# Patient Record
Sex: MALE | Race: BLACK OR AFRICAN AMERICAN | NOT HISPANIC OR LATINO | Employment: FULL TIME | URBAN - METROPOLITAN AREA
[De-identification: names, ages, dates, MRNs, and addresses within clinical notes are randomized per-mention and may not be internally consistent; named-entity substitution may affect disease eponyms.]

---

## 2017-03-16 DIAGNOSIS — I10 ESSENTIAL HYPERTENSION: ICD-10-CM

## 2017-03-16 DIAGNOSIS — F32.A ANXIETY AND DEPRESSION: ICD-10-CM

## 2017-03-16 DIAGNOSIS — F41.9 ANXIETY AND DEPRESSION: ICD-10-CM

## 2017-03-16 RX ORDER — ESCITALOPRAM OXALATE 10 MG/1
TABLET ORAL
Qty: 90 TABLET | Refills: 0 | Status: SHIPPED | OUTPATIENT
Start: 2017-03-16

## 2017-03-16 RX ORDER — LISINOPRIL 10 MG/1
TABLET ORAL
Qty: 90 TABLET | Refills: 0 | Status: SHIPPED | OUTPATIENT
Start: 2017-03-16

## 2017-03-16 NOTE — TELEPHONE ENCOUNTER
Pt is overdue for annual. Please contact him to schedule this. Will send refill of meds in interim.

## 2018-01-01 ENCOUNTER — TELEPHONE (OUTPATIENT)
Dept: SMOKING CESSATION | Facility: CLINIC | Age: 63
End: 2018-01-01

## 2018-01-01 ENCOUNTER — CLINICAL SUPPORT (OUTPATIENT)
Dept: SMOKING CESSATION | Facility: CLINIC | Age: 63
End: 2018-01-01
Payer: COMMERCIAL

## 2018-01-01 ENCOUNTER — ANESTHESIA EVENT (OUTPATIENT)
Dept: SURGERY | Facility: HOSPITAL | Age: 63
DRG: 003 | End: 2018-01-01
Payer: MEDICAID

## 2018-01-01 ENCOUNTER — ANESTHESIA EVENT (OUTPATIENT)
Dept: INTENSIVE CARE | Facility: HOSPITAL | Age: 63
DRG: 003 | End: 2018-01-01
Payer: MEDICAID

## 2018-01-01 ENCOUNTER — ANESTHESIA (OUTPATIENT)
Dept: SURGERY | Facility: HOSPITAL | Age: 63
DRG: 003 | End: 2018-01-01
Payer: MEDICAID

## 2018-01-01 ENCOUNTER — ANESTHESIA EVENT (OUTPATIENT)
Dept: SURGERY | Facility: HOSPITAL | Age: 63
End: 2018-01-01

## 2018-01-01 ENCOUNTER — HOSPITAL ENCOUNTER (INPATIENT)
Facility: HOSPITAL | Age: 63
LOS: 18 days | DRG: 003 | End: 2018-09-30
Attending: EMERGENCY MEDICINE | Admitting: SURGERY
Payer: MEDICAID

## 2018-01-01 ENCOUNTER — ANESTHESIA (OUTPATIENT)
Dept: INTENSIVE CARE | Facility: HOSPITAL | Age: 63
DRG: 003 | End: 2018-01-01
Payer: MEDICAID

## 2018-01-01 VITALS
HEIGHT: 66 IN | RESPIRATION RATE: 36 BRPM | BODY MASS INDEX: 25.61 KG/M2 | SYSTOLIC BLOOD PRESSURE: 75 MMHG | DIASTOLIC BLOOD PRESSURE: 52 MMHG | OXYGEN SATURATION: 79 % | WEIGHT: 159.38 LBS | TEMPERATURE: 98 F

## 2018-01-01 DIAGNOSIS — I49.9 ARRHYTHMIA: ICD-10-CM

## 2018-01-01 DIAGNOSIS — E16.2 HYPOGLYCEMIA: ICD-10-CM

## 2018-01-01 DIAGNOSIS — K25.4 GASTRIC ULCER WITH HEMORRHAGE, UNSPECIFIED CHRONICITY: ICD-10-CM

## 2018-01-01 DIAGNOSIS — R60.0 PERIPHERAL EDEMA: ICD-10-CM

## 2018-01-01 DIAGNOSIS — F17.200 NICOTINE DEPENDENCE: Primary | ICD-10-CM

## 2018-01-01 DIAGNOSIS — R06.03 RESPIRATORY DISTRESS: ICD-10-CM

## 2018-01-01 DIAGNOSIS — J96.01 ACUTE HYPOXEMIC RESPIRATORY FAILURE: ICD-10-CM

## 2018-01-01 DIAGNOSIS — R06.03 ACUTE RESPIRATORY DISTRESS: ICD-10-CM

## 2018-01-01 DIAGNOSIS — K66.8 PNEUMOPERITONEUM: ICD-10-CM

## 2018-01-01 DIAGNOSIS — R00.0 SINUS TACHYCARDIA: ICD-10-CM

## 2018-01-01 DIAGNOSIS — I51.9 RIGHT VENTRICULAR DYSFUNCTION: ICD-10-CM

## 2018-01-01 DIAGNOSIS — R06.02 SHORTNESS OF BREATH: ICD-10-CM

## 2018-01-01 DIAGNOSIS — D62 ACUTE BLOOD LOSS ANEMIA: ICD-10-CM

## 2018-01-01 DIAGNOSIS — R06.09 EXERTIONAL DYSPNEA: ICD-10-CM

## 2018-01-01 DIAGNOSIS — T50.905A MEDICATION ADVERSE EFFECT: ICD-10-CM

## 2018-01-01 DIAGNOSIS — K57.20 PERFORATED DIVERTICULUM OF LARGE INTESTINE: Primary | ICD-10-CM

## 2018-01-01 DIAGNOSIS — J69.0 ASPIRATION PNEUMONIA OF RIGHT LOWER LOBE DUE TO GASTRIC SECRETIONS: ICD-10-CM

## 2018-01-01 DIAGNOSIS — R00.0 TACHYCARDIA: ICD-10-CM

## 2018-01-01 LAB
ABO + RH BLD: NORMAL
ALBUMIN SERPL BCP-MCNC: 0.8 G/DL
ALBUMIN SERPL BCP-MCNC: 0.9 G/DL
ALBUMIN SERPL BCP-MCNC: 1 G/DL
ALBUMIN SERPL BCP-MCNC: 1.1 G/DL
ALBUMIN SERPL BCP-MCNC: 1.2 G/DL
ALBUMIN SERPL BCP-MCNC: 1.3 G/DL
ALBUMIN SERPL BCP-MCNC: 1.3 G/DL
ALBUMIN SERPL BCP-MCNC: 1.4 G/DL
ALBUMIN SERPL BCP-MCNC: 1.5 G/DL
ALBUMIN SERPL BCP-MCNC: 1.6 G/DL
ALBUMIN SERPL BCP-MCNC: 1.6 G/DL
ALBUMIN SERPL BCP-MCNC: 1.7 G/DL
ALBUMIN SERPL BCP-MCNC: 1.7 G/DL
ALBUMIN SERPL BCP-MCNC: 2 G/DL
ALBUMIN SERPL BCP-MCNC: 2.1 G/DL
ALLENS TEST: ABNORMAL
ALP SERPL-CCNC: 103 U/L
ALP SERPL-CCNC: 107 U/L
ALP SERPL-CCNC: 126 U/L
ALP SERPL-CCNC: 129 U/L
ALP SERPL-CCNC: 131 U/L
ALP SERPL-CCNC: 134 U/L
ALP SERPL-CCNC: 140 U/L
ALP SERPL-CCNC: 144 U/L
ALP SERPL-CCNC: 157 U/L
ALP SERPL-CCNC: 158 U/L
ALP SERPL-CCNC: 167 U/L
ALP SERPL-CCNC: 173 U/L
ALP SERPL-CCNC: 181 U/L
ALP SERPL-CCNC: 183 U/L
ALP SERPL-CCNC: 184 U/L
ALP SERPL-CCNC: 189 U/L
ALP SERPL-CCNC: 204 U/L
ALP SERPL-CCNC: 225 U/L
ALP SERPL-CCNC: 78 U/L
ALP SERPL-CCNC: 81 U/L
ALP SERPL-CCNC: 82 U/L
ALP SERPL-CCNC: 89 U/L
ALT SERPL W/O P-5'-P-CCNC: 14 U/L
ALT SERPL W/O P-5'-P-CCNC: 17 U/L
ALT SERPL W/O P-5'-P-CCNC: 19 U/L
ALT SERPL W/O P-5'-P-CCNC: 19 U/L
ALT SERPL W/O P-5'-P-CCNC: 21 U/L
ALT SERPL W/O P-5'-P-CCNC: 21 U/L
ALT SERPL W/O P-5'-P-CCNC: 22 U/L
ALT SERPL W/O P-5'-P-CCNC: 23 U/L
ALT SERPL W/O P-5'-P-CCNC: 23 U/L
ALT SERPL W/O P-5'-P-CCNC: 24 U/L
ALT SERPL W/O P-5'-P-CCNC: 26 U/L
ALT SERPL W/O P-5'-P-CCNC: 27 U/L
ALT SERPL W/O P-5'-P-CCNC: 28 U/L
ALT SERPL W/O P-5'-P-CCNC: 28 U/L
ALT SERPL W/O P-5'-P-CCNC: 30 U/L
ALT SERPL W/O P-5'-P-CCNC: 36 U/L
ALT SERPL W/O P-5'-P-CCNC: 40 U/L
ANION GAP SERPL CALC-SCNC: 10 MMOL/L
ANION GAP SERPL CALC-SCNC: 4 MMOL/L
ANION GAP SERPL CALC-SCNC: 5 MMOL/L
ANION GAP SERPL CALC-SCNC: 6 MMOL/L
ANION GAP SERPL CALC-SCNC: 7 MMOL/L
ANION GAP SERPL CALC-SCNC: 8 MMOL/L
ANION GAP SERPL CALC-SCNC: 9 MMOL/L
ANION GAP SERPL CALC-SCNC: 9 MMOL/L
ANISOCYTOSIS BLD QL SMEAR: ABNORMAL
ANISOCYTOSIS BLD QL SMEAR: ABNORMAL
ANISOCYTOSIS BLD QL SMEAR: SLIGHT
APTT BLDCRRT: 31.5 SEC
APTT BLDCRRT: 31.6 SEC
APTT BLDCRRT: 32.2 SEC
APTT BLDCRRT: 32.9 SEC
APTT BLDCRRT: 35 SEC
APTT BLDCRRT: 35.7 SEC
APTT BLDCRRT: 43.1 SEC
APTT BLDCRRT: 45.1 SEC
APTT BLDCRRT: 45.2 SEC
APTT BLDCRRT: 45.8 SEC
APTT BLDCRRT: 48.5 SEC
APTT BLDCRRT: 51.7 SEC
APTT BLDCRRT: >150 SEC
APTT BLDCRRT: >150 SEC
AST SERPL-CCNC: 15 U/L
AST SERPL-CCNC: 18 U/L
AST SERPL-CCNC: 22 U/L
AST SERPL-CCNC: 23 U/L
AST SERPL-CCNC: 24 U/L
AST SERPL-CCNC: 26 U/L
AST SERPL-CCNC: 26 U/L
AST SERPL-CCNC: 28 U/L
AST SERPL-CCNC: 28 U/L
AST SERPL-CCNC: 30 U/L
AST SERPL-CCNC: 31 U/L
AST SERPL-CCNC: 31 U/L
AST SERPL-CCNC: 32 U/L
AST SERPL-CCNC: 35 U/L
AST SERPL-CCNC: 36 U/L
AST SERPL-CCNC: 37 U/L
AST SERPL-CCNC: 37 U/L
AST SERPL-CCNC: 38 U/L
AST SERPL-CCNC: 38 U/L
AST SERPL-CCNC: 39 U/L
AST SERPL-CCNC: 42 U/L
AST SERPL-CCNC: 43 U/L
AST SERPL-CCNC: 49 U/L
AST SERPL-CCNC: 54 U/L
AST SERPL-CCNC: 80 U/L
BACTERIA #/AREA URNS AUTO: ABNORMAL /HPF
BACTERIA BAL AEROBE CULT: NORMAL
BACTERIA BLD CULT: NORMAL
BACTERIA SPEC AEROBE CULT: NORMAL
BACTERIA SPEC ANAEROBE CULT: NORMAL
BACTERIA UR CULT: NO GROWTH
BACTERIA UR CULT: NORMAL
BASO STIPL BLD QL SMEAR: ABNORMAL
BASOPHILS # BLD AUTO: 0 K/UL
BASOPHILS # BLD AUTO: 0 K/UL
BASOPHILS # BLD AUTO: 0.01 K/UL
BASOPHILS # BLD AUTO: 0.02 K/UL
BASOPHILS # BLD AUTO: 0.03 K/UL
BASOPHILS # BLD AUTO: 0.04 K/UL
BASOPHILS # BLD AUTO: 0.05 K/UL
BASOPHILS # BLD AUTO: 0.09 K/UL
BASOPHILS # BLD AUTO: 0.14 K/UL
BASOPHILS # BLD AUTO: 0.15 K/UL
BASOPHILS # BLD AUTO: 0.17 K/UL
BASOPHILS # BLD AUTO: ABNORMAL K/UL
BASOPHILS NFR BLD: 0 %
BASOPHILS NFR BLD: 0.1 %
BASOPHILS NFR BLD: 0.2 %
BASOPHILS NFR BLD: 0.3 %
BASOPHILS NFR BLD: 0.5 %
BASOPHILS NFR BLD: 0.6 %
BASOPHILS NFR BLD: 0.6 %
BILIRUB SERPL-MCNC: 0.3 MG/DL
BILIRUB SERPL-MCNC: 0.5 MG/DL
BILIRUB SERPL-MCNC: 0.6 MG/DL
BILIRUB SERPL-MCNC: 0.7 MG/DL
BILIRUB SERPL-MCNC: 0.8 MG/DL
BILIRUB SERPL-MCNC: 1 MG/DL
BILIRUB SERPL-MCNC: 1 MG/DL
BILIRUB SERPL-MCNC: 1.1 MG/DL
BILIRUB SERPL-MCNC: 1.2 MG/DL
BILIRUB SERPL-MCNC: 1.2 MG/DL
BILIRUB SERPL-MCNC: 1.3 MG/DL
BILIRUB SERPL-MCNC: 1.4 MG/DL
BILIRUB SERPL-MCNC: 1.8 MG/DL
BILIRUB SERPL-MCNC: 3 MG/DL
BILIRUB SERPL-MCNC: 3.1 MG/DL
BILIRUB UR QL STRIP: NEGATIVE
BILIRUB UR QL STRIP: NEGATIVE
BLD GP AB SCN CELLS X3 SERPL QL: NORMAL
BLD PROD TYP BPU: NORMAL
BLOOD UNIT EXPIRATION DATE: NORMAL
BLOOD UNIT TYPE CODE: 5100
BLOOD UNIT TYPE CODE: 9500
BLOOD UNIT TYPE: NORMAL
BNP SERPL-MCNC: 110 PG/ML
BODY FLUID SOURCE, CREATININE: NORMAL
BUN SERPL-MCNC: 13 MG/DL
BUN SERPL-MCNC: 13 MG/DL
BUN SERPL-MCNC: 14 MG/DL
BUN SERPL-MCNC: 15 MG/DL
BUN SERPL-MCNC: 15 MG/DL
BUN SERPL-MCNC: 18 MG/DL
BUN SERPL-MCNC: 18 MG/DL
BUN SERPL-MCNC: 20 MG/DL
BUN SERPL-MCNC: 22 MG/DL
BUN SERPL-MCNC: 22 MG/DL
BUN SERPL-MCNC: 23 MG/DL
BUN SERPL-MCNC: 24 MG/DL
BUN SERPL-MCNC: 25 MG/DL
BUN SERPL-MCNC: 27 MG/DL
BUN SERPL-MCNC: 27 MG/DL
BUN SERPL-MCNC: 28 MG/DL
BUN SERPL-MCNC: 30 MG/DL
BUN SERPL-MCNC: 31 MG/DL
BUN SERPL-MCNC: 31 MG/DL
BUN SERPL-MCNC: 32 MG/DL
BUN SERPL-MCNC: 35 MG/DL
BUN SERPL-MCNC: 37 MG/DL
BUN SERPL-MCNC: 37 MG/DL
BUN SERPL-MCNC: 41 MG/DL
BURR CELLS BLD QL SMEAR: ABNORMAL
C DIFF GDH STL QL: NEGATIVE
C DIFF TOX A+B STL QL IA: NEGATIVE
CA-I BLDV-SCNC: 0.92 MMOL/L
CA-I BLDV-SCNC: 1.02 MMOL/L
CA-I BLDV-SCNC: 1.02 MMOL/L
CA-I BLDV-SCNC: 1.03 MMOL/L
CA-I BLDV-SCNC: 1.04 MMOL/L
CA-I BLDV-SCNC: 1.05 MMOL/L
CA-I BLDV-SCNC: 1.07 MMOL/L
CA-I BLDV-SCNC: 1.08 MMOL/L
CA-I BLDV-SCNC: 1.08 MMOL/L
CA-I BLDV-SCNC: 1.09 MMOL/L
CA-I BLDV-SCNC: 1.1 MMOL/L
CA-I BLDV-SCNC: 1.1 MMOL/L
CA-I BLDV-SCNC: 1.13 MMOL/L
CA-I BLDV-SCNC: 1.14 MMOL/L
CA-I BLDV-SCNC: 1.17 MMOL/L
CA-I BLDV-SCNC: 1.19 MMOL/L
CA-I BLDV-SCNC: 1.19 MMOL/L
CA-I BLDV-SCNC: 1.21 MMOL/L
CA-I BLDV-SCNC: 1.37 MMOL/L
CALCIUM SERPL-MCNC: 6 MG/DL
CALCIUM SERPL-MCNC: 6.5 MG/DL
CALCIUM SERPL-MCNC: 6.6 MG/DL
CALCIUM SERPL-MCNC: 6.8 MG/DL
CALCIUM SERPL-MCNC: 7.2 MG/DL
CALCIUM SERPL-MCNC: 7.3 MG/DL
CALCIUM SERPL-MCNC: 7.4 MG/DL
CALCIUM SERPL-MCNC: 7.5 MG/DL
CALCIUM SERPL-MCNC: 7.6 MG/DL
CALCIUM SERPL-MCNC: 7.7 MG/DL
CALCIUM SERPL-MCNC: 7.7 MG/DL
CALCIUM SERPL-MCNC: 7.8 MG/DL
CALCIUM SERPL-MCNC: 7.9 MG/DL
CALCIUM SERPL-MCNC: 8.4 MG/DL
CALCIUM SERPL-MCNC: 8.5 MG/DL
CALCIUM SERPL-MCNC: 8.6 MG/DL
CALCIUM SERPL-MCNC: 8.7 MG/DL
CHLORIDE SERPL-SCNC: 104 MMOL/L
CHLORIDE SERPL-SCNC: 109 MMOL/L
CHLORIDE SERPL-SCNC: 111 MMOL/L
CHLORIDE SERPL-SCNC: 112 MMOL/L
CHLORIDE SERPL-SCNC: 113 MMOL/L
CHLORIDE SERPL-SCNC: 114 MMOL/L
CHLORIDE SERPL-SCNC: 115 MMOL/L
CK MB SERPL-MCNC: 4.1 NG/ML
CK MB SERPL-RTO: 3.5 %
CK SERPL-CCNC: 116 U/L
CLARITY UR REFRACT.AUTO: ABNORMAL
CLARITY UR REFRACT.AUTO: ABNORMAL
CO2 SERPL-SCNC: 18 MMOL/L
CO2 SERPL-SCNC: 19 MMOL/L
CO2 SERPL-SCNC: 20 MMOL/L
CO2 SERPL-SCNC: 21 MMOL/L
CO2 SERPL-SCNC: 22 MMOL/L
CO2 SERPL-SCNC: 22 MMOL/L
CO2 SERPL-SCNC: 23 MMOL/L
CO2 SERPL-SCNC: 24 MMOL/L
CO2 SERPL-SCNC: 24 MMOL/L
CO2 SERPL-SCNC: 25 MMOL/L
CO2 SERPL-SCNC: 26 MMOL/L
CO2 SERPL-SCNC: 27 MMOL/L
CO2 SERPL-SCNC: 27 MMOL/L
CODING SYSTEM: NORMAL
COLOR UR AUTO: ABNORMAL
COLOR UR AUTO: YELLOW
CREAT FLD-MCNC: 0.7 MG/DL
CREAT SERPL-MCNC: 0.6 MG/DL
CREAT SERPL-MCNC: 0.7 MG/DL
CREAT SERPL-MCNC: 0.8 MG/DL
CREAT SERPL-MCNC: 0.9 MG/DL
CREAT SERPL-MCNC: 1 MG/DL
DELSYS: ABNORMAL
DIFFERENTIAL METHOD: ABNORMAL
DISPENSE STATUS: NORMAL
DOHLE BOD BLD QL SMEAR: PRESENT
EOSINOPHIL # BLD AUTO: 0 K/UL
EOSINOPHIL # BLD AUTO: 0.1 K/UL
EOSINOPHIL # BLD AUTO: ABNORMAL K/UL
EOSINOPHIL NFR BLD: 0 %
EOSINOPHIL NFR BLD: 0.1 %
EOSINOPHIL NFR BLD: 0.2 %
EOSINOPHIL NFR BLD: 0.3 %
EOSINOPHIL NFR BLD: 0.4 %
EOSINOPHIL NFR BLD: 0.5 %
EOSINOPHIL NFR BLD: 0.5 %
EOSINOPHIL NFR BLD: 1 %
ERYTHROCYTE [DISTWIDTH] IN BLOOD BY AUTOMATED COUNT: 15.6 %
ERYTHROCYTE [DISTWIDTH] IN BLOOD BY AUTOMATED COUNT: 15.6 %
ERYTHROCYTE [DISTWIDTH] IN BLOOD BY AUTOMATED COUNT: 15.8 %
ERYTHROCYTE [DISTWIDTH] IN BLOOD BY AUTOMATED COUNT: 16.2 %
ERYTHROCYTE [DISTWIDTH] IN BLOOD BY AUTOMATED COUNT: 16.5 %
ERYTHROCYTE [DISTWIDTH] IN BLOOD BY AUTOMATED COUNT: 16.7 %
ERYTHROCYTE [DISTWIDTH] IN BLOOD BY AUTOMATED COUNT: 16.8 %
ERYTHROCYTE [DISTWIDTH] IN BLOOD BY AUTOMATED COUNT: 16.9 %
ERYTHROCYTE [DISTWIDTH] IN BLOOD BY AUTOMATED COUNT: 17 %
ERYTHROCYTE [DISTWIDTH] IN BLOOD BY AUTOMATED COUNT: 17.1 %
ERYTHROCYTE [DISTWIDTH] IN BLOOD BY AUTOMATED COUNT: 17.2 %
ERYTHROCYTE [DISTWIDTH] IN BLOOD BY AUTOMATED COUNT: 17.2 %
ERYTHROCYTE [DISTWIDTH] IN BLOOD BY AUTOMATED COUNT: 17.3 %
ERYTHROCYTE [DISTWIDTH] IN BLOOD BY AUTOMATED COUNT: 17.4 %
ERYTHROCYTE [DISTWIDTH] IN BLOOD BY AUTOMATED COUNT: 17.5 %
ERYTHROCYTE [DISTWIDTH] IN BLOOD BY AUTOMATED COUNT: 17.5 %
ERYTHROCYTE [DISTWIDTH] IN BLOOD BY AUTOMATED COUNT: 17.6 %
ERYTHROCYTE [DISTWIDTH] IN BLOOD BY AUTOMATED COUNT: 17.7 %
ERYTHROCYTE [DISTWIDTH] IN BLOOD BY AUTOMATED COUNT: 17.8 %
ERYTHROCYTE [DISTWIDTH] IN BLOOD BY AUTOMATED COUNT: 17.9 %
ERYTHROCYTE [DISTWIDTH] IN BLOOD BY AUTOMATED COUNT: 17.9 %
ERYTHROCYTE [DISTWIDTH] IN BLOOD BY AUTOMATED COUNT: 18 %
ERYTHROCYTE [DISTWIDTH] IN BLOOD BY AUTOMATED COUNT: 18.1 %
ERYTHROCYTE [DISTWIDTH] IN BLOOD BY AUTOMATED COUNT: 18.2 %
ERYTHROCYTE [DISTWIDTH] IN BLOOD BY AUTOMATED COUNT: 18.3 %
ERYTHROCYTE [DISTWIDTH] IN BLOOD BY AUTOMATED COUNT: 18.4 %
ERYTHROCYTE [DISTWIDTH] IN BLOOD BY AUTOMATED COUNT: 18.6 %
ERYTHROCYTE [DISTWIDTH] IN BLOOD BY AUTOMATED COUNT: 18.6 %
ERYTHROCYTE [DISTWIDTH] IN BLOOD BY AUTOMATED COUNT: 18.7 %
ERYTHROCYTE [DISTWIDTH] IN BLOOD BY AUTOMATED COUNT: 18.7 %
ERYTHROCYTE [SEDIMENTATION RATE] IN BLOOD BY WESTERGREN METHOD: 14 MM/H
ERYTHROCYTE [SEDIMENTATION RATE] IN BLOOD BY WESTERGREN METHOD: 20 MM/H
ERYTHROCYTE [SEDIMENTATION RATE] IN BLOOD BY WESTERGREN METHOD: 24 MM/H
ERYTHROCYTE [SEDIMENTATION RATE] IN BLOOD BY WESTERGREN METHOD: 26 MM/H
ERYTHROCYTE [SEDIMENTATION RATE] IN BLOOD BY WESTERGREN METHOD: 28 MM/H
ERYTHROCYTE [SEDIMENTATION RATE] IN BLOOD BY WESTERGREN METHOD: 30 MM/H
EST. GFR  (AFRICAN AMERICAN): >60 ML/MIN/1.73 M^2
EST. GFR  (NON AFRICAN AMERICAN): >60 ML/MIN/1.73 M^2
ESTIMATED PA SYSTOLIC PRESSURE: 50.77
ESTIMATED PA SYSTOLIC PRESSURE: 51.84
FIBRINOGEN PPP-MCNC: 101 MG/DL
FIBRINOGEN PPP-MCNC: 201 MG/DL
FIBRINOGEN PPP-MCNC: 206 MG/DL
FIBRINOGEN PPP-MCNC: 225 MG/DL
FIBRINOGEN PPP-MCNC: 544 MG/DL
FIBRINOGEN PPP-MCNC: 88 MG/DL
FIO2: 100
FIO2: 40
FIO2: 50
FIO2: 60
FIO2: 70
FIO2: 80
FLOW: 15
GIANT PLATELETS BLD QL SMEAR: PRESENT
GLOBAL PERICARDIAL EFFUSION: ABNORMAL
GLOBAL PERICARDIAL EFFUSION: ABNORMAL
GLUCOSE SERPL-MCNC: 101 MG/DL
GLUCOSE SERPL-MCNC: 104 MG/DL
GLUCOSE SERPL-MCNC: 108 MG/DL
GLUCOSE SERPL-MCNC: 114 MG/DL
GLUCOSE SERPL-MCNC: 121 MG/DL
GLUCOSE SERPL-MCNC: 132 MG/DL
GLUCOSE SERPL-MCNC: 133 MG/DL
GLUCOSE SERPL-MCNC: 133 MG/DL
GLUCOSE SERPL-MCNC: 138 MG/DL
GLUCOSE SERPL-MCNC: 171 MG/DL
GLUCOSE SERPL-MCNC: 182 MG/DL
GLUCOSE SERPL-MCNC: 56 MG/DL
GLUCOSE SERPL-MCNC: 68 MG/DL
GLUCOSE SERPL-MCNC: 78 MG/DL
GLUCOSE SERPL-MCNC: 83 MG/DL
GLUCOSE SERPL-MCNC: 84 MG/DL
GLUCOSE SERPL-MCNC: 85 MG/DL
GLUCOSE SERPL-MCNC: 85 MG/DL
GLUCOSE SERPL-MCNC: 85 MG/DL (ref 70–110)
GLUCOSE SERPL-MCNC: 86 MG/DL
GLUCOSE SERPL-MCNC: 88 MG/DL
GLUCOSE SERPL-MCNC: 89 MG/DL
GLUCOSE SERPL-MCNC: 91 MG/DL
GLUCOSE SERPL-MCNC: 93 MG/DL
GLUCOSE SERPL-MCNC: 94 MG/DL
GLUCOSE SERPL-MCNC: 94 MG/DL
GLUCOSE SERPL-MCNC: 97 MG/DL
GLUCOSE UR QL STRIP: NEGATIVE
GLUCOSE UR QL STRIP: NEGATIVE
GRAM STN SPEC: NORMAL
HCO3 UR-SCNC: 16.8 MMOL/L (ref 24–28)
HCO3 UR-SCNC: 17.8 MMOL/L (ref 24–28)
HCO3 UR-SCNC: 18.2 MMOL/L (ref 24–28)
HCO3 UR-SCNC: 18.6 MMOL/L (ref 24–28)
HCO3 UR-SCNC: 18.8 MMOL/L (ref 24–28)
HCO3 UR-SCNC: 19.4 MMOL/L (ref 24–28)
HCO3 UR-SCNC: 19.8 MMOL/L (ref 24–28)
HCO3 UR-SCNC: 20.1 MMOL/L (ref 24–28)
HCO3 UR-SCNC: 21 MMOL/L (ref 24–28)
HCO3 UR-SCNC: 21.2 MMOL/L (ref 24–28)
HCO3 UR-SCNC: 21.2 MMOL/L (ref 24–28)
HCO3 UR-SCNC: 21.3 MMOL/L (ref 24–28)
HCO3 UR-SCNC: 21.5 MMOL/L (ref 24–28)
HCO3 UR-SCNC: 21.5 MMOL/L (ref 24–28)
HCO3 UR-SCNC: 21.8 MMOL/L (ref 24–28)
HCO3 UR-SCNC: 21.9 MMOL/L (ref 24–28)
HCO3 UR-SCNC: 22 MMOL/L (ref 24–28)
HCO3 UR-SCNC: 22 MMOL/L (ref 24–28)
HCO3 UR-SCNC: 22.2 MMOL/L (ref 24–28)
HCO3 UR-SCNC: 22.4 MMOL/L (ref 24–28)
HCO3 UR-SCNC: 22.5 MMOL/L (ref 24–28)
HCO3 UR-SCNC: 22.6 MMOL/L (ref 24–28)
HCO3 UR-SCNC: 22.6 MMOL/L (ref 24–28)
HCO3 UR-SCNC: 22.8 MMOL/L (ref 24–28)
HCO3 UR-SCNC: 22.8 MMOL/L (ref 24–28)
HCO3 UR-SCNC: 23 MMOL/L (ref 24–28)
HCO3 UR-SCNC: 23.1 MMOL/L (ref 24–28)
HCO3 UR-SCNC: 23.1 MMOL/L (ref 24–28)
HCO3 UR-SCNC: 23.4 MMOL/L (ref 24–28)
HCO3 UR-SCNC: 23.6 MMOL/L (ref 24–28)
HCO3 UR-SCNC: 23.8 MMOL/L (ref 24–28)
HCO3 UR-SCNC: 24 MMOL/L (ref 24–28)
HCO3 UR-SCNC: 24.3 MMOL/L (ref 24–28)
HCO3 UR-SCNC: 24.4 MMOL/L (ref 24–28)
HCO3 UR-SCNC: 24.5 MMOL/L (ref 24–28)
HCO3 UR-SCNC: 24.6 MMOL/L (ref 24–28)
HCO3 UR-SCNC: 24.7 MMOL/L (ref 24–28)
HCO3 UR-SCNC: 24.7 MMOL/L (ref 24–28)
HCO3 UR-SCNC: 24.8 MMOL/L (ref 24–28)
HCO3 UR-SCNC: 24.9 MMOL/L (ref 24–28)
HCO3 UR-SCNC: 25.2 MMOL/L (ref 24–28)
HCO3 UR-SCNC: 25.3 MMOL/L (ref 24–28)
HCO3 UR-SCNC: 25.4 MMOL/L (ref 24–28)
HCO3 UR-SCNC: 25.5 MMOL/L (ref 24–28)
HCO3 UR-SCNC: 25.5 MMOL/L (ref 24–28)
HCO3 UR-SCNC: 25.9 MMOL/L (ref 24–28)
HCO3 UR-SCNC: 26.7 MMOL/L (ref 24–28)
HCO3 UR-SCNC: 27 MMOL/L (ref 24–28)
HCO3 UR-SCNC: 27.8 MMOL/L (ref 24–28)
HCO3 UR-SCNC: 28.2 MMOL/L (ref 24–28)
HCO3 UR-SCNC: 29.3 MMOL/L (ref 24–28)
HCO3 UR-SCNC: 30 MMOL/L (ref 24–28)
HCO3 UR-SCNC: 42.3 MMOL/L (ref 24–28)
HCT VFR BLD AUTO: 11.5 %
HCT VFR BLD AUTO: 11.8 %
HCT VFR BLD AUTO: 20.7 %
HCT VFR BLD AUTO: 21 %
HCT VFR BLD AUTO: 21.7 %
HCT VFR BLD AUTO: 23 %
HCT VFR BLD AUTO: 23.7 %
HCT VFR BLD AUTO: 23.7 %
HCT VFR BLD AUTO: 24.1 %
HCT VFR BLD AUTO: 24.1 %
HCT VFR BLD AUTO: 24.4 %
HCT VFR BLD AUTO: 24.5 %
HCT VFR BLD AUTO: 24.7 %
HCT VFR BLD AUTO: 25 %
HCT VFR BLD AUTO: 25 %
HCT VFR BLD AUTO: 25.1 %
HCT VFR BLD AUTO: 25.4 %
HCT VFR BLD AUTO: 25.5 %
HCT VFR BLD AUTO: 25.6 %
HCT VFR BLD AUTO: 25.7 %
HCT VFR BLD AUTO: 25.8 %
HCT VFR BLD AUTO: 26 %
HCT VFR BLD AUTO: 26.2 %
HCT VFR BLD AUTO: 26.7 %
HCT VFR BLD AUTO: 26.7 %
HCT VFR BLD AUTO: 26.8 %
HCT VFR BLD AUTO: 27.1 %
HCT VFR BLD AUTO: 27.1 %
HCT VFR BLD AUTO: 27.2 %
HCT VFR BLD AUTO: 27.5 %
HCT VFR BLD AUTO: 27.6 %
HCT VFR BLD AUTO: 27.7 %
HCT VFR BLD AUTO: 27.7 %
HCT VFR BLD AUTO: 28.3 %
HCT VFR BLD AUTO: 28.5 %
HCT VFR BLD AUTO: 28.6 %
HCT VFR BLD AUTO: 29.2 %
HCT VFR BLD AUTO: 31.8 %
HCT VFR BLD AUTO: 32.4 %
HCT VFR BLD AUTO: 32.7 %
HCT VFR BLD AUTO: 34 %
HCT VFR BLD AUTO: 34.9 %
HCT VFR BLD AUTO: 35.1 %
HCT VFR BLD AUTO: 35.4 %
HCT VFR BLD AUTO: 36.1 %
HCT VFR BLD AUTO: 36.3 %
HCT VFR BLD AUTO: 36.9 %
HCT VFR BLD AUTO: 38 %
HCT VFR BLD AUTO: 38.5 %
HCT VFR BLD CALC: 22 %PCV (ref 36–54)
HCT VFR BLD CALC: 24 %PCV (ref 36–54)
HCT VFR BLD CALC: 27 %PCV (ref 36–54)
HCT VFR BLD CALC: 33 %PCV (ref 36–54)
HCT VFR BLD CALC: <15 %PCV (ref 36–54)
HGB BLD-MCNC: 10.5 G/DL
HGB BLD-MCNC: 10.8 G/DL
HGB BLD-MCNC: 11.1 G/DL
HGB BLD-MCNC: 11.4 G/DL
HGB BLD-MCNC: 11.8 G/DL
HGB BLD-MCNC: 11.9 G/DL
HGB BLD-MCNC: 12 G/DL
HGB BLD-MCNC: 12.1 G/DL
HGB BLD-MCNC: 12.2 G/DL
HGB BLD-MCNC: 12.2 G/DL
HGB BLD-MCNC: 12.6 G/DL
HGB BLD-MCNC: 12.7 G/DL
HGB BLD-MCNC: 3.9 G/DL
HGB BLD-MCNC: 4.3 G/DL
HGB BLD-MCNC: 6.8 G/DL
HGB BLD-MCNC: 6.9 G/DL
HGB BLD-MCNC: 7.6 G/DL
HGB BLD-MCNC: 7.8 G/DL
HGB BLD-MCNC: 7.9 G/DL
HGB BLD-MCNC: 8.2 G/DL
HGB BLD-MCNC: 8.2 G/DL
HGB BLD-MCNC: 8.3 G/DL
HGB BLD-MCNC: 8.3 G/DL
HGB BLD-MCNC: 8.5 G/DL
HGB BLD-MCNC: 8.6 G/DL
HGB BLD-MCNC: 8.8 G/DL
HGB BLD-MCNC: 8.9 G/DL
HGB BLD-MCNC: 9.1 G/DL
HGB BLD-MCNC: 9.2 G/DL
HGB BLD-MCNC: 9.2 G/DL
HGB BLD-MCNC: 9.3 G/DL
HGB BLD-MCNC: 9.3 G/DL
HGB BLD-MCNC: 9.5 G/DL
HGB BLD-MCNC: 9.6 G/DL
HGB BLD-MCNC: 9.6 G/DL
HGB UR QL STRIP: ABNORMAL
HGB UR QL STRIP: ABNORMAL
HYALINE CASTS UR QL AUTO: 1 /LPF
HYALINE CASTS UR QL AUTO: 12 /LPF
HYPOCHROMIA BLD QL SMEAR: ABNORMAL
IMM GRANULOCYTES # BLD AUTO: 0.06 K/UL
IMM GRANULOCYTES # BLD AUTO: 0.07 K/UL
IMM GRANULOCYTES # BLD AUTO: 0.07 K/UL
IMM GRANULOCYTES # BLD AUTO: 0.08 K/UL
IMM GRANULOCYTES # BLD AUTO: 0.09 K/UL
IMM GRANULOCYTES # BLD AUTO: 0.09 K/UL
IMM GRANULOCYTES # BLD AUTO: 0.11 K/UL
IMM GRANULOCYTES # BLD AUTO: 0.12 K/UL
IMM GRANULOCYTES # BLD AUTO: 0.13 K/UL
IMM GRANULOCYTES # BLD AUTO: 0.14 K/UL
IMM GRANULOCYTES # BLD AUTO: 0.15 K/UL
IMM GRANULOCYTES # BLD AUTO: 0.15 K/UL
IMM GRANULOCYTES # BLD AUTO: 0.17 K/UL
IMM GRANULOCYTES # BLD AUTO: 0.2 K/UL
IMM GRANULOCYTES # BLD AUTO: 0.2 K/UL
IMM GRANULOCYTES # BLD AUTO: 0.24 K/UL
IMM GRANULOCYTES # BLD AUTO: 0.24 K/UL
IMM GRANULOCYTES # BLD AUTO: 0.3 K/UL
IMM GRANULOCYTES # BLD AUTO: 0.41 K/UL
IMM GRANULOCYTES # BLD AUTO: 0.42 K/UL
IMM GRANULOCYTES # BLD AUTO: 0.45 K/UL
IMM GRANULOCYTES # BLD AUTO: 0.51 K/UL
IMM GRANULOCYTES # BLD AUTO: 0.53 K/UL
IMM GRANULOCYTES # BLD AUTO: 0.58 K/UL
IMM GRANULOCYTES # BLD AUTO: 0.6 K/UL
IMM GRANULOCYTES # BLD AUTO: 0.68 K/UL
IMM GRANULOCYTES # BLD AUTO: 0.83 K/UL
IMM GRANULOCYTES # BLD AUTO: 0.96 K/UL
IMM GRANULOCYTES # BLD AUTO: 1.05 K/UL
IMM GRANULOCYTES # BLD AUTO: 1.08 K/UL
IMM GRANULOCYTES # BLD AUTO: 1.15 K/UL
IMM GRANULOCYTES # BLD AUTO: ABNORMAL K/UL
IMM GRANULOCYTES NFR BLD AUTO: 0.6 %
IMM GRANULOCYTES NFR BLD AUTO: 0.7 %
IMM GRANULOCYTES NFR BLD AUTO: 0.8 %
IMM GRANULOCYTES NFR BLD AUTO: 0.8 %
IMM GRANULOCYTES NFR BLD AUTO: 0.9 %
IMM GRANULOCYTES NFR BLD AUTO: 1 %
IMM GRANULOCYTES NFR BLD AUTO: 1.1 %
IMM GRANULOCYTES NFR BLD AUTO: 1.2 %
IMM GRANULOCYTES NFR BLD AUTO: 1.2 %
IMM GRANULOCYTES NFR BLD AUTO: 1.3 %
IMM GRANULOCYTES NFR BLD AUTO: 1.6 %
IMM GRANULOCYTES NFR BLD AUTO: 1.7 %
IMM GRANULOCYTES NFR BLD AUTO: 1.7 %
IMM GRANULOCYTES NFR BLD AUTO: 1.8 %
IMM GRANULOCYTES NFR BLD AUTO: 1.9 %
IMM GRANULOCYTES NFR BLD AUTO: 2 %
IMM GRANULOCYTES NFR BLD AUTO: 2.1 %
IMM GRANULOCYTES NFR BLD AUTO: 2.7 %
IMM GRANULOCYTES NFR BLD AUTO: 2.9 %
IMM GRANULOCYTES NFR BLD AUTO: 3.4 %
IMM GRANULOCYTES NFR BLD AUTO: 3.9 %
IMM GRANULOCYTES NFR BLD AUTO: 4 %
IMM GRANULOCYTES NFR BLD AUTO: 4.5 %
IMM GRANULOCYTES NFR BLD AUTO: ABNORMAL %
INR PPP: 1.2
INR PPP: 1.3
INR PPP: 1.3
INR PPP: 1.4
INR PPP: 1.5
INR PPP: 1.6
INR PPP: 1.7
INR PPP: 1.7
INR PPP: 1.9
KETONES UR QL STRIP: NEGATIVE
KETONES UR QL STRIP: NEGATIVE
LACTATE SERPL-SCNC: 1 MMOL/L
LACTATE SERPL-SCNC: 1.3 MMOL/L
LACTATE SERPL-SCNC: 1.4 MMOL/L
LACTATE SERPL-SCNC: 1.5 MMOL/L
LACTATE SERPL-SCNC: 1.6 MMOL/L
LACTATE SERPL-SCNC: 1.7 MMOL/L
LACTATE SERPL-SCNC: 1.8 MMOL/L
LACTATE SERPL-SCNC: 1.9 MMOL/L
LACTATE SERPL-SCNC: 2.1 MMOL/L
LACTATE SERPL-SCNC: 2.1 MMOL/L
LACTATE SERPL-SCNC: 2.3 MMOL/L
LACTATE SERPL-SCNC: 2.5 MMOL/L
LACTATE SERPL-SCNC: 2.6 MMOL/L
LACTATE SERPL-SCNC: 2.9 MMOL/L
LACTATE SERPL-SCNC: 3.5 MMOL/L
LACTATE SERPL-SCNC: 5.9 MMOL/L
LDH SERPL L TO P-CCNC: 1.88 MMOL/L (ref 0.36–1.25)
LDH SERPL L TO P-CCNC: 2.64 MMOL/L (ref 0.36–1.25)
LDH SERPL L TO P-CCNC: 2.92 MMOL/L (ref 0.36–1.25)
LDH SERPL L TO P-CCNC: 3.06 MMOL/L (ref 0.36–1.25)
LDH SERPL L TO P-CCNC: 3.23 MMOL/L (ref 0.36–1.25)
LDH SERPL L TO P-CCNC: 3.91 MMOL/L (ref 0.36–1.25)
LEUKOCYTE ESTERASE UR QL STRIP: ABNORMAL
LEUKOCYTE ESTERASE UR QL STRIP: NEGATIVE
LYMPHOCYTES # BLD AUTO: 0.5 K/UL
LYMPHOCYTES # BLD AUTO: 0.6 K/UL
LYMPHOCYTES # BLD AUTO: 0.7 K/UL
LYMPHOCYTES # BLD AUTO: 0.8 K/UL
LYMPHOCYTES # BLD AUTO: 0.9 K/UL
LYMPHOCYTES # BLD AUTO: 0.9 K/UL
LYMPHOCYTES # BLD AUTO: 1 K/UL
LYMPHOCYTES # BLD AUTO: 1.1 K/UL
LYMPHOCYTES # BLD AUTO: 1.4 K/UL
LYMPHOCYTES # BLD AUTO: ABNORMAL K/UL
LYMPHOCYTES NFR BLD: 1 %
LYMPHOCYTES NFR BLD: 1.5 %
LYMPHOCYTES NFR BLD: 1.5 %
LYMPHOCYTES NFR BLD: 1.7 %
LYMPHOCYTES NFR BLD: 1.8 %
LYMPHOCYTES NFR BLD: 1.8 %
LYMPHOCYTES NFR BLD: 2 %
LYMPHOCYTES NFR BLD: 2.1 %
LYMPHOCYTES NFR BLD: 2.2 %
LYMPHOCYTES NFR BLD: 2.5 %
LYMPHOCYTES NFR BLD: 2.7 %
LYMPHOCYTES NFR BLD: 2.8 %
LYMPHOCYTES NFR BLD: 3 %
LYMPHOCYTES NFR BLD: 3.3 %
LYMPHOCYTES NFR BLD: 3.3 %
LYMPHOCYTES NFR BLD: 3.4 %
LYMPHOCYTES NFR BLD: 3.7 %
LYMPHOCYTES NFR BLD: 3.9 %
LYMPHOCYTES NFR BLD: 4 %
LYMPHOCYTES NFR BLD: 4 %
LYMPHOCYTES NFR BLD: 4.1 %
LYMPHOCYTES NFR BLD: 4.2 %
LYMPHOCYTES NFR BLD: 4.3 %
LYMPHOCYTES NFR BLD: 4.3 %
LYMPHOCYTES NFR BLD: 4.5 %
LYMPHOCYTES NFR BLD: 4.6 %
LYMPHOCYTES NFR BLD: 4.8 %
LYMPHOCYTES NFR BLD: 4.9 %
LYMPHOCYTES NFR BLD: 5 %
LYMPHOCYTES NFR BLD: 5 %
LYMPHOCYTES NFR BLD: 5.2 %
LYMPHOCYTES NFR BLD: 5.6 %
LYMPHOCYTES NFR BLD: 7 %
LYMPHOCYTES NFR BLD: 7.1 %
LYMPHOCYTES NFR BLD: 7.3 %
LYMPHOCYTES NFR BLD: 7.6 %
LYMPHOCYTES NFR BLD: 7.8 %
LYMPHOCYTES NFR BLD: 7.8 %
MAGNESIUM SERPL-MCNC: 1.1 MG/DL
MAGNESIUM SERPL-MCNC: 1.5 MG/DL
MAGNESIUM SERPL-MCNC: 1.6 MG/DL
MAGNESIUM SERPL-MCNC: 1.7 MG/DL
MAGNESIUM SERPL-MCNC: 1.7 MG/DL
MAGNESIUM SERPL-MCNC: 1.8 MG/DL
MAGNESIUM SERPL-MCNC: 1.9 MG/DL
MAGNESIUM SERPL-MCNC: 1.9 MG/DL
MAGNESIUM SERPL-MCNC: 2.1 MG/DL
MAGNESIUM SERPL-MCNC: 2.1 MG/DL
MAGNESIUM SERPL-MCNC: 2.2 MG/DL
MAGNESIUM SERPL-MCNC: 2.3 MG/DL
MAGNESIUM SERPL-MCNC: 2.5 MG/DL
MAGNESIUM SERPL-MCNC: 2.9 MG/DL
MCH RBC QN AUTO: 28.5 PG
MCH RBC QN AUTO: 28.6 PG
MCH RBC QN AUTO: 28.6 PG
MCH RBC QN AUTO: 28.7 PG
MCH RBC QN AUTO: 29.1 PG
MCH RBC QN AUTO: 29.1 PG
MCH RBC QN AUTO: 29.2 PG
MCH RBC QN AUTO: 29.2 PG
MCH RBC QN AUTO: 29.3 PG
MCH RBC QN AUTO: 29.4 PG
MCH RBC QN AUTO: 29.5 PG
MCH RBC QN AUTO: 29.6 PG
MCH RBC QN AUTO: 29.6 PG
MCH RBC QN AUTO: 29.7 PG
MCH RBC QN AUTO: 29.8 PG
MCH RBC QN AUTO: 29.9 PG
MCH RBC QN AUTO: 30 PG
MCH RBC QN AUTO: 30.1 PG
MCH RBC QN AUTO: 30.2 PG
MCH RBC QN AUTO: 30.3 PG
MCH RBC QN AUTO: 30.4 PG
MCH RBC QN AUTO: 30.5 PG
MCH RBC QN AUTO: 30.6 PG
MCH RBC QN AUTO: 30.7 PG
MCH RBC QN AUTO: 30.7 PG
MCH RBC QN AUTO: 30.8 PG
MCH RBC QN AUTO: 30.9 PG
MCH RBC QN AUTO: 31 PG
MCH RBC QN AUTO: 31.1 PG
MCH RBC QN AUTO: 31.8 PG
MCHC RBC AUTO-ENTMCNC: 31 G/DL
MCHC RBC AUTO-ENTMCNC: 31.6 G/DL
MCHC RBC AUTO-ENTMCNC: 32 G/DL
MCHC RBC AUTO-ENTMCNC: 32.3 G/DL
MCHC RBC AUTO-ENTMCNC: 32.5 G/DL
MCHC RBC AUTO-ENTMCNC: 32.8 G/DL
MCHC RBC AUTO-ENTMCNC: 32.8 G/DL
MCHC RBC AUTO-ENTMCNC: 32.9 G/DL
MCHC RBC AUTO-ENTMCNC: 33 G/DL
MCHC RBC AUTO-ENTMCNC: 33.1 G/DL
MCHC RBC AUTO-ENTMCNC: 33.1 G/DL
MCHC RBC AUTO-ENTMCNC: 33.2 G/DL
MCHC RBC AUTO-ENTMCNC: 33.2 G/DL
MCHC RBC AUTO-ENTMCNC: 33.3 G/DL
MCHC RBC AUTO-ENTMCNC: 33.5 G/DL
MCHC RBC AUTO-ENTMCNC: 33.6 G/DL
MCHC RBC AUTO-ENTMCNC: 33.9 G/DL
MCHC RBC AUTO-ENTMCNC: 34.2 G/DL
MCHC RBC AUTO-ENTMCNC: 34.4 G/DL
MCHC RBC AUTO-ENTMCNC: 34.5 G/DL
MCHC RBC AUTO-ENTMCNC: 34.5 G/DL
MCHC RBC AUTO-ENTMCNC: 34.6 G/DL
MCHC RBC AUTO-ENTMCNC: 34.8 G/DL
MCHC RBC AUTO-ENTMCNC: 34.8 G/DL
MCHC RBC AUTO-ENTMCNC: 35 G/DL
MCHC RBC AUTO-ENTMCNC: 35.2 G/DL
MCHC RBC AUTO-ENTMCNC: 35.3 G/DL
MCHC RBC AUTO-ENTMCNC: 35.5 G/DL
MCHC RBC AUTO-ENTMCNC: 35.7 G/DL
MCHC RBC AUTO-ENTMCNC: 35.9 G/DL
MCHC RBC AUTO-ENTMCNC: 36.4 G/DL
MCV RBC AUTO: 82 FL
MCV RBC AUTO: 83 FL
MCV RBC AUTO: 83 FL
MCV RBC AUTO: 84 FL
MCV RBC AUTO: 85 FL
MCV RBC AUTO: 85 FL
MCV RBC AUTO: 86 FL
MCV RBC AUTO: 86 FL
MCV RBC AUTO: 87 FL
MCV RBC AUTO: 89 FL
MCV RBC AUTO: 89 FL
MCV RBC AUTO: 90 FL
MCV RBC AUTO: 91 FL
MCV RBC AUTO: 92 FL
MCV RBC AUTO: 93 FL
MCV RBC AUTO: 94 FL
MCV RBC AUTO: 95 FL
MCV RBC AUTO: 95 FL
MCV RBC AUTO: 96 FL
MCV RBC AUTO: 97 FL
METAMYELOCYTES NFR BLD MANUAL: 0.5 %
METAMYELOCYTES NFR BLD MANUAL: 1 %
METAMYELOCYTES NFR BLD MANUAL: 1 %
METAMYELOCYTES NFR BLD MANUAL: 2 %
METAMYELOCYTES NFR BLD MANUAL: 3 %
METAMYELOCYTES NFR BLD MANUAL: 3 %
METHEMOGLOBIN: 0.7 % (ref 0–3)
METHEMOGLOBIN: 0.9 % (ref 0–3)
METHEMOGLOBIN: 1 % (ref 0–3)
METHEMOGLOBIN: 1 % (ref 0–3)
MICROSCOPIC COMMENT: ABNORMAL
MICROSCOPIC COMMENT: ABNORMAL
MIN VOL: 10
MIN VOL: 10.2
MIN VOL: 10.3
MIN VOL: 11.3
MIN VOL: 11.4
MIN VOL: 11.8
MIN VOL: 12.2
MIN VOL: 12.6
MIN VOL: 13.4
MIN VOL: 17.8
MIN VOL: 18.4
MIN VOL: 7.7
MIN VOL: 8.3
MIN VOL: 8.57
MIN VOL: 9
MIN VOL: 9.8
MITRAL VALVE MOBILITY: NORMAL
MITRAL VALVE MOBILITY: NORMAL
MODE: ABNORMAL
MONOCYTES # BLD AUTO: 0.2 K/UL
MONOCYTES # BLD AUTO: 0.3 K/UL
MONOCYTES # BLD AUTO: 0.4 K/UL
MONOCYTES # BLD AUTO: 0.5 K/UL
MONOCYTES # BLD AUTO: 0.6 K/UL
MONOCYTES # BLD AUTO: 0.8 K/UL
MONOCYTES # BLD AUTO: ABNORMAL K/UL
MONOCYTES NFR BLD: 0 %
MONOCYTES NFR BLD: 0.5 %
MONOCYTES NFR BLD: 0.5 %
MONOCYTES NFR BLD: 0.8 %
MONOCYTES NFR BLD: 0.9 %
MONOCYTES NFR BLD: 0.9 %
MONOCYTES NFR BLD: 1 %
MONOCYTES NFR BLD: 1.1 %
MONOCYTES NFR BLD: 1.2 %
MONOCYTES NFR BLD: 1.2 %
MONOCYTES NFR BLD: 1.4 %
MONOCYTES NFR BLD: 1.4 %
MONOCYTES NFR BLD: 1.5 %
MONOCYTES NFR BLD: 1.6 %
MONOCYTES NFR BLD: 1.8 %
MONOCYTES NFR BLD: 1.9 %
MONOCYTES NFR BLD: 2 %
MONOCYTES NFR BLD: 2.1 %
MONOCYTES NFR BLD: 2.2 %
MONOCYTES NFR BLD: 2.3 %
MONOCYTES NFR BLD: 2.4 %
MONOCYTES NFR BLD: 2.5 %
MONOCYTES NFR BLD: 2.9 %
MONOCYTES NFR BLD: 4 %
MYELOCYTES NFR BLD MANUAL: 0.5 %
MYELOCYTES NFR BLD MANUAL: 1 %
MYELOCYTES NFR BLD MANUAL: 6 %
MYELOCYTES NFR BLD MANUAL: 7 %
NEUTROPHILS # BLD AUTO: 10.1 K/UL
NEUTROPHILS # BLD AUTO: 11.4 K/UL
NEUTROPHILS # BLD AUTO: 12.1 K/UL
NEUTROPHILS # BLD AUTO: 12.4 K/UL
NEUTROPHILS # BLD AUTO: 12.5 K/UL
NEUTROPHILS # BLD AUTO: 12.6 K/UL
NEUTROPHILS # BLD AUTO: 12.9 K/UL
NEUTROPHILS # BLD AUTO: 13.1 K/UL
NEUTROPHILS # BLD AUTO: 13.8 K/UL
NEUTROPHILS # BLD AUTO: 13.9 K/UL
NEUTROPHILS # BLD AUTO: 15.9 K/UL
NEUTROPHILS # BLD AUTO: 15.9 K/UL
NEUTROPHILS # BLD AUTO: 16.7 K/UL
NEUTROPHILS # BLD AUTO: 16.9 K/UL
NEUTROPHILS # BLD AUTO: 18.7 K/UL
NEUTROPHILS # BLD AUTO: 20 K/UL
NEUTROPHILS # BLD AUTO: 20.6 K/UL
NEUTROPHILS # BLD AUTO: 21.7 K/UL
NEUTROPHILS # BLD AUTO: 21.9 K/UL
NEUTROPHILS # BLD AUTO: 23.4 K/UL
NEUTROPHILS # BLD AUTO: 24 K/UL
NEUTROPHILS # BLD AUTO: 24.3 K/UL
NEUTROPHILS # BLD AUTO: 25.7 K/UL
NEUTROPHILS # BLD AUTO: 26.7 K/UL
NEUTROPHILS # BLD AUTO: 27 K/UL
NEUTROPHILS # BLD AUTO: 27.2 K/UL
NEUTROPHILS # BLD AUTO: 28.6 K/UL
NEUTROPHILS # BLD AUTO: 28.8 K/UL
NEUTROPHILS # BLD AUTO: 31.4 K/UL
NEUTROPHILS # BLD AUTO: 7.4 K/UL
NEUTROPHILS # BLD AUTO: 8.2 K/UL
NEUTROPHILS # BLD AUTO: 8.4 K/UL
NEUTROPHILS NFR BLD: 68 %
NEUTROPHILS NFR BLD: 70 %
NEUTROPHILS NFR BLD: 75 %
NEUTROPHILS NFR BLD: 76 %
NEUTROPHILS NFR BLD: 80 %
NEUTROPHILS NFR BLD: 87 %
NEUTROPHILS NFR BLD: 88 %
NEUTROPHILS NFR BLD: 88 %
NEUTROPHILS NFR BLD: 88.8 %
NEUTROPHILS NFR BLD: 89 %
NEUTROPHILS NFR BLD: 89.1 %
NEUTROPHILS NFR BLD: 89.3 %
NEUTROPHILS NFR BLD: 89.3 %
NEUTROPHILS NFR BLD: 89.6 %
NEUTROPHILS NFR BLD: 90.1 %
NEUTROPHILS NFR BLD: 90.5 %
NEUTROPHILS NFR BLD: 90.9 %
NEUTROPHILS NFR BLD: 91 %
NEUTROPHILS NFR BLD: 91.6 %
NEUTROPHILS NFR BLD: 91.7 %
NEUTROPHILS NFR BLD: 91.9 %
NEUTROPHILS NFR BLD: 92 %
NEUTROPHILS NFR BLD: 92.3 %
NEUTROPHILS NFR BLD: 92.4 %
NEUTROPHILS NFR BLD: 92.4 %
NEUTROPHILS NFR BLD: 92.5 %
NEUTROPHILS NFR BLD: 92.5 %
NEUTROPHILS NFR BLD: 92.6 %
NEUTROPHILS NFR BLD: 93 %
NEUTROPHILS NFR BLD: 93 %
NEUTROPHILS NFR BLD: 93.1 %
NEUTROPHILS NFR BLD: 93.2 %
NEUTROPHILS NFR BLD: 93.7 %
NEUTROPHILS NFR BLD: 93.8 %
NEUTROPHILS NFR BLD: 93.9 %
NEUTROPHILS NFR BLD: 94 %
NEUTROPHILS NFR BLD: 94.3 %
NEUTROPHILS NFR BLD: 94.5 %
NEUTROPHILS NFR BLD: 94.7 %
NEUTROPHILS NFR BLD: 95 %
NEUTROPHILS NFR BLD: 96 %
NEUTS BAND NFR BLD MANUAL: 1 %
NEUTS BAND NFR BLD MANUAL: 1.5 %
NEUTS BAND NFR BLD MANUAL: 11 %
NEUTS BAND NFR BLD MANUAL: 13 %
NEUTS BAND NFR BLD MANUAL: 20 %
NEUTS BAND NFR BLD MANUAL: 20 %
NEUTS BAND NFR BLD MANUAL: 24 %
NEUTS BAND NFR BLD MANUAL: 3 %
NEUTS BAND NFR BLD MANUAL: 3 %
NEUTS BAND NFR BLD MANUAL: 3.5 %
NEUTS BAND NFR BLD MANUAL: 4 %
NEUTS BAND NFR BLD MANUAL: 4 %
NEUTS BAND NFR BLD MANUAL: 5 %
NEUTS BAND NFR BLD MANUAL: 5 %
NEUTS BAND NFR BLD MANUAL: 7 %
NEUTS BAND NFR BLD MANUAL: 8 %
NITRITE UR QL STRIP: NEGATIVE
NITRITE UR QL STRIP: NEGATIVE
NRBC BLD-RTO: 0 /100 WBC
NRBC BLD-RTO: 1 /100 WBC
NRBC BLD-RTO: 3 /100 WBC
NUM UNITS TRANS FFP: NORMAL
OVALOCYTES BLD QL SMEAR: ABNORMAL
PAPPENHEIMER BOD BLD QL SMEAR: PRESENT
PCO2 BLDA: 25.2 MMHG (ref 35–45)
PCO2 BLDA: 26.6 MMHG (ref 35–45)
PCO2 BLDA: 28.2 MMHG (ref 35–45)
PCO2 BLDA: 30.1 MMHG (ref 35–45)
PCO2 BLDA: 30.4 MMHG (ref 35–45)
PCO2 BLDA: 30.5 MMHG (ref 35–45)
PCO2 BLDA: 30.6 MMHG (ref 35–45)
PCO2 BLDA: 30.7 MMHG (ref 35–45)
PCO2 BLDA: 30.7 MMHG (ref 35–45)
PCO2 BLDA: 30.9 MMHG (ref 35–45)
PCO2 BLDA: 31.5 MMHG (ref 35–45)
PCO2 BLDA: 32.3 MMHG (ref 35–45)
PCO2 BLDA: 32.3 MMHG (ref 35–45)
PCO2 BLDA: 32.4 MMHG (ref 35–45)
PCO2 BLDA: 32.4 MMHG (ref 35–45)
PCO2 BLDA: 32.7 MMHG (ref 35–45)
PCO2 BLDA: 32.9 MMHG (ref 35–45)
PCO2 BLDA: 33 MMHG (ref 35–45)
PCO2 BLDA: 33.3 MMHG (ref 35–45)
PCO2 BLDA: 33.9 MMHG (ref 35–45)
PCO2 BLDA: 34 MMHG (ref 35–45)
PCO2 BLDA: 34.9 MMHG (ref 35–45)
PCO2 BLDA: 34.9 MMHG (ref 35–45)
PCO2 BLDA: 35.1 MMHG (ref 35–45)
PCO2 BLDA: 35.9 MMHG (ref 35–45)
PCO2 BLDA: 36.7 MMHG (ref 35–45)
PCO2 BLDA: 36.9 MMHG (ref 35–45)
PCO2 BLDA: 38.3 MMHG (ref 35–45)
PCO2 BLDA: 38.6 MMHG (ref 35–45)
PCO2 BLDA: 38.6 MMHG (ref 35–45)
PCO2 BLDA: 39.8 MMHG (ref 35–45)
PCO2 BLDA: 39.9 MMHG (ref 35–45)
PCO2 BLDA: 40 MMHG (ref 35–45)
PCO2 BLDA: 40.1 MMHG (ref 35–45)
PCO2 BLDA: 40.2 MMHG (ref 35–45)
PCO2 BLDA: 40.3 MMHG (ref 35–45)
PCO2 BLDA: 40.4 MMHG (ref 35–45)
PCO2 BLDA: 40.8 MMHG (ref 35–45)
PCO2 BLDA: 41 MMHG (ref 35–45)
PCO2 BLDA: 41.6 MMHG (ref 35–45)
PCO2 BLDA: 42.4 MMHG (ref 35–45)
PCO2 BLDA: 42.5 MMHG (ref 35–45)
PCO2 BLDA: 42.6 MMHG (ref 35–45)
PCO2 BLDA: 48 MMHG (ref 35–45)
PCO2 BLDA: 48.6 MMHG (ref 35–45)
PCO2 BLDA: 54.8 MMHG (ref 35–45)
PCO2 BLDA: 55.5 MMHG (ref 35–45)
PCO2 BLDA: 56.1 MMHG (ref 35–45)
PCO2 BLDA: 56.1 MMHG (ref 35–45)
PCO2 BLDA: 56.9 MMHG (ref 35–45)
PCO2 BLDA: 57.2 MMHG (ref 35–45)
PCO2 BLDA: 57.9 MMHG (ref 35–45)
PCO2 BLDA: 60 MMHG (ref 35–45)
PCO2 BLDA: 60.4 MMHG (ref 35–45)
PCO2 BLDA: 61.2 MMHG (ref 35–45)
PCO2 BLDA: 61.7 MMHG (ref 35–45)
PCO2 BLDA: 62.5 MMHG (ref 35–45)
PCO2 BLDA: 64.5 MMHG (ref 35–45)
PCO2 BLDA: 72.5 MMHG (ref 35–45)
PCO2 BLDA: 74.1 MMHG (ref 35–45)
PCO2 BLDA: 77.1 MMHG (ref 35–45)
PEEP: 10
PEEP: 12
PEEP: 15
PEEP: 16
PEEP: 5
PEEP: 8
PH SMN: 7.07 [PH] (ref 7.35–7.45)
PH SMN: 7.09 [PH] (ref 7.35–7.45)
PH SMN: 7.11 [PH] (ref 7.35–7.45)
PH SMN: 7.17 [PH] (ref 7.35–7.45)
PH SMN: 7.18 [PH] (ref 7.35–7.45)
PH SMN: 7.2 [PH] (ref 7.35–7.45)
PH SMN: 7.21 [PH] (ref 7.35–7.45)
PH SMN: 7.23 [PH] (ref 7.35–7.45)
PH SMN: 7.24 [PH] (ref 7.35–7.45)
PH SMN: 7.24 [PH] (ref 7.35–7.45)
PH SMN: 7.25 [PH] (ref 7.35–7.45)
PH SMN: 7.26 [PH] (ref 7.35–7.45)
PH SMN: 7.26 [PH] (ref 7.35–7.45)
PH SMN: 7.28 [PH] (ref 7.35–7.45)
PH SMN: 7.31 [PH] (ref 7.35–7.45)
PH SMN: 7.32 [PH] (ref 7.35–7.45)
PH SMN: 7.32 [PH] (ref 7.35–7.45)
PH SMN: 7.33 [PH] (ref 7.35–7.45)
PH SMN: 7.34 [PH] (ref 7.35–7.45)
PH SMN: 7.35 [PH] (ref 7.35–7.45)
PH SMN: 7.35 [PH] (ref 7.35–7.45)
PH SMN: 7.36 [PH] (ref 7.35–7.45)
PH SMN: 7.37 [PH] (ref 7.35–7.45)
PH SMN: 7.37 [PH] (ref 7.35–7.45)
PH SMN: 7.38 [PH] (ref 7.35–7.45)
PH SMN: 7.39 [PH] (ref 7.35–7.45)
PH SMN: 7.4 [PH] (ref 7.35–7.45)
PH SMN: 7.41 [PH] (ref 7.35–7.45)
PH SMN: 7.42 [PH] (ref 7.35–7.45)
PH SMN: 7.43 [PH] (ref 7.35–7.45)
PH SMN: 7.44 [PH] (ref 7.35–7.45)
PH SMN: 7.45 [PH] (ref 7.35–7.45)
PH SMN: 7.46 [PH] (ref 7.35–7.45)
PH SMN: 7.47 [PH] (ref 7.35–7.45)
PH SMN: 7.48 [PH] (ref 7.35–7.45)
PH SMN: 7.49 [PH] (ref 7.35–7.45)
PH SMN: 7.5 [PH] (ref 7.35–7.45)
PH UR STRIP: 5 [PH] (ref 5–8)
PH UR STRIP: 5 [PH] (ref 5–8)
PHOSPHATE SERPL-MCNC: 2.3 MG/DL
PHOSPHATE SERPL-MCNC: 2.4 MG/DL
PHOSPHATE SERPL-MCNC: 2.4 MG/DL
PHOSPHATE SERPL-MCNC: 2.5 MG/DL
PHOSPHATE SERPL-MCNC: 2.6 MG/DL
PHOSPHATE SERPL-MCNC: 2.8 MG/DL
PHOSPHATE SERPL-MCNC: 2.9 MG/DL
PHOSPHATE SERPL-MCNC: 3 MG/DL
PHOSPHATE SERPL-MCNC: 3.1 MG/DL
PHOSPHATE SERPL-MCNC: 3.2 MG/DL
PHOSPHATE SERPL-MCNC: 3.3 MG/DL
PHOSPHATE SERPL-MCNC: 3.3 MG/DL
PHOSPHATE SERPL-MCNC: 3.6 MG/DL
PHOSPHATE SERPL-MCNC: 3.6 MG/DL
PHOSPHATE SERPL-MCNC: 3.8 MG/DL
PHOSPHATE SERPL-MCNC: 3.8 MG/DL
PHOSPHATE SERPL-MCNC: 4.3 MG/DL
PHOSPHATE SERPL-MCNC: 5 MG/DL
PHOSPHATE SERPL-MCNC: 5.5 MG/DL
PHOSPHATE SERPL-MCNC: 5.9 MG/DL
PIP: 14
PIP: 14
PIP: 17
PIP: 17
PIP: 21
PIP: 22
PIP: 22
PIP: 24
PIP: 26
PIP: 27
PIP: 28
PIP: 29
PLATELET # BLD AUTO: 100 K/UL
PLATELET # BLD AUTO: 102 K/UL
PLATELET # BLD AUTO: 103 K/UL
PLATELET # BLD AUTO: 105 K/UL
PLATELET # BLD AUTO: 106 K/UL
PLATELET # BLD AUTO: 109 K/UL
PLATELET # BLD AUTO: 116 K/UL
PLATELET # BLD AUTO: 117 K/UL
PLATELET # BLD AUTO: 118 K/UL
PLATELET # BLD AUTO: 124 K/UL
PLATELET # BLD AUTO: 126 K/UL
PLATELET # BLD AUTO: 128 K/UL
PLATELET # BLD AUTO: 130 K/UL
PLATELET # BLD AUTO: 138 K/UL
PLATELET # BLD AUTO: 141 K/UL
PLATELET # BLD AUTO: 141 K/UL
PLATELET # BLD AUTO: 142 K/UL
PLATELET # BLD AUTO: 143 K/UL
PLATELET # BLD AUTO: 144 K/UL
PLATELET # BLD AUTO: 144 K/UL
PLATELET # BLD AUTO: 145 K/UL
PLATELET # BLD AUTO: 148 K/UL
PLATELET # BLD AUTO: 148 K/UL
PLATELET # BLD AUTO: 151 K/UL
PLATELET # BLD AUTO: 158 K/UL
PLATELET # BLD AUTO: 167 K/UL
PLATELET # BLD AUTO: 167 K/UL
PLATELET # BLD AUTO: 170 K/UL
PLATELET # BLD AUTO: 170 K/UL
PLATELET # BLD AUTO: 180 K/UL
PLATELET # BLD AUTO: 187 K/UL
PLATELET # BLD AUTO: 190 K/UL
PLATELET # BLD AUTO: 192 K/UL
PLATELET # BLD AUTO: 258 K/UL
PLATELET # BLD AUTO: 28 K/UL
PLATELET # BLD AUTO: 33 K/UL
PLATELET # BLD AUTO: 38 K/UL
PLATELET # BLD AUTO: 48 K/UL
PLATELET # BLD AUTO: 68 K/UL
PLATELET # BLD AUTO: 69 K/UL
PLATELET # BLD AUTO: 71 K/UL
PLATELET # BLD AUTO: 76 K/UL
PLATELET # BLD AUTO: 90 K/UL
PLATELET # BLD AUTO: 90 K/UL
PLATELET # BLD AUTO: 92 K/UL
PLATELET # BLD AUTO: 97 K/UL
PLATELET # BLD AUTO: 97 K/UL
PLATELET # BLD AUTO: 99 K/UL
PLATELET BLD QL SMEAR: ABNORMAL
PMV BLD AUTO: 10.3 FL
PMV BLD AUTO: 10.4 FL
PMV BLD AUTO: 10.5 FL
PMV BLD AUTO: 10.6 FL
PMV BLD AUTO: 10.6 FL
PMV BLD AUTO: 10.8 FL
PMV BLD AUTO: 10.9 FL
PMV BLD AUTO: 11 FL
PMV BLD AUTO: 11 FL
PMV BLD AUTO: 11.1 FL
PMV BLD AUTO: 11.3 FL
PMV BLD AUTO: 11.3 FL
PMV BLD AUTO: 11.4 FL
PMV BLD AUTO: 11.4 FL
PMV BLD AUTO: 11.5 FL
PMV BLD AUTO: 11.5 FL
PMV BLD AUTO: 11.6 FL
PMV BLD AUTO: 11.6 FL
PMV BLD AUTO: 11.7 FL
PMV BLD AUTO: 11.8 FL
PMV BLD AUTO: 11.9 FL
PMV BLD AUTO: 12 FL
PMV BLD AUTO: 12.2 FL
PMV BLD AUTO: 12.2 FL
PMV BLD AUTO: 12.4 FL
PMV BLD AUTO: 12.4 FL
PMV BLD AUTO: 12.5 FL
PMV BLD AUTO: 12.5 FL
PMV BLD AUTO: 12.7 FL
PMV BLD AUTO: 12.8 FL
PMV BLD AUTO: 12.8 FL
PMV BLD AUTO: 12.9 FL
PMV BLD AUTO: 9.1 FL
PMV BLD AUTO: 9.5 FL
PMV BLD AUTO: 9.8 FL
PMV BLD AUTO: 9.9 FL
PMV BLD AUTO: 9.9 FL
PMV BLD AUTO: ABNORMAL FL
PO2 BLDA: 102 MMHG (ref 80–100)
PO2 BLDA: 105 MMHG (ref 80–100)
PO2 BLDA: 106 MMHG (ref 80–100)
PO2 BLDA: 107 MMHG (ref 80–100)
PO2 BLDA: 111 MMHG (ref 80–100)
PO2 BLDA: 121 MMHG (ref 80–100)
PO2 BLDA: 126 MMHG (ref 80–100)
PO2 BLDA: 134 MMHG (ref 80–100)
PO2 BLDA: 138 MMHG (ref 80–100)
PO2 BLDA: 149 MMHG (ref 80–100)
PO2 BLDA: 161 MMHG (ref 80–100)
PO2 BLDA: 163 MMHG (ref 80–100)
PO2 BLDA: 174 MMHG (ref 80–100)
PO2 BLDA: 174 MMHG (ref 80–100)
PO2 BLDA: 176 MMHG (ref 80–100)
PO2 BLDA: 180 MMHG (ref 80–100)
PO2 BLDA: 191 MMHG (ref 80–100)
PO2 BLDA: 205 MMHG (ref 80–100)
PO2 BLDA: 214 MMHG (ref 80–100)
PO2 BLDA: 228 MMHG (ref 80–100)
PO2 BLDA: 228 MMHG (ref 80–100)
PO2 BLDA: 328 MMHG (ref 80–100)
PO2 BLDA: 38 MMHG (ref 80–100)
PO2 BLDA: 41 MMHG (ref 80–100)
PO2 BLDA: 45 MMHG (ref 80–100)
PO2 BLDA: 49 MMHG (ref 80–100)
PO2 BLDA: 51 MMHG (ref 80–100)
PO2 BLDA: 51 MMHG (ref 80–100)
PO2 BLDA: 52 MMHG (ref 80–100)
PO2 BLDA: 53 MMHG (ref 80–100)
PO2 BLDA: 53 MMHG (ref 80–100)
PO2 BLDA: 57 MMHG (ref 80–100)
PO2 BLDA: 58 MMHG (ref 80–100)
PO2 BLDA: 58 MMHG (ref 80–100)
PO2 BLDA: 59 MMHG (ref 80–100)
PO2 BLDA: 60 MMHG (ref 80–100)
PO2 BLDA: 61 MMHG (ref 80–100)
PO2 BLDA: 61 MMHG (ref 80–100)
PO2 BLDA: 63 MMHG (ref 80–100)
PO2 BLDA: 63 MMHG (ref 80–100)
PO2 BLDA: 64 MMHG (ref 80–100)
PO2 BLDA: 64 MMHG (ref 80–100)
PO2 BLDA: 65 MMHG (ref 80–100)
PO2 BLDA: 65 MMHG (ref 80–100)
PO2 BLDA: 66 MMHG (ref 80–100)
PO2 BLDA: 67 MMHG (ref 80–100)
PO2 BLDA: 69 MMHG (ref 80–100)
PO2 BLDA: 69 MMHG (ref 80–100)
PO2 BLDA: 70 MMHG (ref 80–100)
PO2 BLDA: 74 MMHG (ref 80–100)
PO2 BLDA: 74 MMHG (ref 80–100)
PO2 BLDA: 75 MMHG (ref 80–100)
PO2 BLDA: 77 MMHG (ref 80–100)
PO2 BLDA: 77 MMHG (ref 80–100)
PO2 BLDA: 81 MMHG (ref 80–100)
PO2 BLDA: 82 MMHG (ref 80–100)
PO2 BLDA: 83 MMHG (ref 80–100)
PO2 BLDA: 86 MMHG (ref 80–100)
PO2 BLDA: 86 MMHG (ref 80–100)
PO2 BLDA: 91 MMHG (ref 80–100)
PO2 BLDA: 98 MMHG (ref 80–100)
POC BE: -1 MMOL/L
POC BE: -2 MMOL/L
POC BE: -3 MMOL/L
POC BE: -4 MMOL/L
POC BE: -5 MMOL/L
POC BE: -6 MMOL/L
POC BE: -7 MMOL/L
POC BE: -9 MMOL/L
POC BE: -9 MMOL/L
POC BE: 0 MMOL/L
POC BE: 1 MMOL/L
POC BE: 19 MMOL/L
POC BE: 2 MMOL/L
POC BE: 4 MMOL/L
POC BE: 6 MMOL/L
POC BE: 6 MMOL/L
POC IONIZED CALCIUM: 1 MMOL/L (ref 1.06–1.42)
POC IONIZED CALCIUM: 1.07 MMOL/L (ref 1.06–1.42)
POC IONIZED CALCIUM: 1.09 MMOL/L (ref 1.06–1.42)
POC IONIZED CALCIUM: 1.1 MMOL/L (ref 1.06–1.42)
POC IONIZED CALCIUM: 1.16 MMOL/L (ref 1.06–1.42)
POC PCO2 TEMP: 56.9 MMHG
POC PH TEMP: 7.48
POC PO2 TEMP: 98 MMHG
POC SATURATED O2: 100 % (ref 95–100)
POC SATURATED O2: 72 % (ref 95–100)
POC SATURATED O2: 74 % (ref 95–100)
POC SATURATED O2: 76 % (ref 95–100)
POC SATURATED O2: 77 % (ref 95–100)
POC SATURATED O2: 80 % (ref 95–100)
POC SATURATED O2: 82 % (ref 95–100)
POC SATURATED O2: 83 % (ref 95–100)
POC SATURATED O2: 83 % (ref 95–100)
POC SATURATED O2: 84 % (ref 95–100)
POC SATURATED O2: 84 % (ref 95–100)
POC SATURATED O2: 85 % (ref 95–100)
POC SATURATED O2: 85 % (ref 95–100)
POC SATURATED O2: 86 % (ref 95–100)
POC SATURATED O2: 87 % (ref 95–100)
POC SATURATED O2: 88 % (ref 95–100)
POC SATURATED O2: 89 % (ref 95–100)
POC SATURATED O2: 91 % (ref 95–100)
POC SATURATED O2: 91 % (ref 95–100)
POC SATURATED O2: 92 % (ref 95–100)
POC SATURATED O2: 93 % (ref 95–100)
POC SATURATED O2: 93 % (ref 95–100)
POC SATURATED O2: 94 % (ref 95–100)
POC SATURATED O2: 95 % (ref 95–100)
POC SATURATED O2: 96 % (ref 95–100)
POC SATURATED O2: 96 % (ref 95–100)
POC SATURATED O2: 97 % (ref 95–100)
POC SATURATED O2: 98 % (ref 95–100)
POC SATURATED O2: 99 % (ref 95–100)
POC TCO2: 18 MMOL/L (ref 23–27)
POC TCO2: 19 MMOL/L (ref 23–27)
POC TCO2: 19 MMOL/L (ref 23–27)
POC TCO2: 20 MMOL/L (ref 23–27)
POC TCO2: 21 MMOL/L (ref 23–27)
POC TCO2: 21 MMOL/L (ref 23–27)
POC TCO2: 22 MMOL/L (ref 23–27)
POC TCO2: 23 MMOL/L (ref 23–27)
POC TCO2: 24 MMOL/L (ref 23–27)
POC TCO2: 25 MMOL/L (ref 23–27)
POC TCO2: 26 MMOL/L (ref 23–27)
POC TCO2: 27 MMOL/L (ref 23–27)
POC TCO2: 28 MMOL/L (ref 23–27)
POC TCO2: 28 MMOL/L (ref 23–27)
POC TCO2: 29 MMOL/L (ref 23–27)
POC TCO2: 29 MMOL/L (ref 23–27)
POC TCO2: 30 MMOL/L (ref 23–27)
POC TCO2: 31 MMOL/L (ref 23–27)
POC TCO2: 31 MMOL/L (ref 23–27)
POC TCO2: 44 MMOL/L (ref 23–27)
POC TEMPERATURE: ABNORMAL
POCT GLUCOSE: 100 MG/DL (ref 70–110)
POCT GLUCOSE: 101 MG/DL (ref 70–110)
POCT GLUCOSE: 105 MG/DL (ref 70–110)
POCT GLUCOSE: 105 MG/DL (ref 70–110)
POCT GLUCOSE: 106 MG/DL (ref 70–110)
POCT GLUCOSE: 106 MG/DL (ref 70–110)
POCT GLUCOSE: 107 MG/DL (ref 70–110)
POCT GLUCOSE: 107 MG/DL (ref 70–110)
POCT GLUCOSE: 108 MG/DL (ref 70–110)
POCT GLUCOSE: 108 MG/DL (ref 70–110)
POCT GLUCOSE: 111 MG/DL (ref 70–110)
POCT GLUCOSE: 111 MG/DL (ref 70–110)
POCT GLUCOSE: 112 MG/DL (ref 70–110)
POCT GLUCOSE: 112 MG/DL (ref 70–110)
POCT GLUCOSE: 113 MG/DL (ref 70–110)
POCT GLUCOSE: 114 MG/DL (ref 70–110)
POCT GLUCOSE: 116 MG/DL (ref 70–110)
POCT GLUCOSE: 117 MG/DL (ref 70–110)
POCT GLUCOSE: 118 MG/DL (ref 70–110)
POCT GLUCOSE: 119 MG/DL (ref 70–110)
POCT GLUCOSE: 119 MG/DL (ref 70–110)
POCT GLUCOSE: 120 MG/DL (ref 70–110)
POCT GLUCOSE: 120 MG/DL (ref 70–110)
POCT GLUCOSE: 121 MG/DL (ref 70–110)
POCT GLUCOSE: 123 MG/DL (ref 70–110)
POCT GLUCOSE: 124 MG/DL (ref 70–110)
POCT GLUCOSE: 125 MG/DL (ref 70–110)
POCT GLUCOSE: 126 MG/DL (ref 70–110)
POCT GLUCOSE: 128 MG/DL (ref 70–110)
POCT GLUCOSE: 130 MG/DL (ref 70–110)
POCT GLUCOSE: 131 MG/DL (ref 70–110)
POCT GLUCOSE: 131 MG/DL (ref 70–110)
POCT GLUCOSE: 134 MG/DL (ref 70–110)
POCT GLUCOSE: 135 MG/DL (ref 70–110)
POCT GLUCOSE: 136 MG/DL (ref 70–110)
POCT GLUCOSE: 138 MG/DL (ref 70–110)
POCT GLUCOSE: 138 MG/DL (ref 70–110)
POCT GLUCOSE: 139 MG/DL (ref 70–110)
POCT GLUCOSE: 142 MG/DL (ref 70–110)
POCT GLUCOSE: 142 MG/DL (ref 70–110)
POCT GLUCOSE: 143 MG/DL (ref 70–110)
POCT GLUCOSE: 143 MG/DL (ref 70–110)
POCT GLUCOSE: 144 MG/DL (ref 70–110)
POCT GLUCOSE: 147 MG/DL (ref 70–110)
POCT GLUCOSE: 154 MG/DL (ref 70–110)
POCT GLUCOSE: 155 MG/DL (ref 70–110)
POCT GLUCOSE: 157 MG/DL (ref 70–110)
POCT GLUCOSE: 160 MG/DL (ref 70–110)
POCT GLUCOSE: 161 MG/DL (ref 70–110)
POCT GLUCOSE: 164 MG/DL (ref 70–110)
POCT GLUCOSE: 166 MG/DL (ref 70–110)
POCT GLUCOSE: 168 MG/DL (ref 70–110)
POCT GLUCOSE: 168 MG/DL (ref 70–110)
POCT GLUCOSE: 173 MG/DL (ref 70–110)
POCT GLUCOSE: 174 MG/DL (ref 70–110)
POCT GLUCOSE: 175 MG/DL (ref 70–110)
POCT GLUCOSE: 177 MG/DL (ref 70–110)
POCT GLUCOSE: 177 MG/DL (ref 70–110)
POCT GLUCOSE: 20 MG/DL (ref 70–110)
POCT GLUCOSE: 200 MG/DL (ref 70–110)
POCT GLUCOSE: 232 MG/DL (ref 70–110)
POCT GLUCOSE: 339 MG/DL (ref 70–110)
POCT GLUCOSE: 44 MG/DL (ref 70–110)
POCT GLUCOSE: 47 MG/DL (ref 70–110)
POCT GLUCOSE: 47 MG/DL (ref 70–110)
POCT GLUCOSE: 50 MG/DL (ref 70–110)
POCT GLUCOSE: 51 MG/DL (ref 70–110)
POCT GLUCOSE: 54 MG/DL (ref 70–110)
POCT GLUCOSE: 55 MG/DL (ref 70–110)
POCT GLUCOSE: 56 MG/DL (ref 70–110)
POCT GLUCOSE: 57 MG/DL (ref 70–110)
POCT GLUCOSE: 58 MG/DL (ref 70–110)
POCT GLUCOSE: 60 MG/DL (ref 70–110)
POCT GLUCOSE: 61 MG/DL (ref 70–110)
POCT GLUCOSE: 61 MG/DL (ref 70–110)
POCT GLUCOSE: 63 MG/DL (ref 70–110)
POCT GLUCOSE: 64 MG/DL (ref 70–110)
POCT GLUCOSE: 67 MG/DL (ref 70–110)
POCT GLUCOSE: 68 MG/DL (ref 70–110)
POCT GLUCOSE: 68 MG/DL (ref 70–110)
POCT GLUCOSE: 69 MG/DL (ref 70–110)
POCT GLUCOSE: 70 MG/DL (ref 70–110)
POCT GLUCOSE: 71 MG/DL (ref 70–110)
POCT GLUCOSE: 72 MG/DL (ref 70–110)
POCT GLUCOSE: 74 MG/DL (ref 70–110)
POCT GLUCOSE: 75 MG/DL (ref 70–110)
POCT GLUCOSE: 75 MG/DL (ref 70–110)
POCT GLUCOSE: 76 MG/DL (ref 70–110)
POCT GLUCOSE: 76 MG/DL (ref 70–110)
POCT GLUCOSE: 77 MG/DL (ref 70–110)
POCT GLUCOSE: 77 MG/DL (ref 70–110)
POCT GLUCOSE: 79 MG/DL (ref 70–110)
POCT GLUCOSE: 80 MG/DL (ref 70–110)
POCT GLUCOSE: 81 MG/DL (ref 70–110)
POCT GLUCOSE: 82 MG/DL (ref 70–110)
POCT GLUCOSE: 83 MG/DL (ref 70–110)
POCT GLUCOSE: 84 MG/DL (ref 70–110)
POCT GLUCOSE: 84 MG/DL (ref 70–110)
POCT GLUCOSE: 85 MG/DL (ref 70–110)
POCT GLUCOSE: 85 MG/DL (ref 70–110)
POCT GLUCOSE: 86 MG/DL (ref 70–110)
POCT GLUCOSE: 88 MG/DL (ref 70–110)
POCT GLUCOSE: 88 MG/DL (ref 70–110)
POCT GLUCOSE: 89 MG/DL (ref 70–110)
POCT GLUCOSE: 90 MG/DL (ref 70–110)
POCT GLUCOSE: 90 MG/DL (ref 70–110)
POCT GLUCOSE: 91 MG/DL (ref 70–110)
POCT GLUCOSE: 92 MG/DL (ref 70–110)
POCT GLUCOSE: 93 MG/DL (ref 70–110)
POCT GLUCOSE: 94 MG/DL (ref 70–110)
POCT GLUCOSE: 94 MG/DL (ref 70–110)
POCT GLUCOSE: 95 MG/DL (ref 70–110)
POCT GLUCOSE: 95 MG/DL (ref 70–110)
POCT GLUCOSE: 97 MG/DL (ref 70–110)
POCT GLUCOSE: 97 MG/DL (ref 70–110)
POCT GLUCOSE: 98 MG/DL (ref 70–110)
POCT GLUCOSE: 99 MG/DL (ref 70–110)
POCT GLUCOSE: 99 MG/DL (ref 70–110)
POCT GLUCOSE: <20 MG/DL (ref 70–110)
POIKILOCYTOSIS BLD QL SMEAR: ABNORMAL
POIKILOCYTOSIS BLD QL SMEAR: SLIGHT
POLYCHROMASIA BLD QL SMEAR: ABNORMAL
POTASSIUM BLD-SCNC: 3.5 MMOL/L (ref 3.5–5.1)
POTASSIUM BLD-SCNC: 3.5 MMOL/L (ref 3.5–5.1)
POTASSIUM BLD-SCNC: 3.8 MMOL/L (ref 3.5–5.1)
POTASSIUM BLD-SCNC: 4 MMOL/L (ref 3.5–5.1)
POTASSIUM BLD-SCNC: 4.3 MMOL/L (ref 3.5–5.1)
POTASSIUM SERPL-SCNC: 3.4 MMOL/L
POTASSIUM SERPL-SCNC: 3.5 MMOL/L
POTASSIUM SERPL-SCNC: 3.6 MMOL/L
POTASSIUM SERPL-SCNC: 3.7 MMOL/L
POTASSIUM SERPL-SCNC: 3.9 MMOL/L
POTASSIUM SERPL-SCNC: 4 MMOL/L
POTASSIUM SERPL-SCNC: 4 MMOL/L
POTASSIUM SERPL-SCNC: 4.1 MMOL/L
POTASSIUM SERPL-SCNC: 4.2 MMOL/L
POTASSIUM SERPL-SCNC: 4.3 MMOL/L
POTASSIUM SERPL-SCNC: 4.5 MMOL/L
POTASSIUM SERPL-SCNC: 4.6 MMOL/L
POTASSIUM SERPL-SCNC: 4.8 MMOL/L
POTASSIUM SERPL-SCNC: 4.8 MMOL/L
PROMYELOCYTES NFR BLD MANUAL: 1 %
PROMYELOCYTES NFR BLD MANUAL: 1 %
PROT SERPL-MCNC: 2.8 G/DL
PROT SERPL-MCNC: 3.2 G/DL
PROT SERPL-MCNC: 3.3 G/DL
PROT SERPL-MCNC: 3.4 G/DL
PROT SERPL-MCNC: 3.4 G/DL
PROT SERPL-MCNC: 3.6 G/DL
PROT SERPL-MCNC: 3.6 G/DL
PROT SERPL-MCNC: 3.7 G/DL
PROT SERPL-MCNC: 3.7 G/DL
PROT SERPL-MCNC: 3.8 G/DL
PROT SERPL-MCNC: 3.8 G/DL
PROT SERPL-MCNC: 3.9 G/DL
PROT SERPL-MCNC: 3.9 G/DL
PROT SERPL-MCNC: 4 G/DL
PROT SERPL-MCNC: 4 G/DL
PROT SERPL-MCNC: 4.1 G/DL
PROT SERPL-MCNC: 4.1 G/DL
PROT SERPL-MCNC: 4.2 G/DL
PROT SERPL-MCNC: 4.4 G/DL
PROT SERPL-MCNC: 4.4 G/DL
PROT SERPL-MCNC: 5 G/DL
PROT UR QL STRIP: ABNORMAL
PROT UR QL STRIP: NEGATIVE
PROTHROMBIN TIME: 12.1 SEC
PROTHROMBIN TIME: 12.3 SEC
PROTHROMBIN TIME: 12.4 SEC
PROTHROMBIN TIME: 12.7 SEC
PROTHROMBIN TIME: 13 SEC
PROTHROMBIN TIME: 13.8 SEC
PROTHROMBIN TIME: 14.3 SEC
PROTHROMBIN TIME: 14.5 SEC
PROTHROMBIN TIME: 14.6 SEC
PROTHROMBIN TIME: 14.9 SEC
PROTHROMBIN TIME: 15 SEC
PROTHROMBIN TIME: 15.1 SEC
PROTHROMBIN TIME: 15.1 SEC
PROTHROMBIN TIME: 15.2 SEC
PROTHROMBIN TIME: 15.4 SEC
PROTHROMBIN TIME: 15.9 SEC
PROTHROMBIN TIME: 16.4 SEC
PROTHROMBIN TIME: 16.5 SEC
PROTHROMBIN TIME: 19 SEC
PROVIDER CREDENTIALS: ABNORMAL
PROVIDER NOTIFIED: ABNORMAL
PS: 10
RBC # BLD AUTO: 1.37 M/UL
RBC # BLD AUTO: 1.44 M/UL
RBC # BLD AUTO: 2.23 M/UL
RBC # BLD AUTO: 2.3 M/UL
RBC # BLD AUTO: 2.53 M/UL
RBC # BLD AUTO: 2.59 M/UL
RBC # BLD AUTO: 2.66 M/UL
RBC # BLD AUTO: 2.68 M/UL
RBC # BLD AUTO: 2.78 M/UL
RBC # BLD AUTO: 2.8 M/UL
RBC # BLD AUTO: 2.82 M/UL
RBC # BLD AUTO: 2.83 M/UL
RBC # BLD AUTO: 2.85 M/UL
RBC # BLD AUTO: 2.86 M/UL
RBC # BLD AUTO: 2.88 M/UL
RBC # BLD AUTO: 2.88 M/UL
RBC # BLD AUTO: 2.89 M/UL
RBC # BLD AUTO: 2.89 M/UL
RBC # BLD AUTO: 2.91 M/UL
RBC # BLD AUTO: 2.96 M/UL
RBC # BLD AUTO: 2.96 M/UL
RBC # BLD AUTO: 2.97 M/UL
RBC # BLD AUTO: 2.97 M/UL
RBC # BLD AUTO: 2.98 M/UL
RBC # BLD AUTO: 3 M/UL
RBC # BLD AUTO: 3.01 M/UL
RBC # BLD AUTO: 3.01 M/UL
RBC # BLD AUTO: 3.02 M/UL
RBC # BLD AUTO: 3.05 M/UL
RBC # BLD AUTO: 3.07 M/UL
RBC # BLD AUTO: 3.07 M/UL
RBC # BLD AUTO: 3.14 M/UL
RBC # BLD AUTO: 3.17 M/UL
RBC # BLD AUTO: 3.19 M/UL
RBC # BLD AUTO: 3.23 M/UL
RBC # BLD AUTO: 3.25 M/UL
RBC # BLD AUTO: 3.48 M/UL
RBC # BLD AUTO: 3.55 M/UL
RBC # BLD AUTO: 3.61 M/UL
RBC # BLD AUTO: 3.74 M/UL
RBC # BLD AUTO: 3.87 M/UL
RBC # BLD AUTO: 3.9 M/UL
RBC # BLD AUTO: 3.95 M/UL
RBC # BLD AUTO: 3.97 M/UL
RBC # BLD AUTO: 3.98 M/UL
RBC # BLD AUTO: 3.99 M/UL
RBC # BLD AUTO: 4.01 M/UL
RBC # BLD AUTO: 4.15 M/UL
RBC # BLD AUTO: 4.16 M/UL
RBC # BLD AUTO: 4.17 M/UL
RBC #/AREA URNS AUTO: 1 /HPF (ref 0–4)
RBC #/AREA URNS AUTO: 15 /HPF (ref 0–4)
RETIRED EF AND QEF - SEE NOTES: 55 (ref 55–65)
RETIRED EF AND QEF - SEE NOTES: 70 (ref 55–65)
SAMPLE: ABNORMAL
SCHISTOCYTES BLD QL SMEAR: ABNORMAL
SCHISTOCYTES BLD QL SMEAR: PRESENT
SITE: ABNORMAL
SODIUM BLD-SCNC: 137 MMOL/L (ref 136–145)
SODIUM BLD-SCNC: 143 MMOL/L (ref 136–145)
SODIUM BLD-SCNC: 143 MMOL/L (ref 136–145)
SODIUM BLD-SCNC: 144 MMOL/L (ref 136–145)
SODIUM BLD-SCNC: 145 MMOL/L (ref 136–145)
SODIUM SERPL-SCNC: 136 MMOL/L
SODIUM SERPL-SCNC: 137 MMOL/L
SODIUM SERPL-SCNC: 138 MMOL/L
SODIUM SERPL-SCNC: 139 MMOL/L
SODIUM SERPL-SCNC: 140 MMOL/L
SODIUM SERPL-SCNC: 141 MMOL/L
SODIUM SERPL-SCNC: 142 MMOL/L
SODIUM SERPL-SCNC: 143 MMOL/L
SODIUM SERPL-SCNC: 143 MMOL/L
SODIUM SERPL-SCNC: 144 MMOL/L
SODIUM SERPL-SCNC: 144 MMOL/L
SODIUM SERPL-SCNC: 145 MMOL/L
SP GR UR STRIP: 1 (ref 1–1.03)
SP GR UR STRIP: 1.01 (ref 1–1.03)
SP02: 100
SP02: 78
SP02: 84
SP02: 85
SP02: 85
SP02: 88
SP02: 91
SP02: 92
SP02: 93
SP02: 95
SP02: 96
SP02: 97
SP02: 98
SP02: 99
SPHEROCYTES BLD QL SMEAR: ABNORMAL
SPHEROCYTES BLD QL SMEAR: ABNORMAL
SPONT RATE: 25
SPONT RATE: 29
SQUAMOUS #/AREA URNS AUTO: 0 /HPF
SQUAMOUS #/AREA URNS AUTO: 3 /HPF
TARGETS BLD QL SMEAR: ABNORMAL
TIME NOTIFIED: 2010
TIME NOTIFIED: 2015
TOXIC GRANULES BLD QL SMEAR: PRESENT
TRANS ERYTHROCYTES VOL PATIENT: NORMAL ML
TRANS PLATPHERESIS VOL PATIENT: NORMAL ML
TRICUSPID VALVE REGURGITATION: ABNORMAL
TRICUSPID VALVE REGURGITATION: ABNORMAL
TRIGL SERPL-MCNC: 156 MG/DL
TROPONIN I SERPL DL<=0.01 NG/ML-MCNC: 0.05 NG/ML
TROPONIN I SERPL DL<=0.01 NG/ML-MCNC: 0.05 NG/ML
TROPONIN I SERPL DL<=0.01 NG/ML-MCNC: 0.08 NG/ML
TROPONIN I SERPL DL<=0.01 NG/ML-MCNC: <0.006 NG/ML
TROPONIN I SERPL DL<=0.01 NG/ML-MCNC: <0.006 NG/ML
UNIT NUMBER: NORMAL
URN SPEC COLLECT METH UR: ABNORMAL
URN SPEC COLLECT METH UR: ABNORMAL
UROBILINOGEN UR STRIP-ACNC: NEGATIVE EU/DL
UROBILINOGEN UR STRIP-ACNC: NEGATIVE EU/DL
VANCOMYCIN TROUGH SERPL-MCNC: 24.2 UG/ML
VERBAL RESULT READBACK PERFORMED: YES
VERBAL RESULT READBACK PERFORMED: YES
VT: 350
VT: 357
VT: 360
VT: 380
VT: 425
VT: 427
VT: 439
VT: 446
VT: 450
VT: 489
VT: 494
VT: 543
WBC # BLD AUTO: 10.87 K/UL
WBC # BLD AUTO: 12.4 K/UL
WBC # BLD AUTO: 13.34 K/UL
WBC # BLD AUTO: 13.4 K/UL
WBC # BLD AUTO: 13.47 K/UL
WBC # BLD AUTO: 13.61 K/UL
WBC # BLD AUTO: 13.93 K/UL
WBC # BLD AUTO: 14.64 K/UL
WBC # BLD AUTO: 15.09 K/UL
WBC # BLD AUTO: 15.11 K/UL
WBC # BLD AUTO: 15.39 K/UL
WBC # BLD AUTO: 16.98 K/UL
WBC # BLD AUTO: 17.9 K/UL
WBC # BLD AUTO: 18.07 K/UL
WBC # BLD AUTO: 18.28 K/UL
WBC # BLD AUTO: 18.36 K/UL
WBC # BLD AUTO: 18.91 K/UL
WBC # BLD AUTO: 19.24 K/UL
WBC # BLD AUTO: 19.51 K/UL
WBC # BLD AUTO: 20.59 K/UL
WBC # BLD AUTO: 20.79 K/UL
WBC # BLD AUTO: 21.39 K/UL
WBC # BLD AUTO: 21.77 K/UL
WBC # BLD AUTO: 22.41 K/UL
WBC # BLD AUTO: 22.63 K/UL
WBC # BLD AUTO: 23.3 K/UL
WBC # BLD AUTO: 23.62 K/UL
WBC # BLD AUTO: 24.36 K/UL
WBC # BLD AUTO: 25.39 K/UL
WBC # BLD AUTO: 25.48 K/UL
WBC # BLD AUTO: 25.83 K/UL
WBC # BLD AUTO: 26.95 K/UL
WBC # BLD AUTO: 27.27 K/UL
WBC # BLD AUTO: 27.87 K/UL
WBC # BLD AUTO: 28.12 K/UL
WBC # BLD AUTO: 28.37 K/UL
WBC # BLD AUTO: 28.83 K/UL
WBC # BLD AUTO: 28.98 K/UL
WBC # BLD AUTO: 30.46 K/UL
WBC # BLD AUTO: 31.05 K/UL
WBC # BLD AUTO: 31.06 K/UL
WBC # BLD AUTO: 31.97 K/UL
WBC # BLD AUTO: 32.15 K/UL
WBC # BLD AUTO: 33.15 K/UL
WBC # BLD AUTO: 33.99 K/UL
WBC # BLD AUTO: 35.93 K/UL
WBC # BLD AUTO: 41.9 K/UL
WBC # BLD AUTO: 8.26 K/UL
WBC # BLD AUTO: 9.21 K/UL
WBC # BLD AUTO: 9.38 K/UL
WBC #/AREA URNS AUTO: 1 /HPF (ref 0–5)
WBC #/AREA URNS AUTO: >100 /HPF (ref 0–5)
WBC CLUMPS UR QL AUTO: ABNORMAL
WBC TOXIC VACUOLES BLD QL SMEAR: PRESENT
YEAST UR QL AUTO: ABNORMAL

## 2018-01-01 PROCEDURE — 93010 ELECTROCARDIOGRAM REPORT: CPT | Mod: ,,, | Performed by: INTERNAL MEDICINE

## 2018-01-01 PROCEDURE — 31600 PLANNED TRACHEOSTOMY: CPT | Mod: 79,,, | Performed by: SURGERY

## 2018-01-01 PROCEDURE — 25000003 PHARM REV CODE 250: Performed by: SURGERY

## 2018-01-01 PROCEDURE — 20000000 HC ICU ROOM

## 2018-01-01 PROCEDURE — 37799 UNLISTED PX VASCULAR SURGERY: CPT

## 2018-01-01 PROCEDURE — 27200966 HC CLOSED SUCTION SYSTEM

## 2018-01-01 PROCEDURE — 80053 COMPREHEN METABOLIC PANEL: CPT

## 2018-01-01 PROCEDURE — 63600175 PHARM REV CODE 636 W HCPCS: Performed by: STUDENT IN AN ORGANIZED HEALTH CARE EDUCATION/TRAINING PROGRAM

## 2018-01-01 PROCEDURE — A4216 STERILE WATER/SALINE, 10 ML: HCPCS | Performed by: SURGERY

## 2018-01-01 PROCEDURE — 0DJ00ZZ INSPECTION OF UPPER INTESTINAL TRACT, OPEN APPROACH: ICD-10-PCS | Performed by: SURGERY

## 2018-01-01 PROCEDURE — 83605 ASSAY OF LACTIC ACID: CPT

## 2018-01-01 PROCEDURE — 99407 BEHAV CHNG SMOKING > 10 MIN: CPT | Mod: S$GLB,,, | Performed by: INTERNAL MEDICINE

## 2018-01-01 PROCEDURE — 25000003 PHARM REV CODE 250: Performed by: STUDENT IN AN ORGANIZED HEALTH CARE EDUCATION/TRAINING PROGRAM

## 2018-01-01 PROCEDURE — P9045 ALBUMIN (HUMAN), 5%, 250 ML: HCPCS | Mod: JG

## 2018-01-01 PROCEDURE — 94003 VENT MGMT INPAT SUBQ DAY: CPT

## 2018-01-01 PROCEDURE — C1751 CATH, INF, PER/CENT/MIDLINE: HCPCS

## 2018-01-01 PROCEDURE — 86022 PLATELET ANTIBODIES: CPT

## 2018-01-01 PROCEDURE — 97116 GAIT TRAINING THERAPY: CPT

## 2018-01-01 PROCEDURE — 85610 PROTHROMBIN TIME: CPT | Mod: 91

## 2018-01-01 PROCEDURE — 94761 N-INVAS EAR/PLS OXIMETRY MLT: CPT

## 2018-01-01 PROCEDURE — 25000242 PHARM REV CODE 250 ALT 637 W/ HCPCS: Performed by: STUDENT IN AN ORGANIZED HEALTH CARE EDUCATION/TRAINING PROGRAM

## 2018-01-01 PROCEDURE — 82330 ASSAY OF CALCIUM: CPT

## 2018-01-01 PROCEDURE — 85730 THROMBOPLASTIN TIME PARTIAL: CPT

## 2018-01-01 PROCEDURE — 0W9930Z DRAINAGE OF RIGHT PLEURAL CAVITY WITH DRAINAGE DEVICE, PERCUTANEOUS APPROACH: ICD-10-PCS | Performed by: SURGERY

## 2018-01-01 PROCEDURE — C9113 INJ PANTOPRAZOLE SODIUM, VIA: HCPCS | Performed by: STUDENT IN AN ORGANIZED HEALTH CARE EDUCATION/TRAINING PROGRAM

## 2018-01-01 PROCEDURE — 85007 BL SMEAR W/DIFF WBC COUNT: CPT | Mod: 91

## 2018-01-01 PROCEDURE — 87071 CULTURE AEROBIC QUANT OTHER: CPT

## 2018-01-01 PROCEDURE — 85384 FIBRINOGEN ACTIVITY: CPT | Mod: 91

## 2018-01-01 PROCEDURE — 37000009 HC ANESTHESIA EA ADD 15 MINS: Performed by: SURGERY

## 2018-01-01 PROCEDURE — 63600175 PHARM REV CODE 636 W HCPCS: Performed by: SURGERY

## 2018-01-01 PROCEDURE — 93005 ELECTROCARDIOGRAM TRACING: CPT

## 2018-01-01 PROCEDURE — 87075 CULTR BACTERIA EXCEPT BLOOD: CPT

## 2018-01-01 PROCEDURE — 94640 AIRWAY INHALATION TREATMENT: CPT

## 2018-01-01 PROCEDURE — 83735 ASSAY OF MAGNESIUM: CPT

## 2018-01-01 PROCEDURE — 87106 FUNGI IDENTIFICATION YEAST: CPT

## 2018-01-01 PROCEDURE — 94668 MNPJ CHEST WALL SBSQ: CPT

## 2018-01-01 PROCEDURE — 85027 COMPLETE CBC AUTOMATED: CPT

## 2018-01-01 PROCEDURE — 87070 CULTURE OTHR SPECIMN AEROBIC: CPT

## 2018-01-01 PROCEDURE — 84484 ASSAY OF TROPONIN QUANT: CPT

## 2018-01-01 PROCEDURE — 87086 URINE CULTURE/COLONY COUNT: CPT

## 2018-01-01 PROCEDURE — 31500 INSERT EMERGENCY AIRWAY: CPT

## 2018-01-01 PROCEDURE — 27000221 HC OXYGEN, UP TO 24 HOURS

## 2018-01-01 PROCEDURE — 93306 TTE W/DOPPLER COMPLETE: CPT | Mod: 26,,, | Performed by: INTERNAL MEDICINE

## 2018-01-01 PROCEDURE — 85007 BL SMEAR W/DIFF WBC COUNT: CPT

## 2018-01-01 PROCEDURE — 87077 CULTURE AEROBIC IDENTIFY: CPT | Mod: 59

## 2018-01-01 PROCEDURE — G8987 SELF CARE CURRENT STATUS: HCPCS | Mod: CK

## 2018-01-01 PROCEDURE — C9113 INJ PANTOPRAZOLE SODIUM, VIA: HCPCS | Performed by: SURGERY

## 2018-01-01 PROCEDURE — 85384 FIBRINOGEN ACTIVITY: CPT

## 2018-01-01 PROCEDURE — 99233 SBSQ HOSP IP/OBS HIGH 50: CPT | Mod: 24,,, | Performed by: SURGERY

## 2018-01-01 PROCEDURE — 36415 COLL VENOUS BLD VENIPUNCTURE: CPT

## 2018-01-01 PROCEDURE — 44143 PARTIAL REMOVAL OF COLON: CPT | Mod: ,,, | Performed by: SURGERY

## 2018-01-01 PROCEDURE — 99900035 HC TECH TIME PER 15 MIN (STAT)

## 2018-01-01 PROCEDURE — 99900026 HC AIRWAY MAINTENANCE (STAT)

## 2018-01-01 PROCEDURE — 86920 COMPATIBILITY TEST SPIN: CPT

## 2018-01-01 PROCEDURE — 83735 ASSAY OF MAGNESIUM: CPT | Mod: 91

## 2018-01-01 PROCEDURE — 63600175 PHARM REV CODE 636 W HCPCS: Mod: JG | Performed by: NURSE ANESTHETIST, CERTIFIED REGISTERED

## 2018-01-01 PROCEDURE — 85027 COMPLETE CBC AUTOMATED: CPT | Mod: 91

## 2018-01-01 PROCEDURE — 92950 HEART/LUNG RESUSCITATION CPR: CPT

## 2018-01-01 PROCEDURE — 99233 SBSQ HOSP IP/OBS HIGH 50: CPT | Mod: ,,, | Performed by: ANESTHESIOLOGY

## 2018-01-01 PROCEDURE — 84478 ASSAY OF TRIGLYCERIDES: CPT

## 2018-01-01 PROCEDURE — G8988 SELF CARE GOAL STATUS: HCPCS | Mod: CJ

## 2018-01-01 PROCEDURE — 80048 BASIC METABOLIC PNL TOTAL CA: CPT | Mod: 91

## 2018-01-01 PROCEDURE — 63600175 PHARM REV CODE 636 W HCPCS

## 2018-01-01 PROCEDURE — 99282 EMERGENCY DEPT VISIT SF MDM: CPT | Mod: ,,, | Performed by: EMERGENCY MEDICINE

## 2018-01-01 PROCEDURE — 84132 ASSAY OF SERUM POTASSIUM: CPT

## 2018-01-01 PROCEDURE — 82800 BLOOD PH: CPT

## 2018-01-01 PROCEDURE — 84100 ASSAY OF PHOSPHORUS: CPT

## 2018-01-01 PROCEDURE — 85610 PROTHROMBIN TIME: CPT

## 2018-01-01 PROCEDURE — 31624 DX BRONCHOSCOPE/LAVAGE: CPT | Mod: 79,,, | Performed by: SURGERY

## 2018-01-01 PROCEDURE — 87102 FUNGUS ISOLATION CULTURE: CPT

## 2018-01-01 PROCEDURE — 25500020 PHARM REV CODE 255: Performed by: STUDENT IN AN ORGANIZED HEALTH CARE EDUCATION/TRAINING PROGRAM

## 2018-01-01 PROCEDURE — 85025 COMPLETE CBC W/AUTO DIFF WBC: CPT

## 2018-01-01 PROCEDURE — 99900025 HC BRONCHOSCOPY-ASST (STAT)

## 2018-01-01 PROCEDURE — 36430 TRANSFUSION BLD/BLD COMPNT: CPT

## 2018-01-01 PROCEDURE — 94002 VENT MGMT INPAT INIT DAY: CPT

## 2018-01-01 PROCEDURE — 83605 ASSAY OF LACTIC ACID: CPT | Mod: 91

## 2018-01-01 PROCEDURE — 36000709 HC OR TIME LEV III EA ADD 15 MIN: Performed by: SURGERY

## 2018-01-01 PROCEDURE — 80053 COMPREHEN METABOLIC PANEL: CPT | Mod: 91

## 2018-01-01 PROCEDURE — 0B9F8ZX DRAINAGE OF RIGHT LOWER LUNG LOBE, VIA NATURAL OR ARTIFICIAL OPENING ENDOSCOPIC, DIAGNOSTIC: ICD-10-PCS | Performed by: SURGERY

## 2018-01-01 PROCEDURE — 63600367 HC NITRIC OXIDE PER HOUR

## 2018-01-01 PROCEDURE — 84100 ASSAY OF PHOSPHORUS: CPT | Mod: 91

## 2018-01-01 PROCEDURE — 25000003 PHARM REV CODE 250: Performed by: NURSE ANESTHETIST, CERTIFIED REGISTERED

## 2018-01-01 PROCEDURE — 80048 BASIC METABOLIC PNL TOTAL CA: CPT

## 2018-01-01 PROCEDURE — 0D1N0Z4 BYPASS SIGMOID COLON TO CUTANEOUS, OPEN APPROACH: ICD-10-PCS | Performed by: SURGERY

## 2018-01-01 PROCEDURE — 27100092 HC HIGH FLOW DELIVERY CANNULA

## 2018-01-01 PROCEDURE — 82803 BLOOD GASES ANY COMBINATION: CPT

## 2018-01-01 PROCEDURE — 27100171 HC OXYGEN HIGH FLOW UP TO 24 HOURS

## 2018-01-01 PROCEDURE — 97535 SELF CARE MNGMENT TRAINING: CPT

## 2018-01-01 PROCEDURE — 81001 URINALYSIS AUTO W/SCOPE: CPT

## 2018-01-01 PROCEDURE — 99232 SBSQ HOSP IP/OBS MODERATE 35: CPT | Mod: ,,, | Performed by: ANESTHESIOLOGY

## 2018-01-01 PROCEDURE — 87186 SC STD MICRODIL/AGAR DIL: CPT | Mod: 59

## 2018-01-01 PROCEDURE — A4217 STERILE WATER/SALINE, 500 ML: HCPCS | Performed by: STUDENT IN AN ORGANIZED HEALTH CARE EDUCATION/TRAINING PROGRAM

## 2018-01-01 PROCEDURE — 87040 BLOOD CULTURE FOR BACTERIA: CPT | Mod: 59

## 2018-01-01 PROCEDURE — 94770 HC EXHALED C02 TEST: CPT

## 2018-01-01 PROCEDURE — 96365 THER/PROPH/DIAG IV INF INIT: CPT

## 2018-01-01 PROCEDURE — P9021 RED BLOOD CELLS UNIT: HCPCS

## 2018-01-01 PROCEDURE — 85002 BLEEDING TIME TEST: CPT

## 2018-01-01 PROCEDURE — 36569 INSJ PICC 5 YR+ W/O IMAGING: CPT

## 2018-01-01 PROCEDURE — 31624 DX BRONCHOSCOPE/LAVAGE: CPT

## 2018-01-01 PROCEDURE — D9220A PRA ANESTHESIA: Mod: CRNA,,, | Performed by: NURSE ANESTHETIST, CERTIFIED REGISTERED

## 2018-01-01 PROCEDURE — 0W3P0ZZ CONTROL BLEEDING IN GASTROINTESTINAL TRACT, OPEN APPROACH: ICD-10-PCS | Performed by: SURGERY

## 2018-01-01 PROCEDURE — 0DBN0ZZ EXCISION OF SIGMOID COLON, OPEN APPROACH: ICD-10-PCS | Performed by: SURGERY

## 2018-01-01 PROCEDURE — 27800903 OPTIME MED/SURG SUP & DEVICES OTHER IMPLANTS: Performed by: SURGERY

## 2018-01-01 PROCEDURE — A4217 STERILE WATER/SALINE, 500 ML: HCPCS | Performed by: SURGERY

## 2018-01-01 PROCEDURE — 99900022

## 2018-01-01 PROCEDURE — 63600175 PHARM REV CODE 636 W HCPCS: Performed by: NURSE ANESTHETIST, CERTIFIED REGISTERED

## 2018-01-01 PROCEDURE — D9220A PRA ANESTHESIA: Mod: ANES,,, | Performed by: ANESTHESIOLOGY

## 2018-01-01 PROCEDURE — 27100025 HC TUBING, SET FLUID WARMER: Performed by: NURSE ANESTHETIST, CERTIFIED REGISTERED

## 2018-01-01 PROCEDURE — 25000242 PHARM REV CODE 250 ALT 637 W/ HCPCS: Performed by: SURGERY

## 2018-01-01 PROCEDURE — 87205 SMEAR GRAM STAIN: CPT

## 2018-01-01 PROCEDURE — 99232 SBSQ HOSP IP/OBS MODERATE 35: CPT | Mod: ,,, | Performed by: SURGERY

## 2018-01-01 PROCEDURE — 87077 CULTURE AEROBIC IDENTIFY: CPT

## 2018-01-01 PROCEDURE — 25000003 PHARM REV CODE 250

## 2018-01-01 PROCEDURE — P9012 CRYOPRECIPITATE EACH UNIT: HCPCS

## 2018-01-01 PROCEDURE — 37000008 HC ANESTHESIA 1ST 15 MINUTES: Performed by: SURGERY

## 2018-01-01 PROCEDURE — 84295 ASSAY OF SERUM SODIUM: CPT

## 2018-01-01 PROCEDURE — 86901 BLOOD TYPING SEROLOGIC RH(D): CPT

## 2018-01-01 PROCEDURE — B4185 PARENTERAL SOL 10 GM LIPIDS: HCPCS | Performed by: STUDENT IN AN ORGANIZED HEALTH CARE EDUCATION/TRAINING PROGRAM

## 2018-01-01 PROCEDURE — 85025 COMPLETE CBC W/AUTO DIFF WBC: CPT | Mod: 91

## 2018-01-01 PROCEDURE — P9017 PLASMA 1 DONOR FRZ W/IN 8 HR: HCPCS

## 2018-01-01 PROCEDURE — 99233 SBSQ HOSP IP/OBS HIGH 50: CPT | Mod: ,,, | Performed by: INTERNAL MEDICINE

## 2018-01-01 PROCEDURE — 99291 CRITICAL CARE FIRST HOUR: CPT | Mod: 24,25,, | Performed by: SURGERY

## 2018-01-01 PROCEDURE — P9045 ALBUMIN (HUMAN), 5%, 250 ML: HCPCS | Mod: JG | Performed by: STUDENT IN AN ORGANIZED HEALTH CARE EDUCATION/TRAINING PROGRAM

## 2018-01-01 PROCEDURE — 36600 WITHDRAWAL OF ARTERIAL BLOOD: CPT

## 2018-01-01 PROCEDURE — 0BH18EZ INSERTION OF ENDOTRACHEAL AIRWAY INTO TRACHEA, VIA NATURAL OR ARTIFICIAL OPENING ENDOSCOPIC: ICD-10-PCS | Performed by: SURGERY

## 2018-01-01 PROCEDURE — 82550 ASSAY OF CK (CPK): CPT

## 2018-01-01 PROCEDURE — 27202055 HC BRONCHOSCOPE, DISP

## 2018-01-01 PROCEDURE — 85347 COAGULATION TIME ACTIVATED: CPT

## 2018-01-01 PROCEDURE — C1729 CATH, DRAINAGE: HCPCS | Performed by: SURGERY

## 2018-01-01 PROCEDURE — 87186 SC STD MICRODIL/AGAR DIL: CPT

## 2018-01-01 PROCEDURE — 83050 HGB METHEMOGLOBIN QUAN: CPT

## 2018-01-01 PROCEDURE — 82553 CREATINE MB FRACTION: CPT

## 2018-01-01 PROCEDURE — 85014 HEMATOCRIT: CPT

## 2018-01-01 PROCEDURE — 85018 HEMOGLOBIN: CPT

## 2018-01-01 PROCEDURE — 99285 EMERGENCY DEPT VISIT HI MDM: CPT | Mod: 25

## 2018-01-01 PROCEDURE — 25800024 PHARM REV CODE 258: Performed by: SURGERY

## 2018-01-01 PROCEDURE — 0DH60UZ INSERTION OF FEEDING DEVICE INTO STOMACH, OPEN APPROACH: ICD-10-PCS | Performed by: SURGERY

## 2018-01-01 PROCEDURE — 63600175 PHARM REV CODE 636 W HCPCS: Mod: JG | Performed by: STUDENT IN AN ORGANIZED HEALTH CARE EDUCATION/TRAINING PROGRAM

## 2018-01-01 PROCEDURE — 88307 TISSUE EXAM BY PATHOLOGIST: CPT | Mod: 26,,, | Performed by: PATHOLOGY

## 2018-01-01 PROCEDURE — 97530 THERAPEUTIC ACTIVITIES: CPT

## 2018-01-01 PROCEDURE — 43235 EGD DIAGNOSTIC BRUSH WASH: CPT | Mod: 22,,, | Performed by: INTERNAL MEDICINE

## 2018-01-01 PROCEDURE — 85730 THROMBOPLASTIN TIME PARTIAL: CPT | Mod: 91

## 2018-01-01 PROCEDURE — 5A1955Z RESPIRATORY VENTILATION, GREATER THAN 96 CONSECUTIVE HOURS: ICD-10-PCS | Performed by: SURGERY

## 2018-01-01 PROCEDURE — A4216 STERILE WATER/SALINE, 10 ML: HCPCS | Performed by: STUDENT IN AN ORGANIZED HEALTH CARE EDUCATION/TRAINING PROGRAM

## 2018-01-01 PROCEDURE — 36000706: Performed by: SURGERY

## 2018-01-01 PROCEDURE — 99233 SBSQ HOSP IP/OBS HIGH 50: CPT | Mod: 24,25,, | Performed by: SURGERY

## 2018-01-01 PROCEDURE — 71000039 HC RECOVERY, EACH ADD'L HOUR: Performed by: SURGERY

## 2018-01-01 PROCEDURE — 12000002 HC ACUTE/MED SURGE SEMI-PRIVATE ROOM

## 2018-01-01 PROCEDURE — 99223 1ST HOSP IP/OBS HIGH 75: CPT | Mod: 57,,, | Performed by: SURGERY

## 2018-01-01 PROCEDURE — 0B113F4 BYPASS TRACHEA TO CUTANEOUS WITH TRACHEOSTOMY DEVICE, PERCUTANEOUS APPROACH: ICD-10-PCS | Performed by: SURGERY

## 2018-01-01 PROCEDURE — P9035 PLATELET PHERES LEUKOREDUCED: HCPCS

## 2018-01-01 PROCEDURE — 0DJ08ZZ INSPECTION OF UPPER INTESTINAL TRACT, VIA NATURAL OR ARTIFICIAL OPENING ENDOSCOPIC: ICD-10-PCS | Performed by: SURGERY

## 2018-01-01 PROCEDURE — 31500 INSERT EMERGENCY AIRWAY: CPT | Performed by: STUDENT IN AN ORGANIZED HEALTH CARE EDUCATION/TRAINING PROGRAM

## 2018-01-01 PROCEDURE — 88307 TISSUE EXAM BY PATHOLOGIST: CPT | Performed by: PATHOLOGY

## 2018-01-01 PROCEDURE — 93306 TTE W/DOPPLER COMPLETE: CPT

## 2018-01-01 PROCEDURE — 5A12012 PERFORMANCE OF CARDIAC OUTPUT, SINGLE, MANUAL: ICD-10-PCS | Performed by: SURGERY

## 2018-01-01 PROCEDURE — 20600001 HC STEP DOWN PRIVATE ROOM

## 2018-01-01 PROCEDURE — 99233 SBSQ HOSP IP/OBS HIGH 50: CPT | Mod: ,,, | Performed by: SURGERY

## 2018-01-01 PROCEDURE — 99900017 HC EXTUBATION W/PARAMETERS (STAT)

## 2018-01-01 PROCEDURE — 87324 CLOSTRIDIUM AG IA: CPT

## 2018-01-01 PROCEDURE — 63600175 PHARM REV CODE 636 W HCPCS: Mod: JG

## 2018-01-01 PROCEDURE — 5A1945Z RESPIRATORY VENTILATION, 24-96 CONSECUTIVE HOURS: ICD-10-PCS | Performed by: SURGERY

## 2018-01-01 PROCEDURE — 0DHA0UZ INSERTION OF FEEDING DEVICE INTO JEJUNUM, OPEN APPROACH: ICD-10-PCS | Performed by: SURGERY

## 2018-01-01 PROCEDURE — 83880 ASSAY OF NATRIURETIC PEPTIDE: CPT

## 2018-01-01 PROCEDURE — 36000708 HC OR TIME LEV III 1ST 15 MIN: Performed by: SURGERY

## 2018-01-01 PROCEDURE — 99232 SBSQ HOSP IP/OBS MODERATE 35: CPT | Mod: ,,, | Performed by: INTERNAL MEDICINE

## 2018-01-01 PROCEDURE — 76937 US GUIDE VASCULAR ACCESS: CPT

## 2018-01-01 PROCEDURE — 86850 RBC ANTIBODY SCREEN: CPT | Mod: 91

## 2018-01-01 PROCEDURE — 27201423 OPTIME MED/SURG SUP & DEVICES STERILE SUPPLY: Performed by: SURGERY

## 2018-01-01 PROCEDURE — 87076 CULTURE ANAEROBE IDENT EACH: CPT

## 2018-01-01 PROCEDURE — 25500020 PHARM REV CODE 255: Performed by: SURGERY

## 2018-01-01 PROCEDURE — 36000707: Performed by: SURGERY

## 2018-01-01 PROCEDURE — 94150 VITAL CAPACITY TEST: CPT

## 2018-01-01 PROCEDURE — 97161 PT EVAL LOW COMPLEX 20 MIN: CPT

## 2018-01-01 PROCEDURE — 94010 BREATHING CAPACITY TEST: CPT

## 2018-01-01 PROCEDURE — G8989 SELF CARE D/C STATUS: HCPCS | Mod: CK

## 2018-01-01 PROCEDURE — P9045 ALBUMIN (HUMAN), 5%, 250 ML: HCPCS | Mod: JG | Performed by: NURSE ANESTHETIST, CERTIFIED REGISTERED

## 2018-01-01 PROCEDURE — 80202 ASSAY OF VANCOMYCIN: CPT

## 2018-01-01 PROCEDURE — 97165 OT EVAL LOW COMPLEX 30 MIN: CPT

## 2018-01-01 PROCEDURE — 99233 SBSQ HOSP IP/OBS HIGH 50: CPT | Mod: 24,57,, | Performed by: SURGERY

## 2018-01-01 PROCEDURE — 87088 URINE BACTERIA CULTURE: CPT

## 2018-01-01 PROCEDURE — 71000033 HC RECOVERY, INTIAL HOUR: Performed by: SURGERY

## 2018-01-01 PROCEDURE — 43999 UNLISTED PROCEDURE STOMACH: CPT | Mod: ,,, | Performed by: INTERNAL MEDICINE

## 2018-01-01 PROCEDURE — 94799 UNLISTED PULMONARY SVC/PX: CPT

## 2018-01-01 PROCEDURE — 43830 GSTRST OPEN WO CONSTJ TUBE: CPT | Mod: 79,,, | Performed by: SURGERY

## 2018-01-01 PROCEDURE — 82330 ASSAY OF CALCIUM: CPT | Mod: 91

## 2018-01-01 PROCEDURE — G8987 SELF CARE CURRENT STATUS: HCPCS | Mod: CL

## 2018-01-01 PROCEDURE — 82570 ASSAY OF URINE CREATININE: CPT

## 2018-01-01 PROCEDURE — 0W9930Z DRAINAGE OF RIGHT PLEURAL CAVITY WITH DRAINAGE DEVICE, PERCUTANEOUS APPROACH: ICD-10-PCS | Performed by: THORACIC SURGERY (CARDIOTHORACIC VASCULAR SURGERY)

## 2018-01-01 RX ORDER — SODIUM CHLORIDE 0.9 % (FLUSH) 0.9 %
3 SYRINGE (ML) INJECTION EVERY 8 HOURS
Status: DISCONTINUED | OUTPATIENT
Start: 2018-01-01 | End: 2018-01-01 | Stop reason: HOSPADM

## 2018-01-01 RX ORDER — SODIUM BICARBONATE 1 MEQ/ML
SYRINGE (ML) INTRAVENOUS CODE/TRAUMA/SEDATION MEDICATION
Status: COMPLETED | OUTPATIENT
Start: 2018-01-01 | End: 2018-01-01

## 2018-01-01 RX ORDER — ALBUMIN HUMAN 50 G/1000ML
SOLUTION INTRAVENOUS
Status: COMPLETED
Start: 2018-01-01 | End: 2018-01-01

## 2018-01-01 RX ORDER — FENTANYL CITRATE 50 UG/ML
INJECTION, SOLUTION INTRAMUSCULAR; INTRAVENOUS
Status: COMPLETED
Start: 2018-01-01 | End: 2018-01-01

## 2018-01-01 RX ORDER — METOPROLOL TARTRATE 1 MG/ML
5 INJECTION, SOLUTION INTRAVENOUS ONCE
Status: COMPLETED | OUTPATIENT
Start: 2018-01-01 | End: 2018-01-01

## 2018-01-01 RX ORDER — DEXTROSE MONOHYDRATE 100 MG/ML
INJECTION, SOLUTION INTRAVENOUS CONTINUOUS
Status: DISCONTINUED | OUTPATIENT
Start: 2018-01-01 | End: 2018-01-01

## 2018-01-01 RX ORDER — DEXTROSE MONOHYDRATE, SODIUM CHLORIDE, AND POTASSIUM CHLORIDE 50; 1.49; 9 G/1000ML; G/1000ML; G/1000ML
INJECTION, SOLUTION INTRAVENOUS
Status: DISPENSED
Start: 2018-01-01 | End: 2018-01-01

## 2018-01-01 RX ORDER — HYDROCODONE BITARTRATE AND ACETAMINOPHEN 500; 5 MG/1; MG/1
TABLET ORAL
Status: DISCONTINUED | OUTPATIENT
Start: 2018-01-01 | End: 2018-01-01

## 2018-01-01 RX ORDER — POTASSIUM CHLORIDE 29.8 MG/ML
40 INJECTION INTRAVENOUS
Status: DISCONTINUED | OUTPATIENT
Start: 2018-01-01 | End: 2018-01-01 | Stop reason: HOSPADM

## 2018-01-01 RX ORDER — CALCIUM CHLORIDE INJECTION 100 MG/ML
INJECTION, SOLUTION INTRAVENOUS CODE/TRAUMA/SEDATION MEDICATION
Status: COMPLETED | OUTPATIENT
Start: 2018-01-01 | End: 2018-01-01

## 2018-01-01 RX ORDER — FENTANYL CITRATE 50 UG/ML
INJECTION, SOLUTION INTRAMUSCULAR; INTRAVENOUS
Status: DISCONTINUED | OUTPATIENT
Start: 2018-01-01 | End: 2018-01-01

## 2018-01-01 RX ORDER — SUCCINYLCHOLINE CHLORIDE 20 MG/ML
2 INJECTION INTRAMUSCULAR; INTRAVENOUS ONCE
Status: COMPLETED | OUTPATIENT
Start: 2018-01-01 | End: 2018-01-01

## 2018-01-01 RX ORDER — DEXTROSE MONOHYDRATE, SODIUM CHLORIDE, AND POTASSIUM CHLORIDE 50; 1.49; 9 G/1000ML; G/1000ML; G/1000ML
INJECTION, SOLUTION INTRAVENOUS CONTINUOUS
Status: DISCONTINUED | OUTPATIENT
Start: 2018-01-01 | End: 2018-01-01

## 2018-01-01 RX ORDER — ONDANSETRON 2 MG/ML
4 INJECTION INTRAMUSCULAR; INTRAVENOUS EVERY 8 HOURS PRN
Status: DISCONTINUED | OUTPATIENT
Start: 2018-01-01 | End: 2018-01-01 | Stop reason: HOSPADM

## 2018-01-01 RX ORDER — HALOPERIDOL 5 MG/ML
5 INJECTION INTRAMUSCULAR ONCE
Status: COMPLETED | OUTPATIENT
Start: 2018-01-01 | End: 2018-01-01

## 2018-01-01 RX ORDER — ENOXAPARIN SODIUM 100 MG/ML
1 INJECTION SUBCUTANEOUS
Status: DISCONTINUED | OUTPATIENT
Start: 2018-01-01 | End: 2018-01-01

## 2018-01-01 RX ORDER — PHENYLEPHRINE HCL IN 0.9% NACL 1 MG/10 ML
SYRINGE (ML) INTRAVENOUS
Status: COMPLETED
Start: 2018-01-01 | End: 2018-01-01

## 2018-01-01 RX ORDER — EPINEPHRINE 0.1 MG/ML
INJECTION INTRAVENOUS CODE/TRAUMA/SEDATION MEDICATION
Status: COMPLETED | OUTPATIENT
Start: 2018-01-01 | End: 2018-01-01

## 2018-01-01 RX ORDER — FLUCONAZOLE 40 MG/ML
400 POWDER, FOR SUSPENSION ORAL DAILY
Status: DISCONTINUED | OUTPATIENT
Start: 2018-01-01 | End: 2018-01-01

## 2018-01-01 RX ORDER — PANTOPRAZOLE SODIUM 40 MG/10ML
40 INJECTION, POWDER, LYOPHILIZED, FOR SOLUTION INTRAVENOUS EVERY 12 HOURS
Status: DISCONTINUED | OUTPATIENT
Start: 2018-01-01 | End: 2018-01-01 | Stop reason: HOSPADM

## 2018-01-01 RX ORDER — HYDROMORPHONE HCL IN 0.9% NACL 6 MG/30 ML
PATIENT CONTROLLED ANALGESIA SYRINGE INTRAVENOUS CONTINUOUS
Status: DISCONTINUED | OUTPATIENT
Start: 2018-01-01 | End: 2018-01-01

## 2018-01-01 RX ORDER — DEXMEDETOMIDINE HYDROCHLORIDE 4 UG/ML
0.2 INJECTION, SOLUTION INTRAVENOUS CONTINUOUS
Status: DISCONTINUED | OUTPATIENT
Start: 2018-01-01 | End: 2018-01-01 | Stop reason: HOSPADM

## 2018-01-01 RX ORDER — ROCURONIUM BROMIDE 10 MG/ML
25 INJECTION, SOLUTION INTRAVENOUS ONCE
Status: COMPLETED | OUTPATIENT
Start: 2018-01-01 | End: 2018-01-01

## 2018-01-01 RX ORDER — ALBUMIN HUMAN 50 G/1000ML
500 SOLUTION INTRAVENOUS ONCE
Status: DISCONTINUED | OUTPATIENT
Start: 2018-01-01 | End: 2018-01-01

## 2018-01-01 RX ORDER — ESCITALOPRAM OXALATE 10 MG/1
10 TABLET ORAL DAILY
Status: DISCONTINUED | OUTPATIENT
Start: 2018-01-01 | End: 2018-01-01

## 2018-01-01 RX ORDER — FLUCONAZOLE 2 MG/ML
400 INJECTION, SOLUTION INTRAVENOUS
Status: DISCONTINUED | OUTPATIENT
Start: 2018-01-01 | End: 2018-01-01

## 2018-01-01 RX ORDER — PROPOFOL 10 MG/ML
VIAL (ML) INTRAVENOUS
Status: DISCONTINUED | OUTPATIENT
Start: 2018-01-01 | End: 2018-01-01

## 2018-01-01 RX ORDER — MIDAZOLAM HYDROCHLORIDE 1 MG/ML
INJECTION, SOLUTION INTRAMUSCULAR; INTRAVENOUS
Status: DISCONTINUED | OUTPATIENT
Start: 2018-01-01 | End: 2018-01-01

## 2018-01-01 RX ORDER — MAGNESIUM SULFATE HEPTAHYDRATE 40 MG/ML
4 INJECTION, SOLUTION INTRAVENOUS
Status: DISCONTINUED | OUTPATIENT
Start: 2018-01-01 | End: 2018-01-01

## 2018-01-01 RX ORDER — ALBUMIN HUMAN 50 G/1000ML
25 SOLUTION INTRAVENOUS ONCE
Status: COMPLETED | OUTPATIENT
Start: 2018-01-01 | End: 2018-01-01

## 2018-01-01 RX ORDER — NALOXONE HCL 0.4 MG/ML
0.02 VIAL (ML) INJECTION
Status: DISCONTINUED | OUTPATIENT
Start: 2018-01-01 | End: 2018-01-01

## 2018-01-01 RX ORDER — FUROSEMIDE 10 MG/ML
20 INJECTION INTRAMUSCULAR; INTRAVENOUS 2 TIMES DAILY
Status: DISCONTINUED | OUTPATIENT
Start: 2018-01-01 | End: 2018-01-01

## 2018-01-01 RX ORDER — MAGNESIUM SULFATE HEPTAHYDRATE 40 MG/ML
2 INJECTION, SOLUTION INTRAVENOUS
Status: DISCONTINUED | OUTPATIENT
Start: 2018-01-01 | End: 2018-01-01

## 2018-01-01 RX ORDER — ETOMIDATE 2 MG/ML
INJECTION INTRAVENOUS
Status: DISCONTINUED | OUTPATIENT
Start: 2018-01-01 | End: 2018-01-01

## 2018-01-01 RX ORDER — PANTOPRAZOLE SODIUM 40 MG/10ML
40 INJECTION, POWDER, LYOPHILIZED, FOR SOLUTION INTRAVENOUS DAILY
Status: DISCONTINUED | OUTPATIENT
Start: 2018-01-01 | End: 2018-01-01

## 2018-01-01 RX ORDER — PROPOFOL 10 MG/ML
INJECTION, EMULSION INTRAVENOUS
Status: COMPLETED
Start: 2018-01-01 | End: 2018-01-01

## 2018-01-01 RX ORDER — OXYCODONE AND ACETAMINOPHEN 5; 325 MG/1; MG/1
1 TABLET ORAL EVERY 4 HOURS PRN
Status: DISCONTINUED | OUTPATIENT
Start: 2018-01-01 | End: 2018-01-01

## 2018-01-01 RX ORDER — VANCOMYCIN HCL IN 5 % DEXTROSE 1G/250ML
1000 PLASTIC BAG, INJECTION (ML) INTRAVENOUS
Status: DISCONTINUED | OUTPATIENT
Start: 2018-01-01 | End: 2018-01-01 | Stop reason: HOSPADM

## 2018-01-01 RX ORDER — FENTANYL CITRATE-0.9 % NACL/PF 10 MCG/ML
PLASTIC BAG, INJECTION (ML) INTRAVENOUS CONTINUOUS
Status: DISCONTINUED | OUTPATIENT
Start: 2018-01-01 | End: 2018-01-01

## 2018-01-01 RX ORDER — OXYCODONE AND ACETAMINOPHEN 5; 325 MG/1; MG/1
1-2 TABLET ORAL EVERY 4 HOURS PRN
Qty: 45 TABLET | Refills: 0
Start: 2018-01-01

## 2018-01-01 RX ORDER — GLUCAGON 1 MG
1 KIT INJECTION
Status: DISCONTINUED | OUTPATIENT
Start: 2018-01-01 | End: 2018-01-01 | Stop reason: HOSPADM

## 2018-01-01 RX ORDER — FENTANYL CITRATE 50 UG/ML
25 INJECTION, SOLUTION INTRAMUSCULAR; INTRAVENOUS ONCE
Status: COMPLETED | OUTPATIENT
Start: 2018-01-01 | End: 2018-01-01

## 2018-01-01 RX ORDER — HYDROCODONE BITARTRATE AND ACETAMINOPHEN 500; 5 MG/1; MG/1
TABLET ORAL
Status: DISCONTINUED | OUTPATIENT
Start: 2018-01-01 | End: 2018-01-01 | Stop reason: SDUPTHER

## 2018-01-01 RX ORDER — ETOMIDATE 2 MG/ML
INJECTION INTRAVENOUS
Status: DISPENSED
Start: 2018-01-01 | End: 2018-01-01

## 2018-01-01 RX ORDER — SUCCINYLCHOLINE CHLORIDE 20 MG/ML
INJECTION INTRAMUSCULAR; INTRAVENOUS CODE/TRAUMA/SEDATION MEDICATION
Status: COMPLETED | OUTPATIENT
Start: 2018-01-01 | End: 2018-01-01

## 2018-01-01 RX ORDER — NOREPINEPHRINE BITARTRATE/D5W 4MG/250ML
PLASTIC BAG, INJECTION (ML) INTRAVENOUS
Status: COMPLETED
Start: 2018-01-01 | End: 2018-01-01

## 2018-01-01 RX ORDER — MAGNESIUM SULFATE HEPTAHYDRATE 40 MG/ML
2 INJECTION, SOLUTION INTRAVENOUS ONCE
Status: COMPLETED | OUTPATIENT
Start: 2018-01-01 | End: 2018-01-01

## 2018-01-01 RX ORDER — LABETALOL HYDROCHLORIDE 5 MG/ML
10 INJECTION, SOLUTION INTRAVENOUS ONCE
Status: COMPLETED | OUTPATIENT
Start: 2018-01-01 | End: 2018-01-01

## 2018-01-01 RX ORDER — ALBUMIN HUMAN 50 G/1000ML
12.5 SOLUTION INTRAVENOUS ONCE
Status: COMPLETED | OUTPATIENT
Start: 2018-01-01 | End: 2018-01-01

## 2018-01-01 RX ORDER — FUROSEMIDE 10 MG/ML
40 INJECTION INTRAMUSCULAR; INTRAVENOUS 2 TIMES DAILY
Status: DISCONTINUED | OUTPATIENT
Start: 2018-01-01 | End: 2018-01-01

## 2018-01-01 RX ORDER — HYDROMORPHONE HYDROCHLORIDE 2 MG/ML
0.5 INJECTION, SOLUTION INTRAMUSCULAR; INTRAVENOUS; SUBCUTANEOUS EVERY 4 HOURS PRN
Status: DISCONTINUED | OUTPATIENT
Start: 2018-01-01 | End: 2018-01-01

## 2018-01-01 RX ORDER — PHENYLEPHRINE HCL IN 0.9% NACL 1 MG/10 ML
100 SYRINGE (ML) INTRAVENOUS ONCE
Status: COMPLETED | OUTPATIENT
Start: 2018-01-01 | End: 2018-01-01

## 2018-01-01 RX ORDER — LIDOCAINE HYDROCHLORIDE 10 MG/ML
1 INJECTION, SOLUTION EPIDURAL; INFILTRATION; INTRACAUDAL; PERINEURAL ONCE
Status: DISCONTINUED | OUTPATIENT
Start: 2018-01-01 | End: 2018-01-01

## 2018-01-01 RX ORDER — NOREPINEPHRINE BITARTRATE/D5W 4MG/250ML
0.02 PLASTIC BAG, INJECTION (ML) INTRAVENOUS CONTINUOUS
Status: DISCONTINUED | OUTPATIENT
Start: 2018-01-01 | End: 2018-01-01

## 2018-01-01 RX ORDER — DEXTROSE 50 % IN WATER (D50W) INTRAVENOUS SYRINGE
Status: COMPLETED
Start: 2018-01-01 | End: 2018-01-01

## 2018-01-01 RX ORDER — INDOMETHACIN 25 MG/1
50 CAPSULE ORAL ONCE
Status: COMPLETED | OUTPATIENT
Start: 2018-01-01 | End: 2018-01-01

## 2018-01-01 RX ORDER — FUROSEMIDE 10 MG/ML
40 INJECTION INTRAMUSCULAR; INTRAVENOUS ONCE
Status: COMPLETED | OUTPATIENT
Start: 2018-01-01 | End: 2018-01-01

## 2018-01-01 RX ORDER — ENOXAPARIN SODIUM 100 MG/ML
40 INJECTION SUBCUTANEOUS EVERY 24 HOURS
Status: DISCONTINUED | OUTPATIENT
Start: 2018-01-01 | End: 2018-01-01

## 2018-01-01 RX ORDER — MIDAZOLAM HYDROCHLORIDE 1 MG/ML
6 INJECTION INTRAMUSCULAR; INTRAVENOUS ONCE
Status: DISCONTINUED | OUTPATIENT
Start: 2018-01-01 | End: 2018-01-01

## 2018-01-01 RX ORDER — NOREPINEPHRINE BITARTRATE/D5W 4MG/250ML
0.02 PLASTIC BAG, INJECTION (ML) INTRAVENOUS CONTINUOUS
Status: DISCONTINUED | OUTPATIENT
Start: 2018-01-01 | End: 2018-01-01 | Stop reason: HOSPADM

## 2018-01-01 RX ORDER — SODIUM CHLORIDE 0.9 % (FLUSH) 0.9 %
3 SYRINGE (ML) INJECTION
Status: DISCONTINUED | OUTPATIENT
Start: 2018-01-01 | End: 2018-01-01 | Stop reason: HOSPADM

## 2018-01-01 RX ORDER — SODIUM BICARBONATE 1 MEQ/ML
SYRINGE (ML) INTRAVENOUS
Status: DISPENSED
Start: 2018-01-01 | End: 2018-01-01

## 2018-01-01 RX ORDER — IBUPROFEN 200 MG
16 TABLET ORAL
Status: DISCONTINUED | OUTPATIENT
Start: 2018-01-01 | End: 2018-01-01 | Stop reason: HOSPADM

## 2018-01-01 RX ORDER — KETAMINE HCL IN 0.9 % NACL 50 MG/5 ML
SYRINGE (ML) INTRAVENOUS
Status: DISCONTINUED | OUTPATIENT
Start: 2018-01-01 | End: 2018-01-01

## 2018-01-01 RX ORDER — VANCOMYCIN HCL IN 5 % DEXTROSE 1G/250ML
1000 PLASTIC BAG, INJECTION (ML) INTRAVENOUS
Status: DISCONTINUED | OUTPATIENT
Start: 2018-01-01 | End: 2018-01-01

## 2018-01-01 RX ORDER — ETOMIDATE 2 MG/ML
INJECTION INTRAVENOUS CODE/TRAUMA/SEDATION MEDICATION
Status: COMPLETED | OUTPATIENT
Start: 2018-01-01 | End: 2018-01-01

## 2018-01-01 RX ORDER — TAMSULOSIN HYDROCHLORIDE 0.4 MG/1
0.4 CAPSULE ORAL DAILY
Status: DISCONTINUED | OUTPATIENT
Start: 2018-01-01 | End: 2018-01-01

## 2018-01-01 RX ORDER — HEPARIN SODIUM,PORCINE/D5W 25000/250
18 INTRAVENOUS SOLUTION INTRAVENOUS CONTINUOUS
Status: DISCONTINUED | OUTPATIENT
Start: 2018-01-01 | End: 2018-01-01

## 2018-01-01 RX ORDER — QUETIAPINE FUMARATE 25 MG/1
25 TABLET, FILM COATED ORAL 2 TIMES DAILY
Status: DISCONTINUED | OUTPATIENT
Start: 2018-01-01 | End: 2018-01-01 | Stop reason: HOSPADM

## 2018-01-01 RX ORDER — CALCIUM CARBONATE 500(1250)
2000 TABLET ORAL ONCE
Status: DISCONTINUED | OUTPATIENT
Start: 2018-01-01 | End: 2018-01-01

## 2018-01-01 RX ORDER — PROPOFOL 10 MG/ML
5 INJECTION, EMULSION INTRAVENOUS CONTINUOUS
Status: DISCONTINUED | OUTPATIENT
Start: 2018-01-01 | End: 2018-01-01

## 2018-01-01 RX ORDER — LORAZEPAM 2 MG/ML
1 INJECTION INTRAMUSCULAR ONCE AS NEEDED
Status: DISCONTINUED | OUTPATIENT
Start: 2018-01-01 | End: 2018-01-01

## 2018-01-01 RX ORDER — ALPRAZOLAM 0.5 MG/1
0.5 TABLET ORAL DAILY PRN
Status: DISCONTINUED | OUTPATIENT
Start: 2018-01-01 | End: 2018-01-01

## 2018-01-01 RX ORDER — ROCURONIUM BROMIDE 10 MG/ML
20 INJECTION, SOLUTION INTRAVENOUS ONCE
Status: DISCONTINUED | OUTPATIENT
Start: 2018-01-01 | End: 2018-01-01

## 2018-01-01 RX ORDER — DEXMEDETOMIDINE HYDROCHLORIDE 4 UG/ML
0.2 INJECTION, SOLUTION INTRAVENOUS CONTINUOUS
Status: DISCONTINUED | OUTPATIENT
Start: 2018-01-01 | End: 2018-01-01

## 2018-01-01 RX ORDER — INSULIN ASPART 100 [IU]/ML
0-5 INJECTION, SOLUTION INTRAVENOUS; SUBCUTANEOUS
Status: DISCONTINUED | OUTPATIENT
Start: 2018-01-01 | End: 2018-01-01 | Stop reason: HOSPADM

## 2018-01-01 RX ORDER — POTASSIUM CHLORIDE 14.9 MG/ML
20 INJECTION INTRAVENOUS
Status: DISCONTINUED | OUTPATIENT
Start: 2018-01-01 | End: 2018-01-01 | Stop reason: HOSPADM

## 2018-01-01 RX ORDER — ETOMIDATE 2 MG/ML
0.15 INJECTION INTRAVENOUS ONCE
Status: COMPLETED | OUTPATIENT
Start: 2018-01-01 | End: 2018-01-01

## 2018-01-01 RX ORDER — CHLORHEXIDINE GLUCONATE ORAL RINSE 1.2 MG/ML
15 SOLUTION DENTAL 2 TIMES DAILY
Status: DISCONTINUED | OUTPATIENT
Start: 2018-01-01 | End: 2018-01-01 | Stop reason: HOSPADM

## 2018-01-01 RX ORDER — ACETAMINOPHEN 325 MG/1
650 TABLET ORAL
Status: COMPLETED | OUTPATIENT
Start: 2018-01-01 | End: 2018-01-01

## 2018-01-01 RX ORDER — FENTANYL CITRATE 50 UG/ML
25 INJECTION, SOLUTION INTRAMUSCULAR; INTRAVENOUS
Status: DISCONTINUED | OUTPATIENT
Start: 2018-01-01 | End: 2018-01-01

## 2018-01-01 RX ORDER — FUROSEMIDE 10 MG/ML
20 INJECTION INTRAMUSCULAR; INTRAVENOUS ONCE
Status: COMPLETED | OUTPATIENT
Start: 2018-01-01 | End: 2018-01-01

## 2018-01-01 RX ORDER — AMIODARONE HYDROCHLORIDE 150 MG/3ML
INJECTION, SOLUTION INTRAVENOUS CODE/TRAUMA/SEDATION MEDICATION
Status: COMPLETED | OUTPATIENT
Start: 2018-01-01 | End: 2018-01-01

## 2018-01-01 RX ORDER — VASOPRESSIN 20 [USP'U]/ML
INJECTION, SOLUTION INTRAMUSCULAR; SUBCUTANEOUS
Status: DISCONTINUED | OUTPATIENT
Start: 2018-01-01 | End: 2018-01-01

## 2018-01-01 RX ORDER — SUCCINYLCHOLINE CHLORIDE 20 MG/ML
INJECTION INTRAMUSCULAR; INTRAVENOUS
Status: DISCONTINUED | OUTPATIENT
Start: 2018-01-01 | End: 2018-01-01

## 2018-01-01 RX ORDER — FENTANYL CITRATE 50 UG/ML
50 INJECTION, SOLUTION INTRAMUSCULAR; INTRAVENOUS ONCE
Status: COMPLETED | OUTPATIENT
Start: 2018-01-01 | End: 2018-01-01

## 2018-01-01 RX ORDER — IPRATROPIUM BROMIDE AND ALBUTEROL SULFATE 2.5; .5 MG/3ML; MG/3ML
3 SOLUTION RESPIRATORY (INHALATION) EVERY 4 HOURS
Status: DISCONTINUED | OUTPATIENT
Start: 2018-01-01 | End: 2018-01-01

## 2018-01-01 RX ORDER — LIDOCAINE HCL/PF 100 MG/5ML
SYRINGE (ML) INTRAVENOUS
Status: DISCONTINUED | OUTPATIENT
Start: 2018-01-01 | End: 2018-01-01

## 2018-01-01 RX ORDER — MIDAZOLAM HYDROCHLORIDE 1 MG/ML
3 INJECTION INTRAMUSCULAR; INTRAVENOUS ONCE
Status: COMPLETED | OUTPATIENT
Start: 2018-01-01 | End: 2018-01-01

## 2018-01-01 RX ORDER — SODIUM CHLORIDE, SODIUM LACTATE, POTASSIUM CHLORIDE, CALCIUM CHLORIDE 600; 310; 30; 20 MG/100ML; MG/100ML; MG/100ML; MG/100ML
INJECTION, SOLUTION INTRAVENOUS CONTINUOUS
Status: DISCONTINUED | OUTPATIENT
Start: 2018-01-01 | End: 2018-01-01

## 2018-01-01 RX ORDER — CEFEPIME HYDROCHLORIDE 2 G/1
2 INJECTION, POWDER, FOR SOLUTION INTRAVENOUS
Status: DISCONTINUED | OUTPATIENT
Start: 2018-01-01 | End: 2018-01-01

## 2018-01-01 RX ORDER — ALBUMIN HUMAN 50 G/1000ML
SOLUTION INTRAVENOUS CONTINUOUS PRN
Status: DISCONTINUED | OUTPATIENT
Start: 2018-01-01 | End: 2018-01-01

## 2018-01-01 RX ORDER — IPRATROPIUM BROMIDE AND ALBUTEROL SULFATE 2.5; .5 MG/3ML; MG/3ML
3 SOLUTION RESPIRATORY (INHALATION)
Status: DISCONTINUED | OUTPATIENT
Start: 2018-01-01 | End: 2018-01-01

## 2018-01-01 RX ORDER — CALCIUM CARBONATE 500(1250)
1000 TABLET ORAL ONCE
Status: COMPLETED | OUTPATIENT
Start: 2018-01-01 | End: 2018-01-01

## 2018-01-01 RX ORDER — RAMELTEON 8 MG/1
8 TABLET ORAL NIGHTLY PRN
Status: DISCONTINUED | OUTPATIENT
Start: 2018-01-01 | End: 2018-01-01

## 2018-01-01 RX ORDER — IPRATROPIUM BROMIDE AND ALBUTEROL SULFATE 2.5; .5 MG/3ML; MG/3ML
SOLUTION RESPIRATORY (INHALATION)
Status: DISPENSED
Start: 2018-01-01 | End: 2018-01-01

## 2018-01-01 RX ORDER — DEXTROSE MONOHYDRATE 100 MG/ML
INJECTION, SOLUTION INTRAVENOUS CONTINUOUS
Status: ACTIVE | OUTPATIENT
Start: 2018-01-01 | End: 2018-01-01

## 2018-01-01 RX ORDER — OXYCODONE HYDROCHLORIDE 5 MG/1
5 TABLET ORAL EVERY 4 HOURS PRN
Status: DISCONTINUED | OUTPATIENT
Start: 2018-01-01 | End: 2018-01-01 | Stop reason: HOSPADM

## 2018-01-01 RX ORDER — ROCURONIUM BROMIDE 10 MG/ML
INJECTION, SOLUTION INTRAVENOUS
Status: DISCONTINUED | OUTPATIENT
Start: 2018-01-01 | End: 2018-01-01

## 2018-01-01 RX ORDER — DEXTROSE MONOHYDRATE, SODIUM CHLORIDE, AND POTASSIUM CHLORIDE 50; 1.49; 4.5 G/1000ML; G/1000ML; G/1000ML
INJECTION, SOLUTION INTRAVENOUS CONTINUOUS
Status: DISCONTINUED | OUTPATIENT
Start: 2018-01-01 | End: 2018-01-01

## 2018-01-01 RX ORDER — ACETAMINOPHEN 10 MG/ML
INJECTION, SOLUTION INTRAVENOUS
Status: DISCONTINUED | OUTPATIENT
Start: 2018-01-01 | End: 2018-01-01

## 2018-01-01 RX ORDER — PROPOFOL 10 MG/ML
INJECTION, EMULSION INTRAVENOUS
Status: DISPENSED
Start: 2018-01-01 | End: 2018-01-01

## 2018-01-01 RX ORDER — IBUPROFEN 200 MG
24 TABLET ORAL
Status: DISCONTINUED | OUTPATIENT
Start: 2018-01-01 | End: 2018-01-01 | Stop reason: HOSPADM

## 2018-01-01 RX ORDER — SODIUM CHLORIDE 9 MG/ML
INJECTION, SOLUTION INTRAVENOUS CONTINUOUS PRN
Status: DISCONTINUED | OUTPATIENT
Start: 2018-01-01 | End: 2018-01-01

## 2018-01-01 RX ORDER — IPRATROPIUM BROMIDE AND ALBUTEROL SULFATE 2.5; .5 MG/3ML; MG/3ML
9 SOLUTION RESPIRATORY (INHALATION)
Status: COMPLETED | OUTPATIENT
Start: 2018-01-01 | End: 2018-01-01

## 2018-01-01 RX ORDER — ETOMIDATE 2 MG/ML
INJECTION INTRAVENOUS
Status: COMPLETED
Start: 2018-01-01 | End: 2018-01-01

## 2018-01-01 RX ORDER — OXYCODONE AND ACETAMINOPHEN 5; 325 MG/1; MG/1
2 TABLET ORAL EVERY 4 HOURS PRN
Status: DISCONTINUED | OUTPATIENT
Start: 2018-01-01 | End: 2018-01-01

## 2018-01-01 RX ORDER — PHENYLEPHRINE HYDROCHLORIDE 10 MG/ML
INJECTION INTRAVENOUS
Status: DISCONTINUED | OUTPATIENT
Start: 2018-01-01 | End: 2018-01-01

## 2018-01-01 RX ORDER — SUCCINYLCHOLINE CHLORIDE 20 MG/ML
INJECTION INTRAMUSCULAR; INTRAVENOUS
Status: COMPLETED
Start: 2018-01-01 | End: 2018-01-01

## 2018-01-01 RX ORDER — DEXTROSE 20 G/100ML
INJECTION, SOLUTION INTRAVENOUS CONTINUOUS
Status: DISPENSED | OUTPATIENT
Start: 2018-01-01 | End: 2018-01-01

## 2018-01-01 RX ORDER — ROCURONIUM BROMIDE 10 MG/ML
INJECTION, SOLUTION INTRAVENOUS
Status: DISPENSED
Start: 2018-01-01 | End: 2018-01-01

## 2018-01-01 RX ORDER — ENOXAPARIN SODIUM 100 MG/ML
1 INJECTION SUBCUTANEOUS 2 TIMES DAILY
Status: DISCONTINUED | OUTPATIENT
Start: 2018-01-01 | End: 2018-01-01

## 2018-01-01 RX ORDER — ONDANSETRON 2 MG/ML
INJECTION INTRAMUSCULAR; INTRAVENOUS
Status: DISCONTINUED | OUTPATIENT
Start: 2018-01-01 | End: 2018-01-01

## 2018-01-01 RX ORDER — HALOPERIDOL 5 MG/ML
5 INJECTION INTRAMUSCULAR EVERY 4 HOURS PRN
Status: DISCONTINUED | OUTPATIENT
Start: 2018-01-01 | End: 2018-01-01

## 2018-01-01 RX ORDER — CEFTRIAXONE 1 G/1
1 INJECTION, POWDER, FOR SOLUTION INTRAMUSCULAR; INTRAVENOUS
Status: DISCONTINUED | OUTPATIENT
Start: 2018-01-01 | End: 2018-01-01

## 2018-01-01 RX ORDER — HYDROCODONE BITARTRATE AND ACETAMINOPHEN 500; 5 MG/1; MG/1
TABLET ORAL
Status: DISCONTINUED | OUTPATIENT
Start: 2018-01-01 | End: 2018-01-01 | Stop reason: HOSPADM

## 2018-01-01 RX ORDER — FUROSEMIDE 10 MG/ML
INJECTION INTRAMUSCULAR; INTRAVENOUS
Status: DISPENSED
Start: 2018-01-01 | End: 2018-01-01

## 2018-01-01 RX ORDER — DEXAMETHASONE SODIUM PHOSPHATE 4 MG/ML
INJECTION, SOLUTION INTRA-ARTICULAR; INTRALESIONAL; INTRAMUSCULAR; INTRAVENOUS; SOFT TISSUE
Status: DISCONTINUED | OUTPATIENT
Start: 2018-01-01 | End: 2018-01-01

## 2018-01-01 RX ORDER — FLUCONAZOLE 2 MG/ML
400 INJECTION, SOLUTION INTRAVENOUS
Status: DISCONTINUED | OUTPATIENT
Start: 2018-01-01 | End: 2018-01-01 | Stop reason: HOSPADM

## 2018-01-01 RX ADMIN — MAGNESIUM SULFATE HEPTAHYDRATE 2 G: 40 INJECTION, SOLUTION INTRAVENOUS at 06:09

## 2018-01-01 RX ADMIN — PANTOPRAZOLE SODIUM 40 MG: 40 INJECTION, POWDER, FOR SOLUTION INTRAVENOUS at 08:09

## 2018-01-01 RX ADMIN — IPRATROPIUM BROMIDE AND ALBUTEROL SULFATE 3 ML: .5; 3 SOLUTION RESPIRATORY (INHALATION) at 07:09

## 2018-01-01 RX ADMIN — SODIUM CHLORIDE, SODIUM LACTATE, POTASSIUM CHLORIDE, AND CALCIUM CHLORIDE: .6; .31; .03; .02 INJECTION, SOLUTION INTRAVENOUS at 07:09

## 2018-01-01 RX ADMIN — POTASSIUM CHLORIDE 40 MEQ: 400 INJECTION, SOLUTION INTRAVENOUS at 07:09

## 2018-01-01 RX ADMIN — DEXAMETHASONE SODIUM PHOSPHATE 4 MG: 4 INJECTION, SOLUTION INTRAMUSCULAR; INTRAVENOUS at 05:09

## 2018-01-01 RX ADMIN — Medication 0.02 MCG/KG/MIN: at 11:09

## 2018-01-01 RX ADMIN — IPRATROPIUM BROMIDE AND ALBUTEROL SULFATE 3 ML: .5; 3 SOLUTION RESPIRATORY (INHALATION) at 01:09

## 2018-01-01 RX ADMIN — PIPERACILLIN AND TAZOBACTAM 4.5 G: 4; .5 INJECTION, POWDER, LYOPHILIZED, FOR SOLUTION INTRAVENOUS; PARENTERAL at 07:09

## 2018-01-01 RX ADMIN — FENTANYL CITRATE 25 MCG: 50 INJECTION INTRAMUSCULAR; INTRAVENOUS at 04:09

## 2018-01-01 RX ADMIN — CHLORHEXIDINE GLUCONATE 0.12% ORAL RINSE 15 ML: 1.2 LIQUID ORAL at 08:09

## 2018-01-01 RX ADMIN — ENOXAPARIN SODIUM 70 MG: 100 INJECTION SUBCUTANEOUS at 08:09

## 2018-01-01 RX ADMIN — SODIUM CHLORIDE 1000 ML: 0.9 INJECTION, SOLUTION INTRAVENOUS at 04:09

## 2018-01-01 RX ADMIN — POTASSIUM CHLORIDE 40 MEQ: 400 INJECTION, SOLUTION INTRAVENOUS at 05:09

## 2018-01-01 RX ADMIN — Medication 3 ML: at 01:09

## 2018-01-01 RX ADMIN — PIPERACILLIN AND TAZOBACTAM 4.5 G: 4; .5 INJECTION, POWDER, LYOPHILIZED, FOR SOLUTION INTRAVENOUS; PARENTERAL at 06:09

## 2018-01-01 RX ADMIN — SODIUM CHLORIDE, SODIUM LACTATE, POTASSIUM CHLORIDE, AND CALCIUM CHLORIDE 250 ML: .6; .31; .03; .02 INJECTION, SOLUTION INTRAVENOUS at 11:09

## 2018-01-01 RX ADMIN — LABETALOL HYDROCHLORIDE 10 MG: 5 INJECTION, SOLUTION INTRAVENOUS at 04:09

## 2018-01-01 RX ADMIN — AMIODARONE HYDROCHLORIDE 300 MG: 50 INJECTION, SOLUTION INTRAVENOUS at 02:09

## 2018-01-01 RX ADMIN — ROCURONIUM BROMIDE 30 MG: 10 INJECTION, SOLUTION INTRAVENOUS at 02:09

## 2018-01-01 RX ADMIN — IPRATROPIUM BROMIDE AND ALBUTEROL SULFATE 9 ML: .5; 3 SOLUTION RESPIRATORY (INHALATION) at 02:09

## 2018-01-01 RX ADMIN — SODIUM CHLORIDE, SODIUM LACTATE, POTASSIUM CHLORIDE, AND CALCIUM CHLORIDE 1000 ML: .6; .31; .03; .02 INJECTION, SOLUTION INTRAVENOUS at 11:09

## 2018-01-01 RX ADMIN — ENOXAPARIN SODIUM 60 MG: 100 INJECTION SUBCUTANEOUS at 06:09

## 2018-01-01 RX ADMIN — IPRATROPIUM BROMIDE AND ALBUTEROL SULFATE 3 ML: .5; 3 SOLUTION RESPIRATORY (INHALATION) at 03:09

## 2018-01-01 RX ADMIN — SODIUM CHLORIDE 1000 ML: 0.9 INJECTION, SOLUTION INTRAVENOUS at 09:09

## 2018-01-01 RX ADMIN — VASOPRESSIN 0.04 UNITS/MIN: 20 INJECTION INTRAVENOUS at 07:09

## 2018-01-01 RX ADMIN — PROPOFOL 50 MCG/KG/MIN: 10 INJECTION, EMULSION INTRAVENOUS at 05:09

## 2018-01-01 RX ADMIN — CALCIUM CHLORIDE 1 G: 100 INJECTION, SOLUTION INTRAVENOUS at 05:09

## 2018-01-01 RX ADMIN — CALCIUM CHLORIDE 1 G: 100 INJECTION, SOLUTION INTRAVENOUS at 08:09

## 2018-01-01 RX ADMIN — DEXMEDETOMIDINE HYDROCHLORIDE 1 MCG/KG/HR: 100 INJECTION, SOLUTION, CONCENTRATE INTRAVENOUS at 01:09

## 2018-01-01 RX ADMIN — QUETIAPINE FUMARATE 25 MG: 25 TABLET ORAL at 10:09

## 2018-01-01 RX ADMIN — Medication 0.1 MCG/KG/MIN: at 09:09

## 2018-01-01 RX ADMIN — DEXMEDETOMIDINE HYDROCHLORIDE 1 MCG/KG/HR: 100 INJECTION, SOLUTION, CONCENTRATE INTRAVENOUS at 11:09

## 2018-01-01 RX ADMIN — HEPARIN SODIUM AND DEXTROSE 18 UNITS/KG/HR: 10000; 5 INJECTION INTRAVENOUS at 05:09

## 2018-01-01 RX ADMIN — Medication 4000 MCG: at 07:09

## 2018-01-01 RX ADMIN — FUROSEMIDE 40 MG: 10 INJECTION, SOLUTION INTRAMUSCULAR; INTRAVENOUS at 05:09

## 2018-01-01 RX ADMIN — ENOXAPARIN SODIUM 40 MG: 100 INJECTION SUBCUTANEOUS at 04:09

## 2018-01-01 RX ADMIN — PROPOFOL 20 MCG/KG/MIN: 10 INJECTION, EMULSION INTRAVENOUS at 08:09

## 2018-01-01 RX ADMIN — SOYBEAN OIL 250 ML: 20 INJECTION, SOLUTION INTRAVENOUS at 09:09

## 2018-01-01 RX ADMIN — CEFTRIAXONE SODIUM 1 G: 1 INJECTION, POWDER, FOR SOLUTION INTRAMUSCULAR; INTRAVENOUS at 09:09

## 2018-01-01 RX ADMIN — FENTANYL CITRATE 25 MCG: 50 INJECTION INTRAMUSCULAR; INTRAVENOUS at 01:09

## 2018-01-01 RX ADMIN — NITROGLYCERIN 2 INCH: 20 OINTMENT TOPICAL at 11:09

## 2018-01-01 RX ADMIN — IPRATROPIUM BROMIDE AND ALBUTEROL SULFATE 3 ML: .5; 3 SOLUTION RESPIRATORY (INHALATION) at 11:09

## 2018-01-01 RX ADMIN — DEXTROSE MONOHYDRATE 12.5 G: 500 INJECTION PARENTERAL at 02:09

## 2018-01-01 RX ADMIN — DEXTROSE MONOHYDRATE 12.5 G: 500 INJECTION PARENTERAL at 06:09

## 2018-01-01 RX ADMIN — NITROGLYCERIN 2 INCH: 20 OINTMENT TOPICAL at 01:09

## 2018-01-01 RX ADMIN — CHLORHEXIDINE GLUCONATE 0.12% ORAL RINSE 15 ML: 1.2 LIQUID ORAL at 10:09

## 2018-01-01 RX ADMIN — VASOPRESSIN 0.04 UNITS/MIN: 20 INJECTION INTRAVENOUS at 08:09

## 2018-01-01 RX ADMIN — PROPOFOL 50 MCG/KG/MIN: 10 INJECTION, EMULSION INTRAVENOUS at 12:09

## 2018-01-01 RX ADMIN — VANCOMYCIN HYDROCHLORIDE 1000 MG: 1 INJECTION, POWDER, LYOPHILIZED, FOR SOLUTION INTRAVENOUS at 12:09

## 2018-01-01 RX ADMIN — CEFTRIAXONE SODIUM 2 G: 2 INJECTION, POWDER, FOR SOLUTION INTRAMUSCULAR; INTRAVENOUS at 08:09

## 2018-01-01 RX ADMIN — PROPOFOL 30 MCG/KG/MIN: 10 INJECTION, EMULSION INTRAVENOUS at 07:09

## 2018-01-01 RX ADMIN — PIPERACILLIN AND TAZOBACTAM 4.5 G: 4; .5 INJECTION, POWDER, LYOPHILIZED, FOR SOLUTION INTRAVENOUS; PARENTERAL at 12:09

## 2018-01-01 RX ADMIN — ETOMIDATE 9 MG: 2 INJECTION INTRAVENOUS at 05:09

## 2018-01-01 RX ADMIN — DEXTROSE 8 MG/HR: 50 INJECTION, SOLUTION INTRAVENOUS at 09:09

## 2018-01-01 RX ADMIN — Medication 3 ML: at 10:09

## 2018-01-01 RX ADMIN — DEXTROSE: 10 SOLUTION INTRAVENOUS at 05:09

## 2018-01-01 RX ADMIN — SODIUM CHLORIDE, SODIUM GLUCONATE, SODIUM ACETATE, POTASSIUM CHLORIDE, MAGNESIUM CHLORIDE, SODIUM PHOSPHATE, DIBASIC, AND POTASSIUM PHOSPHATE: .53; .5; .37; .037; .03; .012; .00082 INJECTION, SOLUTION INTRAVENOUS at 02:09

## 2018-01-01 RX ADMIN — NITROGLYCERIN 2 INCH: 20 OINTMENT TOPICAL at 05:09

## 2018-01-01 RX ADMIN — IPRATROPIUM BROMIDE AND ALBUTEROL SULFATE 3 ML: .5; 3 SOLUTION RESPIRATORY (INHALATION) at 08:09

## 2018-01-01 RX ADMIN — SODIUM CHLORIDE, SODIUM LACTATE, POTASSIUM CHLORIDE, AND CALCIUM CHLORIDE 500 ML: .6; .31; .03; .02 INJECTION, SOLUTION INTRAVENOUS at 12:09

## 2018-01-01 RX ADMIN — PIPERACILLIN AND TAZOBACTAM 4.5 G: 4; .5 INJECTION, POWDER, LYOPHILIZED, FOR SOLUTION INTRAVENOUS; PARENTERAL at 11:09

## 2018-01-01 RX ADMIN — SODIUM PHOSPHATE, MONOBASIC, MONOHYDRATE 15 MMOL: 276; 142 INJECTION, SOLUTION INTRAVENOUS at 07:09

## 2018-01-01 RX ADMIN — Medication 0.1 MCG/KG/MIN: at 08:09

## 2018-01-01 RX ADMIN — Medication: at 05:09

## 2018-01-01 RX ADMIN — OXYCODONE HYDROCHLORIDE 5 MG: 5 TABLET ORAL at 07:09

## 2018-01-01 RX ADMIN — MAGNESIUM SULFATE HEPTAHYDRATE 2 G: 40 INJECTION, SOLUTION INTRAVENOUS at 05:09

## 2018-01-01 RX ADMIN — DEXTROSE 8 MG/HR: 50 INJECTION, SOLUTION INTRAVENOUS at 01:09

## 2018-01-01 RX ADMIN — Medication 3 ML: at 02:09

## 2018-01-01 RX ADMIN — DEXMEDETOMIDINE HYDROCHLORIDE 1.2 MCG/KG/HR: 100 INJECTION, SOLUTION, CONCENTRATE INTRAVENOUS at 08:09

## 2018-01-01 RX ADMIN — Medication 2500 MCG: at 05:09

## 2018-01-01 RX ADMIN — DEXTROSE MONOHYDRATE 25 G: 25 INJECTION, SOLUTION INTRAVENOUS at 09:09

## 2018-01-01 RX ADMIN — FUROSEMIDE 20 MG: 10 INJECTION, SOLUTION INTRAMUSCULAR; INTRAVENOUS at 08:09

## 2018-01-01 RX ADMIN — SODIUM CHLORIDE 1000 ML: 0.9 INJECTION, SOLUTION INTRAVENOUS at 06:09

## 2018-01-01 RX ADMIN — SODIUM CHLORIDE, SODIUM LACTATE, POTASSIUM CHLORIDE, AND CALCIUM CHLORIDE 1000 ML: .6; .31; .03; .02 INJECTION, SOLUTION INTRAVENOUS at 12:09

## 2018-01-01 RX ADMIN — VANCOMYCIN HYDROCHLORIDE 1000 MG: 1 INJECTION, POWDER, LYOPHILIZED, FOR SOLUTION INTRAVENOUS at 11:09

## 2018-01-01 RX ADMIN — MAGNESIUM SULFATE HEPTAHYDRATE 2 G: 40 INJECTION, SOLUTION INTRAVENOUS at 08:09

## 2018-01-01 RX ADMIN — ALBUMIN HUMAN 25 G: 0.05 INJECTION, SOLUTION INTRAVENOUS at 05:09

## 2018-01-01 RX ADMIN — SODIUM CHLORIDE, SODIUM LACTATE, POTASSIUM CHLORIDE, AND CALCIUM CHLORIDE 500 ML: .6; .31; .03; .02 INJECTION, SOLUTION INTRAVENOUS at 07:09

## 2018-01-01 RX ADMIN — NITROGLYCERIN 2 INCH: 20 OINTMENT TOPICAL at 12:09

## 2018-01-01 RX ADMIN — ALBUMIN HUMAN 12.5 G: 0.05 INJECTION, SOLUTION INTRAVENOUS at 02:09

## 2018-01-01 RX ADMIN — ALBUMIN (HUMAN): 12.5 SOLUTION INTRAVENOUS at 05:09

## 2018-01-01 RX ADMIN — SODIUM PHOSPHATE, MONOBASIC, MONOHYDRATE 15 MMOL: 276; 142 INJECTION, SOLUTION INTRAVENOUS at 04:09

## 2018-01-01 RX ADMIN — ACETAMINOPHEN 1000 MG: 10 INJECTION, SOLUTION INTRAVENOUS at 05:09

## 2018-01-01 RX ADMIN — PROPOFOL 40 MCG/KG/MIN: 10 INJECTION, EMULSION INTRAVENOUS at 04:09

## 2018-01-01 RX ADMIN — DEXMEDETOMIDINE HYDROCHLORIDE 1 MCG/KG/HR: 100 INJECTION, SOLUTION, CONCENTRATE INTRAVENOUS at 06:09

## 2018-01-01 RX ADMIN — DEXMEDETOMIDINE HYDROCHLORIDE 0.2 MCG/KG/HR: 100 INJECTION, SOLUTION, CONCENTRATE INTRAVENOUS at 02:09

## 2018-01-01 RX ADMIN — PANTOPRAZOLE SODIUM 40 MG: 40 INJECTION, POWDER, FOR SOLUTION INTRAVENOUS at 09:09

## 2018-01-01 RX ADMIN — CHLORHEXIDINE GLUCONATE 0.12% ORAL RINSE 15 ML: 1.2 LIQUID ORAL at 09:09

## 2018-01-01 RX ADMIN — CEFEPIME 2 G: 2 INJECTION, POWDER, FOR SOLUTION INTRAVENOUS at 10:09

## 2018-01-01 RX ADMIN — DEXTROSE MONOHYDRATE 12.5 G: 500 INJECTION PARENTERAL at 04:09

## 2018-01-01 RX ADMIN — OXYCODONE HYDROCHLORIDE 5 MG: 5 TABLET ORAL at 10:09

## 2018-01-01 RX ADMIN — MIDAZOLAM HYDROCHLORIDE 2 MG: 1 INJECTION, SOLUTION INTRAMUSCULAR; INTRAVENOUS at 04:09

## 2018-01-01 RX ADMIN — Medication 3 ML: at 09:09

## 2018-01-01 RX ADMIN — VASOPRESSIN 0.04 UNITS/MIN: 20 INJECTION INTRAVENOUS at 01:09

## 2018-01-01 RX ADMIN — EPINEPHRINE 1 MG: 0.1 INJECTION, SOLUTION ENDOTRACHEAL; INTRACARDIAC; INTRAVENOUS at 02:09

## 2018-01-01 RX ADMIN — Medication 3 ML: at 06:09

## 2018-01-01 RX ADMIN — ENOXAPARIN SODIUM 70 MG: 100 INJECTION SUBCUTANEOUS at 09:09

## 2018-01-01 RX ADMIN — PROPOFOL 30 MCG/KG/MIN: 10 INJECTION, EMULSION INTRAVENOUS at 06:09

## 2018-01-01 RX ADMIN — DEXTROSE MONOHYDRATE 12.5 G: 500 INJECTION PARENTERAL at 12:09

## 2018-01-01 RX ADMIN — PIPERACILLIN AND TAZOBACTAM 4.5 G: 4; .5 INJECTION, POWDER, LYOPHILIZED, FOR SOLUTION INTRAVENOUS; PARENTERAL at 03:09

## 2018-01-01 RX ADMIN — PIPERACILLIN AND TAZOBACTAM 4.5 G: 4; .5 INJECTION, POWDER, LYOPHILIZED, FOR SOLUTION INTRAVENOUS; PARENTERAL at 10:09

## 2018-01-01 RX ADMIN — CALCIUM GLUCONATE: 94 INJECTION, SOLUTION INTRAVENOUS at 09:09

## 2018-01-01 RX ADMIN — DEXTROSE MONOHYDRATE, SODIUM CHLORIDE, AND POTASSIUM CHLORIDE: 50; 4.5; 1.49 INJECTION, SOLUTION INTRAVENOUS at 03:09

## 2018-01-01 RX ADMIN — FENTANYL CITRATE 25 MCG: 50 INJECTION INTRAMUSCULAR; INTRAVENOUS at 07:09

## 2018-01-01 RX ADMIN — SODIUM PHOSPHATE, MONOBASIC, MONOHYDRATE 15 MMOL: 276; 142 INJECTION, SOLUTION INTRAVENOUS at 08:09

## 2018-01-01 RX ADMIN — GELATIN ABSORBABLE SPONGE SIZE 100 3 APPLICATOR: MISC at 09:09

## 2018-01-01 RX ADMIN — SUCCINYLCHOLINE CHLORIDE 120 MG: 20 INJECTION, SOLUTION INTRAMUSCULAR; INTRAVENOUS at 05:09

## 2018-01-01 RX ADMIN — PROPOFOL 35 MCG/KG/MIN: 10 INJECTION, EMULSION INTRAVENOUS at 09:09

## 2018-01-01 RX ADMIN — CALCIUM CHLORIDE 1 G: 100 INJECTION, SOLUTION INTRAVENOUS at 10:09

## 2018-01-01 RX ADMIN — DEXTROSE MONOHYDRATE: 10 INJECTION, SOLUTION INTRAVENOUS at 11:09

## 2018-01-01 RX ADMIN — ROCURONIUM BROMIDE 20 MG: 10 INJECTION, SOLUTION INTRAVENOUS at 04:09

## 2018-01-01 RX ADMIN — PROPOFOL 15 MCG/KG/MIN: 10 INJECTION, EMULSION INTRAVENOUS at 01:09

## 2018-01-01 RX ADMIN — DEXMEDETOMIDINE HYDROCHLORIDE 1.4 MCG/KG/HR: 100 INJECTION, SOLUTION, CONCENTRATE INTRAVENOUS at 01:09

## 2018-01-01 RX ADMIN — DEXTROSE MONOHYDRATE 12.5 G: 500 INJECTION PARENTERAL at 08:09

## 2018-01-01 RX ADMIN — IOHEXOL 15 ML: 350 INJECTION, SOLUTION INTRAVENOUS at 09:09

## 2018-01-01 RX ADMIN — SODIUM CHLORIDE, SODIUM LACTATE, POTASSIUM CHLORIDE, AND CALCIUM CHLORIDE: .6; .31; .03; .02 INJECTION, SOLUTION INTRAVENOUS at 02:09

## 2018-01-01 RX ADMIN — CEFEPIME 2 G: 2 INJECTION, POWDER, FOR SOLUTION INTRAVENOUS at 11:09

## 2018-01-01 RX ADMIN — FUROSEMIDE 40 MG: 10 INJECTION, SOLUTION INTRAMUSCULAR; INTRAVENOUS at 11:09

## 2018-01-01 RX ADMIN — SODIUM CHLORIDE 500 ML: 0.9 INJECTION, SOLUTION INTRAVENOUS at 01:09

## 2018-01-01 RX ADMIN — DEXMEDETOMIDINE HYDROCHLORIDE 1 MCG/KG/HR: 100 INJECTION, SOLUTION, CONCENTRATE INTRAVENOUS at 08:09

## 2018-01-01 RX ADMIN — DEXTROSE MONOHYDRATE 25 G: 500 INJECTION PARENTERAL at 03:09

## 2018-01-01 RX ADMIN — IOHEXOL 15 ML: 350 INJECTION, SOLUTION INTRAVENOUS at 11:09

## 2018-01-01 RX ADMIN — ENOXAPARIN SODIUM 40 MG: 100 INJECTION SUBCUTANEOUS at 07:09

## 2018-01-01 RX ADMIN — FUROSEMIDE 20 MG: 10 INJECTION, SOLUTION INTRAVENOUS at 05:09

## 2018-01-01 RX ADMIN — IPRATROPIUM BROMIDE AND ALBUTEROL SULFATE 3 ML: .5; 3 SOLUTION RESPIRATORY (INHALATION) at 04:09

## 2018-01-01 RX ADMIN — DEXTROSE MONOHYDRATE 25 G: 500 INJECTION PARENTERAL at 08:09

## 2018-01-01 RX ADMIN — ALBUMIN HUMAN 12.5 G: 0.05 INJECTION, SOLUTION INTRAVENOUS at 10:09

## 2018-01-01 RX ADMIN — SUCCINYLCHOLINE CHLORIDE 120 MG: 20 INJECTION, SOLUTION INTRAMUSCULAR; INTRAVENOUS at 04:09

## 2018-01-01 RX ADMIN — HALOPERIDOL LACTATE 5 MG: 5 INJECTION, SOLUTION INTRAMUSCULAR at 06:09

## 2018-01-01 RX ADMIN — Medication 3 ML: at 05:09

## 2018-01-01 RX ADMIN — ACETAMINOPHEN 650 MG: 325 TABLET ORAL at 08:09

## 2018-01-01 RX ADMIN — DEXTROSE MONOHYDRATE, SODIUM CHLORIDE, AND POTASSIUM CHLORIDE: 50; 4.5; 1.49 INJECTION, SOLUTION INTRAVENOUS at 12:09

## 2018-01-01 RX ADMIN — ENOXAPARIN SODIUM 40 MG: 100 INJECTION SUBCUTANEOUS at 05:09

## 2018-01-01 RX ADMIN — IPRATROPIUM BROMIDE AND ALBUTEROL SULFATE 3 ML: .5; 3 SOLUTION RESPIRATORY (INHALATION) at 02:09

## 2018-01-01 RX ADMIN — SODIUM CHLORIDE, SODIUM LACTATE, POTASSIUM CHLORIDE, AND CALCIUM CHLORIDE 1000 ML: .6; .31; .03; .02 INJECTION, SOLUTION INTRAVENOUS at 10:09

## 2018-01-01 RX ADMIN — OXYCODONE HYDROCHLORIDE 5 MG: 5 TABLET ORAL at 08:09

## 2018-01-01 RX ADMIN — DEXMEDETOMIDINE HYDROCHLORIDE 1 MCG/KG/HR: 100 INJECTION, SOLUTION, CONCENTRATE INTRAVENOUS at 10:09

## 2018-01-01 RX ADMIN — HALOPERIDOL LACTATE 5 MG: 5 INJECTION, SOLUTION INTRAMUSCULAR at 10:09

## 2018-01-01 RX ADMIN — ALBUMIN HUMAN 25 G: 50 SOLUTION INTRAVENOUS at 06:09

## 2018-01-01 RX ADMIN — VASOPRESSIN 0.04 UNITS/MIN: 20 INJECTION INTRAVENOUS at 02:09

## 2018-01-01 RX ADMIN — FUROSEMIDE 20 MG: 10 INJECTION, SOLUTION INTRAMUSCULAR; INTRAVENOUS at 05:09

## 2018-01-01 RX ADMIN — DEXTROSE MONOHYDRATE 12.5 G: 500 INJECTION PARENTERAL at 09:09

## 2018-01-01 RX ADMIN — SODIUM CHLORIDE, SODIUM LACTATE, POTASSIUM CHLORIDE, AND CALCIUM CHLORIDE 500 ML: .6; .31; .03; .02 INJECTION, SOLUTION INTRAVENOUS at 02:09

## 2018-01-01 RX ADMIN — IPRATROPIUM BROMIDE AND ALBUTEROL SULFATE 3 ML: .5; 3 SOLUTION RESPIRATORY (INHALATION) at 09:09

## 2018-01-01 RX ADMIN — DEXTROSE 8 MG/HR: 50 INJECTION, SOLUTION INTRAVENOUS at 06:09

## 2018-01-01 RX ADMIN — MIDAZOLAM HYDROCHLORIDE 3 MG: 1 INJECTION, SOLUTION INTRAMUSCULAR; INTRAVENOUS at 01:09

## 2018-01-01 RX ADMIN — IPRATROPIUM BROMIDE AND ALBUTEROL SULFATE 3 ML: .5; 3 SOLUTION RESPIRATORY (INHALATION) at 12:09

## 2018-01-01 RX ADMIN — Medication 0.32 MCG/KG/MIN: at 08:09

## 2018-01-01 RX ADMIN — DEXTROSE 8 MG/HR: 50 INJECTION, SOLUTION INTRAVENOUS at 05:09

## 2018-01-01 RX ADMIN — SODIUM BICARBONATE 50 MEQ: 84 INJECTION, SOLUTION INTRAVENOUS at 02:09

## 2018-01-01 RX ADMIN — Medication 30 MG: at 05:09

## 2018-01-01 RX ADMIN — CEFEPIME 2 G: 2 INJECTION, POWDER, FOR SOLUTION INTRAVENOUS at 01:09

## 2018-01-01 RX ADMIN — PROPOFOL 30 MCG/KG/MIN: 10 INJECTION, EMULSION INTRAVENOUS at 05:09

## 2018-01-01 RX ADMIN — SODIUM CHLORIDE, SODIUM LACTATE, POTASSIUM CHLORIDE, AND CALCIUM CHLORIDE: .6; .31; .03; .02 INJECTION, SOLUTION INTRAVENOUS at 11:09

## 2018-01-01 RX ADMIN — CEFEPIME 2 G: 2 INJECTION, POWDER, FOR SOLUTION INTRAVENOUS at 12:09

## 2018-01-01 RX ADMIN — Medication 3 MCG/KG/MIN: at 02:09

## 2018-01-01 RX ADMIN — DEXTROSE MONOHYDRATE 12.5 G: 25 INJECTION, SOLUTION INTRAVENOUS at 05:09

## 2018-01-01 RX ADMIN — PROPOFOL 5 MCG/KG/MIN: 10 INJECTION, EMULSION INTRAVENOUS at 03:09

## 2018-01-01 RX ADMIN — ENOXAPARIN SODIUM 40 MG: 100 INJECTION SUBCUTANEOUS at 08:09

## 2018-01-01 RX ADMIN — Medication 100 MCG/HR: at 04:09

## 2018-01-01 RX ADMIN — PROPOFOL 50 MCG/KG/MIN: 10 INJECTION, EMULSION INTRAVENOUS at 01:09

## 2018-01-01 RX ADMIN — PIPERACILLIN AND TAZOBACTAM 4.5 G: 4; .5 INJECTION, POWDER, LYOPHILIZED, FOR SOLUTION INTRAVENOUS; PARENTERAL at 02:09

## 2018-01-01 RX ADMIN — CALCIUM CHLORIDE 1 G: 100 INJECTION, SOLUTION INTRAVENOUS at 02:09

## 2018-01-01 RX ADMIN — IOHEXOL 75 ML: 350 INJECTION, SOLUTION INTRAVENOUS at 01:09

## 2018-01-01 RX ADMIN — FENTANYL CITRATE 25 MCG: 50 INJECTION INTRAMUSCULAR; INTRAVENOUS at 08:09

## 2018-01-01 RX ADMIN — SODIUM CHLORIDE, SODIUM LACTATE, POTASSIUM CHLORIDE, AND CALCIUM CHLORIDE 2000 ML: .6; .31; .03; .02 INJECTION, SOLUTION INTRAVENOUS at 02:09

## 2018-01-01 RX ADMIN — POTASSIUM CHLORIDE 40 MEQ: 29.8 INJECTION, SOLUTION INTRAVENOUS at 07:09

## 2018-01-01 RX ADMIN — MAGNESIUM SULFATE HEPTAHYDRATE 4 G: 40 INJECTION, SOLUTION INTRAVENOUS at 09:09

## 2018-01-01 RX ADMIN — VASOPRESSIN 1 UNITS: 20 INJECTION INTRAVENOUS at 04:09

## 2018-01-01 RX ADMIN — PROPOFOL 5 MCG/KG/MIN: 10 INJECTION, EMULSION INTRAVENOUS at 12:09

## 2018-01-01 RX ADMIN — FLUCONAZOLE IN SODIUM CHLORIDE 400 MG: 2 INJECTION, SOLUTION INTRAVENOUS at 07:09

## 2018-01-01 RX ADMIN — DEXTROSE: 20 INJECTION, SOLUTION INTRAVENOUS at 12:09

## 2018-01-01 RX ADMIN — ETOMIDATE 10 MG: 2 INJECTION, SOLUTION INTRAVENOUS at 04:09

## 2018-01-01 RX ADMIN — PROPOFOL 40 MG: 10 INJECTION, EMULSION INTRAVENOUS at 04:09

## 2018-01-01 RX ADMIN — Medication 2500 MCG: at 11:09

## 2018-01-01 RX ADMIN — MAGNESIUM SULFATE HEPTAHYDRATE 2 G: 40 INJECTION, SOLUTION INTRAVENOUS at 07:09

## 2018-01-01 RX ADMIN — SODIUM CHLORIDE 1000 ML: 0.9 INJECTION, SOLUTION INTRAVENOUS at 11:09

## 2018-01-01 RX ADMIN — CALCIUM CHLORIDE 1 G: 100 INJECTION, SOLUTION INTRAVENOUS at 09:09

## 2018-01-01 RX ADMIN — SODIUM CHLORIDE 1000 ML: 0.9 INJECTION, SOLUTION INTRAVENOUS at 03:09

## 2018-01-01 RX ADMIN — POTASSIUM CHLORIDE 40 MEQ: 400 INJECTION, SOLUTION INTRAVENOUS at 10:09

## 2018-01-01 RX ADMIN — POTASSIUM CHLORIDE 40 MEQ: 400 INJECTION, SOLUTION INTRAVENOUS at 09:09

## 2018-01-01 RX ADMIN — LIDOCAINE HYDROCHLORIDE 100 MG: 20 INJECTION, SOLUTION INTRAVENOUS at 04:09

## 2018-01-01 RX ADMIN — IPRATROPIUM BROMIDE AND ALBUTEROL SULFATE 3 ML: .5; 3 SOLUTION RESPIRATORY (INHALATION) at 10:09

## 2018-01-01 RX ADMIN — FENTANYL CITRATE 25 MCG: 50 INJECTION, SOLUTION INTRAMUSCULAR; INTRAVENOUS at 01:09

## 2018-01-01 RX ADMIN — PIPERACILLIN AND TAZOBACTAM 4.5 G: 4; .5 INJECTION, POWDER, LYOPHILIZED, FOR SOLUTION INTRAVENOUS; PARENTERAL at 04:09

## 2018-01-01 RX ADMIN — FUROSEMIDE 40 MG: 10 INJECTION, SOLUTION INTRAMUSCULAR; INTRAVENOUS at 09:09

## 2018-01-01 RX ADMIN — PROPOFOL 25 MCG/KG/MIN: 10 INJECTION, EMULSION INTRAVENOUS at 06:09

## 2018-01-01 RX ADMIN — VASOPRESSIN 0.04 UNITS/MIN: 20 INJECTION INTRAVENOUS at 10:09

## 2018-01-01 RX ADMIN — NITROGLYCERIN 2 INCH: 20 OINTMENT TOPICAL at 06:09

## 2018-01-01 RX ADMIN — Medication 100 MCG: at 05:09

## 2018-01-01 RX ADMIN — ETOMIDATE 1 MG: 2 INJECTION, SOLUTION INTRAVENOUS at 08:09

## 2018-01-01 RX ADMIN — SODIUM CHLORIDE 500 ML: 0.9 INJECTION, SOLUTION INTRAVENOUS at 12:09

## 2018-01-01 RX ADMIN — PROPOFOL 35 MCG/KG/MIN: 10 INJECTION, EMULSION INTRAVENOUS at 12:09

## 2018-01-01 RX ADMIN — VANCOMYCIN HYDROCHLORIDE 1250 MG: 10 INJECTION, POWDER, LYOPHILIZED, FOR SOLUTION INTRAVENOUS at 12:09

## 2018-01-01 RX ADMIN — ESCITALOPRAM OXALATE 10 MG: 10 TABLET ORAL at 09:09

## 2018-01-01 RX ADMIN — PANTOPRAZOLE SODIUM 40 MG: 40 INJECTION, POWDER, FOR SOLUTION INTRAVENOUS at 10:09

## 2018-01-01 RX ADMIN — DEXMEDETOMIDINE HYDROCHLORIDE 1.4 MCG/KG/HR: 100 INJECTION, SOLUTION, CONCENTRATE INTRAVENOUS at 04:09

## 2018-01-01 RX ADMIN — HYDROMORPHONE HYDROCHLORIDE 0.5 MG: 2 INJECTION INTRAMUSCULAR; INTRAVENOUS; SUBCUTANEOUS at 01:09

## 2018-01-01 RX ADMIN — FUROSEMIDE 40 MG: 10 INJECTION, SOLUTION INTRAMUSCULAR; INTRAVENOUS at 08:09

## 2018-01-01 RX ADMIN — DEXMEDETOMIDINE HYDROCHLORIDE 0.2 MCG/KG/HR: 100 INJECTION, SOLUTION, CONCENTRATE INTRAVENOUS at 12:09

## 2018-01-01 RX ADMIN — DEXMEDETOMIDINE HYDROCHLORIDE 0.7 MCG/KG/HR: 4 INJECTION, SOLUTION INTRAVENOUS at 05:09

## 2018-01-01 RX ADMIN — EPINEPHRINE 1 MG: 0.1 INJECTION, SOLUTION ENDOTRACHEAL; INTRACARDIAC; INTRAVENOUS at 07:09

## 2018-01-01 RX ADMIN — SODIUM BICARBONATE 50 MEQ: 84 INJECTION, SOLUTION INTRAVENOUS at 08:09

## 2018-01-01 RX ADMIN — PHENYLEPHRINE HYDROCHLORIDE 100 MCG: 10 INJECTION INTRAVENOUS at 05:09

## 2018-01-01 RX ADMIN — SODIUM CHLORIDE, SODIUM LACTATE, POTASSIUM CHLORIDE, AND CALCIUM CHLORIDE 1000 ML: .6; .31; .03; .02 INJECTION, SOLUTION INTRAVENOUS at 05:09

## 2018-01-01 RX ADMIN — POTASSIUM CHLORIDE 20 MEQ: 14.9 INJECTION, SOLUTION INTRAVENOUS at 05:09

## 2018-01-01 RX ADMIN — PROPOFOL 5 MCG/KG/MIN: 10 INJECTION, EMULSION INTRAVENOUS at 06:09

## 2018-01-01 RX ADMIN — ALBUMIN HUMAN 12.5 G: 50 SOLUTION INTRAVENOUS at 10:09

## 2018-01-01 RX ADMIN — Medication 2500 MCG: at 06:09

## 2018-01-01 RX ADMIN — OXYCODONE HYDROCHLORIDE AND ACETAMINOPHEN 2 TABLET: 5; 325 TABLET ORAL at 02:09

## 2018-01-01 RX ADMIN — FENTANYL CITRATE 50 MCG: 50 INJECTION INTRAMUSCULAR; INTRAVENOUS at 07:09

## 2018-01-01 RX ADMIN — FLUCONAZOLE 400 MG: 40 POWDER, FOR SUSPENSION ORAL at 08:09

## 2018-01-01 RX ADMIN — Medication 3 ML: at 07:09

## 2018-01-01 RX ADMIN — DEXMEDETOMIDINE HYDROCHLORIDE 1.4 MCG/KG/HR: 4 INJECTION, SOLUTION INTRAVENOUS at 06:09

## 2018-01-01 RX ADMIN — MAGNESIUM SULFATE HEPTAHYDRATE 3 G: 500 INJECTION, SOLUTION INTRAMUSCULAR; INTRAVENOUS at 04:09

## 2018-01-01 RX ADMIN — ENOXAPARIN SODIUM 60 MG: 100 INJECTION SUBCUTANEOUS at 08:09

## 2018-01-01 RX ADMIN — VANCOMYCIN HYDROCHLORIDE 1250 MG: 10 INJECTION, POWDER, LYOPHILIZED, FOR SOLUTION INTRAVENOUS at 01:09

## 2018-01-01 RX ADMIN — FENTANYL CITRATE 25 MCG: 50 INJECTION INTRAMUSCULAR; INTRAVENOUS at 11:09

## 2018-01-01 RX ADMIN — PROPOFOL 5 MCG/KG/MIN: 10 INJECTION, EMULSION INTRAVENOUS at 10:09

## 2018-01-01 RX ADMIN — DEXTROSE MONOHYDRATE 12.5 G: 500 INJECTION PARENTERAL at 07:09

## 2018-01-01 RX ADMIN — FUROSEMIDE 20 MG: 10 INJECTION, SOLUTION INTRAMUSCULAR; INTRAVENOUS at 10:09

## 2018-01-01 RX ADMIN — ENOXAPARIN SODIUM 70 MG: 100 INJECTION SUBCUTANEOUS at 03:09

## 2018-01-01 RX ADMIN — SUCCINYLCHOLINE CHLORIDE 120 MG: 20 INJECTION INTRAMUSCULAR; INTRAVENOUS at 05:09

## 2018-01-01 RX ADMIN — POTASSIUM CHLORIDE 40 MEQ: 400 INJECTION, SOLUTION INTRAVENOUS at 11:09

## 2018-01-01 RX ADMIN — MAGNESIUM SULFATE HEPTAHYDRATE 2 G: 40 INJECTION, SOLUTION INTRAVENOUS at 04:09

## 2018-01-01 RX ADMIN — FENTANYL CITRATE 100 MCG: 50 INJECTION, SOLUTION INTRAMUSCULAR; INTRAVENOUS at 04:09

## 2018-01-01 RX ADMIN — PROPOFOL 40 MCG/KG/MIN: 10 INJECTION, EMULSION INTRAVENOUS at 08:09

## 2018-01-01 RX ADMIN — SODIUM CHLORIDE 1000 ML: 0.9 INJECTION, SOLUTION INTRAVENOUS at 07:09

## 2018-01-01 RX ADMIN — QUETIAPINE FUMARATE 25 MG: 25 TABLET ORAL at 08:09

## 2018-01-01 RX ADMIN — PIPERACILLIN AND TAZOBACTAM 4.5 G: 4; .5 INJECTION, POWDER, LYOPHILIZED, FOR SOLUTION INTRAVENOUS; PARENTERAL at 08:09

## 2018-01-01 RX ADMIN — SODIUM CHLORIDE, SODIUM GLUCONATE, SODIUM ACETATE, POTASSIUM CHLORIDE, MAGNESIUM CHLORIDE, SODIUM PHOSPHATE, DIBASIC, AND POTASSIUM PHOSPHATE: .53; .5; .37; .037; .03; .012; .00082 INJECTION, SOLUTION INTRAVENOUS at 04:09

## 2018-01-01 RX ADMIN — DEXTROSE MONOHYDRATE: 10 INJECTION, SOLUTION INTRAVENOUS at 04:09

## 2018-01-01 RX ADMIN — DEXTROSE MONOHYDRATE 25 G: 500 INJECTION PARENTERAL at 06:09

## 2018-01-01 RX ADMIN — DEXMEDETOMIDINE HYDROCHLORIDE 0.55 MCG/KG/HR: 100 INJECTION, SOLUTION, CONCENTRATE INTRAVENOUS at 10:09

## 2018-01-01 RX ADMIN — DEXMEDETOMIDINE HYDROCHLORIDE 1.4 MCG/KG/HR: 100 INJECTION, SOLUTION, CONCENTRATE INTRAVENOUS at 12:09

## 2018-01-01 RX ADMIN — PANTOPRAZOLE SODIUM 40 MG: 40 INJECTION, POWDER, FOR SOLUTION INTRAVENOUS at 01:09

## 2018-01-01 RX ADMIN — POTASSIUM CHLORIDE 40 MEQ: 400 INJECTION, SOLUTION INTRAVENOUS at 08:09

## 2018-01-01 RX ADMIN — SODIUM BICARBONATE 50 MEQ: 84 INJECTION, SOLUTION INTRAVENOUS at 07:09

## 2018-01-01 RX ADMIN — SODIUM CHLORIDE: 0.9 INJECTION, SOLUTION INTRAVENOUS at 02:09

## 2018-01-01 RX ADMIN — TAMSULOSIN HYDROCHLORIDE 0.4 MG: 0.4 CAPSULE ORAL at 09:09

## 2018-01-01 RX ADMIN — SODIUM CHLORIDE 250 ML: 0.9 INJECTION, SOLUTION INTRAVENOUS at 09:09

## 2018-01-01 RX ADMIN — SODIUM CHLORIDE, SODIUM LACTATE, POTASSIUM CHLORIDE, AND CALCIUM CHLORIDE 500 ML: .6; .31; .03; .02 INJECTION, SOLUTION INTRAVENOUS at 11:09

## 2018-01-01 RX ADMIN — DEXMEDETOMIDINE HYDROCHLORIDE 1.4 MCG/KG/HR: 4 INJECTION, SOLUTION INTRAVENOUS at 10:09

## 2018-01-01 RX ADMIN — DEXTROSE MONOHYDRATE: 10 INJECTION, SOLUTION INTRAVENOUS at 02:09

## 2018-01-01 RX ADMIN — METOPROLOL TARTRATE 5 MG: 1 INJECTION, SOLUTION INTRAVENOUS at 11:09

## 2018-01-01 RX ADMIN — DEXTROSE MONOHYDRATE 12.5 G: 500 INJECTION PARENTERAL at 05:09

## 2018-01-01 RX ADMIN — IOHEXOL 15 ML: 350 INJECTION, SOLUTION INTRAVENOUS at 10:09

## 2018-01-01 RX ADMIN — FUROSEMIDE 20 MG: 10 INJECTION, SOLUTION INTRAMUSCULAR; INTRAVENOUS at 06:09

## 2018-01-01 RX ADMIN — CALCIUM 1000 MG: 500 TABLET ORAL at 09:09

## 2018-01-01 RX ADMIN — DEXMEDETOMIDINE HYDROCHLORIDE 1.4 MCG/KG/HR: 100 INJECTION, SOLUTION, CONCENTRATE INTRAVENOUS at 08:09

## 2018-01-01 RX ADMIN — ROCURONIUM BROMIDE 20 MG: 10 INJECTION, SOLUTION INTRAVENOUS at 03:09

## 2018-01-01 RX ADMIN — FENTANYL CITRATE 25 MCG: 50 INJECTION, SOLUTION INTRAMUSCULAR; INTRAVENOUS at 04:09

## 2018-01-01 RX ADMIN — MAGNESIUM SULFATE IN WATER 2 G: 40 INJECTION, SOLUTION INTRAVENOUS at 08:09

## 2018-01-01 RX ADMIN — HALOPERIDOL LACTATE 5 MG: 5 INJECTION, SOLUTION INTRAMUSCULAR at 11:09

## 2018-01-01 RX ADMIN — CEFEPIME 2 G: 2 INJECTION, POWDER, FOR SOLUTION INTRAVENOUS at 04:09

## 2018-01-01 RX ADMIN — FENTANYL CITRATE 50 MCG: 50 INJECTION, SOLUTION INTRAMUSCULAR; INTRAVENOUS at 07:09

## 2018-01-01 RX ADMIN — DEXMEDETOMIDINE HYDROCHLORIDE 0.4 MCG/KG/HR: 4 INJECTION, SOLUTION INTRAVENOUS at 08:09

## 2018-01-01 RX ADMIN — CEFEPIME 2 G: 2 INJECTION, POWDER, FOR SOLUTION INTRAVENOUS at 08:09

## 2018-01-01 RX ADMIN — ETOMIDATE 10 MG: 2 INJECTION, SOLUTION INTRAVENOUS at 07:09

## 2018-01-01 RX ADMIN — ONDANSETRON 4 MG: 2 INJECTION INTRAMUSCULAR; INTRAVENOUS at 05:09

## 2018-01-01 RX ADMIN — SODIUM CHLORIDE, SODIUM LACTATE, POTASSIUM CHLORIDE, AND CALCIUM CHLORIDE 1000 ML: .6; .31; .03; .02 INJECTION, SOLUTION INTRAVENOUS at 04:09

## 2018-01-01 RX ADMIN — DEXMEDETOMIDINE HYDROCHLORIDE 1.4 MCG/KG/HR: 100 INJECTION, SOLUTION, CONCENTRATE INTRAVENOUS at 09:09

## 2018-01-01 RX ADMIN — ALBUMIN HUMAN 25 G: 0.05 INJECTION, SOLUTION INTRAVENOUS at 06:09

## 2018-01-01 RX ADMIN — PROPOFOL 40 MCG/KG/MIN: 10 INJECTION, EMULSION INTRAVENOUS at 01:09

## 2018-01-01 RX ADMIN — ENOXAPARIN SODIUM 70 MG: 100 INJECTION SUBCUTANEOUS at 10:09

## 2018-01-01 RX ADMIN — CEFEPIME 2 G: 2 INJECTION, POWDER, FOR SOLUTION INTRAVENOUS at 05:09

## 2018-01-01 RX ADMIN — ALBUMIN HUMAN 12.5 G: 0.05 INJECTION, SOLUTION INTRAVENOUS at 11:09

## 2018-01-01 RX ADMIN — CEFTRIAXONE SODIUM 1 G: 1 INJECTION, POWDER, FOR SOLUTION INTRAMUSCULAR; INTRAVENOUS at 08:09

## 2018-01-01 RX ADMIN — FENTANYL CITRATE 100 MCG: 50 INJECTION, SOLUTION INTRAMUSCULAR; INTRAVENOUS at 03:09

## 2018-01-01 RX ADMIN — DEXMEDETOMIDINE HYDROCHLORIDE 0.5 MCG/KG/HR: 100 INJECTION, SOLUTION, CONCENTRATE INTRAVENOUS at 02:09

## 2018-01-01 RX ADMIN — HEPARIN SODIUM AND DEXTROSE 14 UNITS/KG/HR: 10000; 5 INJECTION INTRAVENOUS at 12:09

## 2018-01-01 RX ADMIN — DEXTROSE MONOHYDRATE 12.5 G: 500 INJECTION PARENTERAL at 10:09

## 2018-01-01 RX ADMIN — SUCCINYLCHOLINE CHLORIDE 100 MG: 20 INJECTION, SOLUTION INTRAMUSCULAR; INTRAVENOUS at 07:09

## 2018-01-01 RX ADMIN — SODIUM CHLORIDE 1000 ML: 0.9 INJECTION, SOLUTION INTRAVENOUS at 08:09

## 2018-01-01 RX ADMIN — DEXTROSE MONOHYDRATE: 10 INJECTION, SOLUTION INTRAVENOUS at 08:09

## 2018-01-01 RX ADMIN — ETOMIDATE 9 MG: 2 INJECTION, SOLUTION INTRAVENOUS at 05:09

## 2018-01-01 RX ADMIN — MIDAZOLAM HYDROCHLORIDE 2 MG: 1 INJECTION, SOLUTION INTRAMUSCULAR; INTRAVENOUS at 02:09

## 2018-01-01 RX ADMIN — DEXTROSE MONOHYDRATE 25 G: 500 INJECTION PARENTERAL at 07:09

## 2018-01-01 RX ADMIN — CALCIUM CHLORIDE 1 G: 100 INJECTION, SOLUTION INTRAVENOUS at 07:09

## 2018-01-01 RX ADMIN — DEXTROSE MONOHYDRATE, SODIUM CHLORIDE, AND POTASSIUM CHLORIDE: 50; 4.5; 1.49 INJECTION, SOLUTION INTRAVENOUS at 11:09

## 2018-01-01 RX ADMIN — PROPOFOL 50 MCG/KG/MIN: 10 INJECTION, EMULSION INTRAVENOUS at 02:09

## 2018-01-01 RX ADMIN — FLUCONAZOLE IN SODIUM CHLORIDE 400 MG: 2 INJECTION, SOLUTION INTRAVENOUS at 08:09

## 2018-01-01 RX ADMIN — Medication 50 MCG/HR: at 12:09

## 2018-01-01 RX ADMIN — Medication: at 06:09

## 2018-01-01 RX ADMIN — ROCURONIUM BROMIDE 25 MG: 10 INJECTION, SOLUTION INTRAVENOUS at 01:09

## 2018-01-01 RX ADMIN — DEXTROSE 8 MG/HR: 50 INJECTION, SOLUTION INTRAVENOUS at 02:09

## 2018-01-01 RX ADMIN — DEXTROSE MONOHYDRATE, SODIUM CHLORIDE, AND POTASSIUM CHLORIDE: 50; 9; 1.49 INJECTION, SOLUTION INTRAVENOUS at 06:09

## 2018-01-01 RX ADMIN — FUROSEMIDE 20 MG: 10 INJECTION, SOLUTION INTRAMUSCULAR; INTRAVENOUS at 11:09

## 2018-01-01 RX ADMIN — PROPOFOL 50 MCG/KG/MIN: 10 INJECTION, EMULSION INTRAVENOUS at 11:09

## 2018-09-12 PROBLEM — K57.20 PERFORATED DIVERTICULUM OF LARGE INTESTINE: Status: ACTIVE | Noted: 2018-01-01

## 2018-09-12 NOTE — H&P
"[facility]  Attending: Jammie  Resident: Rufus   CC: Pneumoperitoneum    HPI: Amari Saez is a pleasant 62 y.o. man, hx of gastric bypass in 2003, who presents to the ED for worsening dyspnea and leg swelling.    He states that there have been no complications from his gastric bypass, although he did require hospitalization for what sounds like a GI bleed 7-8 years ago.    Does not have any cardiac, pulmonary, or renal diagnoses.      Smokes 1.5 packs per day, and has for 20 years.  Used to be a heavy drinker, but now drinks a "fifth of wine" over 3 days.  Uses minimal NSAIDs.  Took alleve once yesterday and twice the day before that for rib pain after a fall.     Past Medical History:   Diagnosis Date    Anxiety     Hypertension     Nicotine abuse        Past Surgical History:   Procedure Laterality Date    CARPAL TUNNEL RELEASE Bilateral     EYE SURGERY      vein occlusion -laser sugery for burst blood vessel     EYE SURGERY      lasix    GASTRIC BYPASS  2004    KNEE SURGERY  1980s    arthroscopy       Family History   Problem Relation Age of Onset    Diabetes Unknown     Parkinsonism Mother     Alzheimer's disease Sister         she lives with his other sister    Alzheimer's disease Maternal Uncle     Stroke Father         hypertension?    Cancer Neg Hx        Social History     Socioeconomic History    Marital status: Single     Spouse name: Not on file    Number of children: Not on file    Years of education: Not on file    Highest education level: Not on file   Social Needs    Financial resource strain: Not on file    Food insecurity - worry: Not on file    Food insecurity - inability: Not on file    Transportation needs - medical: Not on file    Transportation needs - non-medical: Not on file   Occupational History    Not on file   Tobacco Use    Smoking status: Current Every Day Smoker     Packs/day: 1.00     Years: 30.00     Pack years: 30.00     Types: Cigarettes   Substance " "and Sexual Activity    Alcohol use: Yes     Alcohol/week: 1.2 oz     Types: 2 Shots of liquor per week    Drug use: No    Sexual activity: Yes     Partners: Male     Birth control/protection: Condom   Other Topics Concern    Not on file   Social History Narrative    Previously worked on [employer] - retired due to severe carpal tunnel. Now does maintenance work at a school.        No current facility-administered medications on file prior to encounter.      Current Outpatient Medications on File Prior to Encounter   Medication Sig Dispense Refill    alprazolam (XANAX) 0.5 MG tablet Take 1 tablet (0.5 mg total) by mouth daily as needed for Anxiety. 30 tablet 0    blood pressure test kit-medium Kit 1 kit by Misc.(Non-Drug; Combo Route) route once daily. 1 each 0    escitalopram oxalate (LEXAPRO) 10 MG tablet TAKE 1 TABLET BY MOUTH EVERY DAY 90 tablet 0    lisinopril 10 MG tablet TAKE 1 TABLET BY MOUTH EVERY DAY 90 tablet 0       Review of patient's allergies indicates:  No Known Allergies    ROS: Constitutional: no fever or chills, pain controlled   Cardiovascular: no chest pain or palpitations   Genitourinary: no hematuria or dysuria   Hematologic/Lymphatic: no easy bruising or lymphadenopathy   Musculoskeletal: no arthralgias or myalgias   Neurological: no seizures or tremors     Phys:  Vitals:    09/12/18 1246 09/12/18 1446 09/12/18 1516   BP: (!) 103/56  119/69   BP Location:   Left arm   Patient Position:   Sitting   Pulse: 108 93 100   Resp: 18 (!) 24 18   Temp: 97.5 °F (36.4 °C)     TempSrc: Oral     Weight: 59.9 kg (132 lb)     Height: 5' 6" (1.676 m)       Phys:   Gen: NAD   HEENT: NCAT, trachea midline  CV: RRR, no m/r/g   Pulm: Unlabored  Abd: Moderate ttp RUQ > RLQ.  Focal peritonitis  Extremities: no cyanosis or edema, or clubbing  Skin: Skin color, texture, turgor normal. No rashes or lesions     A/P 62 year old man with hx gastric bypass, pneumoperitoneum    To OR for ex-lap, Class A  Zosyn given " in ED  Consents obtained    Juan Carlos Hooper MD  General Surgery, PGY-5  [phone]

## 2018-09-12 NOTE — ED NOTES
LOC: The patient is awake, alert, and aware of environment. The patient is oriented x 3 and speaking appropriately.   APPEARANCE: No acute distress noted.   PSYCHOSOCIAL: Patient is calm and cooperative.   SKIN: The skin is warm, dry.   RESPIRATORY: Airway is open and patent. Bilateral chest rise and fall. Respirations are spontaneous, even and unlabored. Normal effort and rate noted. No accessory muscle use noted. Pt reports sob. RR 2O even unlabored. Pt reports sob worsens with exertion and laying flat.   CARDIAC: Patient has a normal rate and rhythm. Denies chest pain.  ABDOMEN: Soft and non tender to palpation. No distention noted.   URINARY:  Voids independently.   EXTREMITIES: 4+ bilateral edema in the legs.   NEUROLOGIC: Eyes open spontaneously. Speech clear. Tolerating saliva secretions well. Able to follow commands, demonstrating ability to actively and appropriately communicate within context of current conversation. Symmetrical facial muscles. Moving all extremities well. Movement is purposeful.   MUSCULOSKELETAL: No obvious deformities noted.

## 2018-09-12 NOTE — ANESTHESIA PREPROCEDURE EVALUATION
09/12/2018  Pre-operative evaluation for Procedure(s) (LRB):  LAPAROTOMY,EXPLORATORY (N/A)     Amari Saez is a 62 y.o. male with PMH of newly dx COPD, and well-controlled HTN who presents for the procedure above secondary to pneumoperitoneum.  Pt is s/p gastric bypass several years ago. Patient describes relatively recent hx of GI bleed tx at [facility].  Denies N/V today.  NPO since yesterday.     LDA: 20G RAC     Prev airway:  None on file     Drips: none     Patient Active Problem List   Diagnosis    Hypertension       Review of patient's allergies indicates:  No Known Allergies     No current facility-administered medications on file prior to encounter.      Current Outpatient Medications on File Prior to Encounter   Medication Sig Dispense Refill    alprazolam (XANAX) 0.5 MG tablet Take 1 tablet (0.5 mg total) by mouth daily as needed for Anxiety. 30 tablet 0    blood pressure test kit-medium Kit 1 kit by Misc.(Non-Drug; Combo Route) route once daily. 1 each 0    escitalopram oxalate (LEXAPRO) 10 MG tablet TAKE 1 TABLET BY MOUTH EVERY DAY 90 tablet 0    lisinopril 10 MG tablet TAKE 1 TABLET BY MOUTH EVERY DAY 90 tablet 0       Past Surgical History:   Procedure Laterality Date    CARPAL TUNNEL RELEASE Bilateral     EYE SURGERY      vein occlusion -laser sugery for burst blood vessel     EYE SURGERY      lasix    GASTRIC BYPASS  2004    KNEE SURGERY  1980s    arthroscopy       Social History     Socioeconomic History    Marital status: Single     Spouse name: Not on file    Number of children: Not on file    Years of education: Not on file    Highest education level: Not on file   Social Needs    Financial resource strain: Not on file    Food insecurity - worry: Not on file    Food insecurity - inability: Not on file    Transportation needs - medical: Not on file    Transportation needs -  non-medical: Not on file   Occupational History    Not on file   Tobacco Use    Smoking status: Current Every Day Smoker     Packs/day: 1.00     Years: 30.00     Pack years: 30.00     Types: Cigarettes   Substance and Sexual Activity    Alcohol use: Yes     Alcohol/week: 1.2 oz     Types: 2 Shots of liquor per week    Drug use: No    Sexual activity: Yes     Partners: Male     Birth control/protection: Condom   Other Topics Concern    Not on file   Social History Narrative    Previously worked on [employer] - retired due to severe carpal tunnel. Now does maintenance work at a school.          Vital Signs Range (Last 24H):  Temp:  [36.4 °C (97.5 °F)]   Pulse:  []   Resp:  [16-25]   BP: (103-119)/(56-87)   SpO2:  [96 %]       CBC:   Recent Labs      09/12/18   1508   WBC  13.34*   RBC  4.15*   HGB  12.2*   HCT  34.9*   PLT  258   MCV  84   MCH  29.4   MCHC  35.0       CMP:   Recent Labs      09/12/18   1407   NA  136   K  3.7   CL  104   CO2  23   BUN  20   CREATININE  1.0   GLU  56*   MG  1.8   CALCIUM  7.5*   ALBUMIN  1.4*   PROT  5.0*   ALKPHOS  189*   ALT  40   AST  37   BILITOT  1.2*       INR  Recent Labs      09/12/18   1508   INR  1.5*           Diagnostic Studies:      EKG:  Vent. Rate : 090 BPM     Atrial Rate : 090 BPM     P-R Int : 116 ms          QRS Dur : 084 ms      QT Int : 348 ms       P-R-T Axes : 085 077 084 degrees     QTc Int : 425 ms    Normal sinus rhythm  Normal ECG  Abnormal ECG  When compared with ECG of 21-AUG-2015 08:36,  No significant change was found  Confirmed by Paul Apple MD ([id number]) on 9/12/2018 2:09:58 PM    2D Echo:  None on file         Anesthesia Evaluation    I have reviewed the Patient Summary Reports.     I have reviewed the Medications.     Review of Systems  Anesthesia Hx:  No problems with previous Anesthesia  History of prior surgery of interest to airway management or planning: gastric bypass. Denies Family Hx of Anesthesia complications.   Denies Personal Hx  of Anesthesia complications.   Social:  Smoker    Cardiovascular:   Hypertension, well controlled    Pulmonary:   COPD, moderate    Hepatic/GI:   Denies PUD. Denies GERD.    Neurological:  Neurology Normal    Endocrine:  Endocrine Normal        Physical Exam  General:  Malnutrition    Airway/Jaw/Neck:  Airway Findings: Mouth Opening: Normal Tongue: Normal  General Airway Assessment: Adult  Mallampati: II  Improves to I with phonation.  TM Distance: Normal, at least 6 cm  Jaw/Neck Findings:     Neck ROM: Normal ROM      Dental:  Dental Findings:    Chest/Lungs:  Chest/Lungs Findings: Decreased Breath Sounds Bilateral     Heart/Vascular:  Heart Findings: Rate: Normal  Rhythm: Regular Rhythm  Sounds: Normal             Anesthesia Plan  Type of Anesthesia, risks & benefits discussed:  Anesthesia Type:  general  Patient's Preference:   Intra-op Monitoring Plan: arterial line and standard ASA monitors  Intra-op Monitoring Plan Comments:   Post Op Pain Control Plan: multimodal analgesia  Post Op Pain Control Plan Comments:   Induction:   IV  Beta Blocker:  Patient is not currently on a Beta-Blocker (No further documentation required).       Informed Consent: Patient representative understands risks and agrees with Anesthesia plan.  Questions answered. Anesthesia consent signed with patient representative.  ASA Score: 4  emergent   Day of Surgery Review of History & Physical:    H&P update referred to the surgeon.     Anesthesia Plan Notes: Ordered NGT pre-induction         Ready For Surgery From Anesthesia Perspective.

## 2018-09-12 NOTE — ED PROVIDER NOTES
Encounter Date: 9/12/2018       History     Chief Complaint   Patient presents with    Extremity Weakness     Pt c/o weakness to both legs x couple months.  States having trouble walking.      63 y/o male with PMHx significant for HTN, past gastric bypass on vitamins, nicotine use who presents to the ED with shortness of breath, LE swelling. Patient states that this has been going on for the last couple of months, worsened over the last two weeks. States that he has been seen at OSH and treated for hypertension, never been admitted in the past. States that he has been having trouble walking secondary to significant leg swelling. States that he has been having increasing SOB as well; used to walk 100 ft without Rodriguez, now walks 5 feet with Rodriguez. Also states that he has significant smoking hx as well. No orthopnea per patient. Denies any NVD, no HA, no changes in vision.          Review of patient's allergies indicates:  No Known Allergies  Past Medical History:   Diagnosis Date    Anxiety     Hypertension     Nicotine abuse      Past Surgical History:   Procedure Laterality Date    CARPAL TUNNEL RELEASE Bilateral     EYE SURGERY      vein occlusion -laser sugery for burst blood vessel     EYE SURGERY      lasix    GASTRIC BYPASS  2004    KNEE SURGERY  1980s    arthroscopy     Family History   Problem Relation Age of Onset    Diabetes Unknown     Parkinsonism Mother     Alzheimer's disease Sister         she lives with his other sister    Alzheimer's disease Maternal Uncle     Stroke Father         hypertension?    Cancer Neg Hx      Social History     Tobacco Use    Smoking status: Current Every Day Smoker     Packs/day: 1.00     Years: 30.00     Pack years: 30.00     Types: Cigarettes   Substance Use Topics    Alcohol use: Yes     Alcohol/week: 1.2 oz     Types: 2 Shots of liquor per week    Drug use: No     Review of Systems   Constitutional: Negative for chills and fever.   HENT: Negative for  drooling and tinnitus.    Eyes: Negative for photophobia and visual disturbance.   Respiratory: Positive for shortness of breath. Negative for wheezing.    Cardiovascular: Negative for chest pain and palpitations.   Gastrointestinal: Negative for abdominal pain, diarrhea, nausea and vomiting.   Genitourinary: Negative for dysuria, flank pain and hematuria.   Musculoskeletal: Negative for neck pain and neck stiffness.   Skin: Negative for color change and pallor.   Neurological: Negative for light-headedness and numbness.   Psychiatric/Behavioral: Negative for agitation and confusion.       Physical Exam     Initial Vitals [09/12/18 1246]   BP Pulse Resp Temp SpO2   (!) 103/56 108 18 97.5 °F (36.4 °C) --      MAP       --         Physical Exam    Constitutional: No distress.   Chronically ill appearing, speaking full sentences, non toxic appearing   HENT:   Head: Normocephalic and atraumatic.   Mouth/Throat: No oropharyngeal exudate.   Eyes: EOM are normal. Pupils are equal, round, and reactive to light. No scleral icterus.   Neck: Normal range of motion. Neck supple. No tracheal deviation present. No JVD present.   Cardiovascular: Normal rate and regular rhythm. Exam reveals no gallop and no friction rub.    No murmur heard.  3+ pitting edema bilaterally   Pulmonary/Chest: Breath sounds normal. No respiratory distress. He has no wheezes. He has no rhonchi. He has no rales.   No tachypnea, no accessory muscle use   Abdominal: Soft.   Diffusely TTP, ? Rebound   Musculoskeletal: Normal range of motion.   Neurological: He is alert and oriented to person, place, and time. No cranial nerve deficit.   Downgoing toes, no clonus, normal finger to nose, no drift   Skin: Skin is warm and dry.   Psychiatric: He has a normal mood and affect. Thought content normal.         ED Course   Procedures  Labs Reviewed - No data to display       Imaging Results    None                APC / Resident Notes:   Assessment: 63 y/o male with  bilateral leg swelling, shortness of breath    Ddx includes but is not limited to: CHF, COPD, VTE, ACS, MI, cirrhosis, electrolyte abnormality    Plan: This is an emergent evaluation of a 63 y/o male with shortness of breath, leg swelling. Patient is currently HD stable, afebrile. Patient is noted on exam to have significant bilateral pitting edema as well as Rodriguez, bedside echo demonstrates ? Pericardial effusion with possible decreased EF. Does have B lines present on bedside echo towards the bases. Patient has EKG  demonstrating normal QTc, normal QRS complexes, no ST changes and in NSR. Lower concern for ACS/MI. Patient getting CXR to check for PTX vs PNA, though lower suspicion given patient's presentation. Also pending labs to check for CHF as well as electrolyte disturbances. Disposition pending at this time.      MARYANN Stout  9/12/2018 2:35 PM     1 Update:  CXR remarkable for air underneath the diaphragm; consult to gen surg will be placed and patient getting zosyn for abdominal coverage. Patient updated with plan.    MARYANN Stout  9/12/2018 3:02 PM     1 Update:  Patient to OR for ex lap with surgery.    MARYANN Stout  9/12/2018 4:32 PM             Attending Attestation:   Physician Attestation Statement for Resident:  As the supervising MD   Physician Attestation Statement: I have personally seen and examined this patient.   I agree with the above history. -:   As the supervising MD I agree with the above PE.    As the supervising MD I agree with the above treatment, course, plan, and disposition.                       Clinical Impression:   The primary encounter diagnosis was Pneumoperitoneum. A diagnosis of Shortness of breath was also pertinent to this visit.      Disposition:   Disposition: Admitted  Condition: Serious                        Juan José Franks MD  Resident  09/12/18 1632       Lakhwinder Mariscal III, MD  09/26/18 1202

## 2018-09-12 NOTE — ED PROVIDER NOTES
Encounter Date: 9/12/2018    SCRIBE #1 NOTE: I, Ann Elder, am scribing for, and in the presence of, Dr. Carver.       History     Chief Complaint   Patient presents with    Extremity Weakness     Pt c/o weakness to both legs x couple months.  States having trouble walking.      Patient is a 62 y.o. male with comorbidities including:HTN and anxiety that presents to the ED for evaluation of extremity weakness. States his symptoms started a month ago with LE weakness and getting SOB on exertion.  Reports a fall earlier this week. Endorses leg swelling x 7-8 months, weight loss, and chills. Denies fever and abdominal distention. States he recently started seeing a doctor at [place].       The history is provided by the patient and medical records.     Review of patient's allergies indicates:  No Known Allergies  Past Medical History:   Diagnosis Date    Anxiety     Hypertension     Nicotine abuse      Past Surgical History:   Procedure Laterality Date    CARPAL TUNNEL RELEASE Bilateral     EYE SURGERY      vein occlusion -laser sugery for burst blood vessel     EYE SURGERY      lasix    GASTRIC BYPASS  2004    KNEE SURGERY  1980s    arthroscopy     Family History   Problem Relation Age of Onset    Diabetes Unknown     Parkinsonism Mother     Alzheimer's disease Sister         she lives with his other sister    Alzheimer's disease Maternal Uncle     Stroke Father         hypertension?    Cancer Neg Hx      Social History     Tobacco Use    Smoking status: Current Every Day Smoker     Packs/day: 1.00     Years: 30.00     Pack years: 30.00     Types: Cigarettes   Substance Use Topics    Alcohol use: Yes     Alcohol/week: 1.2 oz     Types: 2 Shots of liquor per week    Drug use: No     Review of Systems   Constitutional: Positive for chills. Negative for fever.        (+)Weight loss   HENT: Negative for sore throat.    Respiratory: Positive for shortness of breath.    Cardiovascular: Positive  for leg swelling. Negative for chest pain.   Gastrointestinal: Negative for abdominal pain.   Genitourinary: Negative for flank pain.   Musculoskeletal: Negative for neck pain.   Skin: Negative for rash.   Neurological: Positive for weakness.   Psychiatric/Behavioral: Negative for confusion.       Physical Exam     Initial Vitals [09/12/18 1246]   BP Pulse Resp Temp SpO2   (!) 103/56 108 18 97.5 °F (36.4 °C) --      MAP       --         Physical Exam    ED Course   Procedures  Labs Reviewed - No data to display       Imaging Results    None          Medical Decision Making:   History:   Old Medical Records: I decided to obtain old medical records.  Initial Assessment:   62 y.o. male presenting with SOB, leg swelling and bilateral LE weakness.  ED Management:  1455 - I received a call from the radiologist.  The patient has free air in abdomen on chest radiograph.  I returned to patient's bedside.  He states that he did have mild abdominal pain earlier today but is not presently having this complaint.  However, on palpation of the abdomen, he has diffuse market tenderness with involuntary guarding. No distention noted. Patient is in no respiratory distress. He has clear breath sounds on auscultation of the lungs.  He is not tachycardic.  He does not appear to be in distress. I will order a dose of IV Zosyn and consult General surgery to come evaluate the patient.    1522 - Consult - Dr. Hooper (general surgery) - we discussed patient's presentation, exam, and chest radiograph findings.  He will come and evaluate the patient.            Scribe Attestation:   Scribe #1: I performed the above scribed service and the documentation accurately describes the services I performed. I attest to the accuracy of the note.    Attending Attestation:   Physician Attestation Statement for Resident:  As the supervising MD . -: The patient presents with lower extremity edema that has been ongoing for months. He is also having exertional  dyspnea for several weeks. He denies chest pain.  -: Afebrile. Stable vitals. No respiratory distress. Clear breath sounds. Incidental finding of pneumoperitoneum on CXR. Abdomen nondistended but diffusely tender and concerning for acute abdomen.  -: General surgery consulted for abnormal CXR finding. Evaluated patient and immediately took him to surgery for exploratory laparotomy.                       Clinical Impression:   The primary encounter diagnosis was Pneumoperitoneum. Diagnoses of Shortness of breath, Perforated diverticulum of large intestine, Peripheral edema, and Exertional dyspnea were also pertinent to this visit.      Disposition:   Disposition: Discharged  Condition: Stable                        Lakhwinder Mariscal III, MD  09/12/18 1950

## 2018-09-12 NOTE — TRANSFER OF CARE
"Anesthesia Transfer of Care Note    Patient: Amari Saez    Procedure(s) Performed: Procedure(s) (LRB):  LAPAROTOMY,EXPLORATORY (N/A)  COLECTOMY,SIGMOID  COLOSTOMY    Patient location: PACU    Anesthesia Type: general    Transport from OR: Transported from OR on 6-10 L/min O2 by face mask with adequate spontaneous ventilation    Post pain: adequate analgesia    Post assessment: no apparent anesthetic complications    Post vital signs: stable    Level of consciousness: awake    Nausea/Vomiting: no nausea/vomiting    Complications: none    Transfer of care protocol was followed      Last vitals:   Visit Vitals  /87   Pulse 105   Temp 36.4 °C (97.5 °F) (Oral)   Resp 20   Ht 5' 6" (1.676 m)   Wt 59.9 kg (132 lb)   SpO2 96%   BMI 21.31 kg/m²     "

## 2018-09-12 NOTE — ED NOTES
Patient to OR on cardiac monitor per RN. VSS, A&Ox4, and following commands. All consents signed and sent with patient. Patient belongings retained per patient family member.

## 2018-09-12 NOTE — ED TRIAGE NOTES
PT reports sob x months. Pt reports worsens with minimal exertion and laying down. Pt denies chest pain. Pt reports he noticed swelling in bilateral legs.

## 2018-09-13 PROBLEM — R23.9 ALTERATION IN SKIN INTEGRITY: Status: ACTIVE | Noted: 2018-01-01

## 2018-09-13 PROBLEM — L89.93 PRESSURE INJURY OF SKIN AND SUBCUTANEOUS TISSUE, STAGE 3: Status: ACTIVE | Noted: 2018-01-01

## 2018-09-13 NOTE — PT/OT/SLP EVAL
Physical Therapy Evaluation    Patient Name:  Amari Saez   MRN:  [id number]    Recommendations:     Discharge Recommendations:  home with home health(may possibly require SNF if patient progresses slowly)   Discharge Equipment Recommendations: none   Barriers to discharge: None    Assessment:     Amari Saez is a 62 y.o. male admitted with a medical diagnosis of Perforated diverticulum of large intestine.  He presents with the below impairments/functional limitations.  Pt tolerated evaluation well today.  Pt unable to ambulate today, but is likely to progress quickly.  Pt is a good candidate for skilled PT services to address the below deficits and to increase functional independence.      Rehab Prognosis: good; patient would benefit from acute skilled PT services to address these deficits and reach maximum level of function.      Rehab Identified impairments/functional limitations:   weakness, impaired endurance, impaired functional mobilty, gait instability, impaired balance, decreased lower extremity function, decreased ROM, pain    Recent Surgery: Procedure(s) (LRB):  LAPAROTOMY,EXPLORATORY (N/A)  COLECTOMY,SIGMOID  COLOSTOMY 1 Day Post-Op    Plan:     During this hospitalization, patient to be seen 4 x/week to address the above listed problems via gait training, therapeutic activities, therapeutic exercises, neuromuscular re-education  · Plan of Care Expires:  10/13/18   Plan of Care Reviewed with: patient    Subjective     Communicated with RN prior to session.  Patient found supine in bed upon PT entry to room, agreeable to evaluation.      Chief Complaint: none stated  Patient comments/goals: to return to PLOF    Pain/Comfort:  · Pain Rating 1: 0/10  · Pain Rating Post-Intervention 1: 0/10    Patients cultural, spiritual, [employer]ybeterian conflicts given the current situation: none noted    Living Environment:  Pt lives alone in 1SH with 6-7STE and 0HR. Home has tub/shower  Previous level of function: PTA, pt  reports independence with all ADL and IADL, including driving. Pt reports use of rollator inside the home, but has only been using it since receiving it 4 days ago. Pt reports hx of multiple falls; most recent happened 4 days ago when he slipped while standing to wash dishes.   Roles and Routines: n/a  Equipment Used at Home:  rollator  Assistance upon Discharge: Limited; pt lives alone    Objective:     Patient found with: telemetry, oxygen, peripheral IV, mcneill catheter, colostomy     General Precautions: Standard, fall   Orthopedic Precautions:N/A   Braces: N/A     Exams:    Cognitive/Psychosocial Skills:     -       Oriented to: Person, Place, Time and Situation   -       Follows Commands/attention:Follows multistep  commands  -       Communication: clear/fluent  -       Safety awareness/insight to disability: intact     Physical Exams:  · Gross Motor Coordination:  WFL  · Postural Exam:  Patient presented with the following abnormalities:    · -       No postural abnormalities identified  · RLE ROM: WFL  · RLE Strength: WFL  · LLE ROM: WFL  · LLE Strength: WFL    Functional Mobility:    Bed Mobility:    · Patient completed Rolling/Turning to Left with  minimum assistance  · Patient completed Scooting/Bridging with moderate assistance  · Patient completed Supine to Sit with moderate assistance     Transfers:  · Sit <> Stand: Moderate Assistance with No Assistive Device from EOB  Bed/Chair: Stand Pivot with Moderate Assistance with No Assistive Device     AM-PAC 6 CLICK MOBILITY  Total Score:14     Therapeutic Activities and Exercises:  · PT evaluation performed.  Pt educated on:  PT role and POC  Benefits and importance of OOB activity  To call for assistance to get out of bed   Importance of participation in therapy     Pt safe to transfer with RN staff    Patient left up in chair with all lines intact, call button in reach and RN notified.    GOALS:   Multidisciplinary Problems     Physical Therapy Goals         Problem: Physical Therapy Goal    Goal Priority Disciplines Outcome Goal Variances Interventions   Physical Therapy Goal     PT, PT/OT Ongoing (interventions implemented as appropriate)     Description:  Goals to be met by: 9/23/2018     Patient will increase functional independence with mobility by performing:    -Supine to sit with Set-up [place]  -Sit to supine with Set-up [place]  -Sit to stand transfer with Stand-by Assistance  -Bed to chair transfer with Stand-by Assistance using LRAD  -Gait  x 50 feet with Contact Guard Assistance using LRAD  -Ascend/descend 7 stairs with Minimal Assistance   -Lower extremity exercise program x 30 reps per handout, with independence                     History:     Past Medical History:   Diagnosis Date    Anxiety     Hypertension     Nicotine abuse        Past Surgical History:   Procedure Laterality Date    CARPAL TUNNEL RELEASE Bilateral     EYE SURGERY      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

## 2018-09-13 NOTE — PLAN OF CARE
[facility]    HOME HEALTH ORDERS  FACE TO FACE ENCOUNTER    Patient Name: Amari Saez  YOB: 1955    PCP: Tricia Mccray MD   PCP Address: [address] / [place] [address]  PCP Phone Number: [phone]  PCP Fax: [phone]    Encounter Date: 09/13/2018    Admit to Home Health    Diagnoses:  Active Hospital Problems    Diagnosis  POA    *Perforated diverticulum of large intestine [K57.20]  Yes      Resolved Hospital Problems   No resolved problems to display.       No future appointments.  Follow-up Information     Judd Agudelo MD.    Specialty:  General Surgery  Contact information:  [address]  [facility] [address]  [phone]                     I have seen and examined this patient face to face today. My clinical findings that support the need for the home health skilled services and home bound status are the following:  Weakness/numbness causing balance and gait disturbance due to Surgery making it taxing to leave home.    Allergies:Review of patient's allergies indicates:  No Known Allergies    Diet: regular diet    Activities: activity as tolerated and no lifting, or Strenuous exercise for 6 weeks.    Nursing:   SN to complete comprehensive assessment including routine vital signs. Instruct on disease process and s/s of complications to report to MD. Review/verify medication list sent home with the patient at time of discharge  and instruct patient/caregiver as needed. Frequency may be adjusted depending on start of care date.    Notify MD if SBP > 160 or < 90; DBP > 90 or < 50; HR > 120 or < 50; Temp > 101      CONSULTS:    Physical Therapy to evaluate and treat. Evaluate for home safety and equipment needs; Establish/upgrade home exercise program. Perform / instruct on therapeutic exercises, gait training, transfer training, and Range of Motion.  Occupational Therapy to evaluate and treat. Evaluate home environment for safety and  equipment needs. Perform/Instruct on transfers, ADL training, ROM, and therapeutic exercises.   to evaluate for community resources/long-range planning.  Aide to provide assistance with personal care, ADLs, and vital signs.    MISCELLANEOUS CARE:  Colostomy Care:  Instruct patient/caregiver to empty bag when full and PRN., Change and clean site every 48 hours and Monitor skin integrity.    Medications: Review discharge medications with patient and family and provide education.      Current Discharge Medication List      START taking these medications    Details   oxyCODONE-acetaminophen (PERCOCET) 5-325 mg per tablet Take 1-2 tablets by mouth every 4 (four) hours as needed for Pain.  Qty: 45 tablet, Refills: 0         CONTINUE these medications which have NOT CHANGED    Details   alprazolam (XANAX) 0.5 MG tablet Take 1 tablet (0.5 mg total) by mouth daily as needed for Anxiety.  Qty: 30 tablet, Refills: 0    Associated Diagnoses: Anxiety attack      blood pressure test kit-medium Kit 1 kit by Misc.(Non-Drug; Combo Route) route once daily.  Qty: 1 each, Refills: 0    Associated Diagnoses: Essential hypertension; Other specified hypotension      escitalopram oxalate (LEXAPRO) 10 MG tablet TAKE 1 TABLET BY MOUTH EVERY DAY  Qty: 90 tablet, Refills: 0    Associated Diagnoses: Anxiety and depression      lisinopril 10 MG tablet TAKE 1 TABLET BY MOUTH EVERY DAY  Qty: 90 tablet, Refills: 0    Associated Diagnoses: Essential hypertension             I certify that this patient is confined to his home and needs intermittent skilled nursing care, physical therapy and occupational therapy.

## 2018-09-13 NOTE — CONSULTS
Placed 20g X 10cm midline in left brachial vein of LUE using realtime ultrasound guidance. Lot#[id number]. Remove on or before 10/12/2018.

## 2018-09-13 NOTE — PROGRESS NOTES
Notified Dr. Gauthier of pt's UOP over PACU course. New order for 250ml bolus now, then notify MD of UOP 45 min after bolus finished. Will continue to monitor.

## 2018-09-13 NOTE — OP NOTE
DATE OF PROCEDURE: 9/12/2018     PREOPERATIVE DIAGNOSIS: Pneumoperitoneum    POSTOPERATIVE DIAGNOSIS: Perforated diverticulitis    PROCEDURE PERFORMED: Exploratory laparotomy, Tash's Procedure (Sigmoid resection and creation of end colostomy)    ATTENDING SURGEON: Judd Agudelo M.D.     HOUSESTAFF SURGEON: Juan Carlos Hooper M.D. (RES)     ANESTHESIA: General endotracheal.     ESTIMATED BLOOD LOSS: 30 mL.     FINDINGS:  Some milky fluid.  Minimal exudate.  Perforation at sigmoid diverticulum.  No issue noted with Familia en Y gastric bypass.      SPECIMEN: None.     DRAINS: None.     COMPLICATIONS: None.     INDICATIONS: Amari Saez is a 62 y.o.male who presented to the emergency room with worsening dyspnea x 2 weeks.  CXR revealed pneumoperitoneum, and therefore we were consulted.  We recommended urgent operative exploration and the patient agreed to proceed. The patient signed informed consent and expressed understanding of the risks and benefits of surgery.     OPERATIVE PROCEDURE: The patient was identified in Preoperative Holding and  brought back to the Operating Room. Placed supine on the operating table and padded appropriately. Monitors were applied and there was smooth induction of general endotracheal anesthesia. The patient's abdomen was prepped and draped   in the standard sterile surgical fashion. A time-out was performed and all team   members present agreed this was the correct procedure on the correct patient.   We also confirmed administration of appropriate preoperative antibiotics.    A midline vertical incision was made with the #10 scalpel.  This was carried deeply with the Bovie.  Fascia was grasped, elevated, and the abdomen entered.  The peritoneum was entered uneventfully.  The remainder of the incision was opened, with care to avoid inadvertent injury.    Some adhesions to the anterior abdominal wall were lysed.  The stomach was inspected carefully.  This required mobilization of the left  lobe of the liver.  The gastrojejunostomy was intact.  This was run down to the J-J, which was intact as well.  The hepatobiliary limb was inspected, as was the common channel down to the ileocecal valve.  There was no evidence of any injury.    The colon was then inspected.  There was minimal exudate around the sigmoid.  We then identified a sigmoid perforation.  We proceed with removing the sigmoid.  A blue load GIACOMO, 55 mm was used to divide the sigmoid proximal to the perforation, and at the rectosigmoid junction, leaving a long Tash's pouch.  The mesentery was ligated with silk ties and divided.  The specimen was handed off the field.  An end colostomy was then created in the left lower quadrant.  Skin was excised at this point with the Bovie.  A cruciate incision was carried down through the abdominal wall.  Colon was brought out through here for future maturation.    The abdomen was irrigated with 1 L of sterile saline.  The fascia was closed with #1 non looped PDS.  Skin was approximated with skin staples.    The colostomy was then matured.  We used 3-0 Vicryl to secure the bowel to the dermis.    Sterile dressings were applied. The patient was extubated in the Operating Room and transported to the Recovery Room in stable condition. All sponge, instrument and needle counts were correct at the end of the case.

## 2018-09-13 NOTE — PROGRESS NOTES
Progress Note  Surgery    Admit Date: 9/12/2018  Attending: Jammie  S/P: Procedure(s) (LRB):  LAPAROTOMY,EXPLORATORY (N/A)  COLECTOMY,SIGMOID  COLOSTOMY    Post-operative Day: 1 Day Post-Op    Hospital Day: 2    SUBJECTIVE:     No acute events overnight.  Patient states pain is controlled.  Only sweat per ostomy.  No nausea this morning.  Coughing, productive of clear sputum.    OBJECTIVE:     Vital Signs (Most Recent)  Temp: 97.5 °F (36.4 °C) (09/13/18 0816)  Pulse: 68 (09/13/18 0816)  Resp: 17 (09/13/18 0816)  BP: 105/78 (09/13/18 0816)  SpO2: 95 % (09/13/18 0816)    Vital Signs Range (Last 24H):  Temp:  [96.5 °F (35.8 °C)-98.1 °F (36.7 °C)]   Pulse:  []   Resp:  [10-25]   BP: ()/(56-87)   SpO2:  [94 %-100 %]     I & O (Last 24H):    Intake/Output Summary (Last 24 hours) at 9/13/2018 0836  Last data filed at 9/13/2018 0445  Gross per 24 hour   Intake 2836.75 ml   Output 415 ml   Net 2421.75 ml       Physical Exam:  Gen: NAD   HEENT: NCAT  Pulm: Coarse BS bilaterally  Abd: Soft, attp.  Bandage in place, reinforced.  Top is clean.  Ostomy with sweat only.    Extremities: no cyanosis or edema, or clubbing  Skin: Skin color, texture, turgor normal. No rashes or lesions    Laboratory:  CBC:   Recent Labs   Lab  09/12/18   1508   WBC  13.34*   RBC  4.15*   HGB  12.2*   HCT  34.9*   PLT  258   MCV  84   MCH  29.4   MCHC  35.0     CMP:   Recent Labs   Lab  09/12/18   1407   GLU  56*   CALCIUM  7.5*   ALBUMIN  1.4*   PROT  5.0*   NA  136   K  3.7   CO2  23   CL  104   BUN  20   CREATININE  1.0   ALKPHOS  189*   ALT  40   AST  37   BILITOT  1.2*     Coagulation:   Recent Labs   Lab  09/12/18   1508   INR  1.5*     Labs within the past 24 hours have been reviewed.    ASSESSMENT/PLAN:     Assessment: 62 year old man with perforated diverticulitis and free air s/p Tash's procedure POD 1.    Plan:  NPO, IVF  Awaiting return of bowel function  Keep mcneill for now- low UOP  Bolus this morning  F/u  labs  Respiratory toilet- CPT, ole neil and IS  PT/OT OOB ambulate x 2  Lovenox  Home meds    Juan Carlos Hooper MD  General Surgery, PGY-5  [phone]

## 2018-09-13 NOTE — ANESTHESIA RELEASE NOTE
"Anesthesia Release from PACU Note    Patient: Amari Saez    Procedure(s) Performed: Procedure(s) (LRB):  LAPAROTOMY,EXPLORATORY (N/A)  COLECTOMY,SIGMOID  COLOSTOMY    Anesthesia type: general    Post pain: Adequate analgesia    Post assessment: no apparent anesthetic complications, tolerated procedure well and no evidence of recall    Last Vitals:   Visit Vitals  /72   Pulse 89   Temp 36.4 °C (97.5 °F) (Oral)   Resp 12   Ht 5' 6" (1.676 m)   Wt 59.9 kg (132 lb)   SpO2 98%   BMI 21.31 kg/m²       Post vital signs: stable    Level of consciousness: awake, alert  and oriented    Nausea/Vomiting: no nausea/no vomiting    Complications: none    Airway Patency: patent    Respiratory: unassisted, spontaneous ventilation, nasal cannula    Cardiovascular: stable and blood pressure at baseline    Hydration: euvolemic  "

## 2018-09-13 NOTE — PROGRESS NOTES
Consult received on patient colostomy education. Patient POD 1, pouch recently changed by RN. Scant serosanguinous drainage noted in pouch. Stoma appears dark red, flush with skin. Will follow up with patient for colostomy eduction.     RN asked wound care to assess coccyx and left calf. (see flowsheet below for additional documentation)  White discolored tissue noted to left calf. Wound dressed with foam dressing, recommend changing twice a week.  Two ulcerations noted to coccyx. Patient had previously seen dermatology at outside facility and had skin biopsy performed to one area back in June 2018, per chart review, biopsy benign. Superior ulceration appears to be biopsy site, beneath superior ulceration, shallow stage 3 pressure injury noted.  Recommend applying Triad barrier cream BID/prn. Wheel chair cushion and EHOB waffle overlay ordered for patient. No additional needs noted at this time.     Nursing to continue care. Wound care to follow prn.         09/13/18 1534       Wound 09/13/18 1534 Ulceration upper Coccyx   Date First Assessed/Time First Assessed: 09/13/18 1534   Primary Wound Type: Ulceration  Orientation: upper  Location: Coccyx   Wound Image    Wound WDL ex   Drainage Amount None   Drainage Characteristics/Odor No odor   Appearance Moist;Bunch   Periwound Area (darker skin discoloration)   Wound Length (cm) 0.4 cm   Wound Width (cm) 0.2 cm   Wound Depth (cm) 0.2 cm   Wound Volume (cm^3) 0.02 cm^3       Wound 09/13/18 1534 Ulceration Calf   Date First Assessed/Time First Assessed: 09/13/18 1534   Primary Wound Type: Ulceration  Side: Left  Location: Calf   Wound Image    Wound WDL ex  (unknown etiology)   Appearance Dry;White   Periwound Area Scar tissue   Wound Length (cm) 1 cm   Wound Width (cm) 1 cm   Wound Depth (cm) 0 cm   Wound Volume (cm^3) 0 cm^3   Dressing Foam   Dressing Change Due 09/17/18       Pressure Injury 09/13/18 1534 lower Coccyx Stage 3   Date First Assessed/Time First Assessed:  09/13/18 1534   Pressure Injury Present on Admission: yes  Orientation: lower  Location: Coccyx  Staging: Stage 3   Staging 3   Drainage Amount None   Drainage Characteristics/Odor No odor   Appearance Moist;Pink   Tissue loss description Full thickness   Periwound Area (darker skin discoloration)   Wound Edges Open   Wound Length (cm) 0.4 cm   Wound Width (cm) 0.3 cm   Wound Depth (cm) 0.2 cm   Wound Volume (cm^3) 0.02 cm^3

## 2018-09-13 NOTE — NURSING TRANSFER
Nursing Transfer Note      9/12/2018     Transfer To: 625 from PACU    Transfer via stretcher    Transfer with O2    Transported by Transport    Medicines sent: NS bolus infusing, Dilaudid PCA, pipercillin infusing, [id number] infusing    Chart send with patient: Yes    Notified: Brother    Patient reassessed at: 9/12/18 23:00

## 2018-09-13 NOTE — PROGRESS NOTES
Notified Dr. Gauthier of ostomy output and drainage on incision dressing, placing orders for contin cardiac monitoring.

## 2018-09-13 NOTE — PROGRESS NOTES
Paged Resident patient had 150cc bloody fluid from ostomy bag, notified nurse and awaiting call back from resident. Patient transported to room.

## 2018-09-13 NOTE — PLAN OF CARE
Problem: Physical Therapy Goal  Goal: Physical Therapy Goal  Goals to be met by: 9/23/2018     Patient will increase functional independence with mobility by performing:    -Supine to sit with Set-up [place]  -Sit to supine with Set-up [place]  -Sit to stand transfer with Stand-by Assistance  -Bed to chair transfer with Stand-by Assistance using LRAD  -Gait  x 50 feet with Contact Guard Assistance using LRAD  -Ascend/descend 7 stairs with Minimal Assistance   -Lower extremity exercise program x 30 reps per handout, with independence   Outcome: Ongoing (interventions implemented as appropriate)  Continue POC.

## 2018-09-13 NOTE — PHYSICIAN QUERY
PT Name: Amari Saez  MR #: [id number]     Physician Query Form - Documentation Clarification      CDS/: Leila EscalonaRN                 Contact information:[email or web address]    This form is a permanent document in the medical record.     Query Date: September 13, 2018    By submitting this query, we are merely seeking further clarification of documentation. Please utilize your independent clinical judgment when addressing the question(s) below.    The Medical record reflects the following:    Supporting Clinical Findings Location in Medical Record   Calcium 7.5>>6.5    Os-gabrielle 1000mg po once   Lab 9/12-9/13    MAR 9/13                                                                                      Doctor, Please specify diagnosis or diagnoses associated with above clinical findings.      Provider Use Only      ___x_hypocalcemia    ____other_________________                                                                                                               [  ] Clinically undetermined

## 2018-09-13 NOTE — PLAN OF CARE
Problem: Occupational Therapy Goal  Goal: Occupational Therapy Goal  Goals to be met by: 9/20/18     Patient will increase functional independence with ADLs by performing:    UE Dressing with Set-up Assistance.  LE Dressing with Set-up Assistance.  Grooming while standing at sink with Stand-by Assistance.  Toileting from toilet with Stand-by Assistance for hygiene and clothing management.   Supine to sit with Contact Guard Assistance.  Toilet transfer to toilet with Stand-by Assistance.    Outcome: Ongoing (interventions implemented as appropriate)  Eval completed; goals established    Comments: Initiate OT TRUMAN [place] OT  9/13/2018

## 2018-09-13 NOTE — PLAN OF CARE
SW learned from Surgery Resident that Pt has a new ostomy and is anticipated to be discharge ready early next week. Resident is recommending home health for Pt's new ostomy. Pt has Medicaid so it may be difficult to obtain home health. SW sent all necessary referral information to six different home health agencies to see if any would be willing to accept. If no agency accepts, Pt will have to be set up with Outpatient wound care. SW to continue to follow.     Jessica Barraza LCSW

## 2018-09-13 NOTE — PT/OT/SLP EVAL
Occupational Therapy   Evaluation    Name: Amari Saez  MRN: [id number]  Admitting Diagnosis:  <principal problem not specified> 1 Day Post-Op    Recommendations:     Discharge Recommendations: home health OT  Discharge Equipment Recommendations:  (TBD pending progress)  Barriers to discharge:  Decreased caregiver support    History:     Occupational Profile:  Living Environment: Pt lives alone in 1SH with 6-7STE and 0HR. Home has tub/shower  Previous level of function: PTA, pt reports independence with all ADL and IADL, including driving. Pt reports use of rollator inside the home, but has only been using it since receiving it 4 days ago. Pt reports hx of multiple falls; most recent happened 4 days ago when he slipped while standing to wash dishes.   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     EYE SURGERY      lasix    GASTRIC BYPASS  2004    KNEE SURGERY  1980s    arthroscopy       Subjective     Chief Complaint: pain  Patient/Family Comments/goals: return to PLOF    Pain/Comfort:  · Pain Rating 1: 0/10  · Location - Side 1: Bilateral  · Location - Orientation 1: generalized  · Location 1: abdomen  · Pain Addressed 1: Reposition, Distraction, Cessation of Activity  · Pain Rating Post-Intervention 1: (Pt did not rate, however c/o pain with movement and strength evaluation)    Patients cultural, spiritual, Hoahaoism conflicts given the current situation: none reported    Objective:     Communicated with: RN prior to session.  Patient found all lines intact and call button in reach and telemetry, PCA, oxygen, peripheral IV, mcneill catheter, colostomy upon OT entry to room.    General  Precautions: Standard, fall   Orthopedic Precautions:N/A   Braces: N/A     Occupational Performance:    Bed Mobility:    · Patient completed Supine to Sit with moderate assistance    Functional Mobility/Transfers:  · Patient completed Sit <> Stand Transfer with moderate assistance  with  hand-held assist   · Patient completed Bed <> Chair Transfer using Stand Pivot technique with moderate assistance with hand-held assist  · Functional Mobility: Pt took 3 steps bed>chair with mod A using HHA    Activities of Daily Living:  · Lower Body Dressing: maximal assistance to don B socks while seated EOB    Cognitive/Visual Perceptual:  Cognitive/Psychosocial Skills:     -       Oriented to: Person, Place and Situation   -       Follows Commands/attention:Follows multistep  commands  -       Communication: clear/fluent  -       Memory: No Deficits noted  -       Safety awareness/insight to disability: impaired; pt reports hx of multiple falls inside and outside of home  -       Mood/Affect/Coping skills/emotional control: Appropriate to situation  Visual/Perceptual:      -Pt wears glasses and hearing intact    Physical Exam:  Balance:    -       good sitting balance, fair standing balance  Postural examination/scapula alignment:    -       Rounded shoulders  -       Forward head  Skin integrity: Visible skin intact  Edema:  None noted  Sensation:    -       Impaired  Pt reports B hand and foot numbness and tingling; hand numbness occassional affecting fine motor tasks  Dominant hand:    -       R hand  Upper Extremity Range of Motion:     -       Right Upper Extremity: WFL  -       Left Upper Extremity: WFL  Upper Extremity Strength:    -       Right Upper Extremity: WFL except pt unable to withstand max resistance for shoulder flexion 2/2 pain (4-/5)  -       Left Upper Extremity: WFL except pt unable to withstand max resistance for shoulder flexion 2/2 pain (4-/5)    Strength:    -       Right Upper Extremity: WFL   -    "    Left Upper Extremity: WFL   Fine Motor Coordination:    -       Intact  Gross motor coordination:   WFL    AMPAC 6 Click ADL:  AMPAC Total Score: 13    Treatment & Education:  Pt educated on role of OT/POC  Pt educated on importance of ambulation/UIC  Pt educated on use of log roll technique for bed mobility  White board/communication board updated  Education:    Patient left up in chair with all lines intact, call button in reach and RN notified    Assessment:     Amari Saez is a 62 y.o. male with a medical diagnosis of <principal problem not specified>.  He presents with the following performance deficits affecting function: weakness, impaired endurance, gait instability, impaired functional mobilty, impaired balance, impaired self care skills, decreased ROM, pain.      Rehab Prognosis: Good; patient would benefit from acute skilled OT services to address these deficits and reach maximum level of function.         Clinical Decision Makin.  OT Low:  "Pt evaluation falls under low complexity for evaluation coding due to performance deficits noted in 1-3 areas as stated above and 0 co-morbities affecting current functional status. Data obtained from problem focused assessments. No modifications or assistance was required for completion of evaluation. Only brief occupational profile and history review completed."     Plan:     Patient to be seen 4 x/week to address the above listed problems via self-care/home management, therapeutic activities, therapeutic exercises  · Plan of Care Expires: 10/13/18  · Plan of Care Reviewed with: patient    This Plan of care has been discussed with the patient who was involved in its development and understands and is in agreement with the identified goals and treatment plan    GOALS:   Multidisciplinary Problems     Occupational Therapy Goals        Problem: Occupational Therapy Goal    Goal Priority Disciplines Outcome Interventions   Occupational Therapy Goal     OT, " PT/OT Ongoing (interventions implemented as appropriate)    Description:  Goals to be met by: 9/20/18     Patient will increase functional independence with ADLs by performing:    UE Dressing with Set-up Assistance.  LE Dressing with Set-up Assistance.  Grooming while standing at sink with Stand-by Assistance.  Toileting from toilet with Stand-by Assistance for hygiene and clothing management.   Supine to sit with Contact Guard Assistance.  Toilet transfer to toilet with Stand-by Assistance.                      Time Tracking:     OT Date of Treatment: 09/13/18  OT Start Time: 1101  OT Stop Time: 1113  OT Total Time (min): 12 min    Billable Minutes:Evaluation 12    Sandy Keating OT  9/13/2018

## 2018-09-14 PROBLEM — R06.03 ACUTE RESPIRATORY DISTRESS: Status: ACTIVE | Noted: 2018-01-01

## 2018-09-14 NOTE — PROGRESS NOTES
Patient seen for colostomy education. Patient's brother at bedside, attentive and involved in care. Educated patient and brother on ostomy care: how often, removing pouch, cleaning peristomal skin, measuring stoma, cutting/applying pouch, and emptying pouch. Pouch changed performed. Stoma Dark Red, [id number], peristomal skin intact. Small serosanguinous drainage noted in previous pouch. Peristomal skin cleaned, barrier ring and coloplast susanna pouch applied. Answered patient's and brother's questions. Educational booklet provided. Extra supplies ordered to patient's room. Ostomy dept to continue to follow.

## 2018-09-14 NOTE — ANESTHESIA POSTPROCEDURE EVALUATION
"Anesthesia Post Evaluation    Patient: Amari Saez    Procedure(s) Performed: Procedure(s) (LRB):  LAPAROTOMY,EXPLORATORY (N/A)  COLECTOMY,SIGMOID  COLOSTOMY    Final Anesthesia Type: general  Patient location during evaluation: floor  Patient participation: Yes- Able to Participate  Level of consciousness: awake and alert  Post-procedure vital signs: reviewed and stable  Pain management: adequate  Airway patency: patent  PONV status at discharge: No PONV  Anesthetic complications: no      Cardiovascular status: blood pressure returned to baseline  Respiratory status: unassisted  Hydration status: euvolemic  Follow-up not needed.        Visit Vitals  /88 (BP Location: Left arm, Patient Position: Lying)   Pulse 85   Temp 36.4 °C (97.6 °F) (Oral)   Resp 19   Ht 5' 6" (1.676 m)   Wt 59.9 kg (132 lb)   SpO2 95%   BMI 21.31 kg/m²       Pain/Wilbert Score: Pain Assessment Performed: Yes (9/14/2018 10:00 AM)  Presence of Pain: denies (9/14/2018 10:00 AM)  Pain Rating Prior to Med Admin: 4 (9/14/2018  2:08 PM)  Pain Rating Post Med Admin: 0 (9/13/2018 12:36 AM)        "

## 2018-09-14 NOTE — PLAN OF CARE
Problem: Physical Therapy Goal  Goal: Physical Therapy Goal  Goals to be met by: 9/23/2018     Patient will increase functional independence with mobility by performing:    -Supine to sit with Set-up [place]  -Sit to supine with Set-up [place]  -Sit to stand transfer with Stand-by Assistance  -Bed to chair transfer with Stand-by Assistance using LRAD  -Gait  x 50 feet with Contact Guard Assistance using LRAD  -Ascend/descend 7 stairs with Minimal Assistance   -Lower extremity exercise program x 30 reps per handout, with independence    Outcome: Ongoing (interventions implemented as appropriate)  Pt's goals remain appropriate and pt will continue to benefit from skilled PT services to work towards improved functional mobility including: bed mobility, transfers, up/down steps, and gait.   Tricia Dang, PT  9/14/2018

## 2018-09-14 NOTE — PLAN OF CARE
Problem: Occupational Therapy Goal  Goal: Occupational Therapy Goal  Goals to be met by: 9/20/18     Patient will increase functional independence with ADLs by performing:    UE Dressing with Set-up Assistance.  LE Dressing with Set-up Assistance.  Grooming while standing at sink with Stand-by Assistance.  Toileting from toilet with Stand-by Assistance for hygiene and clothing management.   Supine to sit with Contact Guard Assistance.  Toilet transfer to toilet with Stand-by Assistance.     Outcome: Ongoing (interventions implemented as appropriate)  Pt progressing toward goals    Comments: Continue OT POC    Sandy Keating OT  9/14/2018

## 2018-09-14 NOTE — PLAN OF CARE
Problem: Patient Care Overview  Goal: Plan of Care Review  Outcome: Ongoing (interventions implemented as appropriate)  Plan of care reviewed with patient. Verbalizes understanding. AAOx4. VSS. Patient denies any pain at this time. Patient got out of bed with PT and stayed in chair for 2 hours. Patient remains free of any falls or trauma. Patient tolerating clear liquid diet. Ostomy remains draining  bloody drainage. Midline incision intact with staples. Call light within reach. TM.

## 2018-09-14 NOTE — PHYSICIAN QUERY
PT Name: Amari Saez  MR #: [id number]    Physician Query Form - Consultant Diagnosis Clarification     CDS/: Leila Escalona RN                 Contact information:[email or web address]  This form is a permanent document in the medical record.     Query Date: September 14, 2018      By submitting this query, we are merely seeking further clarification of documentation.  Please utilize your independent clinical judgment when addressing the question(s) below.      The Medical record contains the following:   Diagnosis Supporting Clinical Information Location in Medical Record   Lower coccyx stage 3 pressure ulcer Date First Assessed/Time First Assessed: 09/13/18 1534   Pressure Injury Present on Admission: yes  Orientation: lower  Location: Coccyx  Staging: Stage 3    Staging 3  Drainage amount none  Drainage characteristic no odor  Appearance moist, pink  Tissue loss description full thickness  Periwound area (darker skin discoloration)  Wound edges open  Wound length 0.4 cm  Wound width 0.3 cm  Wound depth 0.2 cm  Wound volume 0.02 cm^3   Wound Care PN 9/13         Do you agree with the Consultants diagnosis of __lower coccyx stage 3 pressure ulcer__?                 [ x  ]   Yes               [   ]   Yes, but it resolved prior to my assessment of the patient               [   ]   No                [   ]   Clinically insignificant               [   ]   Clinically undetermined               [   ]   Other/Clarification of findings: ___________________________________________

## 2018-09-14 NOTE — PT/OT/SLP PROGRESS
"Physical Therapy Treatment    Patient Name:  Amari Saez   MRN:  [id number]    Recommendations:     Discharge Recommendations:  nursing facility, skilled   Discharge Equipment Recommendations: none   Barriers to discharge: Inaccessible home and Decreased caregiver support lives alone with 7 NAKUL with no HR    Assessment:     Amari Saez is a 62 y.o. male admitted with a medical diagnosis of Perforated diverticulum of large intestine.  He presents with the following impairments/functional limitations:  weakness, impaired endurance, impaired functional mobilty, gait instability, impaired balance, decreased lower extremity function, decreased ROM, pain . Pt c/o LE weakness limiting mobility.    Rehab Prognosis:  good; patient would benefit from acute skilled PT services to address these deficits and reach maximum level of function.      Recent Surgery: Procedure(s) (LRB):  LAPAROTOMY,EXPLORATORY (N/A)  COLECTOMY,SIGMOID  COLOSTOMY 2 Days Post-Op    Plan:     During this hospitalization, patient to be seen 4 x/week to address the above listed problems via gait training, therapeutic activities, therapeutic exercises, neuromuscular re-education  · Plan of Care Expires:  10/13/18   Plan of Care Reviewed with: patient    Subjective     Communicated with nurse prior to session.  Patient found supine upon PT entry to room, agreeable to treatment.    "My legs feel like I can barely move them"    Pain/Comfort:  · Pain Rating 1: 5/10  · Location - Orientation 1: generalized  · Location 1: abdomen  · Pain Addressed 1: Pre-medicate for activity, Reposition  · Pain Rating Post-Intervention 1: 5/10    Patients cultural, spiritual, Taoist conflicts given the current situation: none noted    Objective:     Patient found with: telemetry, peripheral IV, mcneill catheter, colostomy, oxygen     General Precautions: Standard, fall   Orthopedic Precautions:N/A   Braces: N/A     Functional Mobility:  · Bed Mobility:     · Supine to Sit: " moderate assistance  · Transfers:     · Sit to Stand:  moderate assistance with rolling walker  · Bed to Chair: moderate assistance with  hand-held assist  using  Stand Pivot; chair to bed with moderate assist with HHA  · Gait: 25ft then 30ft with RW with flexed trunk, decreased step length, decreased clearance of B feet during swing phase, and at slow pace. pt rested in sitting between gait trials      AM-PAC 6 CLICK MOBILITY  Turning over in bed (including adjusting bedclothes, sheets and blankets)?: 3  Sitting down on and standing up from a chair with arms (e.g., wheelchair, bedside commode, etc.): 2  Moving from lying on back to sitting on the side of the bed?: 2  Moving to and from a bed to a chair (including a wheelchair)?: 2  Need to walk in hospital room?: 2  Climbing 3-5 steps with a railing?: 1  Basic Mobility Total Score: 12       Therapeutic Activities and Exercises:   Pt sat on the EOB ~ 6 min with SBA prior to transfer due to pt c/o dizziness upon sitting.    Patient left supine (after sitting up in bedside chair ~ 45 min before wound care nurse requested return to bed) with all lines intact, call button in reach and nurse notified..    GOALS:   Multidisciplinary Problems     Physical Therapy Goals        Problem: Physical Therapy Goal    Goal Priority Disciplines Outcome Goal Variances Interventions   Physical Therapy Goal     PT, PT/OT Ongoing (interventions implemented as appropriate)     Description:  Goals to be met by: 9/23/2018     Patient will increase functional independence with mobility by performing:    -Supine to sit with Set-up [place]  -Sit to supine with Set-up [place]  -Sit to stand transfer with Stand-by Assistance  -Bed to chair transfer with Stand-by Assistance using LRAD  -Gait  x 50 feet with Contact Guard Assistance using LRAD  -Ascend/descend 7 stairs with Minimal Assistance   -Lower extremity exercise program x 30 reps per handout, with independence                      Time Tracking:     PT Received On: 09/14/18  PT Start Time: 1015     PT Stop Time: 1055  PT Total Time (min): 40 min     Billable Minutes: Gait Training 25 and Therapeutic Activity 15    Treatment Type: Treatment  PT/PTA: PT           Tricia Dang, PT  09/14/2018

## 2018-09-14 NOTE — PLAN OF CARE
Problem: Patient Care Overview  Goal: Plan of Care Review  Outcome: Ongoing (interventions implemented as appropriate)  POC reviewed and understood by patient. Patient's AAOx4. Pain managed by PCA pump per MD order. IV patent and intact. Ponce remained intact throughout shift. Air mattress applied. Ostomy remained intact putting out dark red liquid. Pt's VSS. SCDS on. Call light WNR. Bed in lowest position. No acute events at this time. WCTM.

## 2018-09-14 NOTE — PROGRESS NOTES
Progress Note  Surgery    Admit Date: 9/12/2018  Attending: Jammie  S/P: Procedure(s) (LRB):  LAPAROTOMY,EXPLORATORY (N/A)  COLECTOMY,SIGMOID  COLOSTOMY    Post-operative Day: 2 Days Post-Op    Hospital Day: 3    SUBJECTIVE:     No acute events overnight.  Patient states pain is controlled.  Only sweat per ostomy.  No nausea this morning.  Minimal burping.  No further coughing.      OBJECTIVE:     Vital Signs (Most Recent)  Temp: 97.8 °F (36.6 °C) (09/14/18 0718)  Pulse: 86 (09/14/18 0800)  Resp: 19 (09/14/18 0743)  BP: 121/75 (09/14/18 0718)  SpO2: 95 % (09/14/18 0743)    Vital Signs Range (Last 24H):  Temp:  [96.9 °F (36.1 °C)-97.9 °F (36.6 °C)]   Pulse:  []   Resp:  [11-22]   BP: (118-127)/(74-99)   SpO2:  [93 %-99 %]     I & O (Last 24H):    Intake/Output Summary (Last 24 hours) at 9/14/2018 1124  Last data filed at 9/14/2018 0625  Gross per 24 hour   Intake 2351.67 ml   Output 650 ml   Net 1701.67 ml       Physical Exam:  Gen: NAD   HEENT: NCAT  Pulm: Coarse BS bilaterally  Abd: Soft, attp.  Ostomy with sweat only.    Extremities: no cyanosis or edema, or clubbing  Skin: Skin color, texture, turgor normal. No rashes or lesions    Laboratory:  CBC:   Recent Labs   Lab  09/14/18   0617   WBC  12.40   RBC  3.05*   HGB  8.9*   HCT  25.1*   PLT  151   MCV  82   MCH  29.2   MCHC  35.5     CMP:   Recent Labs   Lab  09/14/18   0617   GLU  94   CALCIUM  6.8*   ALBUMIN  1.3*   PROT  3.7*   NA  139   K  4.8   CO2  21*   CL  111*   BUN  15   CREATININE  0.8   ALKPHOS  158*   ALT  30   AST  39   BILITOT  0.8     Coagulation:   Recent Labs   Lab  09/12/18   1508   INR  1.5*     Labs within the past 24 hours have been reviewed.    ASSESSMENT/PLAN:     Assessment: 62 year old man with perforated diverticulitis and free air s/p Tash's procedure POD 2.    Plan:  CLD, but go slow  Awaiting return of bowel function  F/u labs  Respiratory toilet- CPT, ole neil and IS  PT/OT OOB ambulate x 2  Lovenox  Home  meds  IV Allen Hooper MD  General Surgery, PGY-5  [phone]

## 2018-09-14 NOTE — PLAN OF CARE
09:15 AM Per RITIKA, PARMINDER Barraza, the  Agency, [facility], that is looking at patient to consider taking on as a patient, states there is an issue w/his . When they tried to submit to insurance it showed up as a discrepancy. HH Agency personal said they talked to patient & will get back in touch w/SW if needed.     1:55 PM CM visited patient. AAOX4. Brother & PT at BS. CM discussed above & asked him what is his legal ? He reports he grew up w/ 1955, but when he finally got a birth certificate, his  was actually 1955. CM informed him he needs to use his legal birth date. He agreed. Floor nurse, Vernell ([phone]), & SW updated on above.

## 2018-09-14 NOTE — PT/OT/SLP PROGRESS
Occupational Therapy   Treatment    Name: Amari Saez  MRN: [id number]  Admitting Diagnosis:  Perforated diverticulum of large intestine  2 Days Post-Op    Recommendations:     Discharge Recommendations: nursing facility, skilled  Discharge Equipment Recommendations:  none  Barriers to discharge:  Decreased caregiver support    Subjective     Communicated with: RN prior to session.  Pain/Comfort:  · Pain Rating 1: 3/10  · Location - Side 1: Bilateral  · Location - Orientation 1: generalized  · Location 1: abdomen  · Pain Addressed 1: Reposition, Distraction, Cessation of Activity  · Pain Rating Post-Intervention 1: (Pt did not rate)    Patients cultural, spiritual, Gnosticist conflicts given the current situation: none reported    Objective:     Patient found with: telemetry, peripheral IV, mcneill catheter, colostomy, oxygen    General Precautions: Standard, fall   Orthopedic Precautions:N/A   Braces: N/A     Occupational Performance:    Bed Mobility:    · Patient completed Supine to Sit with moderate assistance  · Patient completed Sit to Supine with maximal assistance     Functional Mobility/Transfers:  · Pt decline    Activities of Daily Living:  · Grooming: setup assistance to brush teeth, wash face, and apply chapstick while seated EOB    Patient left HOB elevated with all lines intact, call button in reach and family present    [facility] 6 Click:  [facility] Total Score: 16    Treatment & Education:  Pt educated on role of OT/POC  Pt educated on importance of EOB/OOB  White board/communication board updated  Education:    Assessment:     Amari Saez is a 62 y.o. male with a medical diagnosis of Perforated diverticulum of large intestine.  He presents with pain limiting functional mobility and self care.  Performance deficits affecting function are weakness, impaired endurance, impaired functional mobilty, impaired self care skills, impaired balance, gait instability, pain, decreased lower extremity function, edema,  impaired cardiopulmonary response to activity.      Rehab Prognosis:  Good; patient would benefit from acute skilled OT services to address these deficits and reach maximum level of function.       Plan:     Patient to be seen 4 x/week to address the above listed problems via self-care/home management, therapeutic activities, therapeutic exercises  · Plan of Care Expires: 10/13/18  · Plan of Care Reviewed with: patient, sibling    This Plan of care has been discussed with the patient who was involved in its development and understands and is in agreement with the identified goals and treatment plan    GOALS:   Multidisciplinary Problems     Occupational Therapy Goals        Problem: Occupational Therapy Goal    Goal Priority Disciplines Outcome Interventions   Occupational Therapy Goal     OT, PT/OT Ongoing (interventions implemented as appropriate)    Description:  Goals to be met by: 9/20/18     Patient will increase functional independence with ADLs by performing:    UE Dressing with Set-up Assistance.  LE Dressing with Set-up Assistance.  Grooming while standing at sink with Stand-by Assistance.  Toileting from toilet with Stand-by Assistance for hygiene and clothing management.   Supine to sit with Contact Guard Assistance.  Toilet transfer to toilet with Stand-by Assistance.                      Time Tracking:     OT Date of Treatment: 09/14/18  OT Start Time: 1341  OT Stop Time: 1354  OT Total Time (min): 13 min    Billable Minutes:Self Care/Home Management 13    Sandy Keating OT  9/14/2018

## 2018-09-15 NOTE — PROGRESS NOTES
Dr. Smith notified of pt becoming more hypotensive with a systolic in the 80s and pressor requirements increasing; Levo @ 0.1 mcg/kgmin. Orders received to give 500 albumin.

## 2018-09-15 NOTE — SIGNIFICANT EVENT
Received phone call from patient's nurse around 5:10 pm concerning new onset shortness of breath. As per report patient had been slightly tachycardic (-120s) and had a narrow pulse pressure. Secondary to this his primary team ordered a fluid bolus which was currently running. Went to patient's bedside and noted that nursing was unable to obtain pulse ox. She was attempting to place the pulse ox to patient's nostril and to finger, both not providing a good read. When a read was obtained patient's O2 sats were in 80's. As per chart review, he had previously had oxygen saturations in the 90's on 2 L NC.     Upon assessment of patient he appeared somewhat short of breath although he noted he was somewhat improved compared to earlier and was now able to get out full sentences. He was utilizing accessory muscles of respiration, but he told me he had been doing so episodically ever since he had been in the hospital as he had been having intermittent episodes of shortness of breath.     Patient's lung sounds were CTA bilaterally at that point in time without rales, wheezing or rhonchi. HR was slightly tachycardic (110's) with BP taken manually at 140's/80's. Oxygen was increased at that point in time to 3L. A stat ABG, chest xray and EKG were ordered.     Returned to follow up on patient status about 15 minutes later. At this point in time patient was sitting on the edge of his bedside in increased respiratory distress. He was breathing more rapidly and stating he was unable to breathe. I placed a non-re-breather mask on patient and turned oxygen to 10L. Patient still was noting respiratory distress at this point in time and was struggling to breathe. I therefore called a rapid response; team then subsequently arrived and helped assess the situation. MICU physician also arrived. At this point in time patient's lung sounds had become dramatically more coarse. He therefore was given 40 mg IV lasix. Chest xray and EKG  were obtained. New chest xray showed diffuse bilateral pleural effusions and EKG read SVT. Patient was laid down on bed with continued oxygen flow and appeared to be breathing with somewhat improvement. HR continued to be elevated to 140-150's, but pulse ox still was unable to be obtained.     At this point in time it was determined that in patient's state of continued oxygen need he needed to be transferred to SICU for higher level of care. As transfer was being put into place patient became unresponsive.  A code blue was called and code team subsequently arrived. Compressions were started and epinephrine was given x2. Additionally, a dose of bicarbonate was given with the second dose of epinephrine. Upon pulse check, patient had an identifiable pulse. Therefore, compressions were stopped. Anesthesia was present and subsequently intubated patient. He was then transferred to SICU where care was taken over.     At this point in time code is suspected to be of respiratory origin (ARDS from acute illness?). Case discussed with resident team on call and Dr. Pitt.         Sandy Santo MD  General Surgery-PGYI [facility]

## 2018-09-15 NOTE — PROGRESS NOTES
SICU team rounding at bedside. Notified of pressors back on to maintain MAP > 60 and SBP > 90. HR increasing from 80s-90s to low 100s. SV decreasing from 50 to 35 at this time. No new orders at this time. Will continue to monitor.

## 2018-09-15 NOTE — [employer] PATIENT INSTRUCTIONS
Discharge Summary/Instructions after an Endoscopic Procedure  Patient Name: Amari Saez  Patient MRN: [id number]  Patient YOB: 1955  Saturday, September 15, 2018  Tino Rawls MD  RESTRICTIONS:  During your procedure today, you received medications for sedation.  These   medications may affect your judgment, balance and coordination.  Therefore,   for 24 hours, you have the following restrictions:   - DO NOT drive a car, operate machinery, make legal/financial decisions,   sign important papers or drink alcohol.    ACTIVITY:  Today: no heavy lifting, straining or running due to procedural   sedation/anesthesia.  The following day: return to full activity including work.  DIET:  Eat and drink normally unless instructed otherwise.     TREATMENT FOR COMMON SIDE EFFECTS:  - Mild abdominal pain, nausea, belching, bloating or excessive gas:  rest,   eat lightly and use a heating pad.  - Sore Throat: treat with throat lozenges and/or gargle with warm salt   water.  - Because air was used during the procedure, expelling large amounts of air   from your rectum or belching is normal.  - If a bowel prep was taken, you may not have a bowel movement for 1-3 days.    This is normal.  SYMPTOMS TO WATCH FOR AND REPORT TO YOUR PHYSICIAN:  1. Abdominal pain or bloating, other than gas cramps.  2. Chest pain.  3. Back pain.  4. Signs of infection such as: chills or fever occurring within 24 hours   after the procedure.  5. Rectal bleeding, which would show as bright red, maroon, or black stools.   (A tablespoon of blood from the rectum is not serious, especially if   hemorrhoids are present.)  6. Vomiting.  7. Weakness or dizziness.  GO DIRECTLY TO THE NEAREST EMERGENCY ROOM IF YOU HAVE ANY OF THE FOLLOWING:      Difficulty breathing              Chills and/or fever over 101 F   Persistent vomiting and/or vomiting blood   Severe abdominal pain   Severe chest pain   Black, tarry stools   Bleeding- more than one  tablespoon   Any other symptom or condition that you feel may need urgent attention  Your doctor recommends these additional instructions:  If any biopsies were taken, your doctors clinic will contact you in 1 to 2   weeks with any results.  - Return patient to ICU for ongoing care, once surgery is complete.   - Continued management intra-operatively by the surgical team.  - PPI drip  - Monitor for further bleeding.  For questions, problems or results please call your physician - Tino Rawls MD at Work:  [phone].  [facility], EMERGENCY ROOM PHONE NUMBER: [phone]  IF A COMPLICATION OR EMERGENCY SITUATION ARISES AND YOU ARE UNABLE TO REACH   YOUR PHYSICIAN - GO DIRECTLY TO THE EMERGENCY ROOM.  Tino Rawls MD  9/15/2018 6:43:37 PM  This report has been verified and signed electronically.  [employer]

## 2018-09-15 NOTE — PROGRESS NOTES
Dr. Grajeda and Dr. Woodard at bedside. Aware of most recent H/H 3.9/11.5. Orders in place for 3 units PRBCs and 2 units FFP. Updated on most recent vitals and labs. STAT EKG obtained per orders. STAT ABG with lactic and Hct obtained per orders. STAT INR and fibrogen labs ordered and sent.     1430: Pt to go to OR for Class A ex lap. Consents obtained, signed by family, and placed in chart. Report given to JAYME Beard. 2 units of PRBCs and 2 units FFP given with 3rd units of PRBC currently infusing. Report given to JAYME Beard.    1445: OR team at bedside to transport patient to OR. Pt placed on portable monitor. Gtts infusing. ICU ventilator disconnected and pt bagged per CRNA. VSS at this time and pt in NAD.Chart and meds sent with patient. Family sent to 2nd floor OR waiting room.

## 2018-09-15 NOTE — TRANSFER OF CARE
"Anesthesia Transfer of Care Note    Patient: Amari Saez    Procedure(s) Performed: Procedure(s) (LRB):  LAPAROTOMY,EXPLORATORY (N/A)  GASTROSTOMY (N/A)  EGD (ESOPHAGOGASTRODUODENOSCOPY) (N/A)    Patient location: PACU    Anesthesia Type: general    Transport from OR: Continuous SpO2 monitoring in transport. Continuous ECG monitoring in transport. Transported from OR intubated on 100% O2 by AMBU with assisted ventilation. Continuos invasive BP monitoring in transport    Post pain: adequate analgesia    Post assessment: no apparent anesthetic complications and tolerated procedure well    Post vital signs: stable    Level of consciousness: sedated    Nausea/Vomiting: no nausea/vomiting    Complications: none    Transfer of care protocol was followed      Last vitals:   Visit Vitals  BP (!) 116/92 (BP Location: Left arm, Patient Position: Lying)   Pulse 110   Temp 36.7 °C (98.1 °F) (Core Bladder)   Resp (!) 28   Ht 5' 6" (1.676 m)   Wt 70.2 kg (154 lb 12.2 oz)   SpO2 95%   BMI 24.98 kg/m²     "

## 2018-09-15 NOTE — H&P
[facility]  Critical Care - Surgery  History & Physical    Patient Name: Amari Saez  MRN: [id number]  Admission Date: 9/12/2018  Code Status: Full Code  Attending Physician: Judd Agudelo MD   Primary Care Provider: Chaim Valdovinos MD   Principal Problem: Perforated diverticulum of large intestine    Subjective:     HPI:  Mr. Saez is a 61 yo M with PMHx of HTN, previous gastric bypass (in 2003), and tobacco abuse who [place] [facility] [place]12 with shortness of breath, LE swelling, and abdominal pain. CXR on admission showed evidence of intraperitoneal air. He was subsequently taken to OR where Tash procedure was performed. He was recovering on the floor where this morning a rapid response was called after pt became hypoxic, tachycardic, and tachypneic on the floor. He was placed on a venti mask however pt further decompensated and became pulseless. Code blue was called where pt was intubated, chest compressions were performed, and epi was given x2 where ROSC was attained. Pt was subsequently transferred to SICU for higher level of care.         Hospital/ICU Course:  9/14: Transferred from floor to SICU for higher level of care.     Follow-up For: Procedure(s) (LRB):  LAPAROTOMY,EXPLORATORY (N/A)  COLECTOMY,SIGMOID  COLOSTOMY    Post-Operative Day: 2 Days Post-Op     Past Medical History:   Diagnosis Date    Anxiety     Hypertension     Nicotine abuse        Past Surgical History:   Procedure Laterality Date    CARPAL TUNNEL RELEASE Bilateral     EYE SURGERY      vein occlusion -laser sugery for burst blood vessel     EYE SURGERY      lasix    GASTRIC BYPASS  2004    KNEE SURGERY  1980s    arthroscopy       Review of patient's allergies indicates:  No Known Allergies    Family History     Problem Relation (Age of Onset)    Alzheimer's disease Sister, Maternal Uncle    Diabetes     Parkinsonism Mother    Stroke Father        Tobacco Use    Smoking status: Current Every Day  Smoker     Packs/day: 1.00     Years: 30.00     Pack years: 30.00     Types: Cigarettes   Substance and Sexual Activity    Alcohol use: Yes     Alcohol/week: 1.2 oz     Types: 2 Shots of liquor per week    Drug use: No    Sexual activity: Yes     Partners: Male     Birth control/protection: Condom      Review of Systems   Unable to perform ROS: Intubated     Objective:     Vital Signs (Most Recent):  Temp: (!) 94.8 °F (34.9 °C) (09/14/18 2141)  Pulse: 79 (09/14/18 2141)  Resp: (!) 26 (09/14/18 2018)  BP: 101/66 (09/14/18 2018)  SpO2: 100 % (09/14/18 2141) Vital Signs (24h Range):  Temp:  [94.8 °F (34.9 °C)-98.4 °F (36.9 °C)] 94.8 °F (34.9 °C)  Pulse:  [] 79  Resp:  [17-27] 26  SpO2:  [63 %-100 %] 100 %  BP: (101-129)/() 101/66     Weight: 70.2 kg (154 lb 12.2 oz)  Body mass index is 24.98 kg/m².      Intake/Output Summary (Last 24 hours) at 9/14/2018 2209  Last data filed at 9/14/2018 2022  Gross per 24 hour   Intake 2351.67 ml   Output 585 ml   Net 1766.67 ml       Physical Exam   Constitutional: He appears well-developed and well-nourished.   HENT:   Head: Normocephalic and atraumatic.   Eyes:   Pupils not equal but are reactive to light   Cardiovascular: Normal rate and regular rhythm.   No murmur heard.  Pulmonary/Chest: He has rales.   intubated   Abdominal: Soft. Bowel sounds are normal.   Neurological:   Sedated   Skin: Skin is warm and dry.       Vents:  Vent Mode: A/C (09/14/18 2141)  Ventilator Initiated: Yes (09/14/18 2114)  Set Rate: 30 bmp (09/14/18 2141)  Vt Set: 350 mL (09/14/18 2141)  PEEP/CPAP: 15 cmH20 (09/14/18 2141)  Oxygen Concentration (%): 101 (09/14/18 2141)  Peak Airway Pressure: 31 cmH2O (09/14/18 2141)  Plateau Pressure: 0 cmH20 (09/14/18 2141)  Total Ve: 11.8 mL (09/14/18 2141)    Lines/Drains/Airways     Central Venous Catheter Line                 Percutaneous Central Line Insertion/Assessment - triple lumen  09/14/18 1951 right femoral less than 1 day          Drain                  Colostomy 09/12/18 LLQ 2 days         NG/OG Tube 09/14/18 2038 Perla sump Left mouth less than 1 day         Urethral Catheter 09/1955 Latex 16 Fr. less than 1 day          Airway                 Airway - Non-Surgical 09/14/18 1915 Other (Comment) less than 1 day          Arterial Line                 Arterial Line 09/14/18 1953 Left Radial less than 1 day          Pressure Ulcer                 Pressure Injury 09/13/18 1534 lower Coccyx Stage 3 1 day          Peripheral Intravenous Line                 Midline Catheter Insertion/Assessment  - Single Lumen 09/13/18 1748 Right brachial vein 20g x 10cm 1 day         Peripheral IV - Single Lumen 09/13/18 1858 Anterior;Right Forearm 1 day                Significant Labs:    CBC/Anemia Profile:  Recent Labs   Lab  09/13/18   1748  09/14/18   0617  09/14/18   1905 09/14/18 1940 09/14/18 1940   WBC  13.61*  12.40   --   15.09*   --    HGB  9.3*  8.9*   --   8.2*   --    HCT  26.7*  25.1*  33*  23.7*  27*   PLT  190  151   --   126*   --    MCV  82  82   --   84   --    RDW  17.8*  18.1*   --   18.4*   --         Chemistries:  Recent Labs   Lab  09/13/18   0547  09/14/18 0617 09/14/18 1940   NA  142  139  143   K  4.6  4.8  3.9   CL  114*  111*  114*   CO2  21*  21*  19*   BUN  18  15  15   CREATININE  0.9  0.8  0.8   CALCIUM  6.5*  6.8*  6.0*   ALBUMIN  1.5*  1.3*  0.9*   PROT  3.6*  3.7*  2.8*   BILITOT  1.1*  0.8  0.7   ALKPHOS  107  158*  140*   ALT  27  30  26   AST  43*  39  38   MG  1.8  1.6  1.1*   PHOS  4.3  3.0  5.0*       Recent Lab Results       09/14/18  2141 09/14/18 2118 09/14/18 2014 09/14/18 2013 09/14/18 1944      Immature Granulocytes          Immature Grans (Abs)          Albumin          Alkaline Phosphatase          Allens Test   N/A       ALT          Anion Gap          Aniso          AST          Baso #          Basophilic Stippling          Basophil%          Total Bilirubin          Site   Joseline/UAC       BUN, Bld     "      [place] Cells          Calcium          Chloride          CO2          CPK     116     CPK MB     4.1     Creatinine          DelSys          Differential Method          eGFR if           eGFR if non           Eos #          Eosinophil%          FiO2          Flow          Fragmented Cells          Glucose          Gran # (ANC)          Gran%          Hematocrit          Hemoglobin          Hypo          Lactate, Scott          Lymph #          Lymph%          Magnesium          MB%     3.5  Comment:  To be positive, the MB% must be greater than 5% AND the CK-MB  greater than 6.5 ng/mL. Values not in the reference interval,   but not qualifying as positive, should be considered "trace".       MCH          MCHC          MCV          Mode          Mono #          Mono%          MPV          nRBC          Ovalocytes          PEEP          Phosphorus          Platelet Estimate          Platelets          POC BE   19       POC HCO3   42.3       POC Hematocrit          POC Ionized Calcium          POC Lactate   3.91       POC PCO2   56.9       POC pCO2 Temp   56.9       POC PH   7.480       POC pH Temp   7.480       POC PO2   98       POC pO2 Temp   98       POC Potassium          POC SATURATED O2   98       POC Sodium          POC TCO2   44       POC Temp   37.0 C       POCT Glucose 200 47  75      Poik          Poly          Potassium          Total Protein          Rate          RBC          RDW          Sample   ARTERIAL       Schistocytes          Sodium          Target Cells          Toxic Granulation          Troponin I     <0.006  Comment:  The reference interval for Troponin I represents the 99th percentile   cutoff   for our facility and is consistent with 3rd generation assay   performance.       Vt          WBC                      09/14/18  1940 09/14/18  1940 09/14/18  1905 09/14/18  0617      Immature Granulocytes  CANCELED  Comment:  Result canceled by the ancillary.  0.6  "    Immature Grans (Abs)  CANCELED  Comment:  Mild elevation in immature granulocytes is non specific and   can be seen in a variety of conditions including stress response,   acute inflammation, trauma and pregnancy. Correlation with other   laboratory and clinical findings is essential.    Result canceled by the ancillary.    0.07  Comment:  Mild elevation in immature granulocytes is non specific and   can be seen in a variety of conditions including stress response,   acute inflammation, trauma and pregnancy. Correlation with other   laboratory and clinical findings is essential.       Albumin  0.9  1.3     Alkaline Phosphatase  140  158     Allens Test N/A  N/A      ALT  26  30     Anion Gap  10  7     Aniso  Slight       AST  38  39  Comment:  *Result may be interfered by visible hemolysis     Baso #  CANCELED  Comment:  Result canceled by the ancillary.  0.01     Basophilic Stippling  Occasional       Basophil%  0.0  0.1     Total Bilirubin  0.7  Comment:  For infants and newborns, interpretation of results should be based  on gestational age, weight and in agreement with clinical  observations.  Premature Infant recommended reference ranges:  Up to 24 hours.............<8.0 mg/dL  Up to 48 hours............<12.0 mg/dL  3-5 days..................<15.0 mg/dL  6-29 days.................<15.0 mg/dL    0.8  Comment:  For infants and newborns, interpretation of results should be based  on gestational age, weight and in agreement with clinical  observations.  Premature Infant recommended reference ranges:  Up to 24 hours.............<8.0 mg/dL  Up to 48 hours............<12.0 mg/dL  3-5 days..................<15.0 mg/dL  6-29 days.................<15.0 mg/dL       Site LR  RR      BUN, Bld  15  15     [place] Cells  Occasional       Calcium  6.0  Comment:  *Critical value -  Results called to and read back by:tucker montgomery rn    6.8  Comment:  *Critical value -  Results called to and read back by:gregorio naidu  rn       Chloride  114  111     CO2  19  21     CPK         CPK MB         Creatinine  0.8  0.8     DelSys Adult Vent  NRB      Differential Method  Manual  Automated     eGFR if   >60.0  >60.0     eGFR if non   >60.0  Comment:  Calculation used to obtain the estimated glomerular filtration  rate (eGFR) is the CKD-EPI equation.     >60.0  Comment:  Calculation used to obtain the estimated glomerular filtration  rate (eGFR) is the CKD-EPI equation.        Eos #  CANCELED  Comment:  Result canceled by the ancillary.  0.0     Eosinophil%  1.0  0.2     FiO2 100  100      Flow   15      Fragmented Cells  Occasional       Glucose  104  94     Gran # (ANC)    11.4     Gran%  95.0  91.6     Hematocrit  23.7  25.1     Hemoglobin  8.2  8.9     Hypo  Occasional       Lactate, Scott  5.9  Comment:  Falsely low lactic acid results can be found in samples   containing >=13.0 mg/dL total bilirubin and/or >=3.5 mg/dL   direct bilirubin.  *Critical value -   Results called to and read back by:tucker montgomery rn         Lymph #  CANCELED  Comment:  Result canceled by the ancillary.  0.6     Lymph%  3.0  5.2     Magnesium  1.1  1.6     MB%         MCH  29.1  29.2     MCHC  34.6  35.5     MCV  84  82     Mode AC/PRVC  SPONT      Mono #  CANCELED  Comment:  Result canceled by the ancillary.  0.3     Mono%  1.0  2.3     MPV  9.9  9.8     nRBC  0  0     Ovalocytes  Occasional       PEEP 8        Phosphorus  5.0  3.0     Platelet Estimate  Decreased       Platelets  126  151     POC BE -9  -9      POC HCO3 21.3  18.2      POC Hematocrit 27  33      POC Ionized Calcium 1.00  1.10      POC Lactate         POC PCO2 74.1  38.6      POC pCO2 Temp         POC PH 7.067  7.280      POC pH Temp         POC PO2 59  52      POC pO2 Temp         POC Potassium 3.5  4.3      POC SATURATED O2 76  82      POC Sodium 143  137      POC TCO2 24  19      POC Temp         POCT Glucose         Poik  Slight       Poly  Occasional        Potassium  3.9  4.8     Total Protein  2.8  3.7     Rate 14        RBC  2.82  3.05     RDW  18.4  18.1     Sample ARTERIAL  ARTERIAL      Schistocytes  Present       Sodium  143  139     Target Cells  Occasional       Toxic Granulation  Present       Troponin I         Vt 450        WBC  15.09  12.40           Significant Imaging:     CXR (9/14): ET tube placed with tip 3 cm above the hawa. Enteric tube placed with tip overlying the stomach. Bilateral diffuse infiltrates have increased slightly. Consolidative opacity at the right base has resolved, likely representing resolved atelectasis.    Assessment/Plan:     Acute respiratory distress    Neuro:  Sedation: Propofol gtt  Pain: Percocet PRN    CV:  - HDS, on Levo at 0.04, wean as tolerated  - EKG on arrival showing sinus tachycardia  - A line and Central line placed on arrival  - 2L bolus given on arrival with appropriate response  - cardiac enzymes stable     Pulm:  - intubated  -Vent Mode: A/C  Oxygen Concentration (%):  [100-101] 100  Resp Rate Total:  [30 br/min-33 br/min] 30 br/min  Vt Set:  [350 mL] 350 mL  PEEP/CPAP:  [15 cmH20] 15 cmH20  Mean Airway Pressure:  [20 xhV73-92 cmH20] 21 cmH20   - ABG 7.480/56.9/98/42.3/19  - ABG PRN  - CXR showing bilateral diffuse infiltrates    GI/FEN/Lytes:  - NPO  - NG placed at bedside  - CMP, Mg, Phos qday  - replace lytes PRN    Renal:  - BUN/Cr at 15/0.8  - monitor UOP     Heme/ID:  - H/H stable at 8.2/23.7  - CBC qday    - WBC at 15.09  - peritoneal cx growing Bacteroides and E. Coli  - Zosyn q8 (day 3)  - lactate 5.9 on arrival  - Repeat Blood/Urine cx on arrival     Endo:  SSI    PPX: Lovenox    Dispo: Cont SICU care              Critical care was time spent personally by me on the following activities: development of treatment plan with patient or surrogate and bedside caregivers, discussions with consultants, evaluation of patient's response to treatment, examination of patient, ordering and performing  treatments and interventions, ordering and review of laboratory studies, ordering and review of radiographic studies, pulse oximetry, re-evaluation of patient's condition.  This critical care time did not overlap with that of any other provider or involve time for any procedures.     Fer Smith MD  Critical Care - Surgery  [facility]

## 2018-09-15 NOTE — SIGNIFICANT EVENT
Rapid Response Nurse Note     Rapid Response Metrics:     Admit Date: 2018  LOS: 2  Code Status: Full Code   Date of Consult: 2018  : 1955  Age: 62 y.o.  Weight:   Wt Readings from Last 1 Encounters:   18 59.9 kg (132 lb)     Race:   Sex: male  Bed: [address] [place] [address] A:   MRN: [id number]  Time Rapid Response Team page Received: 1847  Time Rapid Response Team at Bedside: 1850  Time Rapid Response Team left Bedside:   Was the patient discharged from an ICU this admission? No   Was the patient discharged from a PACU within last 24 hours? No  Did the patient receive conscious sedation/general anesthesia within last 24 hours? No  Was the patient in the ED within the past 24 hours? No  Was the patient started on NIPPV within the past 24 hours? No  Did this progress into an ARC or CPA: Yes  Attending Physician: Judd Agudelo MD  Primary Service: Networked reference to record PCT   Consult Requested By: Judd Agudelo MD   Rapid Response Indication(s): Respiratory Distress  Disposition: Transferred to [facility]    SITUATION:     Reason for Call:   Called to evaluate the patient for Respiratory    BACKGROUND:     Why is the patient in the hospital?: Perforated diverticulum  What changed?: Increased oxygen demand, AMS    ASSESSMENT:     What did you find: Upon arrival to bedside pt on 100% non-rebreather, unable to obtain sats or BP, pt agitated and unable to answer questions, placed in bed, RIKY Leung called to bedside, breath sounds coarse upon auscultation, 40 IV lasix given per MD order, decision to transfer to ICU made due to possible need for intubation, pt suddenly became unresponsive with no pulse, code called at 1911, please see code documentation for more information, pt transferred to [facility] by Rapid Response RN without difficulty    RECOMMENDATIONS:     We recommend: Agree with MD recommendation to transfer patient, more frequent vitals, closer  monitoring    FOLLOW-UP/CONTINGENCY PLAN:     Patient needs a second visit at : N/A    Call the rapid response Nurse at x [phone] for additional questions or concerns.      PHYSICIAN ESCALATION:     Orders received and case discussed with MD Leung and MD Santo    Disposition: Transferred to [id number]

## 2018-09-15 NOTE — SUBJECTIVE & OBJECTIVE
Interval History:   Pt stepped up to SICU following code on floor with chest compressions and epi x2 prior to attaining ROSC. Likely aspiration event. Has moved all extremities spontaeously but not followed commands.  HDS, was on Levo .1 this morning. With continued fluid resuscitation and addition of vaso at .04, both are weaned to off. 6L crystalloid, 1L colloid.   UOP improving (~35/hr).  Lactate cleared.   On high vent settings 60%/16, AC Vt 350. Severe ARDS.    Medications:  Continuous Infusions:   dextrose 5 % and 0.45 % NaCl with KCl 20 mEq 100 mL/hr at 09/14/18 1233    norepinephrine bitartrate-D5W Stopped (09/15/18 0815)    propofol 35 mcg/kg/min (09/15/18 0900)    vasopressin (PITRESSIN) infusion 0.04 Units/min (09/15/18 0900)     Scheduled Meds:   albuterol-ipratropium  3 mL Nebulization Q6H WAKE    enoxaparin  40 mg Subcutaneous Daily    lidocaine (PF) 10 mg/ml (1%)  1 mL Other Once    pantoprazole  40 mg Intravenous Daily    piperacillin-tazobactam (ZOSYN) IVPB  4.5 g Intravenous Q8H    sodium chloride 0.9%  3 mL Intravenous Q8H    tamsulosin  0.4 mg Oral Daily     PRN Meds:magnesium sulfate IVPB, magnesium sulfate IVPB, ondansetron, potassium chloride in water, potassium chloride in water, potassium chloride in water, ramelteon, sodium chloride 0.9%, sodium phosphate IVPB, sodium phosphate IVPB, sodium phosphate IVPB     Review of patient's allergies indicates:  No Known Allergies    Objective:     Vital Signs (Most Recent):  Temp: 97.3 °F (36.3 °C) (09/15/18 0900)  Pulse: 94 (09/15/18 0930)  Resp: (!) 28 (09/15/18 0705)  BP: 97/69 (09/15/18 0900)  SpO2: 95 % (09/15/18 0930) Vital Signs (24h Range):  Temp:  [93.4 °F (34.1 °C)-98.4 °F (36.9 °C)] 97.3 °F (36.3 °C)  Pulse:  [] 94  Resp:  [17-56] 28  SpO2:  [57 %-100 %] 95 %  BP: ()/() 97/69  Arterial Line BP: ()/() 114/73     Weight: 70.2 kg (154 lb 12.2 oz)  Body mass index is 24.98 kg/m².    Intake/Output - Last  3 Shifts       09/13 0700 - 09/14 0659 09/14 0700 - 09/15 0659 09/15 0700 - 09/16 0659    P.O. 80      I.V. (mL/kg) 1871.7 (31.2) 1312 (18.7) 752.8 (10.7)    NG/GT   30    IV Piggyback 400 4000 1000    Total Intake(mL/kg) 2351.7 (39.3) 5312 (75.7) 1782.8 (25.4)    Urine (mL/kg/hr) 300 (0.2) 905 (0.5) 95 (0.5)    Stool 350 400 50    Total Output 650 1305 145    Net +1701.7 +4007 +1637.8           Stool Occurrence 0 x          Physical Exam  Gen: NAD, intubated and sedated  HEENT: NCAT, ETT in place, NGT in place  Pulm: Coarse BS bilaterally  Vent Mode: A/C  Oxygen Concentration (%):  [] 60  Resp Rate Total:  [28 br/min-33 br/min] 28 br/min  Vt Set:  [350 mL] 350 mL  PEEP/CPAP:  [15 wqS62-02 cmH20] 16 cmH20  Mean Airway Pressure:  [20 dqL72-68 cmH20] 21 cmH20  Abd: Soft, non distended, benign exam. Ostomy with blood tinged sweat and gas.  Extremities: no cyanosis or edema, or clubbing  Skin: Skin color, texture, turgor normal. No rashes or lesions    Significant Labs:  CBC:   Recent Labs   Lab  09/15/18   0300   WBC  18.28*   RBC  2.66*   HGB  7.8*   HCT  21.7*   PLT  97*   MCV  82   MCH  29.3   MCHC  35.9     CMP:   Recent Labs   Lab  09/15/18   0300   GLU  94   CALCIUM  6.6*   ALBUMIN  1.7*   PROT  3.3*   NA  141   K  4.2   CO2  19*   CL  115*   BUN  13   CREATININE  0.8   ALKPHOS  103   ALT  22   AST  30   BILITOT  1.0     Significant Diagnostics:  I have reviewed and interpreted all pertinent imaging results/findings within the past 24 hours.

## 2018-09-15 NOTE — ASSESSMENT & PLAN NOTE
Neuro:  Sedation: Propofol gtt  Pain: Percocet PRN    CV:  - HDS, on Levo at 0.04, wean as tolerated  - EKG on arrival showing sinus tachycardia  - A line and Central line placed on arrival  - 2L bolus given on arrival with appropriate response  - cardiac enzymes stable     Pulm:  - intubated  -Vent Mode: A/C  Oxygen Concentration (%):  [100-101] 100  Resp Rate Total:  [30 br/min-33 br/min] 30 br/min  Vt Set:  [350 mL] 350 mL  PEEP/CPAP:  [15 cmH20] 15 cmH20  Mean Airway Pressure:  [20 ggL30-05 cmH20] 21 cmH20   - ABG 7.480/56.9/98/42.3/19  - ABG PRN  - CXR showing bilateral diffuse infiltrates    GI/FEN/Lytes:  - NPO  - NG placed at bedside  - CMP, Mg, Phos qday  - replace lytes PRN    Renal:  - BUN/Cr at 15/0.8  - monitor UOP     Heme/ID:  - H/H stable at 8.2/23.7  - CBC qday    - WBC at 15.09  - peritoneal cx growing Bacteroides and E. Coli  - Zosyn q8 (day 3)  - lactate 5.9 on arrival  - Repeat Blood/Urine cx on arrival     Endo:  SSI    PPX: Lovenox    Dispo: Cont SICU care

## 2018-09-15 NOTE — HPI
Mr. Saez is a 63 yo M with PMHx of HTN, previous gastric bypass (in 2003), and tobacco abuse who presented to the [facility] ED on 9/12 with shortness of breath, LE swelling, and abdominal pain. CXR on admission showed evidence of intraperitoneal air. He was subsequently taken to OR where Tash procedure was performed. He was recovering on the floor where this morning a rapid response was called after pt became hypoxic, tachycardic, and tachypneic on the floor. He was placed on a venti mask however pt further decompensated and became pulseless. Code blue was called where pt was intubated, chest compressions were performed, and epi was given x2 where ROSC was attained. Pt was subsequently transferred to SICU for higher level of care.

## 2018-09-15 NOTE — SUBJECTIVE & OBJECTIVE
Interval History/Significant Events:   Pt stepped up to SICU following code on floor with chest compressions and epi x2 prior to attaining ROSC. HDS, on Levo gtt and 5L crystalloid and 500 mL colloid overnight. Vaso gtt added this AM per primary. Lactate trending down. On vent, weaning as tolerated. No other acute changes.     Follow-up For: Procedure(s) (LRB):  LAPAROTOMY,EXPLORATORY (N/A)  COLECTOMY,SIGMOID  COLOSTOMY    Post-Operative Day: 3 Days Post-Op    Objective:     Vital Signs (Most Recent):  Temp: 97.5 °F (36.4 °C) (09/15/18 0700)  Pulse: 101 (09/15/18 0705)  Resp: (!) 28 (09/15/18 0705)  BP: 101/66 (09/14/18 2018)  SpO2: (!) 92 % (09/15/18 0705) Vital Signs (24h Range):  Temp:  [93.4 °F (34.1 °C)-98.4 °F (36.9 °C)] 97.5 °F (36.4 °C)  Pulse:  [] 101  Resp:  [17-56] 28  SpO2:  [57 %-100 %] 92 %  BP: ()/() 101/66  Arterial Line BP: ()/() 108/78     Weight: 70.2 kg (154 lb 12.2 oz)  Body mass index is 24.98 kg/m².      Intake/Output Summary (Last 24 hours) at 9/15/2018 0718  Last data filed at 9/15/2018 0700  Gross per 24 hour   Intake 2500 ml   Output 1240 ml   Net 1260 ml       Physical Exam   Constitutional: He appears well-developed and well-nourished.   HENT:   Head: Normocephalic and atraumatic.   Eyes:   Pupils not equal but are reactive to light   Cardiovascular: Normal rate and regular rhythm.   No murmur heard.  Pulmonary/Chest: He has rales.   intubated   Abdominal: Soft. Bowel sounds are normal.   Neurological:   Sedated   Skin: Skin is warm and dry.       Vents:  Vent Mode: A/C (09/15/18 0700)  Ventilator Initiated: Yes (09/14/18 2114)  Set Rate: 28 bmp (09/15/18 0700)  Vt Set: 350 mL (09/15/18 0700)  PEEP/CPAP: 16 cmH20 (09/15/18 0700)  Oxygen Concentration (%): 60 (09/15/18 0705)  Peak Airway Pressure: 28 cmH2O (09/15/18 0700)  Plateau Pressure: 25 cmH20 (09/15/18 0700)  Total Ve: 9.2 mL (09/15/18 0700)    Lines/Drains/Airways     Central Venous Catheter Line                  Percutaneous Central Line Insertion/Assessment - quad lumen  09/14/18 1951 right femoral less than 1 day          Drain                 Colostomy 09/12/18 LLQ 3 days         NG/OG Tube 09/14/18 2038 Owen sump Left mouth less than 1 day         Urethral Catheter 09/1955 Latex 16 Fr. less than 1 day          Airway                 Airway - Non-Surgical 09/14/18 1915 Other (Comment) less than 1 day          Arterial Line                 Arterial Line 09/14/18 1953 Left Radial less than 1 day          Pressure Ulcer                 Pressure Injury 09/13/18 1534 lower Coccyx Stage 3 1 day          Peripheral Intravenous Line                 Midline Catheter Insertion/Assessment  - Single Lumen 09/13/18 1748 Right brachial vein 20g x 10cm 1 day         Peripheral IV - Single Lumen 09/13/18 1858 Anterior;Right Forearm 1 day                Significant Labs:    CBC/Anemia Profile:  Recent Labs   Lab  09/14/18   0617   09/14/18   1940  09/14/18   1940  09/15/18   0300   WBC  12.40   --   15.09*   --   18.28*   HGB  8.9*   --   8.2*   --   7.8*   HCT  25.1*   < >  23.7*  27*  21.7*   PLT  151   --   126*   --   97*   MCV  82   --   84   --   82   RDW  18.1*   --   18.4*   --   18.0*    < > = values in this interval not displayed.        Chemistries:  Recent Labs   Lab  09/14/18   0617  09/14/18   1940  09/15/18   0300   NA  139  143  141   K  4.8  3.9  4.2   CL  111*  114*  115*   CO2  21*  19*  19*   BUN  15  15  13   CREATININE  0.8  0.8  0.8   CALCIUM  6.8*  6.0*  6.6*   ALBUMIN  1.3*  0.9*  1.7*   PROT  3.7*  2.8*  3.3*   BILITOT  0.8  0.7  1.0   ALKPHOS  158*  140*  103   ALT  30  26  22   AST  39  38  30   MG  1.6  1.1*  1.8   PHOS  3.0  5.0*  2.8       Recent Lab Results       09/15/18  0534 09/15/18  0445 09/15/18  0322 09/15/18  0320 09/15/18  0300      Immature Granulocytes     1.1     Immature Grans (Abs)     0.20  Comment:  Mild elevation in immature granulocytes is non specific and   can be seen  in a variety of conditions including stress response,   acute inflammation, trauma and pregnancy. Correlation with other   laboratory and clinical findings is essential.       Albumin     1.7     Alkaline Phosphatase     103     Allens Test   N/A       ALT     22     Anion Gap     7     Aniso     Slight     Appearance, UA          AST     30     Baso #     0.02     Basophilic Stippling          Basophil%     0.1     Bilirubin (UA)          Total Bilirubin     1.0  Comment:  For infants and newborns, interpretation of results should be based  on gestational age, weight and in agreement with clinical  observations.  Premature Infant recommended reference ranges:  Up to 24 hours.............<8.0 mg/dL  Up to 48 hours............<12.0 mg/dL  3-5 days..................<15.0 mg/dL  6-29 days.................<15.0 mg/dL       Blood Culture, Routine          Site   Joseline/UAC       BUN, Bld     13     [place] Cells     Occasional     Calcium     6.6  Comment:  *Critical value -  Results called to and read back by:Jayla Chavis RN       Calcium, Ion     0.92     Chloride     115     CO2     19     Color, UA          CPK          CPK MB          Creatinine     0.8     DelSys   Adult Vent       Differential Method     Automated     eGFR if      >60.0     eGFR if non      >60.0  Comment:  Calculation used to obtain the estimated glomerular filtration  rate (eGFR) is the CKD-EPI equation.        Eos #     0.0     Eosinophil%     0.1     FiO2   80       Flow          Fragmented Cells          Glucose     94     Glucose, UA          Gran # (ANC)     16.9     Gran%     92.5     Hematocrit     21.7     Hemoglobin     7.8     Hyaline Casts, UA          Hypo     Moderate     Ketones, UA          Lactate, Scott     1.5  Comment:  Falsely low lactic acid results can be found in samples   containing >=13.0 mg/dL total bilirubin and/or >=3.5 mg/dL   direct bilirubin.       Leukocytes, UA          Lymph #      0.9     Lymph%     4.6     Magnesium     1.8     MB%          MCH     29.3     MCHC     35.9     MCV     82     Microscopic Comment          Mode   AC/PRVC       Mono #     0.3     Mono%     1.6     MPV     10.5     Nitrite, UA          nRBC     0     Occult Blood UA          Ovalocytes          PEEP   16       pH, UA          Phosphorus     2.8     Platelet Estimate          Platelets     97     POC BE   -1       POC HCO3   24.3       POC Hematocrit          POC Ionized Calcium          POC Lactate          POC PCO2   40.1       POC pCO2 Temp          POC PH   7.391       POC pH Temp          POC PO2   214       POC pO2 Temp          POC Potassium          POC SATURATED O2   100       POC Sodium          POC TCO2   26       POC Temp          POCT Glucose 80 79  85      Poik     Slight     Poly     Occasional     Potassium     4.2     Total Protein     3.3     Protein, UA          Rate   28       RBC     2.66     RBC, UA          RDW     18.0     Sample   ARTERIAL       Schistocytes          Sodium     141     Sp02   98       Specific [place], UA          Specimen UA          Squam Epithel, UA          Target Cells     Occasional     Toxic Granulation     Present     Troponin I          Urobilinogen, UA          Vacuolated Granulocytes     Present     Vt   350       WBC, UA          WBC     18.28                 09/14/18  2351 09/14/18  2350 09/14/18  2212 09/14/18  2141 09/14/18  2130      Immature Granulocytes          Immature Grans (Abs)          Albumin          Alkaline Phosphatase          Allens Test          ALT          Anion Gap          Aniso          Appearance, UA     Hazy     AST          Baso #          Basophilic Stippling          Basophil%          Bilirubin (UA)     Negative     Total Bilirubin          Blood Culture, Routine          Site          BUN, Bld          [place] Cells          Calcium          Calcium, Ion          Chloride          CO2          Color, UA     Straw     CPK          CPK  MB          Creatinine          DelSys          Differential Method          eGFR if           eGFR if non           Eos #          Eosinophil%          FiO2          Flow          Fragmented Cells          Glucose          Glucose, UA     Negative     Gran # (ANC)          Gran%          Hematocrit          Hemoglobin          Hyaline Casts, UA     1     Hypo          Ketones, UA     Negative     Lactate, Scott  2.3  Comment:  Falsely low lactic acid results can be found in samples   containing >=13.0 mg/dL total bilirubin and/or >=3.5 mg/dL   direct bilirubin.          Leukocytes, UA     Negative     Lymph #          Lymph%          Magnesium          MB%          MCH          MCHC          MCV          Microscopic Comment     SEE COMMENT  Comment:  Other formed elements not mentioned in the report are not   present in the microscopic examination.        Mode          Mono #          Mono%          MPV          Nitrite, UA     Negative     nRBC          Occult Blood UA     1+     Ovalocytes          PEEP          pH, UA     5.0     Phosphorus          Platelet Estimate          Platelets          POC BE          POC HCO3          POC Hematocrit          POC Ionized Calcium          POC Lactate          POC PCO2          POC pCO2 Temp          POC PH          POC pH Temp          POC PO2          POC pO2 Temp          POC Potassium          POC SATURATED O2          POC Sodium          POC TCO2          POC Temp          POCT Glucose 105  143 200      Poik          Poly          Potassium          Total Protein          Protein, UA     Negative  Comment:  Recommend a 24 hour urine protein or a urine   protein/creatinine ratio if globulin induced proteinuria is  clinically suspected.       Rate          RBC          RBC, UA     15     RDW          Sample          Schistocytes          Sodium          Sp02          Specific [place], UA     1.005     Specimen UA     Urine, Catheterized   "   Squam Epithel, UA     0     Target Cells          Toxic Granulation          Troponin I          Urobilinogen, UA     Negative     Vacuolated Granulocytes          Vt          WBC, UA     1     WBC                      09/14/18  2118 09/14/18 2014 09/14/18 2013 09/14/18  1944 09/14/18  1940      Immature Granulocytes          Immature Grans (Abs)          Albumin          Alkaline Phosphatase          Allens Test  N/A   N/A     ALT          Anion Gap          Aniso          Appearance, UA          AST          Baso #          Basophilic Stippling          Basophil%          Bilirubin (UA)          Total Bilirubin          Blood Culture, Routine          Site  Joseline/UAC   LR     BUN, Bld          [place] Cells          Calcium          Calcium, Ion          Chloride          CO2          Color, UA          CPK    116      CPK MB    4.1      Creatinine          DelSys     Adult Vent     Differential Method          eGFR if           eGFR if non           Eos #          Eosinophil%          FiO2     100     Flow          Fragmented Cells          Glucose          Glucose, UA          Gran # (ANC)          Gran%          Hematocrit          Hemoglobin          Hyaline Casts, UA          Hypo          Ketones, UA          Lactate, Scott          Leukocytes, UA          Lymph #          Lymph%          Magnesium          MB%    3.5  Comment:  To be positive, the MB% must be greater than 5% AND the CK-MB  greater than 6.5 ng/mL. Values not in the reference interval,   but not qualifying as positive, should be considered "trace".        MCH          MCHC          MCV          Microscopic Comment          Mode     AC/PRVC     Mono #          Mono%          MPV          Nitrite, UA          nRBC          Occult Blood UA          Ovalocytes          PEEP     8     pH, UA          Phosphorus          Platelet Estimate          Platelets          POC BE  19   -9     POC HCO3  42.3   21.3     POC " Hematocrit     27     POC Ionized Calcium     1.00     POC Lactate  3.91        POC PCO2  56.9   74.1     POC pCO2 Temp  56.9        POC PH  7.480   7.067     POC pH Temp  7.480        POC PO2  98   59     POC pO2 Temp  98        POC Potassium     3.5     POC SATURATED O2  98   76     POC Sodium     143     POC TCO2  44   24     POC Temp  37.0 C        POCT Glucose 47  75       Poik          Poly          Potassium          Total Protein          Protein, UA          Rate     14     RBC          RBC, UA          RDW          Sample  ARTERIAL   ARTERIAL     Schistocytes          Sodium          Sp02          Specific Gravity, UA          Specimen UA          Squam Epithel, UA          Target Cells          Toxic Granulation          Troponin I    <0.006  Comment:  The reference interval for Troponin I represents the 99th percentile   cutoff   for our facility and is consistent with 3rd generation assay   performance.        Urobilinogen, UA          Vacuolated Granulocytes          Vt     450     WBC, UA          WBC                      09/14/18  1940 09/14/18  1905 09/14/18  0922 09/14/18  0913      Immature Granulocytes CANCELED  Comment:  Result canceled by the ancillary.        Immature Grans (Abs) CANCELED  Comment:  Mild elevation in immature granulocytes is non specific and   can be seen in a variety of conditions including stress response,   acute inflammation, trauma and pregnancy. Correlation with other   laboratory and clinical findings is essential.    Result canceled by the ancillary.          Albumin 0.9        Alkaline Phosphatase 140        Allens Test  N/A       ALT 26        Anion Gap 10        Aniso Slight        Appearance, UA         AST 38        Baso # CANCELED  Comment:  Result canceled by the ancillary.        Basophilic Stippling Occasional        Basophil% 0.0        Bilirubin (UA)         Total Bilirubin 0.7  Comment:  For infants and newborns, interpretation of results should be  based  on gestational age, weight and in agreement with clinical  observations.  Premature Infant recommended reference ranges:  Up to 24 hours.............<8.0 mg/dL  Up to 48 hours............<12.0 mg/dL  3-5 days..................<15.0 mg/dL  6-29 days.................<15.0 mg/dL          Blood Culture, Routine   No Growth to date[P] No Growth to date[P]     Site  RR       BUN, Bld 15        [place] Cells Occasional        Calcium 6.0  Comment:  *Critical value -  Results called to and read back by:tucker montgomery rn          Calcium, Ion         Chloride 114        CO2 19        Color, UA         CPK         CPK MB         Creatinine 0.8        DelSys  NRB       Differential Method Manual        eGFR if  >60.0        eGFR if non  >60.0  Comment:  Calculation used to obtain the estimated glomerular filtration  rate (eGFR) is the CKD-EPI equation.           Eos # CANCELED  Comment:  Result canceled by the ancillary.        Eosinophil% 1.0        FiO2  100       Flow  15       Fragmented Cells Occasional        Glucose 104        Glucose, UA         Gran # (ANC)         Gran% 95.0        Hematocrit 23.7        Hemoglobin 8.2        Hyaline Casts, UA         Hypo Occasional        Ketones, UA         Lactate, Scott 5.9  Comment:  Falsely low lactic acid results can be found in samples   containing >=13.0 mg/dL total bilirubin and/or >=3.5 mg/dL   direct bilirubin.  *Critical value -   Results called to and read back by:tucker montgomery rn          Leukocytes, UA         Lymph # CANCELED  Comment:  Result canceled by the ancillary.        Lymph% 3.0        Magnesium 1.1        MB%         MCH 29.1        MCHC 34.6        MCV 84        Microscopic Comment         Mode  SPONT       Mono # CANCELED  Comment:  Result canceled by the ancillary.        Mono% 1.0        MPV 9.9        Nitrite, UA         nRBC 0        Occult Blood UA         Ovalocytes Occasional        PEEP         pH, UA          Phosphorus 5.0        Platelet Estimate Decreased        Platelets 126        POC BE  -9       POC HCO3  18.2       POC Hematocrit  33       POC Ionized Calcium  1.10       POC Lactate         POC PCO2  38.6       POC pCO2 Temp         POC PH  7.280       POC pH Temp         POC PO2  52       POC pO2 Temp         POC Potassium  4.3       POC SATURATED O2  82       POC Sodium  137       POC TCO2  19       POC Temp         POCT Glucose         Poik Slight        Poly Occasional        Potassium 3.9        Total Protein 2.8        Protein, UA         Rate         RBC 2.82        RBC, UA         RDW 18.4        Sample  ARTERIAL       Schistocytes Present        Sodium 143        Sp02         Specific Gravity, UA         Specimen UA         Squam Epithel, UA         Target Cells Occasional        Toxic Granulation Present        Troponin I         Urobilinogen, UA         Vacuolated Granulocytes         Vt         WBC, UA         WBC 15.09              Significant Imaging:  I have reviewed all pertinent imaging results/findings within the past 24 hours.

## 2018-09-15 NOTE — ANESTHESIA PROCEDURE NOTES
Intubation    Diagnosis: aspiration  Patient location during procedure: floor  Procedure start time: 9/14/2018 7:15 PM  Timeout: 9/14/2018 7:15 PM  Procedure end time: 9/14/2018 7:30 PM  Staffing  Resident/CRNA: Kunal Pedraza MD  Performed: resident/CRNA   Preanesthetic Checklist  Completed: patient identified, timeout performed, IV checked and monitors and equipment checked  Intubation  Indication: respiratory failure, hypoxemia, airway protection  Pre-oxygenation. Induction: intravenous rapid sequence, mask ventilation: easy mask.  Intubation: postinduction, laryngoscopy direct, [place] 3.  Endotracheal Tube: oral, 7.0 mm ID, cuffed (inflated to minimal occlusive pressure)  Attempts: 3, Grade II - cords partially seen  DIFFICULT INTUBATION DESCRIPTOR: first attempt, unable to insert tube due to chest compressions. Second attempt, was handed ETT without stylet.  Tube secured at 25 cm at the lips.  Findings post-intubation: bilateral breath sounds, positive ETCO2, atraumatic / condition of teeth unchanged  Position Confirmation: auscultation  Additional Notes  Called to bedside for emergent intubation for respiratory failure, code.  Patient identified. First intubation attempt was during code, unable to place ETT due to chest compressions.  Second attempt was unsuccessful due to lack of stylet.  Bag masking was performed throughout the code with adequate ventilation.  After ROSC, rapid sequence IV induction was performed.    10mg etomidate  100mg succinylcholine    Intubation successful on third attempt.  Pt transferred to SICU in critical but stable condition following intubation.  Surgical ICU team was at bedside and assumed care immediately post intubation.

## 2018-09-15 NOTE — PROGRESS NOTES
Patient admitted to ICU. Central line placed, a line placed, ABG done, EKG done, propofol and levo started. Patient's left pupil noted to be larger than right but still reactive, MDs aware. Ponce placed. Blood and urine cultures drawn. Lactic 5.9, 2 L of LR given. Blood glucose 45, 1 amp of dextrose given per order, Blood glucose up to 200. Patient moves all extremities and opens eyes spontaneously but not following commands. Patient hypothermic on admit, currently on warming blanket. Will continue to monitor.

## 2018-09-15 NOTE — PROGRESS NOTES
Urine output 10cc at 0600, 1 L of NS ordered and currently infusing. New order also received to start vaso. Total of 5L of NS and 1L of albumin given overnight for hypotension, low urine output, and lactic of 5.9. Lactic down to 1.5 this morning. Patient hypothermic overnight, currently on warming blanket with temp up to 97 F. Team kept updated on patient's status overnight.

## 2018-09-15 NOTE — PROCEDURES
"Amari Saez is a 62 y.o. male patient.    Temp: 98.4 °F (36.9 °C) (09/14/18 2018)  Pulse: (!) 129 (09/14/18 2018)  Resp: (!) 26 (09/14/18 2018)  BP: 101/66 (09/14/18 2018)  SpO2: (!) 82 % (09/14/18 2018)  Weight: 59.9 kg (132 lb) (09/12/18 1246)  Height: 5' 6" (167.6 cm) (09/12/18 1246)       Arterial Line  Date/Time: 9/14/2018 8:41 PM  Location procedure was performed: [facility] CRITICAL CARE SURGERY  Performed by: Fer Smith MD  Authorized by: Fer Smith MD   Pre-op Diagnosis: Acute Respiratory Failure  Post-operative diagnosis: Acute Respiratory Failure  Consent Done: Emergent Situation  Preparation: Patient was prepped and draped in the usual sterile fashion.  Indications: multiple ABGs, respiratory failure and hemodynamic monitoring  Location: left radial  Needle gauge: 20  Number of attempts: 1  Complications: No  Post-procedure: dressing applied  Post-procedure CMS: normal  Patient tolerance: Patient tolerated the procedure well with no immediate complications          eFr Smith  9/14/2018  "

## 2018-09-15 NOTE — ASSESSMENT & PLAN NOTE
Neuro:  Sedation: Propofol gtt  Pain: Percocet PRN    CV:  - HDS, on Levo gtt. Vaso gtt added this AM.   - EKG on arrival showing sinus tachycardia  - A line and Central line placed on arrival  - 2L bolus given on arrival with appropriate response. ADditional 3L crystalloid and 500mL colloid overnight.  - cardiac enzymes stable     Pulm:  - intubated  -Vent Mode: A/C  Oxygen Concentration (%):  [] 60  Resp Rate Total:  [28 br/min-33 br/min] 28 br/min  Vt Set:  [350 mL] 350 mL  PEEP/CPAP:  [15 fcN63-43 cmH20] 16 cmH20  Mean Airway Pressure:  [20 kyF06-12 cmH20] 21 cmH20   - ABG 7.391/40.1/214/24.3/-1  - ABG PRN  - CXR showing bilateral diffuse infiltrates. Repeat CXR pending.    GI/FEN/Lytes:  - NPO  - NG placed at bedside  - CMP, Mg, Phos qday  - replace lytes PRN    Renal:  - BUN/Cr at 15/0.8  - monitor UOP     Heme/ID:  - H/H stable at 13/0.8  - CBC qday    - WBC at 18.28  - peritoneal cx growing Bacteroides and E. Coli  - Zosyn q8 (day 3)  - lactate 5.9 on arrival. Trending down, currently at 1.5.  - Repeat Blood/Urine cx on arrival     Endo:  SSI    PPX: Lovenox    Dispo: Cont SICU care

## 2018-09-15 NOTE — HOSPITAL COURSE
Admitted with perforated diverticulitis (free intraperitoneal air on admission CXR). S/p emergent Tash procedure on 9/12. On 9/14, a rapid response was called after pt became hypoxic, tachycardic, and tachypneic on the floor. He was placed on a venti mask however pt further decompensated and became pulseless. Code blue was called where pt was intubated, chest compressions were performed, and epi was given x2 where ROSC was attained. Pt was subsequently transferred to SICU for higher level of care. On 9/15, Hgb went from 7.8 to 4.3, then 3.9 on repeat. Pt was taken emergently to OR for ex-lap with intraop endoscopic control of bleeding with hemostatic clip in remnant stomach. He was given 3u pRBCs, 2U FFP prior to/in OR. Patient was extubated on 9/17, but had to be re-intubated on the same day due to significant respiratory failure. CXR with bilateral infiltrates (R>L). Patient was intermittently on pressors. Left radial arterial line placed on 09/14/18 and removed 09/20/18 due to malfunction. Vascular surgery consulted for left thumb cyanosis and signs of ischemia. Patient was started on heparin gtt, then transitioned to therapeutic enoxaparin. Respiratory cultures growing E. Coli. On 9/22, bronchoscopy was done and BAL cultures sent. Patient had episodes of hypotension requiring volume resuscitation with good response (500cc albumin, ~6L crystalloids, and 2u PRBC). On 9/24, he acutely decompensated and required an increase in vent settings. CXR done at that time showed large R tension pneumothorax requiring emergent chest tube. Vitals quickly normalized after chest tube was placed. BAL cultures resulted 9/24 and grew E. Coli , candida, and stenotrophomonas maltophilia. A larger chest tube placed and trach were placed on 9/25. Patient required precedex gtt for agitation; improved with adding quetiapine. Required 1.5 L crystalloid yesterday for low UOP, low BP. Also required increase in ventilator to 50% from 40%  Fi.  Right lung appears fully expanded on CXR, although there is still an air leak. He continued to have ostomy output. TFs running at 10 with residuals. Thoracic surgery was consulted for the persistent air leak. The chest is sutured at the skin at 4cm and the sentinel hole is outside the chest wall on the most recent CXR. The chest tube has been minimally in the chest since placement. It was replaced. Severe respiratory decompensation started 9/29 afternoon. Right sided CT was exchanged for air leak and continuing pneumothorax (sats high 80s prior to exchange). After exchange, sats dropped to high 70s. Tube was in good position, but pneumo persisted. MAPs gradually declined, requiring the addition of levo and vaso. A second chest tube was placed in the evening, sats stayed low, so the 1st CT was removed and a 3rd was placed. Sats improved marginally to low 80s, so Eunice started at 20ppm. CXR showed reinflation of the lung. Sats increased to 88. Anterior CT no output, to suction, no air leak. Posterior CT 159cc serosanguinous output, to suction, large air leak.   Over the past 24 hours (9/29/9/30), pt has had significant decline in his pulmonary status.  Despite chest tube exchange which decompressed his pneumothorax, he had a persistent air leak with signs/ symptoms consistent with pulmonary sepsis and ARDS. Despite being on maximal vent settings 100% FiO2 and 15 of PEEP and 20 ppm Eunice, pt remained persistently hypoxic.  Called to bedside after patient became acutely hypotensive, bradycadic, which quickly became PEA arrest.  Code initiated     Despite multiple rounds of ACLS, coded was terminated at 1421 after failure to respond to multiple boluses of epinephrine, bicarb, a single cardioversion for vfib with the addition of amiodarone.     Time of death is 1421  Cause is septic shock and hypoxic respiratory failure.

## 2018-09-15 NOTE — PROGRESS NOTES
Notified Dr. Grajeda of HR now 108-110. Levo increased to 0.08 mcg/kg/min. UO this hour 10 mL. Orders to start LR infusion at 125 mL and to administer 250 mL bolus of albumin. Also notified of unable to obtain SpO2 on ICU monitor despite multiple oximeter changes, and monitor changes. ABG obtained, SpO2 100%. Will obtain another ABG @ 1300 and Dr. Grajeda to notify Dr. Pitt of unable to obtain SpO2 on monitor. Will carry out and continue to monitor pt closely.

## 2018-09-15 NOTE — PROGRESS NOTES
Ostomy care follow on-hold as pt has been transferred to ICU due to respiratory distress. Family is not in room at this time.  Will re-assess for ostomy needs next week. [id number]

## 2018-09-15 NOTE — BRIEF OP NOTE
[facility]  Brief Operative Note    SUMMARY     Surgery Date: 9/15/2018     Surgeon(s) and Role:     * Jarod Pitt MD - Primary     * Shabana Rosen MD - Resident - Assisting     * Tino Rawls MD - Consult        Pre-op Diagnosis:  Acute blood loss anemia [D62]    Post-op Diagnosis:  Post-Op Diagnosis Codes:     * Acute blood loss anemia [D62]    Procedure(s) (LRB):  LAPAROTOMY,EXPLORATORY (N/A)  GASTROSTOMY (N/A)  EGD (ESOPHAGOGASTRODUODENOSCOPY) (N/A)    Anesthesia: General    Description of Procedure: exploratory laparotomy with minimal ascites, all bowel viable, biliary-pancreatic limb of RNYGB dilated, gastrostomy to remnant stomach with blood in remnant stomach, intraoperative GI consult with initial scope from esophagus to remnant stomach unable to locate ulcer, scope then placed through 12 mm port through gastrotomy with actively bleeding ulcer with artery identified- see GI for their portion of procedure with control of bleed with clip/epi, PEG placed through gastrotomy, abdomen irrigated and closed    Description of the findings of the procedure: see op note    Estimated Blood Loss: * No values recorded between 9/15/2018  3:08 PM and 9/15/2018  6:40 PM *         Specimens:   Specimen (12h ago, onward)    None

## 2018-09-15 NOTE — ANESTHESIA PREPROCEDURE EVALUATION
09/15/2018  [facility]  Anesthesia Pre-Operative Evaluation         Patient Name: Amari Saez  YOB: 1955  MRN: [id number]    SUBJECTIVE:     Pre-operative evaluation for Procedure(s) (LRB):  LAPAROTOMY,EXPLORATORY (N/A)     09/15/2018    Amari Saez is a 62 y.o. male s/p ex lap and sigmoid colectomy POD 3 who was stepped up to SICU following code on floor with chest compressions and epi x2 prior to attaining ROSC. Likely aspiration event. Has moved all extremities spontaeously but not followed commands. Pt currently intubated and sedated (propofol gtt). Concern for bleeding with H/H 3.9/11.5. Received 3 pRBCs and 2 PLTs.     Pt is intubated and sedated    Gtts:  Levo 0.02 and 0.04     Patient now presents for the above procedure(s).      LDA:   Quad right femoral   R mid line         Prev airway:     09/14/18: Mac 3, 7.0 tube. Difficult intubation: first attempt, unable to insert tube due to chest compressions. Second attempt, was handed ETT without stylet.  Tube secured at 25 cm at the lips.    Drips:    lactated ringers 125 mL/hr at 09/15/18 1300    norepinephrine bitartrate-D5W 0.04 mcg/kg/min (09/15/18 1300)    pantoprozole (PROTONIX) IV infusion 8 mg/hr (09/15/18 1342)    propofol 35 mcg/kg/min (09/15/18 1300)    vasopressin (PITRESSIN) infusion 0.04 Units/min (09/15/18 1400)       Patient Active Problem List   Diagnosis    Hypertension    Perforated diverticulum of large intestine    Pressure injury of skin and subcutaneous tissue, stage 3    Alteration in skin integrity    Acute respiratory distress       Review of patient's allergies indicates:  No Known Allergies    Current Inpatient Medications:   albuterol-ipratropium  3 mL Nebulization Q6H WAKE    lidocaine (PF) 10 mg/ml (1%)  1 mL Other Once    piperacillin-tazobactam (ZOSYN) IVPB  4.5 g Intravenous  Q8H    sodium chloride 0.9%  3 mL Intravenous Q8H    tamsulosin  0.4 mg Oral Daily       No current facility-administered medications on file prior to encounter.      Current Outpatient Medications on File Prior to Encounter   Medication Sig Dispense Refill    alprazolam (XANAX) 0.5 MG tablet Take 1 tablet (0.5 mg total) by mouth daily as needed for Anxiety. 30 tablet 0    blood pressure test kit-medium Kit 1 kit by Misc.(Non-Drug; Combo Route) route once daily. 1 each 0    escitalopram oxalate (LEXAPRO) 10 MG tablet TAKE 1 TABLET BY MOUTH EVERY DAY 90 tablet 0    lisinopril 10 MG tablet TAKE 1 TABLET BY MOUTH EVERY DAY 90 tablet 0       Past Surgical History:   Procedure Laterality Date    CARPAL TUNNEL RELEASE Bilateral     EYE SURGERY      vein occlusion -laser sugery for burst blood vessel     EYE SURGERY      lasix    GASTRIC BYPASS  2004    KNEE SURGERY  1980s    arthroscopy       Social History     Socioeconomic History    Marital status: Single     Spouse name: Not on file    Number of children: Not on file    Years of education: Not on file    Highest education level: Not on file   Social Needs    Financial resource strain: Not on file    Food insecurity - worry: Not on file    Food insecurity - inability: Not on file    Transportation needs - medical: Not on file    Transportation needs - non-medical: Not on file   Occupational History    Not on file   Tobacco Use    Smoking status: Current Every Day Smoker     Packs/day: 1.00     Years: 30.00     Pack years: 30.00     Types: Cigarettes   Substance and Sexual Activity    Alcohol use: Yes     Alcohol/week: 1.2 oz     Types: 2 Shots of liquor per week    Drug use: No    Sexual activity: Yes     Partners: Male     Birth control/protection: Condom   Other Topics Concern    Not on file   Social History Narrative    Previously worked on [employer] - retired due to severe carpal tunnel. Now does maintenance work at a school.         OBJECTIVE:     Vital Signs Range (Last 24H):  Temp:  [34.1 °C (93.4 °F)-36.9 °C (98.4 °F)]   Pulse:  []   Resp:  [18-56]   BP: ()/()   SpO2:  [57 %-100 %]   Arterial Line BP: ()/()       Significant Labs:  Lab Results   Component Value Date    WBC 9.21 09/15/2018    HGB 3.9 (LL) 09/15/2018    HCT 11.5 (LL) 09/15/2018    PLT 68 (L) 09/15/2018    CHOL 142 2015    TRIG 99 2015    HDL 72 2015    ALT 22 09/15/2018    AST 30 09/15/2018     09/15/2018    K 3.9 09/15/2018     (H) 09/15/2018    CREATININE 0.8 09/15/2018    BUN 14 09/15/2018    CO2 21 (L) 09/15/2018    TSH 0.986 2015    PSA 0.5 2004    INR 1.5 (H) 2018    HGBA1C 5.3 2010       Diagnostic Studies: No relevant studies.    EK18:  Sinus tachy.      2D ECHO:  No results found for this or any previous visit.      ASSESSMENT/PLAN:       Anesthesia Evaluation    I have reviewed the Patient Summary Reports.     I have reviewed the Medications.     Review of Systems  Anesthesia Hx:  No problems with previous Anesthesia  History of prior surgery of interest to airway management or planning: gastric bypass. Denies Family Hx of Anesthesia complications.   Denies Personal Hx of Anesthesia complications.   Social:  Smoker    Cardiovascular:   Hypertension, well controlled    Pulmonary:   COPD, moderate    Hepatic/GI:   Denies PUD. Denies GERD.    Neurological:  Neurology Normal    Endocrine:  Endocrine Normal        Physical Exam  General:  Malnutrition    Airway/Jaw/Neck:  Airway Findings: Mouth Opening: Normal Tongue: Normal  General Airway Assessment: Adult  Mallampati: II  TM Distance: Normal, at least 6 cm  Jaw/Neck Findings:     Neck ROM: Normal ROM      Dental:  Dental Findings:    Chest/Lungs:  Chest/Lungs Findings: Decreased Breath Sounds Bilateral     Heart/Vascular:  Heart Findings: Rate: Normal  Rhythm: Regular Rhythm  Sounds: Normal             Anesthesia  Plan  Type of Anesthesia, risks & benefits discussed:  Anesthesia Type:  general  Patient's Preference:   Intra-op Monitoring Plan: standard ASA monitors and arterial line  Intra-op Monitoring Plan Comments:   Post Op Pain Control Plan: multimodal analgesia, IV/PO Opioids PRN and per primary service following discharge from PACU  Post Op Pain Control Plan Comments:   Induction:   IV  Beta Blocker:         Informed Consent: Patient representative understands risks and agrees with Anesthesia plan.  Questions answered. Anesthesia consent signed with patient representative.  ASA Score: 4  emergent   Day of Surgery Review of History & Physical:    H&P update referred to the provider.         Ready For Surgery From Anesthesia Perspective.

## 2018-09-15 NOTE — NURSING TRANSFER
Nursing Transfer Note      9/15/2018     Transfer To: OR    Transfer via bed    Transfer to ETT bagged with O2 and portable cardiac monitoring    Transported by JAYME Beard and OR team     Medicines sent: yes    Chart send with patient: Yes    Notified: Ole, agatha    Patient reassessed at: 9/15/18, 8604

## 2018-09-15 NOTE — PROGRESS NOTES
Patient returned to SICU room 95399 from OR. Patient arrived intubated, connected to vent on A/C FiO2 50%/ PEEP 5. Patient arrived on MIVF at 50 mL/hr and protonix gtt at 8 mg/hr. Vaso, levo and propofol gtts paused. Patient connected to continuous cardiac monitor. SICU resident called to bedside to assess patient and place orders.

## 2018-09-15 NOTE — PROGRESS NOTES
Patient became tachycardic; Doctor Santo ordered 1L bolus of NS.     1845 Patient SOB, Sat low 80s; Applied non re breather and called rapid response. Dr. Santo ordered 40mg of lasix, labs, ABGs, and chest x ray.     1907 Patient became unresponsive. Started compressions, called a code. Started bagging. Applied leads and pads. Administered epi, bicarb, etomidate and succinylcholine.    1935 Patient achieved ROSC and transferred to ICU.

## 2018-09-15 NOTE — ASSESSMENT & PLAN NOTE
62 year old man with perforated diverticulitis and free air s/p Tash's procedure 9/12/18     Neuro:  Sedation: Propofol gtt  Pain: Percocet PRN     CV:  - HDS; off levo and vaso gtt  - EKG post code with sinus tachycardia, no troponin bump  - A line and Central line 9/15  - continue flotrac, SVV improving    Pulm:  - intubated in severe ARDS after likely aspiration with respiratory code  - wean vent as tolerated per ARDSnet protocol  - ABG PRN  - CXR showing bilateral diffuse infiltrates. Daily while intubated.     GI/FEN/Lytes:  - NPO  - continue NG  - CMP, Mg, Phos qday  - replace lytes PRN per ICU protocol     Renal:  - monitor BUN/Cr (baseline 0.8)  - monitor UOP      Heme/ID:  - H/H stable   - CBC QD     - WBC at 18.28  - peritoneal cx growing Bacteroides and E. Coli  - Zosyn (day 3)  - lactate 5.9 on arrival, cleared. Stop serial measurements.  - f/u repeat Blood/Urine cx 9/15     Endo:  SSI     PPX: Lovenox    Dispo: continue SICU care

## 2018-09-15 NOTE — SUBJECTIVE & OBJECTIVE
Follow-up For: Procedure(s) (LRB):  LAPAROTOMY,EXPLORATORY (N/A)  COLECTOMY,SIGMOID  COLOSTOMY    Post-Operative Day: 2 Days Post-Op     Past Medical History:   Diagnosis Date    Anxiety     Hypertension     Nicotine abuse        Past Surgical History:   Procedure Laterality Date    CARPAL TUNNEL RELEASE Bilateral     EYE SURGERY      vein occlusion -laser sugery for burst blood vessel     EYE SURGERY      lasix    GASTRIC BYPASS  2004    KNEE SURGERY  1980s    arthroscopy       Review of patient's allergies indicates:  No Known Allergies    Family History     Problem Relation (Age of Onset)    Alzheimer's disease Sister, Maternal Uncle    Diabetes     Parkinsonism Mother    Stroke Father        Tobacco Use    Smoking status: Current Every Day Smoker     Packs/day: 1.00     Years: 30.00     Pack years: 30.00     Types: Cigarettes   Substance and Sexual Activity    Alcohol use: Yes     Alcohol/week: 1.2 oz     Types: 2 Shots of liquor per week    Drug use: No    Sexual activity: Yes     Partners: Male     Birth control/protection: Condom      Review of Systems   Unable to perform ROS: Intubated     Objective:     Vital Signs (Most Recent):  Temp: (!) 94.8 °F (34.9 °C) (09/14/18 2141)  Pulse: 79 (09/14/18 2141)  Resp: (!) 26 (09/14/18 2018)  BP: 101/66 (09/14/18 2018)  SpO2: 100 % (09/14/18 2141) Vital Signs (24h Range):  Temp:  [94.8 °F (34.9 °C)-98.4 °F (36.9 °C)] 94.8 °F (34.9 °C)  Pulse:  [] 79  Resp:  [17-27] 26  SpO2:  [63 %-100 %] 100 %  BP: (101-129)/() 101/66     Weight: 70.2 kg (154 lb 12.2 oz)  Body mass index is 24.98 kg/m².      Intake/Output Summary (Last 24 hours) at 9/14/2018 2209  Last data filed at 9/14/2018 2022  Gross per 24 hour   Intake 2351.67 ml   Output 585 ml   Net 1766.67 ml       Physical Exam   Constitutional: He appears well-developed and well-nourished.   HENT:   Head: Normocephalic and atraumatic.   Eyes:   Pupils not equal but are reactive to light    Cardiovascular: Normal rate and regular rhythm.   No murmur heard.  Pulmonary/Chest: He has rales.   intubated   Abdominal: Soft. Bowel sounds are normal.   Neurological:   Sedated   Skin: Skin is warm and dry.       Vents:  Vent Mode: A/C (09/14/18 2141)  Ventilator Initiated: Yes (09/14/18 2114)  Set Rate: 30 bmp (09/14/18 2141)  Vt Set: 350 mL (09/14/18 2141)  PEEP/CPAP: 15 cmH20 (09/14/18 2141)  Oxygen Concentration (%): 101 (09/14/18 2141)  Peak Airway Pressure: 31 cmH2O (09/14/18 2141)  Plateau Pressure: 0 cmH20 (09/14/18 2141)  Total Ve: 11.8 mL (09/14/18 2141)    Lines/Drains/Airways     Central Venous Catheter Line                 Percutaneous Central Line Insertion/Assessment - triple lumen  09/14/18 1951 right femoral less than 1 day          Drain                 Colostomy 09/12/18 LLQ 2 days         NG/OG Tube 09/14/18 2038 [place] sump Left mouth less than 1 day         Urethral Catheter 09/1955 Latex 16 Fr. less than 1 day          Airway                 Airway - Non-Surgical 09/14/18 1915 Other (Comment) less than 1 day          Arterial Line                 Arterial Line 09/14/18 1953 Left Radial less than 1 day          Pressure Ulcer                 Pressure Injury 09/13/18 1534 lower Coccyx Stage 3 1 day          Peripheral Intravenous Line                 Midline Catheter Insertion/Assessment  - Single Lumen 09/13/18 1748 Right brachial vein 20g x 10cm 1 day         Peripheral IV - Single Lumen 09/13/18 1858 Anterior;Right Forearm 1 day                Significant Labs:    CBC/Anemia Profile:  Recent Labs   Lab  09/13/18   1748  09/14/18   0617  09/14/18   1905  09/14/18   1940  09/14/18   1940   WBC  13.61*  12.40   --   15.09*   --    HGB  9.3*  8.9*   --   8.2*   --    HCT  26.7*  25.1*  33*  23.7*  27*   PLT  190  151   --   126*   --    MCV  82  82   --   84   --    RDW  17.8*  18.1*   --   18.4*   --         Chemistries:  Recent Labs   Lab  09/13/18   0547  09/14/18   0617  09/14/18    "1940   NA  142  139  143   K  4.6  4.8  3.9   CL  114*  111*  114*   CO2  21*  21*  19*   BUN  18  15  15   CREATININE  0.9  0.8  0.8   CALCIUM  6.5*  6.8*  6.0*   ALBUMIN  1.5*  1.3*  0.9*   PROT  3.6*  3.7*  2.8*   BILITOT  1.1*  0.8  0.7   ALKPHOS  107  158*  140*   ALT  27  30  26   AST  43*  39  38   MG  1.8  1.6  1.1*   PHOS  4.3  3.0  5.0*       Recent Lab Results       09/14/18  2141 09/14/18  2118 09/14/18 2014 09/14/18 2013 09/14/18  1944      Immature Granulocytes          Immature Grans (Abs)          Albumin          Alkaline Phosphatase          Allens Test   N/A       ALT          Anion Gap          Aniso          AST          Baso #          Basophilic Stippling          Basophil%          Total Bilirubin          Site   Joseline/UAC       BUN, Bld          Keerthi Cells          Calcium          Chloride          CO2          CPK     116     CPK MB     4.1     Creatinine          DelSys          Differential Method          eGFR if           eGFR if non           Eos #          Eosinophil%          FiO2          Flow          Fragmented Cells          Glucose          Gran # (ANC)          Gran%          Hematocrit          Hemoglobin          Hypo          Lactate, Scott          Lymph #          Lymph%          Magnesium          MB%     3.5  Comment:  To be positive, the MB% must be greater than 5% AND the CK-MB  greater than 6.5 ng/mL. Values not in the reference interval,   but not qualifying as positive, should be considered "trace".       MCH          MCHC          MCV          Mode          Mono #          Mono%          MPV          nRBC          Ovalocytes          PEEP          Phosphorus          Platelet Estimate          Platelets          POC BE   19       POC HCO3   42.3       POC Hematocrit          POC Ionized Calcium          POC Lactate   3.91       POC PCO2   56.9       POC pCO2 Temp   56.9       POC PH   7.480       POC pH Temp   7.480       POC PO2   " 98       POC pO2 Temp   98       POC Potassium          POC SATURATED O2   98       POC Sodium          POC TCO2   44       POC Temp   37.0 C       POCT Glucose 200 47  75      Poik          Poly          Potassium          Total Protein          Rate          RBC          RDW          Sample   ARTERIAL       Schistocytes          Sodium          Target Cells          Toxic Granulation          Troponin I     <0.006  Comment:  The reference interval for Troponin I represents the 99th percentile   cutoff   for our facility and is consistent with 3rd generation assay   performance.       Vt          WBC                      09/14/18  1940 09/14/18  1940 09/14/18  1905 09/14/18  0617      Immature Granulocytes  CANCELED  Comment:  Result canceled by the ancillary.  0.6     Immature Grans (Abs)  CANCELED  Comment:  Mild elevation in immature granulocytes is non specific and   can be seen in a variety of conditions including stress response,   acute inflammation, trauma and pregnancy. Correlation with other   laboratory and clinical findings is essential.    Result canceled by the ancillary.    0.07  Comment:  Mild elevation in immature granulocytes is non specific and   can be seen in a variety of conditions including stress response,   acute inflammation, trauma and pregnancy. Correlation with other   laboratory and clinical findings is essential.       Albumin  0.9  1.3     Alkaline Phosphatase  140  158     Allens Test N/A  N/A      ALT  26  30     Anion Gap  10  7     Aniso  Slight       AST  38  39  Comment:  *Result may be interfered by visible hemolysis     Baso #  CANCELED  Comment:  Result canceled by the ancillary.  0.01     Basophilic Stippling  Occasional       Basophil%  0.0  0.1     Total Bilirubin  0.7  Comment:  For infants and newborns, interpretation of results should be based  on gestational age, weight and in agreement with clinical  observations.  Premature Infant recommended reference ranges:  Up  to 24 hours.............<8.0 mg/dL  Up to 48 hours............<12.0 mg/dL  3-5 days..................<15.0 mg/dL  6-29 days.................<15.0 mg/dL    0.8  Comment:  For infants and newborns, interpretation of results should be based  on gestational age, weight and in agreement with clinical  observations.  Premature Infant recommended reference ranges:  Up to 24 hours.............<8.0 mg/dL  Up to 48 hours............<12.0 mg/dL  3-5 days..................<15.0 mg/dL  6-29 days.................<15.0 mg/dL       Site LR  RR      BUN, Bld  15  15     Keerthi Cells  Occasional       Calcium  6.0  Comment:  *Critical value -  Results called to and read back by:tucker montgomery rn    6.8  Comment:  *Critical value -  Results called to and read back by:gregorio naidu rn       Chloride  114  111     CO2  19  21     CPK         CPK MB         Creatinine  0.8  0.8     DelSys Adult Vent  NRB      Differential Method  Manual  Automated     eGFR if   >60.0  >60.0     eGFR if non   >60.0  Comment:  Calculation used to obtain the estimated glomerular filtration  rate (eGFR) is the CKD-EPI equation.     >60.0  Comment:  Calculation used to obtain the estimated glomerular filtration  rate (eGFR) is the CKD-EPI equation.        Eos #  CANCELED  Comment:  Result canceled by the ancillary.  0.0     Eosinophil%  1.0  0.2     FiO2 100  100      Flow   15      Fragmented Cells  Occasional       Glucose  104  94     Gran # (ANC)    11.4     Gran%  95.0  91.6     Hematocrit  23.7  25.1     Hemoglobin  8.2  8.9     Hypo  Occasional       Lactate, Scott  5.9  Comment:  Falsely low lactic acid results can be found in samples   containing >=13.0 mg/dL total bilirubin and/or >=3.5 mg/dL   direct bilirubin.  *Critical value -   Results called to and read back by:tucker montgomery rn         Lymph #  CANCELED  Comment:  Result canceled by the ancillary.  0.6     Lymph%  3.0  5.2     Magnesium  1.1  1.6     MB%          MCH  29.1  29.2     MCHC  34.6  35.5     MCV  84  82     Mode AC/PRVC  SPONT      Mono #  CANCELED  Comment:  Result canceled by the ancillary.  0.3     Mono%  1.0  2.3     MPV  9.9  9.8     nRBC  0  0     Ovalocytes  Occasional       PEEP 8        Phosphorus  5.0  3.0     Platelet Estimate  Decreased       Platelets  126  151     POC BE -9  -9      POC HCO3 21.3  18.2      POC Hematocrit 27  33      POC Ionized Calcium 1.00  1.10      POC Lactate         POC PCO2 74.1  38.6      POC pCO2 Temp         POC PH 7.067  7.280      POC pH Temp         POC PO2 59  52      POC pO2 Temp         POC Potassium 3.5  4.3      POC SATURATED O2 76  82      POC Sodium 143  137      POC TCO2 24  19      POC Temp         POCT Glucose         Poik  Slight       Poly  Occasional       Potassium  3.9  4.8     Total Protein  2.8  3.7     Rate 14        RBC  2.82  3.05     RDW  18.4  18.1     Sample ARTERIAL  ARTERIAL      Schistocytes  Present       Sodium  143  139     Target Cells  Occasional       Toxic Granulation  Present       Troponin I         Vt 450        WBC  15.09  12.40           Significant Imaging:     CXR (9/14): ET tube placed with tip 3 cm above the hawa. Enteric tube placed with tip overlying the stomach. Bilateral diffuse infiltrates have increased slightly. Consolidative opacity at the right base has resolved, likely representing resolved atelectasis.

## 2018-09-15 NOTE — PROGRESS NOTES
[facility]  Critical Care - Surgery  Progress Note    Patient Name: Amari Saez  MRN: [id number]  Admission Date: 9/12/2018  Hospital Length of Stay: 3 days  Code Status: Full Code  Attending Provider: Judd Agudelo MD  Primary Care Provider: Chaim Valdovinos MD   Principal Problem: Perforated diverticulum of large intestine    Subjective:     Hospital/ICU Course:  9/14: Transferred from floor to SICU for higher level of care.     Interval History/Significant Events:   Pt stepped up to SICU following code on floor with chest compressions and epi x2 prior to attaining ROSC. HDS, on Levo gtt and 5L crystalloid and 500 mL colloid overnight. Vaso gtt added this AM per primary. Lactate trending down. On vent, weaning as tolerated. No other acute changes.     Follow-up For: Procedure(s) (LRB):  LAPAROTOMY,EXPLORATORY (N/A)  COLECTOMY,SIGMOID  COLOSTOMY    Post-Operative Day: 3 Days Post-Op    Objective:     Vital Signs (Most Recent):  Temp: 97.5 °F (36.4 °C) (09/15/18 0700)  Pulse: 101 (09/15/18 0705)  Resp: (!) 28 (09/15/18 0705)  BP: 101/66 (09/14/18 2018)  SpO2: (!) 92 % (09/15/18 0705) Vital Signs (24h Range):  Temp:  [93.4 °F (34.1 °C)-98.4 °F (36.9 °C)] 97.5 °F (36.4 °C)  Pulse:  [] 101  Resp:  [17-56] 28  SpO2:  [57 %-100 %] 92 %  BP: ()/() 101/66  Arterial Line BP: ()/() 108/78     Weight: 70.2 kg (154 lb 12.2 oz)  Body mass index is 24.98 kg/m².      Intake/Output Summary (Last 24 hours) at 9/15/2018 0718  Last data filed at 9/15/2018 0700  Gross per 24 hour   Intake 2500 ml   Output 1240 ml   Net 1260 ml       Physical Exam   Constitutional: He appears well-developed and well-nourished.   HENT:   Head: Normocephalic and atraumatic.   Eyes:   Pupils not equal but are reactive to light   Cardiovascular: Normal rate and regular rhythm.   No murmur heard.  Pulmonary/Chest: He has rales.   intubated   Abdominal: Soft. Bowel sounds are normal.   Neurological:   Sedated    Skin: Skin is warm and dry.       Vents:  Vent Mode: A/C (09/15/18 0700)  Ventilator Initiated: Yes (09/14/18 2114)  Set Rate: 28 bmp (09/15/18 0700)  Vt Set: 350 mL (09/15/18 0700)  PEEP/CPAP: 16 cmH20 (09/15/18 0700)  Oxygen Concentration (%): 60 (09/15/18 0705)  Peak Airway Pressure: 28 cmH2O (09/15/18 0700)  Plateau Pressure: 25 cmH20 (09/15/18 0700)  Total Ve: 9.2 mL (09/15/18 0700)    Lines/Drains/Airways     Central Venous Catheter Line                 Percutaneous Central Line Insertion/Assessment - quad lumen  09/14/18 1951 right femoral less than 1 day          Drain                 Colostomy 09/12/18 LLQ 3 days         NG/OG Tube 09/14/18 2038 [place] sump Left mouth less than 1 day         Urethral Catheter 09/1955 Latex 16 Fr. less than 1 day          Airway                 Airway - Non-Surgical 09/14/18 1915 Other (Comment) less than 1 day          Arterial Line                 Arterial Line 09/14/18 1953 Left Radial less than 1 day          Pressure Ulcer                 Pressure Injury 09/13/18 1534 lower Coccyx Stage 3 1 day          Peripheral Intravenous Line                 Midline Catheter Insertion/Assessment  - Single Lumen 09/13/18 1748 Right brachial vein 20g x 10cm 1 day         Peripheral IV - Single Lumen 09/13/18 1858 Anterior;Right Forearm 1 day                Significant Labs:    CBC/Anemia Profile:  Recent Labs   Lab  09/14/18   0617   09/14/18   1940  09/14/18   1940  09/15/18   0300   WBC  12.40   --   15.09*   --   18.28*   HGB  8.9*   --   8.2*   --   7.8*   HCT  25.1*   < >  23.7*  27*  21.7*   PLT  151   --   126*   --   97*   MCV  82   --   84   --   82   RDW  18.1*   --   18.4*   --   18.0*    < > = values in this interval not displayed.        Chemistries:  Recent Labs   Lab  09/14/18   0617  09/14/18   1940  09/15/18   0300   NA  139  143  141   K  4.8  3.9  4.2   CL  111*  114*  115*   CO2  21*  19*  19*   BUN  15  15  13   CREATININE  0.8  0.8  0.8   CALCIUM  6.8*   6.0*  6.6*   ALBUMIN  1.3*  0.9*  1.7*   PROT  3.7*  2.8*  3.3*   BILITOT  0.8  0.7  1.0   ALKPHOS  158*  140*  103   ALT  30  26  22   AST  39  38  30   MG  1.6  1.1*  1.8   PHOS  3.0  5.0*  2.8       Recent Lab Results       09/15/18  0534 09/15/18  0445 09/15/18  0322 09/15/18  0320 09/15/18  0300      Immature Granulocytes     1.1     Immature Grans (Abs)     0.20  Comment:  Mild elevation in immature granulocytes is non specific and   can be seen in a variety of conditions including stress response,   acute inflammation, trauma and pregnancy. Correlation with other   laboratory and clinical findings is essential.       Albumin     1.7     Alkaline Phosphatase     103     Allens Test   N/A       ALT     22     Anion Gap     7     Aniso     Slight     Appearance, UA          AST     30     Baso #     0.02     Basophilic Stippling          Basophil%     0.1     Bilirubin (UA)          Total Bilirubin     1.0  Comment:  For infants and newborns, interpretation of results should be based  on gestational age, weight and in agreement with clinical  observations.  Premature Infant recommended reference ranges:  Up to 24 hours.............<8.0 mg/dL  Up to 48 hours............<12.0 mg/dL  3-5 days..................<15.0 mg/dL  6-29 days.................<15.0 mg/dL       Blood Culture, Routine          Site   Joseline/UAC       BUN, Bld     13     [place] Cells     Occasional     Calcium     6.6  Comment:  *Critical value -  Results called to and read back by:Jayla Chavis   RN       Calcium, Ion     0.92     Chloride     115     CO2     19     Color, UA          CPK          CPK MB          Creatinine     0.8     DelSys   Adult Vent       Differential Method     Automated     eGFR if      >60.0     eGFR if non      >60.0  Comment:  Calculation used to obtain the estimated glomerular filtration  rate (eGFR) is the CKD-EPI equation.        Eos #     0.0     Eosinophil%     0.1     FiO2   80        Flow          Fragmented Cells          Glucose     94     Glucose, UA          Gran # (ANC)     16.9     Gran%     92.5     Hematocrit     21.7     Hemoglobin     7.8     Hyaline Casts, UA          Hypo     Moderate     Ketones, UA          Lactate, Scott     1.5  Comment:  Falsely low lactic acid results can be found in samples   containing >=13.0 mg/dL total bilirubin and/or >=3.5 mg/dL   direct bilirubin.       Leukocytes, UA          Lymph #     0.9     Lymph%     4.6     Magnesium     1.8     MB%          MCH     29.3     MCHC     35.9     MCV     82     Microscopic Comment          Mode   AC/PRVC       Mono #     0.3     Mono%     1.6     MPV     10.5     Nitrite, UA          nRBC     0     Occult Blood UA          Ovalocytes          PEEP   16       pH, UA          Phosphorus     2.8     Platelet Estimate          Platelets     97     POC BE   -1       POC HCO3   24.3       POC Hematocrit          POC Ionized Calcium          POC Lactate          POC PCO2   40.1       POC pCO2 Temp          POC PH   7.391       POC pH Temp          POC PO2   214       POC pO2 Temp          POC Potassium          POC SATURATED O2   100       POC Sodium          POC TCO2   26       POC Temp          POCT Glucose 80 79  85      Poik     Slight     Poly     Occasional     Potassium     4.2     Total Protein     3.3     Protein, UA          Rate   28       RBC     2.66     RBC, UA          RDW     18.0     Sample   ARTERIAL       Schistocytes          Sodium     141     Sp02   98       Specific Otho, UA          Specimen UA          Squam Epithel, UA          Target Cells     Occasional     Toxic Granulation     Present     Troponin I          Urobilinogen, UA          Vacuolated Granulocytes     Present     Vt   350       WBC, UA          WBC     18.28                 09/14/18  2351 09/14/18  2350 09/14/18  2212 09/14/18  2141 09/14/18  2130      Immature Granulocytes          Immature Grans (Abs)          Albumin           Alkaline Phosphatase          Allens Test          ALT          Anion Gap          Aniso          Appearance, UA     Hazy     AST          Baso #          Basophilic Stippling          Basophil%          Bilirubin (UA)     Negative     Total Bilirubin          Blood Culture, Routine          Site          BUN, Bld          [place] Cells          Calcium          Calcium, Ion          Chloride          CO2          Color, UA     Straw     CPK          CPK MB          Creatinine          DelSys          Differential Method          eGFR if           eGFR if non           Eos #          Eosinophil%          FiO2          Flow          Fragmented Cells          Glucose          Glucose, UA     Negative     Gran # (ANC)          Gran%          Hematocrit          Hemoglobin          Hyaline Casts, UA     1     Hypo          Ketones, UA     Negative     Lactate, Scott  2.3  Comment:  Falsely low lactic acid results can be found in samples   containing >=13.0 mg/dL total bilirubin and/or >=3.5 mg/dL   direct bilirubin.          Leukocytes, UA     Negative     Lymph #          Lymph%          Magnesium          MB%          MCH          MCHC          MCV          Microscopic Comment     SEE COMMENT  Comment:  Other formed elements not mentioned in the report are not   present in the microscopic examination.        Mode          Mono #          Mono%          MPV          Nitrite, UA     Negative     nRBC          Occult Blood UA     1+     Ovalocytes          PEEP          pH, UA     5.0     Phosphorus          Platelet Estimate          Platelets          POC BE          POC HCO3          POC Hematocrit          POC Ionized Calcium          POC Lactate          POC PCO2          POC pCO2 Temp          POC PH          POC pH Temp          POC PO2          POC pO2 Temp          POC Potassium          POC SATURATED O2          POC Sodium          POC TCO2          POC Temp          POCT Glucose 105   143 200      Poik          Poly          Potassium          Total Protein          Protein, UA     Negative  Comment:  Recommend a 24 hour urine protein or a urine   protein/creatinine ratio if globulin induced proteinuria is  clinically suspected.       Rate          RBC          RBC, UA     15     RDW          Sample          Schistocytes          Sodium          Sp02          Specific Labelle, UA     1.005     Specimen UA     Urine, Catheterized     Squam Epithel, UA     0     Target Cells          Toxic Granulation          Troponin I          Urobilinogen, UA     Negative     Vacuolated Granulocytes          Vt          WBC, UA     1     WBC                      09/14/18  2118 09/14/18 2014 09/14/18 2013 09/14/18  1944 09/14/18  1940      Immature Granulocytes          Immature Grans (Abs)          Albumin          Alkaline Phosphatase          Allens Test  N/A   N/A     ALT          Anion Gap          Aniso          Appearance, UA          AST          Baso #          Basophilic Stippling          Basophil%          Bilirubin (UA)          Total Bilirubin          Blood Culture, Routine          Site  Joseline/UAC   LR     BUN, Bld          Ke[place]thi Cells          Calcium          Calcium, Ion          Chloride          CO2          Color, UA          CPK    116      CPK MB    4.1      Creatinine          DelSys     Adult Vent     Differential Method          eGFR if           eGFR if non           Eos #          Eosinophil%          FiO2     100     Flow          Fragmented Cells          Glucose          Glucose, UA          Gran # (ANC)          Gran%          Hematocrit          Hemoglobin          Hyaline Casts, UA          Hypo          Ketones, UA          Lactate, Scott          Leukocytes, UA          Lymph #          Lymph%          Magnesium          MB%    3.5  Comment:  To be positive, the MB% must be greater than 5% AND the CK-MB  greater than 6.5 ng/mL. Values not in the  "reference interval,   but not qualifying as positive, should be considered "trace".        MCH          MCHC          MCV          Microscopic Comment          Mode     AC/PRVC     Mono #          Mono%          MPV          Nitrite, UA          nRBC          Occult Blood UA          Ovalocytes          PEEP     8     pH, UA          Phosphorus          Platelet Estimate          Platelets          POC BE  19   -9     POC HCO3  42.3   21.3     POC Hematocrit     27     POC Ionized Calcium     1.00     POC Lactate  3.91        POC PCO2  56.9   74.1     POC pCO2 Temp  56.9        POC PH  7.480   7.067     POC pH Temp  7.480        POC PO2  98   59     POC pO2 Temp  98        POC Potassium     3.5     POC SATURATED O2  98   76     POC Sodium     143     POC TCO2  44   24     POC Temp  37.0 C        POCT Glucose 47  75       Poik          Poly          Potassium          Total Protein          Protein, UA          Rate     14     RBC          RBC, UA          RDW          Sample  ARTERIAL   ARTERIAL     Schistocytes          Sodium          Sp02          Specific Gravity, UA          Specimen UA          Squam Epithel, UA          Target Cells          Toxic Granulation          Troponin I    <0.006  Comment:  The reference interval for Troponin I represents the 99th percentile   cutoff   for our facility and is consistent with 3rd generation assay   performance.        Urobilinogen, UA          Vacuolated Granulocytes          Vt     450     WBC, UA          WBC                      09/14/18  1940 09/14/18  1905 09/14/18  0922 09/14/18  0913      Immature Granulocytes CANCELED  Comment:  Result canceled by the ancillary.        Immature Grans (Abs) CANCELED  Comment:  Mild elevation in immature granulocytes is non specific and   can be seen in a variety of conditions including stress response,   acute inflammation, trauma and pregnancy. Correlation with other   laboratory and clinical findings is essential.    Result " canceled by the ancillary.          Albumin 0.9        Alkaline Phosphatase 140        Allens Test  N/A       ALT 26        Anion Gap 10        Aniso Slight        Appearance, UA         AST 38        Baso # CANCELED  Comment:  Result canceled by the ancillary.        Basophilic Stippling Occasional        Basophil% 0.0        Bilirubin (UA)         Total Bilirubin 0.7  Comment:  For infants and newborns, interpretation of results should be based  on gestational age, weight and in agreement with clinical  observations.  Premature Infant recommended reference ranges:  Up to 24 hours.............<8.0 mg/dL  Up to 48 hours............<12.0 mg/dL  3-5 days..................<15.0 mg/dL  6-29 days.................<15.0 mg/dL          Blood Culture, Routine   No Growth to date[P] No Growth to date[P]     Site  RR       BUN, Bld 15        [place] Cells Occasional        Calcium 6.0  Comment:  *Critical value -  Results called to and read back by:tucker montgomery rn          Calcium, Ion         Chloride 114        CO2 19        Color, UA         CPK         CPK MB         Creatinine 0.8        DelSys  NRB       Differential Method Manual        eGFR if  >60.0        eGFR if non  >60.0  Comment:  Calculation used to obtain the estimated glomerular filtration  rate (eGFR) is the CKD-EPI equation.           Eos # CANCELED  Comment:  Result canceled by the ancillary.        Eosinophil% 1.0        FiO2  100       Flow  15       Fragmented Cells Occasional        Glucose 104        Glucose, UA         Gran # (ANC)         Gran% 95.0        Hematocrit 23.7        Hemoglobin 8.2        Hyaline Casts, UA         Hypo Occasional        Ketones, UA         Lactate, Scott 5.9  Comment:  Falsely low lactic acid results can be found in samples   containing >=13.0 mg/dL total bilirubin and/or >=3.5 mg/dL   direct bilirubin.  *Critical value -   Results called to and read back by:tucker montgomery rn           Leukocytes, UA         Lymph # CANCELED  Comment:  Result canceled by the ancillary.        Lymph% 3.0        Magnesium 1.1        MB%         MCH 29.1        MCHC 34.6        MCV 84        Microscopic Comment         Mode  SPONT       Mono # CANCELED  Comment:  Result canceled by the ancillary.        Mono% 1.0        MPV 9.9        Nitrite, UA         nRBC 0        Occult Blood UA         Ovalocytes Occasional        PEEP         pH, UA         Phosphorus 5.0        Platelet Estimate Decreased        Platelets 126        POC BE  -9       POC HCO3  18.2       POC Hematocrit  33       POC Ionized Calcium  1.10       POC Lactate         POC PCO2  38.6       POC pCO2 Temp         POC PH  7.280       POC pH Temp         POC PO2  52       POC pO2 Temp         POC Potassium  4.3       POC SATURATED O2  82       POC Sodium  137       POC TCO2  19       POC Temp         POCT Glucose         Poik Slight        Poly Occasional        Potassium 3.9        Total Protein 2.8        Protein, UA         Rate         RBC 2.82        RBC, UA         RDW 18.4        Sample  ARTERIAL       Schistocytes Present        Sodium 143        Sp02         Specific Gravity, UA         Specimen UA         Squam Epithel, UA         Target Cells Occasional        Toxic Granulation Present        Troponin I         Urobilinogen, UA         Vacuolated Granulocytes         Vt         WBC, UA         WBC 15.09              Significant Imaging:  I have reviewed all pertinent imaging results/findings within the past 24 hours.    Assessment/Plan:     Acute respiratory distress    Neuro:  Sedation: Propofol gtt  Pain: Percocet PRN    CV:  - HDS, on Levo gtt. Vaso gtt added this AM.   - EKG on arrival showing sinus tachycardia  - A line and Central line placed on arrival  - 2L bolus given on arrival with appropriate response. ADditional 3L crystalloid and 500mL colloid overnight.  - cardiac enzymes stable     Pulm:  - intubated  -Vent Mode: A/C  Oxygen  Concentration (%):  [] 60  Resp Rate Total:  [28 br/min-33 br/min] 28 br/min  Vt Set:  [350 mL] 350 mL  PEEP/CPAP:  [15 utX86-06 cmH20] 16 cmH20  Mean Airway Pressure:  [20 lpM58-42 cmH20] 21 cmH20   - ABG 7.391/40.1/214/24.3/-1  - ABG PRN  - CXR showing bilateral diffuse infiltrates. Repeat CXR pending.    GI/FEN/Lytes:  - NPO  - NG placed at bedside  - CMP, Mg, Phos qday  - replace lytes PRN    Renal:  - BUN/Cr at 15/0.8  - monitor UOP     Heme/ID:  - H/H stable at 13/0.8  - CBC qday    - WBC at 18.28  - peritoneal cx growing Bacteroides and E. Coli  - Zosyn q8 (day 3)  - lactate 5.9 on arrival. Trending down, currently at 1.5.  - Repeat Blood/Urine cx on arrival     Endo:  SSI    PPX: Lovenox    Dispo: Cont SICU care           Critical care was time spent personally by me on the following activities: development of treatment plan with patient or surrogate and bedside caregivers, discussions with consultants, evaluation of patient's response to treatment, examination of patient, ordering and performing treatments and interventions, ordering and review of laboratory studies, ordering and review of radiographic studies, pulse oximetry, re-evaluation of patient's condition.  This critical care time did not overlap with that of any other provider or involve time for any procedures.     Fer Smith MD  Critical Care - Surgery  [facility]

## 2018-09-15 NOTE — PROGRESS NOTES
[facility]  General Surgery  Progress Note    Subjective:     Post-Op Info:  Procedure(s) (LRB):  LAPAROTOMY,EXPLORATORY (N/A)  COLECTOMY,SIGMOID  COLOSTOMY   3 Days Post-Op     Interval History:   Pt stepped up to SICU following code on floor with chest compressions and epi x2 prior to attaining ROSC. Likely aspiration event. Has moved all extremities spontaeously but not followed commands.  HDS, was on Levo .1 this morning. With continued fluid resuscitation and addition of vaso at .04, both are weaned to off. 6L crystalloid, 1L colloid.   UOP improving (~35/hr).  Lactate cleared.   On high vent settings 60%/16, AC Vt 350. Severe ARDS.    Medications:  Continuous Infusions:   dextrose 5 % and 0.45 % NaCl with KCl 20 mEq 100 mL/hr at 09/14/18 1233    norepinephrine bitartrate-D5W Stopped (09/15/18 0815)    propofol 35 mcg/kg/min (09/15/18 0900)    vasopressin (PITRESSIN) infusion 0.04 Units/min (09/15/18 0900)     Scheduled Meds:   albuterol-ipratropium  3 mL Nebulization Q6H WAKE    enoxaparin  40 mg Subcutaneous Daily    lidocaine (PF) 10 mg/ml (1%)  1 mL Other Once    pantoprazole  40 mg Intravenous Daily    piperacillin-tazobactam (ZOSYN) IVPB  4.5 g Intravenous Q8H    sodium chloride 0.9%  3 mL Intravenous Q8H    tamsulosin  0.4 mg Oral Daily     PRN Meds:magnesium sulfate IVPB, magnesium sulfate IVPB, ondansetron, potassium chloride in water, potassium chloride in water, potassium chloride in water, ramelteon, sodium chloride 0.9%, sodium phosphate IVPB, sodium phosphate IVPB, sodium phosphate IVPB     Review of patient's allergies indicates:  No Known Allergies    Objective:     Vital Signs (Most Recent):  Temp: 97.3 °F (36.3 °C) (09/15/18 0900)  Pulse: 94 (09/15/18 0930)  Resp: (!) 28 (09/15/18 0705)  BP: 97/69 (09/15/18 0900)  SpO2: 95 % (09/15/18 0930) Vital Signs (24h Range):  Temp:  [93.4 °F (34.1 °C)-98.4 °F (36.9 °C)] 97.3 °F (36.3 °C)  Pulse:  [] 94  Resp:   [17-56] 28  SpO2:  [57 %-100 %] 95 %  BP: ()/() 97/69  Arterial Line BP: ()/() 114/73     Weight: 70.2 kg (154 lb 12.2 oz)  Body mass index is 24.98 kg/m².    Intake/Output - Last 3 Shifts       09/13 0700 - 09/14 0659 09/14 0700 - 09/15 0659 09/15 0700 - 09/16 0659    P.O. 80      I.V. (mL/kg) 1871.7 (31.2) 1312 (18.7) 752.8 (10.7)    NG/GT   30    IV Piggyback 400 4000 1000    Total Intake(mL/kg) 2351.7 (39.3) 5312 (75.7) 1782.8 (25.4)    Urine (mL/kg/hr) 300 (0.2) 905 (0.5) 95 (0.5)    Stool 350 400 50    Total Output 650 1305 145    Net +1701.7 +4007 +1637.8           Stool Occurrence 0 x          Physical Exam  Gen: NAD, intubated and sedated  HEENT: NCAT, ETT in place, NGT in place  Pulm: Coarse BS bilaterally  Vent Mode: A/C  Oxygen Concentration (%):  [] 60  Resp Rate Total:  [28 br/min-33 br/min] 28 br/min  Vt Set:  [350 mL] 350 mL  PEEP/CPAP:  [15 lrJ53-21 cmH20] 16 cmH20  Mean Airway Pressure:  [20 apD18-61 cmH20] 21 cmH20  Abd: Soft, non distended, benign exam. Ostomy with blood tinged sweat and gas.  Extremities: no cyanosis or edema, or clubbing  Skin: Skin color, texture, turgor normal. No rashes or lesions    Significant Labs:  CBC:   Recent Labs   Lab  09/15/18   0300   WBC  18.28*   RBC  2.66*   HGB  7.8*   HCT  21.7*   PLT  97*   MCV  82   MCH  29.3   MCHC  35.9     CMP:   Recent Labs   Lab  09/15/18   0300   GLU  94   CALCIUM  6.6*   ALBUMIN  1.7*   PROT  3.3*   NA  141   K  4.2   CO2  19*   CL  115*   BUN  13   CREATININE  0.8   ALKPHOS  103   ALT  22   AST  30   BILITOT  1.0     Significant Diagnostics:  I have reviewed and interpreted all pertinent imaging results/findings within the past 24 hours.    Assessment/Plan:     * Perforated diverticulum of large intestine    62 year old man with perforated diverticulitis and free air s/p Tash's procedure 9/12/18     Neuro:  Sedation: Propofol gtt  Pain: Percocet PRN     CV:  - HDS; off levo and vaso gtt  - EKG post  code with sinus tachycardia, no troponin bump  - A line and Central line 9/15  - continue flotrac, SVV improving    Pulm:  - intubated in severe ARDS after likely aspiration with respiratory code  - wean vent as tolerated per ARDSnet protocol  - ABG PRN  - CXR showing bilateral diffuse infiltrates. Daily while intubated.     GI/FEN/Lytes:  - NPO  - continue NG  - CMP, Mg, Phos qday  - replace lytes PRN per ICU protocol     Renal:  - monitor BUN/Cr (baseline 0.8)  - monitor UOP      Heme/ID:  - H/H stable   - CBC QD     - WBC at 18.28  - peritoneal cx growing Bacteroides and E. Coli  - Zosyn (day 3)  - lactate 5.9 on arrival, cleared. Stop serial measurements.  - f/u repeat Blood/Urine cx 9/15     Endo:  SSI     PPX: Lovenox    Dispo: continue SICU care            Dinorah Ward MD  General Surgery  [facility]

## 2018-09-16 NOTE — SUBJECTIVE & OBJECTIVE
Interval History/Significant Events:   Pt stepped up to SICU 9/14 following code on floor with chest compressions and epi x2 prior to attaining ROSC. HDS, on Levo gtt and 5L crystalloid and 500 mL colloid overnight. Vaso gtt added this AM per primary. Lactate trending down. On vent, weaning as tolerated. No other acute changes.     Hgb 9/15 went from 7.8 to 4.3, then 3.9 on repeat.  Pt was taken emergently to OR for ex lap. Cause of bleeding was found to be ulceration onto vessel of the remnant stomach. He returned to ICU in stable condition after source of bleeding was controlled with hemostatic clip. He was given 3u pRBCs, 2U FFP prior to/ in OR. TEG blood test after surgery indicated need for cryo and plts, gave 1 unit of each. Patient doing well this AM. Intubated and off pressors.     Follow-up For: Procedure(s) (LRB):  LAPAROTOMY,EXPLORATORY (N/A)  COLECTOMY,SIGMOID  COLOSTOMY  9/12/18    Return to OR for Ex-Lap  9/16/18      Objective:     Vital Signs (Most Recent):  Temp: 98.6 °F (37 °C) (09/16/18 0600)  Pulse: 99 (09/16/18 0600)  Resp: (!) 28 (09/15/18 1302)  BP: (!) 118/94 (09/15/18 1845)  SpO2: 99 % (09/16/18 0600) Vital Signs (24h Range):  Temp:  [93.7 °F (34.3 °C)-99.4 °F (37.4 °C)] 98.6 °F (37 °C)  Pulse:  [] 99  Resp:  [28] 28  SpO2:  [73 %-100 %] 99 %  BP: ()/(69-94) 118/94  Arterial Line BP: ()/() 118/81     Weight: 70.2 kg (154 lb 12.2 oz)  Body mass index is 24.98 kg/m².      Intake/Output Summary (Last 24 hours) at 9/16/2018 0659  Last data filed at 9/16/2018 0600  Gross per 24 hour   Intake 9997.49 ml   Output 1845 ml   Net 8152.49 ml       Physical Exam   Constitutional: He appears well-developed and well-nourished.   HENT:   Head: Normocephalic and atraumatic.   Eyes:   Pupils not equal but are reactive to light   Cardiovascular: Normal rate and regular rhythm.   No murmur heard.  Pulmonary/Chest: He has rales.   intubated   Abdominal: Soft. Bowel sounds are normal.    Neurological:   Sedated   Skin: Skin is warm and dry.       Vents:  Vent Mode: A/C (09/16/18 0522)  Ventilator Initiated: Yes (09/14/18 2114)  Set Rate: 28 bmp (09/16/18 0522)  Vt Set: 350 mL (09/16/18 0522)  PEEP/CPAP: 10 cmH20 (09/16/18 0522)  Oxygen Concentration (%): 50 (09/16/18 0600)  Peak Airway Pressure: 21 cmH2O (09/16/18 0522)  Plateau Pressure: 0 cmH20 (09/16/18 0522)  Total Ve: 10 mL (09/16/18 0522)    Lines/Drains/Airways     Central Venous Catheter Line                 Percutaneous Central Line Insertion/Assessment - quad lumen  09/14/18 1951 right femoral 1 day          Drain                 Colostomy 09/12/18 LLQ 4 days         NG/OG Tube 09/14/18 2038 [place] sump Left nostril 1 day         Urethral Catheter 09/1955 Latex 16 Fr. 1 day         Gastrostomy/Enterostomy 09/15/18 1621 Gastrostomy tube w/o balloon LUQ decompression less than 1 day          Airway                 Airway - Non-Surgical 09/14/18 1915 Other (Comment) 1 day          Arterial Line                 Arterial Line 09/14/18 1953 Left Radial 1 day          Pressure Ulcer                 Pressure Injury 09/13/18 1534 lower Coccyx Stage 3 2 days          Peripheral Intravenous Line                 Midline Catheter Insertion/Assessment  - Single Lumen 09/13/18 1748 Right brachial vein 20g x 10cm 2 days         Peripheral IV - Single Lumen 09/13/18 1858 Anterior;Right Forearm 2 days                Significant Labs:    CBC/Anemia Profile:  Recent Labs   Lab  09/15/18   1856  09/15/18   1908  09/15/18   2345  09/16/18   0300   WBC  8.26   --   13.93*  13.47*   HGB  9.2*   --   8.6*  8.8*   HCT  25.8*  24*  24.7*  25.0*   PLT  128*   --   170  170   MCV  84   --   86  85   RDW  15.6*   --   15.6*  15.8*        Chemistries:  Recent Labs   Lab  09/15/18   0300   09/15/18   1856  09/15/18   2345  09/16/18   0300   NA  141   < >  142  139  140   K  4.2   < >  3.6  4.5  4.3   CL  115*   < >  114*  113*  114*   CO2  19*   < >  20*  20*  20*    BUN  13   < >  13  14  14   CREATININE  0.8   < >  0.8  0.8  0.9   CALCIUM  6.6*   < >  7.2*  8.4*  7.7*   ALBUMIN  1.7*   --   2.0*   --   2.1*   PROT  3.3*   --   3.2*   --   3.7*   BILITOT  1.0   --   3.1*   --   3.0*   ALKPHOS  103   --   82   --   81   ALT  22   --   14   --   17   AST  30   --   24   --   22   MG  1.8   < >  1.8  2.3  2.2   PHOS  2.8   < >  3.2  2.8  2.8    < > = values in this interval not displayed.       Recent Lab Results       09/16/18  0614 09/16/18  0526 09/16/18  0522 09/16/18  0300 09/15/18  2358      Immature Granulocytes    0.7      Immature Grans (Abs)    0.09  Comment:  Mild elevation in immature granulocytes is non specific and   can be seen in a variety of conditions including stress response,   acute inflammation, trauma and pregnancy. Correlation with other   laboratory and clinical findings is essential.        Unit Blood Type Code          Unit Expiration          Unit Blood Type          Albumin    2.1      Alkaline Phosphatase    81      Allens Test   N/A  N/A     ALT    17      Anion Gap    6      Aniso          AST    22      Baso #    0.02      Basophil%    0.1      Total Bilirubin    3.0  Comment:  For infants and newborns, interpretation of results should be based  on gestational age, weight and in agreement with clinical  observations.  Premature Infant recommended reference ranges:  Up to 24 hours.............<8.0 mg/dL  Up to 48 hours............<12.0 mg/dL  3-5 days..................<15.0 mg/dL  6-29 days.................<15.0 mg/dL        Site   Joseline/UAC  Joseline/UAC     BUN, Bld    14      Calcium    7.7      Calcium, Ion    1.10      Chloride    114      CO2    20      CODING SYSTEM          Creatinine    0.9      Caloricss   Adult Vent  Adult Vent     Differential Method    Automated      DISPENSE STATUS          eGFR if     >60.0      eGFR if non     >60.0  Comment:  Calculation used to obtain the estimated glomerular  filtration  rate (eGFR) is the CKD-EPI equation.         Eos #    0.0      Eosinophil%    0.1      Fibrinogen    225      FiO2   50  50     Glucose    84      Gran # (ANC)    12.5      Gran%    93.1      Group & Rh          Hematocrit    25.0      Hemoglobin    8.8      Hypo          INDIRECT BRIDGET          Coumadin Monitoring INR    1.2  Comment:  Coumadin Therapy:  2.0 - 3.0 for INR for all indicators except mechanical heart valves  and antiphospholipid syndromes which should use 2.5 - 3.5.        Lactate, Scott    1.5  Comment:  Falsely low lactic acid results can be found in samples   containing >=13.0 mg/dL total bilirubin and/or >=3.5 mg/dL   direct bilirubin.        Lymph #    0.6      Lymph%    4.2      Magnesium    2.2      MCH    29.7      MCHC    35.2      MCV    85      Mode   AC/PRVC  AC/PRVC     Mono #    0.2      Mono%    1.8      MPV    10.6      nRBC    0      Ovalocytes          PEEP   10  15     Phosphorus    2.8      PiP          Platelet Estimate          Platelets    170      POC BE   -4  -2     POC HCO3   21.2  22.8     POC Hematocrit          POC Ionized Calcium          POC Lactate          POC PCO2   34.9  38.3     POC PH   7.392  7.383     POC PO2   75  138     POC Potassium          POC SATURATED O2   95  99     POC Sodium          POC TCO2   22  24     POCT Glucose 119 68        Poik          Poly          Potassium    4.3      PRODUCT CODE          Total Protein    3.7      Protime    12.4      Rate   28  28     RBC    2.96      RDW    15.8      Sample   ARTERIAL  ARTERIAL     Sodium    140      Target Cells          UNIT NUMBER          Vt   350  350     WBC    13.47                  09/15/18  2347 09/15/18  2345 09/15/18  2009 09/15/18  1908 09/15/18  1858      Immature Granulocytes  0.8        Immature Grans (Abs)  0.11  Comment:  Mild elevation in immature granulocytes is non specific and   can be seen in a variety of conditions including stress response,   acute inflammation, trauma  and pregnancy. Correlation with other   laboratory and clinical findings is essential.          Unit Blood Type Code          Unit Expiration          Unit Blood Type          Albumin          Alkaline Phosphatase          Allens Test    N/A      ALT          Anion Gap  6        Aniso          AST          Baso #  0.02        Basophil%  0.1        Total Bilirubin          Site    Joseline/UAC      BUN, Bld  14        Calcium  8.4        Calcium, Ion          Chloride  113        CO2  20        CODING SYSTEM          Creatinine  0.8        DelSys    Adult Vent      Differential Method  Automated        DISPENSE STATUS          eGFR if   >60.0        eGFR if non   >60.0  Comment:  Calculation used to obtain the estimated glomerular filtration  rate (eGFR) is the CKD-EPI equation.           Eos #  0.0        Eosinophil%  0.1        Fibrinogen  206        FiO2    50      Glucose  97        Gran # (ANC)  12.9        Gran%  92.4        Group & Rh          Hematocrit  24.7        Hemoglobin  8.6        Hypo          INDIRECT BRIDGET          Coumadin Monitoring INR  1.2  Comment:  Coumadin Therapy:  2.0 - 3.0 for INR for all indicators except mechanical heart valves  and antiphospholipid syndromes which should use 2.5 - 3.5.          Lactate, Scott  1.3  Comment:  Falsely low lactic acid results can be found in samples   containing >=13.0 mg/dL total bilirubin and/or >=3.5 mg/dL   direct bilirubin.          Lymph #  0.7        Lymph%  4.8        Magnesium  2.3        MCH  29.9        MCHC  34.8        MCV  86        Mode    AC/PRVC      Mono #  0.3        Mono%  1.8        MPV  10.9        nRBC  0        Ovalocytes          PEEP    15      Phosphorus  2.8        PiP          Platelet Estimate          Platelets  170        POC BE    -4      POC HCO3    21.8      POC Hematocrit    24      POC Ionized Calcium    1.09      POC Lactate          POC PCO2    40.2      POC PH    7.342      POC PO2    81       POC Potassium    3.5      POC SATURATED O2    95      POC Sodium    144      POC TCO2    23      POCT Glucose 83  84  94     Poik          Poly          Potassium  4.5        PRODUCT CODE          Total Protein          Protime  12.4        Rate    28      RBC  2.88        RDW  15.6        Sample    ARTERIAL      Sodium  139        Target Cells          UNIT NUMBER          Vt    350      WBC  13.93                    09/15/18  1856 09/15/18  1342 09/15/18  1335 09/15/18  1323 09/15/18  1317      Immature Granulocytes 1.1         Immature Grans (Abs) 0.09  Comment:  Mild elevation in immature granulocytes is non specific and   can be seen in a variety of conditions including stress response,   acute inflammation, trauma and pregnancy. Correlation with other   laboratory and clinical findings is essential.           Unit Blood Type Code    5100 9500         9500 5100         9500[P] 9500         5100[P] 9500         5100[P] 9500         5100[P]          5100[P]          5100[P]          9500          5100[P]          5100[P]          5100[P]          5100[P]      Unit Expiration    [id number]         [id number]         [id number][P] [id number]         [id number][P] [id number]         [id number][P] [id number]         [id number][P]          [id number][P]          [id number][P]          [id number]          [id number][P]          941086111310[P]          [id number][P]          [id number][P]      Unit Blood Type    O POS O NEG         O NEG O POS         O NEG[P] O NEG         O POS[P] O NEG         O POS[P] O NEG         O POS[P]          O POS[P]          O POS[P]          O NEG          O POS[P]          O POS[P]          O POS[P]          O POS[P]      Albumin 2.0         Alkaline Phosphatase 82         Allens Test  N/A        ALT 14         Anion Gap 8         Aniso          AST 24         Baso # 0.01         Basophil% 0.1         Total Bilirubin  3.1  Comment:  For infants and newborns, interpretation of results should be based  on gestational age, weight and in agreement with clinical  observations.  Premature Infant recommended reference ranges:  Up to 24 hours.............<8.0 mg/dL  Up to 48 hours............<12.0 mg/dL  3-5 days..................<15.0 mg/dL  6-29 days.................<15.0 mg/dL           Site  [facility]        BUN, Bld 13         Calcium 7.2         Calcium, Ion          Chloride 114         CO2 20         CODING SYSTEM    GIZA427 KIDC737         MZPL369 EWDL805         XPBR547[P] BOZM241         RXOW809[P] IPMW153         REYX360[P] GMLN615         VVRK238[P]          VTTJ181[P]          NTNG673[P]          OWMX250          OQCF593[P]          HTDH994[P]          NEAK357[P]          RLNV898[P]      Creatinine 0.8         [employer]  Adult Vent        Differential Method Automated         DISPENSE STATUS    TRANSFUSED TRANSFUSED         TRANSFUSED TRANSFUSED         CROSSMATCHED[P] TRANSFUSED         CROSSMATCHED[P] TRANSFUSED         CROSSMATCHED[P] TRANSFUSED         CROSSMATCHED[P]          CROSSMATCHED[P]          CROSSMATCHED[P]          TRANSFUSED          CROSSMATCHED[P]          CROSSMATCHED[P]          CROSSMATCHED[P]          CROSSMATCHED[P]      eGFR if  >60.0         eGFR if non  >60.0  Comment:  Calculation used to obtain the estimated glomerular filtration  rate (eGFR) is the CKD-EPI equation.            Eos # 0.0         Eosinophil% 0.0         Fibrinogen 101  88  Comment:  FIBRINOGEN critical result(s) called and verbal readback obtained   from WHIT DONALD RN, 09/15/2018 14:31         FiO2  50        Glucose 97         Gran # (ANC) 7.4         Gran% 89.6         Group & Rh    O POS      Hematocrit 25.8         Hemoglobin 9.2         Hypo          INDIRECT BRIDGET    NEG      Coumadin Monitoring INR 1.4  Comment:  Coumadin Therapy:  2.0 - 3.0 for INR for all indicators except mechanical heart  valves  and antiphospholipid syndromes which should use 2.5 - 3.5.    1.9  Comment:  Coumadin Therapy:  2.0 - 3.0 for INR for all indicators except mechanical heart valves  and antiphospholipid syndromes which should use 2.5 - 3.5.         Lactate, Csott 1.6  Comment:  Falsely low lactic acid results can be found in samples   containing >=13.0 mg/dL total bilirubin and/or >=3.5 mg/dL   direct bilirubin.           Lymph # 0.6         Lymph% 7.3         Magnesium 1.8         MCH 30.0         MCHC 35.7         MCV 84         Mode  AC/PRVC        Mono # 0.2         Mono% 1.9         MPV 11.0         nRBC 0         Ovalocytes          PEEP  15        Phosphorus 3.2         PiP  27        Platelet Estimate Decreased         Platelets 128         POC BE  -1        POC HCO3  22.5        POC Hematocrit          POC Ionized Calcium          POC Lactate  2.64        POC PCO2  30.6        POC PH  7.475        POC PO2  191        POC Potassium          POC SATURATED O2  100        POC Sodium          POC TCO2  23        POCT Glucose          Poik          Poly          Potassium 3.6         PRODUCT CODE    [id number] F3537K76         P3629X10 H8501Q62         C4354S66[P] W3306E94         M9964P16[P] P8178T22         M8501J96[P] H5005F88         M1853V31[P]          Y4184J46[P]          J9408K57[P]          X3695J16          C7875W18[P]          V1307H84[P]          E3345C06[P]          P6923Z54[P]      Total Protein 3.2         Protime 14.3  19.0       Rate  28        RBC 3.07         RDW 15.6         Sample  ARTERIAL        Sodium 142         Target Cells          UNIT NUMBER    [id number]         [id number]         [id number][P] [id number]         [id number][P] [id number]         [id number][P] [id number]         [id number][P]          [id number][P]          [id number][P]          [id number]          [id number][P]          [id number][P]          [id number][P]           [id number][P]      Vt  350        WBC 8.26                     09/15/18  1302 09/15/18  1245 09/15/18  1208 09/15/18  1206 09/15/18  1123      Immature Granulocytes  0.7  0.7      Immature Grans (Abs)  0.06  Comment:  Mild elevation in immature granulocytes is non specific and   can be seen in a variety of conditions including stress response,   acute inflammation, trauma and pregnancy. Correlation with other   laboratory and clinical findings is essential.    0.07  Comment:  Mild elevation in immature granulocytes is non specific and   can be seen in a variety of conditions including stress response,   acute inflammation, trauma and pregnancy. Correlation with other   laboratory and clinical findings is essential.        Unit Blood Type Code          Unit Expiration          Unit Blood Type          Albumin          Alkaline Phosphatase          Allens Test N/A    N/A     ALT          Anion Gap    5      Aniso  Slight  Slight      AST          Baso #  0.00  0.00      Basophil%  0.0  0.0      Total Bilirubin          Site [place]/UAC    Joseline/UAC     BUN, Bld    14      Calcium    7.3      Calcium, Ion    1.13      Chloride    114      CO2    21      CODING SYSTEM          Creatinine    0.8      DelSys Adult Vent    Adult Vent     Differential Method  Automated  Automated      DISPENSE STATUS          eGFR if     >60.0      eGFR if non     >60.0  Comment:  Calculation used to obtain the estimated glomerular filtration  rate (eGFR) is the CKD-EPI equation.         Eos #  0.0  0.0      Eosinophil%  0.1  0.1      Fibrinogen          FiO2 60    60     Glucose    97      Gran # (ANC)  8.2  8.4      Gran%  89.0  89.3      Group & Rh          Hematocrit  11.5  Comment:  Hgb & Hct critical result(s) called and verbal readback obtained   from Glenn Simpson RN, 09/15/2018 13:15    11.8  Comment:  Hgb & Hct critical result(s) called and verbal readback obtained   from Glenn Simpson  Nurse, 09/15/2018 12:42  Hgb & Hct critical result(s) called and verbal readback obtained   from   Nurse Breanna, 09/15/2018 12:41        Hemoglobin  3.9  Comment:  Hgb & Hct critical result(s) called and verbal readback obtained   from Glenn Simpson RN, 09/15/2018 13:15    4.3  Comment:  Hgb & Hct critical result(s) called and verbal readback obtained   from Nurse Breanna, 09/15/2018 12:42  Hgb & Hct critical result(s) called and verbal readback obtained   from   Glenn Simpson Nurse, 09/15/2018 12:41        Hypo  Moderate  Moderate      INDIRECT BRIDGET          Coumadin Monitoring INR          Lactate, Scott    1.8  Comment:  Falsely low lactic acid results can be found in samples   containing >=13.0 mg/dL total bilirubin and/or >=3.5 mg/dL   direct bilirubin.        Lymph #  0.7  0.7      Lymph%  7.8  7.6      Magnesium    1.9      MCH  28.5  29.9      MCHC  33.9  36.4      MCV  84  82      Mode AC/PRVC    AC/PRVC     Mono #  0.2  0.2      Mono%  2.4  2.3      MPV  10.4  10.3      nRBC  0  0      Ovalocytes  Occasional        PEEP 15    15     Phosphorus    2.6      PiP 24    28     Platelet Estimate          Platelets  68  69      POC BE 1    0     POC HCO3 24.7    24.0     POC Hematocrit          POC Ionized Calcium          POC Lactate          POC PCO2 32.9    35.1     POC PH 7.483    7.444     POC PO2 228    205     POC Potassium          POC SATURATED O2 100    100     POC Sodium          POC TCO2 26    25     POCT Glucose   93       Poik  Moderate  Moderate      Poly  Occasional  Occasional      Potassium    3.9      PRODUCT CODE          Total Protein          Protime          Rate 28    28     RBC  1.37  1.44      RDW  17.8  17.9      Sample ARTERIAL    ARTERIAL     Sodium    140      Target Cells  Moderate  Moderate      UNIT NUMBER          Vt 350    350     WBC  9.21  9.38                  09/15/18  0751 09/15/18  0739      Immature Granulocytes       Immature Grans (Abs)       Unit Blood  Type Code       Unit Expiration       Unit Blood Type       Albumin       Alkaline Phosphatase       Allens Test       ALT       Anion Gap       Aniso       AST       Baso #       Basophil%       Total Bilirubin       Site       BUN, Bld       Calcium       Calcium, Ion       Chloride       CO2       CODING SYSTEM       Creatinine       DelSys       Differential Method       DISPENSE STATUS       eGFR if        eGFR if non        Eos #       Eosinophil%       Fibrinogen       FiO2       Glucose       Gran # (ANC)       Gran%       Group & Rh       Hematocrit       Hemoglobin       Hypo       INDIRECT BRIDGET       Coumadin Monitoring INR       Lactate, Scott 1.0  Comment:  Falsely low lactic acid results can be found in samples   containing >=13.0 mg/dL total bilirubin and/or >=3.5 mg/dL   direct bilirubin.        Lymph #       Lymph%       Magnesium       MCH       MCHC       MCV       Mode       Mono #       Mono%       MPV       nRBC       Ovalocytes       PEEP       Phosphorus       PiP       Platelet Estimate       Platelets       POC BE       POC HCO3       POC Hematocrit       POC Ionized Calcium       POC Lactate       POC PCO2       POC PH       POC PO2       POC Potassium       POC SATURATED O2       POC Sodium       POC TCO2       POCT Glucose  95     Poik       Poly       Potassium       PRODUCT CODE       Total Protein       Protime       Rate       RBC       RDW       Sample       Sodium       Target Cells       UNIT NUMBER       Vt       WBC             Significant Imaging:  I have reviewed all pertinent imaging results/findings within the past 24 hours.

## 2018-09-16 NOTE — PROGRESS NOTES
Dr. Armas notified of decreased UOP at this time with a CVP of 12 and a SVV of 8. All other VSS. Orders received to give 20 mg of Lasix IVP.

## 2018-09-16 NOTE — PROCEDURES
"Amari Saez is a 62 y.o. male patient.    Temp: 98.4 °F (36.9 °C) (09/16/18 0700)  Pulse: 80 (09/16/18 1308)  Resp: (!) 28 (09/16/18 0702)  BP: (!) 118/94 (09/15/18 1845)  SpO2: 100 % (09/16/18 1308)  Weight: 70.2 kg (154 lb 12.2 oz) (09/14/18 2042)  Height: 5' 6" (167.6 cm) (09/12/18 1246)       Central Line  Date/Time: 9/16/2018 1:24 PM  Location procedure was performed: [facility] SURGICAL ICU (SICU)  Performed by: Tyesha Ellis MD  Consent Done: Yes  Time out: Immediately prior to procedure a "time out" was called to verify the correct patient, procedure, equipment, support staff and site/side marked as required.  Indications: med administration, vascular access and hemodynamic monitoring  Anesthesia: local infiltration    Anesthesia:  Local Anesthetic: lidocaine 1% without epinephrine  Anesthetic total: 3 mL  Preparation: skin prepped with ChloraPrep  Skin prep agent dried: skin prep agent completely dried prior to procedure  Sterile barriers: all five maximum sterile barriers used - cap, mask, sterile gown, sterile gloves, and large sterile sheet  Hand hygiene: hand hygiene performed prior to central venous catheter insertion  Location details: right internal jugular  Catheter type: triple lumen  Catheter size: 7 Fr  Catheter Length: 16cm    Ultrasound guidance: yes  Vessel Caliber: large, patent, compressibility normal  Needle advanced into vessel with real time Ultrasound guidance.  Guidewire confirmed in vessel.  Sterile sheath used.  Manometry: Yes  Number of attempts: 1  Complications: none  Estimated blood loss (mL): 2  Post-procedure: line sutured,  chlorhexidine patch,  sterile dressing applied and blood return through all ports  Complications: No  Comments: CXR to verify placement is pending        Tyesha Ellis MD  9/16/2018  "

## 2018-09-16 NOTE — PT/OT/SLP DISCHARGE
Physical Therapy Discharge Summary    Name: Amari Saez  MRN: [id number]   Principal Problem: Perforated diverticulum of large intestine     Patient Discharged from acute Physical Therapy on 9/14/2018.  Please refer to prior PT noted date on 9/14/2018 for functional status.     Assessment:     Patient transferred to lower level of care secondary to pt code on 9/14/2018 transferred to SICU     Objective:     GOALS:   Multidisciplinary Problems     Physical Therapy Goals        Problem: Physical Therapy Goal    Goal Priority Disciplines Outcome Goal Variances Interventions   Physical Therapy Goal     PT, PT/OT Ongoing (interventions implemented as appropriate)     Description:  Goals to be met by: 9/23/2018     Patient will increase functional independence with mobility by performing:    -Supine to sit with Set-up [place]  -Sit to supine with Set-up [place]  -Sit to stand transfer with Stand-by Assistance  -Bed to chair transfer with Stand-by Assistance using LRAD  -Gait  x 50 feet with Contact Guard Assistance using LRAD  -Ascend/descend 7 stairs with Minimal Assistance   -Lower extremity exercise program x 30 reps per handout, with independence                     Reasons for Discontinuation of Therapy Services  Transfer to alternate level of care.      Plan:     Patient Discharged to: Pt transferred to SICU, re-consult pending orders.    Bre Koch, PT  9/16/2018

## 2018-09-16 NOTE — SUBJECTIVE & OBJECTIVE
Interval History:   Acute GI bleed yesterday- taken to OR for ex lap, found to have actively bleeding ulcer in remnant stomach, ulcer clipped with intraop assistance of GI. PEG placed in remnant stomach. Received 3U PRBCs, 2 FFP, 1 plt, 1 cryo. Has remained off pressors since OR.   UOP decreased o/n, responded well to 20 of lasix.   Decreasing vent settings 50%/10, AC Vt 350.     Medications:  Continuous Infusions:   lactated ringers 125 mL/hr at 09/16/18 0800    pantoprozole (PROTONIX) IV infusion 8 mg/hr (09/16/18 0800)    propofol 30 mcg/kg/min (09/16/18 0800)     Scheduled Meds:   albuterol-ipratropium  3 mL Nebulization Q6H WAKE    lidocaine (PF) 10 mg/ml (1%)  1 mL Other Once    piperacillin-tazobactam (ZOSYN) IVPB  4.5 g Intravenous Q8H    sodium chloride 0.9%  3 mL Intravenous Q8H    tamsulosin  0.4 mg Oral Daily     PRN Meds:sodium chloride, sodium chloride, magnesium sulfate IVPB, magnesium sulfate IVPB, ondansetron, potassium chloride in water, potassium chloride in water, potassium chloride in water, sodium chloride 0.9%, sodium phosphate IVPB, sodium phosphate IVPB, sodium phosphate IVPB     Review of patient's allergies indicates:  No Known Allergies    Objective:     Vital Signs (Most Recent):  Temp: 98.4 °F (36.9 °C) (09/16/18 0700)  Pulse: 91 (09/16/18 0830)  Resp: (!) 28 (09/16/18 0702)  BP: (!) 118/94 (09/15/18 1845)  SpO2: 100 % (09/16/18 0830) Vital Signs (24h Range):  Temp:  [93.7 °F (34.3 °C)-99.4 °F (37.4 °C)] 98.4 °F (36.9 °C)  Pulse:  [] 91  Resp:  [28] 28  SpO2:  [73 %-100 %] 100 %  BP: ()/(69-94) 118/94  Arterial Line BP: ()/() 120/86     Weight: 70.2 kg (154 lb 12.2 oz)  Body mass index is 24.98 kg/m².    Intake/Output - Last 3 Shifts       09/14 0700 - 09/15 0659 09/15 0700 - 09/16 0659 09/16 0700 - 09/17 0659    P.O.       I.V. (mL/kg) 1312 (18.7) 5819.5 (82.9)     Blood  3028     NG/GT  150     IV Piggyback 4000 1000     Total Intake(mL/kg) 5312 (75.7)  9997.5 (142.4)     Urine (mL/kg/hr) 905 (0.5) 945 (0.6) 185 (1.5)    Drains  50     Other  800     Stool 400 50     Total Output 1305 1845 185    Net +4007 +8152.5 -185               Physical Exam    Gen: NAD, intubated and sedated, open eyes and turns head to voice but not following commands  HEENT: NCAT, ETT in place, NGT in place  Pulm: Coarse BS bilaterally  Vent Mode: A/C  Oxygen Concentration (%):  [50-60] 50  Resp Rate Total:  [28 br/min-36 br/min] 28 br/min  Vt Set:  [350 mL] 350 mL  PEEP/CPAP:  [10 idC50-36 cmH20] 10 cmH20  Mean Airway Pressure:  [14 xkY29-15 cmH20] 14 cmH20  Abd: Soft, non distended, benign exam. Ostomy with blood tinged sweat and gas, mucosa appears purple but viable, PEG with minimal serosang output, incision dressing C/D/I  +Flank edema.  Extremities: no cyanosis or edema, or clubbing  Skin: Skin color, texture, turgor normal. No rashes or lesions    Significant Labs:  CBC:   Recent Labs   Lab  09/16/18   0751   WBC  13.40*   RBC  2.98*   HGB  8.8*   HCT  24.5*   PLT  158   MCV  82   MCH  29.5   MCHC  35.9     CMP:   Recent Labs   Lab  09/16/18   0300   GLU  84   CALCIUM  7.7*   ALBUMIN  2.1*   PROT  3.7*   NA  140   K  4.3   CO2  20*   CL  114*   BUN  14   CREATININE  0.9   ALKPHOS  81   ALT  17   AST  22   BILITOT  3.0*     Significant Diagnostics:  I have reviewed and interpreted all pertinent imaging results/findings within the past 24 hours.

## 2018-09-16 NOTE — PROGRESS NOTES
[facility]  Critical Care - Surgery  Progress Note    Patient Name: Amari Saez  MRN: [id number]  Admission Date: 9/12/2018  Hospital Length of Stay: 4 days  Code Status: Full Code  Attending Provider: Judd Agudelo MD  Primary Care Provider: Chaim Valdovinos MD   Principal Problem: Perforated diverticulum of large intestine    Subjective:     Hospital/ICU Course:  9/14: Transferred from floor to SICU for higher level of care.     Interval History/Significant Events:   Pt stepped up to SICU 9/14 following code on floor with chest compressions and epi x2 prior to attaining ROSC. HDS, on Levo gtt and 5L crystalloid and 500 mL colloid overnight. Vaso gtt added this AM per primary. Lactate trending down. On vent, weaning as tolerated. No other acute changes.     Hgb 9/15 went from 7.8 to 4.3, then 3.9 on repeat.  Pt was taken emergently to OR for ex lap. Cause of bleeding was found to be ulceration onto vessel of the remnant stomach. He returned to ICU in stable condition after source of bleeding was controlled with hemostatic clip. He was given 3u pRBCs, 2U FFP prior to/ in OR. TEG blood test after surgery indicated need for cryo and plts, gave 1 unit of each. Patient doing well this AM. Intubated and off pressors.     Follow-up For: Procedure(s) (LRB):  LAPAROTOMY,EXPLORATORY (N/A)  COLECTOMY,SIGMOID  COLOSTOMY  9/12/18    Return to OR for Ex-Lap  9/16/18      Objective:     Vital Signs (Most Recent):  Temp: 98.6 °F (37 °C) (09/16/18 0600)  Pulse: 99 (09/16/18 0600)  Resp: (!) 28 (09/15/18 1302)  BP: (!) 118/94 (09/15/18 1845)  SpO2: 99 % (09/16/18 0600) Vital Signs (24h Range):  Temp:  [93.7 °F (34.3 °C)-99.4 °F (37.4 °C)] 98.6 °F (37 °C)  Pulse:  [] 99  Resp:  [28] 28  SpO2:  [73 %-100 %] 99 %  BP: ()/(69-94) 118/94  Arterial Line BP: ()/() 118/81     Weight: 70.2 kg (154 lb 12.2 oz)  Body mass index is 24.98 kg/m².      Intake/Output Summary (Last 24 hours) at 9/16/2018  0659  Last data filed at 9/16/2018 0600  Gross per 24 hour   Intake 9997.49 ml   Output 1845 ml   Net 8152.49 ml       Physical Exam   Constitutional: He appears well-developed and well-nourished.   HENT:   Head: Normocephalic and atraumatic.   Eyes:   Pupils not equal but are reactive to light   Cardiovascular: Normal rate and regular rhythm.   No murmur heard.  Pulmonary/Chest: He has rales.   intubated   Abdominal: Soft. Bowel sounds are normal.   Neurological:   Sedated   Skin: Skin is warm and dry.       Vents:  Vent Mode: A/C (09/16/18 0522)  Ventilator Initiated: Yes (09/14/18 2114)  Set Rate: 28 bmp (09/16/18 0522)  Vt Set: 350 mL (09/16/18 0522)  PEEP/CPAP: 10 cmH20 (09/16/18 0522)  Oxygen Concentration (%): 50 (09/16/18 0600)  Peak Airway Pressure: 21 cmH2O (09/16/18 0522)  Plateau Pressure: 0 cmH20 (09/16/18 0522)  Total Ve: 10 mL (09/16/18 0522)    Lines/Drains/Airways     Central Venous Catheter Line                 Percutaneous Central Line Insertion/Assessment - quad lumen  09/14/18 1951 right femoral 1 day          Drain                 Colostomy 09/12/18 LLQ 4 days         NG/OG Tube 09/14/18 2038 [place] sump Left nostril 1 day         Urethral Catheter 09/1955 Latex 16 Fr. 1 day         Gastrostomy/Enterostomy 09/15/18 1621 Gastrostomy tube w/o balloon LUQ decompression less than 1 day          Airway                 Airway - Non-Surgical 09/14/18 1915 Other (Comment) 1 day          Arterial Line                 Arterial Line 09/14/18 1953 Left Radial 1 day          Pressure Ulcer                 Pressure Injury 09/13/18 1534 lower Coccyx Stage 3 2 days          Peripheral Intravenous Line                 Midline Catheter Insertion/Assessment  - Single Lumen 09/13/18 1748 Right brachial vein 20g x 10cm 2 days         Peripheral IV - Single Lumen 09/13/18 1858 Anterior;Right Forearm 2 days                Significant Labs:    CBC/Anemia Profile:  Recent Labs   Lab  09/15/18   1856  09/15/18    1908  09/15/18   2345  09/16/18   0300   WBC  8.26   --   13.93*  13.47*   HGB  9.2*   --   8.6*  8.8*   HCT  25.8*  24*  24.7*  25.0*   PLT  128*   --   170  170   MCV  84   --   86  85   RDW  15.6*   --   15.6*  15.8*        Chemistries:  Recent Labs   Lab  09/15/18   0300   09/15/18   1856  09/15/18   2345  09/16/18   0300   NA  141   < >  142  139  140   K  4.2   < >  3.6  4.5  4.3   CL  115*   < >  114*  113*  114*   CO2  19*   < >  20*  20*  20*   BUN  13   < >  13  14  14   CREATININE  0.8   < >  0.8  0.8  0.9   CALCIUM  6.6*   < >  7.2*  8.4*  7.7*   ALBUMIN  1.7*   --   2.0*   --   2.1*   PROT  3.3*   --   3.2*   --   3.7*   BILITOT  1.0   --   3.1*   --   3.0*   ALKPHOS  103   --   82   --   81   ALT  22   --   14   --   17   AST  30   --   24   --   22   MG  1.8   < >  1.8  2.3  2.2   PHOS  2.8   < >  3.2  2.8  2.8    < > = values in this interval not displayed.       Recent Lab Results       09/16/18  0614 09/16/18  0526 09/16/18  0522 09/16/18  0300 09/15/18  2358      Immature Granulocytes    0.7      Immature Grans (Abs)    0.09  Comment:  Mild elevation in immature granulocytes is non specific and   can be seen in a variety of conditions including stress response,   acute inflammation, trauma and pregnancy. Correlation with other   laboratory and clinical findings is essential.        Unit Blood Type Code          Unit Expiration          Unit Blood Type          Albumin    2.1      Alkaline Phosphatase    81      Allens Test   N/A  N/A     ALT    17      Anion Gap    6      Aniso          AST    22      Baso #    0.02      Basophil%    0.1      Total Bilirubin    3.0  Comment:  For infants and newborns, interpretation of results should be based  on gestational age, weight and in agreement with clinical  observations.  Premature Infant recommended reference ranges:  Up to 24 hours.............<8.0 mg/dL  Up to 48 hours............<12.0 mg/dL  3-5 days..................<15.0 mg/dL  6-29  days.................<15.0 mg/dL        Site   Joseline/UAC  Joseline/UAC     BUN, Bld    14      Calcium    7.7      Calcium, Ion    1.10      Chloride    114      CO2    20      CODING SYSTEM          Creatinine    0.9      DelSys   Adult Vent  Adult Vent     Differential Method    Automated      DISPENSE STATUS          eGFR if     >60.0      eGFR if non     >60.0  Comment:  Calculation used to obtain the estimated glomerular filtration  rate (eGFR) is the CKD-EPI equation.         Eos #    0.0      Eosinophil%    0.1      Fibrinogen    225      FiO2   50  50     Glucose    84      Gran # (ANC)    12.5      Gran%    93.1      Group & Rh          Hematocrit    25.0      Hemoglobin    8.8      Hypo          INDIRECT BRIDGET          Coumadin Monitoring INR    1.2  Comment:  Coumadin Therapy:  2.0 - 3.0 for INR for all indicators except mechanical heart valves  and antiphospholipid syndromes which should use 2.5 - 3.5.        Lactate, Scott    1.5  Comment:  Falsely low lactic acid results can be found in samples   containing >=13.0 mg/dL total bilirubin and/or >=3.5 mg/dL   direct bilirubin.        Lymph #    0.6      Lymph%    4.2      Magnesium    2.2      MCH    29.7      MCHC    35.2      MCV    85      Mode   AC/PRVC  AC/PRVC     Mono #    0.2      Mono%    1.8      MPV    10.6      nRBC    0      Ovalocytes          PEEP   10  15     Phosphorus    2.8      PiP          Platelet Estimate          Platelets    170      POC BE   -4  -2     POC HCO3   21.2  22.8     POC Hematocrit          POC Ionized Calcium          POC Lactate          POC PCO2   34.9  38.3     POC PH   7.392  7.383     POC PO2   75  138     POC Potassium          POC SATURATED O2   95  99     POC Sodium          POC TCO2   22  24     POCT Glucose 119 68        Poik          Poly          Potassium    4.3      PRODUCT CODE          Total Protein    3.7      Protime    12.4      Rate   28  28     RBC    2.96      RDW     15.8      Sample   ARTERIAL  ARTERIAL     Sodium    140      Target Cells          UNIT NUMBER          Vt   350  350     WBC    13.47                  09/15/18  2347 09/15/18  2345 09/15/18  2009 09/15/18  1908 09/15/18  1858      Immature Granulocytes  0.8        Immature Grans (Abs)  0.11  Comment:  Mild elevation in immature granulocytes is non specific and   can be seen in a variety of conditions including stress response,   acute inflammation, trauma and pregnancy. Correlation with other   laboratory and clinical findings is essential.          Unit Blood Type Code          Unit Expiration          Unit Blood Type          Albumin          Alkaline Phosphatase          Allens Test    N/A      ALT          Anion Gap  6        Aniso          AST          Baso #  0.02        Basophil%  0.1        Total Bilirubin          Site    Joseline/UAC      BUN, Bld  14        Calcium  8.4        Calcium, Ion          Chloride  113        CO2  20        CODING SYSTEM          Creatinine  0.8        [employer]    Adult Vent      Differential Method  Automated        DISPENSE STATUS          eGFR if   >60.0        eGFR if non   >60.0  Comment:  Calculation used to obtain the estimated glomerular filtration  rate (eGFR) is the CKD-EPI equation.           Eos #  0.0        Eosinophil%  0.1        Fibrinogen  206        FiO2    50      Glucose  97        Gran # (ANC)  12.9        Gran%  92.4        Group & Rh          Hematocrit  24.7        Hemoglobin  8.6        Hypo          INDIRECT BRIDGET          Coumadin Monitoring INR  1.2  Comment:  Coumadin Therapy:  2.0 - 3.0 for INR for all indicators except mechanical heart valves  and antiphospholipid syndromes which should use 2.5 - 3.5.          Lactate, Scott  1.3  Comment:  Falsely low lactic acid results can be found in samples   containing >=13.0 mg/dL total bilirubin and/or >=3.5 mg/dL   direct bilirubin.          Lymph #  0.7        Lymph%  4.8         Magnesium  2.3        MCH  29.9        MCHC  34.8        MCV  86        Mode    AC/PRVC      Mono #  0.3        Mono%  1.8        MPV  10.9        nRBC  0        Ovalocytes          PEEP    15      Phosphorus  2.8        PiP          Platelet Estimate          Platelets  170        POC BE    -4      POC HCO3    21.8      POC Hematocrit    24      POC Ionized Calcium    1.09      POC Lactate          POC PCO2    40.2      POC PH    7.342      POC PO2    81      POC Potassium    3.5      POC SATURATED O2    95      POC Sodium    144      POC TCO2    23      POCT Glucose 83  84  94     Poik          Poly          Potassium  4.5        PRODUCT CODE          Total Protein          Protime  12.4        Rate    28      RBC  2.88        RDW  15.6        Sample    ARTERIAL      Sodium  139        Target Cells          UNIT NUMBER          Vt    350      WBC  13.93                    09/15/18  1856 09/15/18  1342 09/15/18  1335 09/15/18  1323 09/15/18  1317      Immature Granulocytes 1.1         Immature Grans (Abs) 0.09  Comment:  Mild elevation in immature granulocytes is non specific and   can be seen in a variety of conditions including stress response,   acute inflammation, trauma and pregnancy. Correlation with other   laboratory and clinical findings is essential.           Unit Blood Type Code    5100 9500         9500 5100         9500[P] 9500         5100[P] 9500         5100[P] 9500         5100[P]          5100[P]          5100[P]          9500          5100[P]          5100[P]          5100[P]          5100[P]      Unit Expiration    [id number]         [id number]         [id number][P] [id number]         [id number][P] 504421362765         [id number][P] [id number]         [id number][P]          861280610141[P]          720425335373[P]          [id number]          200639125846[P]          018139781934[P]          821161779697[P]          685907352866[P]      Unit Blood  Type    O POS O NEG         O NEG O POS         O NEG[P] O NEG         O POS[P] O NEG         O POS[P] O NEG         O POS[P]          O POS[P]          O POS[P]          O NEG          O POS[P]          O POS[P]          O POS[P]          O POS[P]      Albumin 2.0         Alkaline Phosphatase 82         Allens Test  N/A        ALT 14         Anion Gap 8         Aniso          AST 24         Baso # 0.01         Basophil% 0.1         Total Bilirubin 3.1  Comment:  For infants and newborns, interpretation of results should be based  on gestational age, weight and in agreement with clinical  observations.  Premature Infant recommended reference ranges:  Up to 24 hours.............<8.0 mg/dL  Up to 48 hours............<12.0 mg/dL  3-5 days..................<15.0 mg/dL  6-29 days.................<15.0 mg/dL           Site  [facility]        BUN, Bld 13         Calcium 7.2         Calcium, Ion          Chloride 114         CO2 20         CODING SYSTEM    GQZO029 DUDJ835         EGPT659 SVDM590         NIGJ066[P] SDBR667         VVDD146[P] ERSW789         KKHO726[P] LEGT597         ZYAP713[P]          ZYMW611[P]          GZCT599[P]          YCFP697          FZQZ552[P]          XORV924[P]          CFQU732[P]          YXNM932[P]      Creatinine 0.8         [facility]  Adult Vent        Differential Method Automated         DISPENSE STATUS    TRANSFUSED TRANSFUSED         TRANSFUSED TRANSFUSED         CROSSMATCHED[P] TRANSFUSED         CROSSMATCHED[P] TRANSFUSED         CROSSMATCHED[P] TRANSFUSED         CROSSMATCHED[P]          CROSSMATCHED[P]          CROSSMATCHED[P]          TRANSFUSED          CROSSMATCHED[P]          CROSSMATCHED[P]          CROSSMATCHED[P]          CROSSMATCHED[P]      eGFR if  >60.0         eGFR if non  >60.0  Comment:  Calculation used to obtain the estimated glomerular filtration  rate (eGFR) is the CKD-EPI equation.            Eos # 0.0         Eosinophil% 0.0          Fibrinogen 101  88  Comment:  FIBRINOGEN critical result(s) called and verbal readback obtained   from WHIT DONALD RN, 09/15/2018 14:31         FiO2  50        Glucose 97         Gran # (ANC) 7.4         Gran% 89.6         Group & Rh    O POS      Hematocrit 25.8         Hemoglobin 9.2         Hypo          INDIRECT BRIDGET    NEG      Coumadin Monitoring INR 1.4  Comment:  Coumadin Therapy:  2.0 - 3.0 for INR for all indicators except mechanical heart valves  and antiphospholipid syndromes which should use 2.5 - 3.5.    1.9  Comment:  Coumadin Therapy:  2.0 - 3.0 for INR for all indicators except mechanical heart valves  and antiphospholipid syndromes which should use 2.5 - 3.5.         Lactate, Scott 1.6  Comment:  Falsely low lactic acid results can be found in samples   containing >=13.0 mg/dL total bilirubin and/or >=3.5 mg/dL   direct bilirubin.           Lymph # 0.6         Lymph% 7.3         Magnesium 1.8         MCH 30.0         MCHC 35.7         MCV 84         Mode  AC/PRVC        Mono # 0.2         Mono% 1.9         MPV 11.0         nRBC 0         Ovalocytes          PEEP  15        Phosphorus 3.2         PiP  27        Platelet Estimate Decreased         Platelets 128         POC BE  -1        POC HCO3  22.5        POC Hematocrit          POC Ionized Calcium          POC Lactate  2.64        POC PCO2  30.6        POC PH  7.475        POC PO2  191        POC Potassium          POC SATURATED O2  100        POC Sodium          POC TCO2  23        POCT Glucose          Poik          Poly          Potassium 3.6         PRODUCT CODE    O7712I52 G5109F44         F7056O87 O8126V05         R3641L81[P] A2052O30         B4901I04[P] U5123Q02         R1147E81[P] Z7691P71         N6473X92[P]          V9378F84[P]          E0557X18[P]          L5546R76          G2961H21[P]          Y9773H33[P]          O1154N19[P]          S8703O30[P]      Total Protein 3.2         Protime 14.3  19.0       Rate  28        RBC 3.07          RDW 15.6         Sample  ARTERIAL        Sodium 142         Target Cells          UNIT NUMBER    [id number]         [id number]         [id number][P] [id number]         [id number][P] [id number]         [id number][P] [id number]         [id number][P]          [id number][P]          [id number][P]          [id number]          [id number][P]          [id number][P]          [id number][P]          [id number][P]      Vt  350        WBC 8.26                     09/15/18  1302 09/15/18  1245 09/15/18  1208 09/15/18  1206 09/15/18  1123      Immature Granulocytes  0.7  0.7      Immature Grans (Abs)  0.06  Comment:  Mild elevation in immature granulocytes is non specific and   can be seen in a variety of conditions including stress response,   acute inflammation, trauma and pregnancy. Correlation with other   laboratory and clinical findings is essential.    0.07  Comment:  Mild elevation in immature granulocytes is non specific and   can be seen in a variety of conditions including stress response,   acute inflammation, trauma and pregnancy. Correlation with other   laboratory and clinical findings is essential.        Unit Blood Type Code          Unit Expiration          Unit Blood Type          Albumin          Alkaline Phosphatase          Allens Test N/A    N/A     ALT          Anion Gap    5      Aniso  Slight  Slight      AST          Baso #  0.00  0.00      Basophil%  0.0  0.0      Total Bilirubin          Site Joseline/UAC    Joseline/UAC     BUN, Bld    14      Calcium    7.3      Calcium, Ion    1.13      Chloride    114      CO2    21      CODING SYSTEM          Creatinine    0.8      [employer] Adult Vent    Adult Vent     Differential Method  Automated  Automated      DISPENSE STATUS          eGFR if     >60.0      eGFR if non     >60.0  Comment:  Calculation used to obtain the estimated glomerular filtration  rate  (eGFR) is the CKD-EPI equation.         Eos #  0.0  0.0      Eosinophil%  0.1  0.1      Fibrinogen          FiO2 60    60     Glucose    97      Gran # (ANC)  8.2  8.4      Gran%  89.0  89.3      Group & Rh          Hematocrit  11.5  Comment:  Hgb & Hct critical result(s) called and verbal readback obtained   from Glenn Simpson RN, 09/15/2018 13:15    11.8  Comment:  Hgb & Hct critical result(s) called and verbal readback obtained   from Nurse Breanna, 09/15/2018 12:42  Hgb & Hct critical result(s) called and verbal readback obtained   from   Nurse Breanna, 09/15/2018 12:41        Hemoglobin  3.9  Comment:  Hgb & Hct critical result(s) called and verbal readback obtained   from Glenn Simpson RN, 09/15/2018 13:15    4.3  Comment:  Hgb & Hct critical result(s) called and verbal readback obtained   from Nurse Breanna, 09/15/2018 12:42  Hgb & Hct critical result(s) called and verbal readback obtained   from   Nurse Breanna, 09/15/2018 12:41        Hypo  Moderate  Moderate      INDIRECT BRIDGET          Coumadin Monitoring INR          Lactate, Scott    1.8  Comment:  Falsely low lactic acid results can be found in samples   containing >=13.0 mg/dL total bilirubin and/or >=3.5 mg/dL   direct bilirubin.        Lymph #  0.7  0.7      Lymph%  7.8  7.6      Magnesium    1.9      MCH  28.5  29.9      MCHC  33.9  36.4      MCV  84  82      Mode AC/PRVC    AC/PRVC     Mono #  0.2  0.2      Mono%  2.4  2.3      MPV  10.4  10.3      nRBC  0  0      Ovalocytes  Occasional        PEEP 15    15     Phosphorus    2.6      PiP 24    28     Platelet Estimate          Platelets  68  69      POC BE 1    0     POC HCO3 24.7    24.0     POC Hematocrit          POC Ionized Calcium          POC Lactate          POC PCO2 32.9    35.1     POC PH 7.483    7.444     POC PO2 228    205     POC Potassium          POC SATURATED O2 100    100     POC Sodium          POC TCO2 26    25     POCT Glucose   93       Poik  Moderate   Moderate      Poly  Occasional  Occasional      Potassium    3.9      PRODUCT CODE          Total Protein          Protime          Rate 28    28     RBC  1.37  1.44      RDW  17.8  17.9      Sample ARTERIAL    ARTERIAL     Sodium    140      Target Cells  Moderate  Moderate      UNIT NUMBER          Vt 350    350     WBC  9.21  9.38                  09/15/18  0751 09/15/18  0739      Immature Granulocytes       Immature Grans (Abs)       Unit Blood Type Code       Unit Expiration       Unit Blood Type       Albumin       Alkaline Phosphatase       Allens Test       ALT       Anion Gap       Aniso       AST       Baso #       Basophil%       Total Bilirubin       Site       BUN, Bld       Calcium       Calcium, Ion       Chloride       CO2       CODING SYSTEM       Creatinine       DelSys       Differential Method       DISPENSE STATUS       eGFR if        eGFR if non        Eos #       Eosinophil%       Fibrinogen       FiO2       Glucose       Gran # (ANC)       Gran%       Group & Rh       Hematocrit       Hemoglobin       Hypo       INDIRECT BRIDGET       Coumadin Monitoring INR       Lactate, Scott 1.0  Comment:  Falsely low lactic acid results can be found in samples   containing >=13.0 mg/dL total bilirubin and/or >=3.5 mg/dL   direct bilirubin.        Lymph #       Lymph%       Magnesium       MCH       MCHC       MCV       Mode       Mono #       Mono%       MPV       nRBC       Ovalocytes       PEEP       Phosphorus       PiP       Platelet Estimate       Platelets       POC BE       POC HCO3       POC Hematocrit       POC Ionized Calcium       POC Lactate       POC PCO2       POC PH       POC PO2       POC Potassium       POC SATURATED O2       POC Sodium       POC TCO2       POCT Glucose  95     Poik       Poly       Potassium       PRODUCT CODE       Total Protein       Protime       Rate       RBC       RDW       Sample       Sodium       Target Cells       UNIT NUMBER        Vt       WBC             Significant Imaging:  I have reviewed all pertinent imaging results/findings within the past 24 hours.    Assessment/Plan:     Acute respiratory distress    Neuro:  Sedation: Propofol gtt  Pain: Percocet PRN     Pulm:  - intubated  -Vent Mode: A/C  Oxygen Concentration (%):  [50-60] 50  Resp Rate Total:  [28 br/min-36 br/min] 28 br/min  Vt Set:  [350 mL] 350 mL  PEEP/CPAP:  [10 qhV54-93 cmH20] 10 cmH20  Mean Airway Pressure:  [14 izF86-16 cmH20] 14 cmH20   - ABG 7.39/34.9/75/21.2 BE -4  - ABG PRN  - daily CXRs     CV:  - HDS, off pressors    - 9/15: 3UpRBCs, 2 U FFP prior to OR  - 9/15: 1 U plts, 1 U cryo based on TEG study    GI/FEN/Lytes:  - NPO  - LR @ 125     Renal:  - BUN/Cr at 14/0.9  - monitor UOP     Heme:  - H/H stable at 8.8/25(8.6/24.7)  - CBC, INR, fibrinogen q 4    ID:  - WBC at 13.47(13.93)  - peritoneal cx growing Bacteroides and E. Coli  - Zosyn q8 (day 4) treating e.coli from peritoneal fluid 9/12  - lactate currently at 1.5 (1.3)  - Repeat Blood/Urine cx NGTD     Endo:  - SSI    PPX:   - Lovenox, protonix gtt    Dispo:   - Cont SICU care             Critical care was time spent personally by me on the following activities: development of treatment plan with patient or surrogate and bedside caregivers, discussions with consultants, evaluation of patient's response to treatment, examination of patient, ordering and performing treatments and interventions, ordering and review of laboratory studies, ordering and review of radiographic studies, pulse oximetry, re-evaluation of patient's condition.  This critical care time did not overlap with that of any other provider or involve time for any procedures.     Antonieta Grajeda MD  Critical Care - Surgery  [facility]

## 2018-09-16 NOTE — ASSESSMENT & PLAN NOTE
Neuro:  Sedation: Propofol gtt  Pain: Percocet PRN     Pulm:  - intubated  -Vent Mode: A/C  Oxygen Concentration (%):  [50-60] 50  Resp Rate Total:  [28 br/min-36 br/min] 28 br/min  Vt Set:  [350 mL] 350 mL  PEEP/CPAP:  [10 boM45-65 cmH20] 10 cmH20  Mean Airway Pressure:  [14 pjZ65-56 cmH20] 14 cmH20   - ABG 7.39/34.9/75/21.2 BE -4  - ABG PRN  - daily CXRs     CV:  - HDS, off pressors    - 9/15: 3UpRBCs, 2 U FFP prior to OR  - 9/15: 1 U plts, 1 U cryo based on TEG study    GI/FEN/Lytes:  - NPO  - LR @ 125     Renal:  - BUN/Cr at 14/0.9  - monitor UOP     Heme:  - H/H stable at 8.8/25(8.6/24.7)  - CBC, INR, fibrinogen q 4    ID:  - WBC at 13.47(13.93)  - peritoneal cx growing Bacteroides and E. Coli  - Zosyn q8 (day 4) treating e.coli from peritoneal fluid 9/12  - lactate currently at 1.5 (1.3)  - Repeat Blood/Urine cx NGTD     Endo:  - SSI    PPX:   - Lovenox, protonix gtt    Dispo:   - Cont SICU care

## 2018-09-16 NOTE — PROGRESS NOTES
Dr. Grajeda notified of decreased UOP over the past two hours and all VSS at this time. Systolic BP of 128, MAP of 113, CVP of 15, and SVV of 8. Orders received to give cryo and platelets and then reassess UOP.

## 2018-09-16 NOTE — ASSESSMENT & PLAN NOTE
62 year old man with perforated diverticulitis and free air s/p Tash's procedure 9/12/18     Neuro:  Sedation: Propofol gtt  Patient opens eyes, not tracking this am but turning head to voice, consider head CT if does not improve today     CV:  - HDS; off levo and vaso gtt  - EKG post code with sinus tachycardia, no troponin bump  - A line and Central line 9/15- will need femoral line exchanged for IJ central line today now that coagulopathy has resolved  - continue flotrac, SVV improved    Pulm:  - intubated in severe ARDS after likely aspiration with respiratory code  - wean vent as tolerated per ARDSnet protocol  - ABG q6  - CXR showing bilateral diffuse infiltrates. Daily while intubated.     GI/FEN/Lytes:  - NPO  - continue NG  - Patient has PEG in remnant stomach- cont to gravity, flushing q6 hours, if blood from PEG concern for re-bleed of ulcer- monitor  - cont protonix gtt  - CMP, Mg, Phos qday  - replace lytes PRN per ICU protocol     Renal:  - monitor BUN/Cr (baseline 0.8)  - monitor UOP, may need more lasix today     Heme/ID:  - H/H stable since OR, will space labs to q6   - transfused 9/15 3U prbcs, 2 ffp, plt, cryo for acute GI bleed  - peritoneal cx growing Bacteroides and E. Coli  - Zosyn (day 3)  - lactate wnl  - f/u repeat Blood/Urine cx 9/15     Endo:  SSI     PPX: Lovenox    Dispo: continue SICU care, wean vent, monitor neuro status, remove and replace femoral central line

## 2018-09-16 NOTE — PROGRESS NOTES
Pt responds to voice, but does not follow commands; sedated on propofol  Intubated on the vent AC rate of 28, 50% FiO2, and 10 of PEEP; vent settings being weaned as tolerated  Mild tachycardia noted, systolic BP > 90, and MAP >60; no pressors needed this shift  UOP marginal throughout shift; 20 mg of Lasix given IVP with little response in UOP  CVP ranged from 10-13 throughout shift; SVV currently 8, and CI currently 2.2  Pt remains on LR gtt @ 125 and protonix gtt @ 8 mg/hr  1 of cryo and 1 of platelet given this shift following TEG lab test  Labs monitored Q4H; H&H currently stable, electrolytes replaced PRN  G-tube remains intact with minimal bloody output this shift; flushed Q6H  Pt turned Q2H; dressing applied to breakdown and pressure points; no further skin breakdown noted  POC reviewed and all questions and concerns addressed with family at bedside

## 2018-09-17 NOTE — PROGRESS NOTES
Dr. Hooper at bedside. Aware of HR in 115s, SBP in 150s. MD aware of all respiratory issues, including current SSpO2 reading in the 60s. Aware of all recent ABGsk. Aware of UOP, 20mg lasix IVP given per order. Pt anxious, pulling at lines. Decision made to reintubate pt. Anesthesia called. MD also made aware pt hypoglycemic. reciving D50 with all blood glucose checks. No orders received. Will continue to monitor.

## 2018-09-17 NOTE — PLAN OF CARE
Problem: Spiritual Distress, Risk/Actual (Adult,Obstetrics,Pediatric)  Intervention: Facilitate Personal Exploration/Expression of Spirituality  Provided initial visit. Pt present and non-verbal. Contact info left bedside for follow-up by family as needed.

## 2018-09-17 NOTE — SUBJECTIVE & OBJECTIVE
Interval History: No acute events overnight, afebrile, vital signs stable. Per nursing reports only opening eyes, not following commands. Vent able to be weaned to Fi O2 40% and PEEP5 overnight, maintaining sats. UOP adequate overnight. Trickle TFs running this morning.     Medications:  Continuous Infusions:   pantoprozole (PROTONIX) IV infusion 8 mg/hr (09/17/18 0800)     Scheduled Meds:   albuterol-ipratropium  3 mL Nebulization Q6H WAKE    enoxaparin  40 mg Subcutaneous Daily    sodium chloride 0.9%  3 mL Intravenous Q8H    tamsulosin  0.4 mg Oral Daily     PRN Meds:magnesium sulfate IVPB, magnesium sulfate IVPB, ondansetron, potassium chloride in water, potassium chloride in water, potassium chloride in water, sodium chloride 0.9%, sodium phosphate IVPB, sodium phosphate IVPB, sodium phosphate IVPB     Review of patient's allergies indicates:  No Known Allergies  Objective:     Vital Signs (Most Recent):  Temp: 96.6 °F (35.9 °C) (09/17/18 0745)  Pulse: 102 (09/17/18 0800)  Resp: (!) 24 (09/17/18 0700)  BP: (!) 118/94 (09/15/18 1845)  SpO2: 95 % (09/17/18 0800) Vital Signs (24h Range):  Temp:  [96.1 °F (35.6 °C)-98 °F (36.7 °C)] 96.6 °F (35.9 °C)  Pulse:  [] 102  Resp:  [24-28] 24  SpO2:  [95 %-100 %] 95 %  Arterial Line BP: ()/(63-97) 130/93     Weight: 70.2 kg (154 lb 12.2 oz)  Body mass index is 24.98 kg/m².    Intake/Output - Last 3 Shifts       09/15 0700 - 09/16 0659 09/16 0700 - 09/17 0659 09/17 0700 - 09/18 0659    I.V. (mL/kg) 5819.5 (82.9) 3923 (55.9)     Blood 3028      NG/ 410 0    IV Piggyback 1000      Total Intake(mL/kg) 9997.5 (142.4) 4333 (61.7) 0 (0)    Urine (mL/kg/hr) 945 (0.6) 1145 (0.7) 100 (0.6)    Drains 50 185     Other 800 975     Stool 50 75     Total Output 1845 2380 100    Net +8152.5 +1953 -100                 Physical Exam  Gen: NAD, intubated and sedated, open eyes and turns head to voice but not following commands  HEENT: NCAT, ETT in place, NGT in  place  Pulm: Coarse BS bilaterally  Vent Mode: A/C  Oxygen Concentration (%):  [40-50] 40  Resp Rate Total:  [24 br/min-29 br/min] 29 br/min  Vt Set:  [350 mL] 350 mL  PEEP/CPAP:  [5 cmH20-10 cmH20] 5 cmH20  Mean Airway Pressure:  [8.4 cfR22-63 cmH20] 8.5 cmH20  Abd: Soft, non distended, benign exam. Ostomy with blood tinged sweat and gas, mucosa appears dusky, PEG with minimal serosang output, incision dressing C/D/I  +Flank edema.  Extremities: no cyanosis or edema, or clubbing  Skin: Skin color, texture, turgor normal. No rashes or lesions      Significant Labs:  CBC:   Recent Labs   Lab  09/17/18   0300   WBC  15.39*   RBC  2.91*   HGB  8.5*   HCT  24.1*   PLT  118*   MCV  83   MCH  29.2   MCHC  35.3     CMP:   Recent Labs   Lab  09/17/18   0300   GLU  91   CALCIUM  7.9*   ALBUMIN  1.6*   PROT  3.4*   NA  139   K  4.1   CO2  21*   CL  113*   BUN  14   CREATININE  0.9   ALKPHOS  89   ALT  17   AST  18   BILITOT  1.3*       Significant Diagnostics:  I have reviewed and interpreted all pertinent imaging results/findings within the past 24 hours.

## 2018-09-17 NOTE — ASSESSMENT & PLAN NOTE
62 year old man with perforated diverticulitis and free air s/p Tash's procedure 9/12/18     Neuro:  Sedation: Propofol gtt, wean to off this morning for SBT   Patient continues to open eyes, but per nursing reports not following commands     CV:  - HDS; off levo and vaso gtt  - EKG post code with sinus tachycardia, no troponin bump  - A line 9/15 and central line IJ placed 9/16  - continue flotrac, SVV improved    Pulm:  - intubated in severe ARDS after likely aspiration with respiratory code  - wean vent as tolerated per ARDSnet protocol, current settings Fi O2 40%, PEEP5  - ABG q6  - CXR showing bilateral diffuse infiltrates, no significant change from 9/16. Daily while intubated.  - SBT today     GI/FEN/Lytes:  - NPO  - continue NG  - Patient has PEG in remnant stomach- cont to gravity, flushing q6 hours, if blood from PEG concern for re-bleed of ulcer- monitor  - cont protonix gtt  - CMP, Mg, Phos qday  - replace lytes PRN per ICU protocol  - holding TFs this morning while attempting SBT/possible extubation     Renal:  - monitor BUN/Cr (baseline 0.8)  - monitor UOP     Heme/ID:  - H/H stable since OR, will space labs to daily   - transfused 9/15 3U prbcs, 2 ffp, plt, cryo for acute GI bleed  - peritoneal cx growing Bacteroides and E. Coli  - Zosyn (day 4)  - lactate wnl  - f/u repeat Blood/Urine cx 9/15     Endo:  SSI     PPX: Lovenox    Dispo: continue SICU care, wean vent with possible extubation based on SBT, monitor neuro status

## 2018-09-17 NOTE — OP NOTE
DATE: 9/15/18.    PREOPERATIVE DIAGNOSIS:   1. Hemorrhagic shock.    POSTOPERATIVE DIAGNOSIS:   1. Hemorrhagic shock.  2. Bleeding gastric ulcer.     PROCEDURE:   1. Reopening of recent laparotomy.   2. Open assisted EGD via gastrotomy.  3. Open gastrostomy tube placement.    ATTENDING SURGEON: Jarod Pitt MD.    RESIDENT: Shabana Newton MD.    ANESTHESIA: General.    INDICATION:  Patient is a 62-year-old male admitted to the ICU following aspiration of any cardiac arrest.  He had a sudden drop in his hemoglobin with concerns for intra-abdominal hemorrhage and sepsis given recent operation.  His ostomy is also showing severe signs of ischemia, concerning for potential cause.  We are going to the operating room emergently for exploration.    PROCEDURE IN DETAIL:  Patient was taken to the operating room and placed supine.  After induction of general endotracheal tube anesthesia, his abdomen was prepped and draped in the standard fashion. Time-out was performed.  Staples were removed and the fascial stitches were cut gaining access into the abdomen. There was no immediate signs of intra-abdominal hemorrhage.  There was mild postoperative ascites present. Liver and spleen were intact without laceration or bleeding.  Colon was run proximally from the ostomy.  There was no signs of ischemia.  Small bowel was run proximally from the cecum to the JJ anastomosis and was normal. The gastric jejunal and biliary Familia Y limb were normal. There was no signs of gastric pouch distention.  The remnant stomach was significantly dilated with fluid.  Omentum was divided and the greater curve to mobilize the remnant stomach. There was no signs of retroperitoneal or pancreatic hemorrhage.  An anterior gastrotomy was made and a large volume of blood and hematoma was evacuated, confirming an acute GI bleed.  Gastroenterology was consulted intraoperatively.  We performed upper endoscopy with manual assistance through the open  abdomen to enter the stomach retrograde via the biliopancreatic limb. They identified a bleeding gastric ulcer in the lesser curve, but were unable to intervene given the angle.  A 12 mm trocar was then placed through the gastrotomy to allow access for the endoscope.  Dr. Rawls was then able to place a clip and epinephrine injections to the bleeding ulcer with good hemostasis. Please refer to the gastroenterology procedure note for more information on their procedure. Dual pursestring suture was placed around the gastrotomy and a bone.  Bumpered gastrostomy tube was placed within the stomach and secured with the pursestring sutures. Tube was passed through the left upper quadrant and was secured in place in the standard fashion. Abdomen was irrigated with saline until clear. Hemostasis as confirmed. Fascia was closed with running 0 looped PDS and skin was closed with staples. Patient was transported to the ICU in critical, but stable condition. All needle and sponge counts were correct at the conclusion of the case. I was present for the procedure in its entirety.    EBL: 500 mL.    FINDINGS: Large bleeding gastric ulcer controled with endoscopic clipping and epi injection.     COMPLICATIONS: None.

## 2018-09-17 NOTE — PROGRESS NOTES
Patient seen for follow up colostomy care/education. Patient in ICU, not ready for any additional lessons at this time. Colostomy pouch intact, Dark red stoma, scant serosanguinous drainage noted. additional pouch noted to lower midline incision, applied by nursing to collect drainage, small amount of serosanguinous drainage noted. No needs noted at this time. Will continue to follow patient's ostomy educational needs as needed.

## 2018-09-17 NOTE — PROGRESS NOTES
[facility]  General Surgery  Progress Note    Subjective:     History of Present Illness:  No notes on file    Post-Op Info:  Procedure(s) (LRB):  LAPAROTOMY,EXPLORATORY (N/A)  GASTROSTOMY (N/A)  EGD (ESOPHAGOGASTRODUODENOSCOPY) (N/A)   2 Days Post-Op     Interval History: No acute events overnight, afebrile, vital signs stable. Per nursing reports only opening eyes, not following commands. Vent able to be weaned to Fi O2 40% and PEEP5 overnight, maintaining sats. UOP adequate overnight. Trickle TFs running this morning.     Medications:  Continuous Infusions:   pantoprozole (PROTONIX) IV infusion 8 mg/hr (09/17/18 0800)     Scheduled Meds:   albuterol-ipratropium  3 mL Nebulization Q6H WAKE    enoxaparin  40 mg Subcutaneous Daily    sodium chloride 0.9%  3 mL Intravenous Q8H    tamsulosin  0.4 mg Oral Daily     PRN Meds:magnesium sulfate IVPB, magnesium sulfate IVPB, ondansetron, potassium chloride in water, potassium chloride in water, potassium chloride in water, sodium chloride 0.9%, sodium phosphate IVPB, sodium phosphate IVPB, sodium phosphate IVPB     Review of patient's allergies indicates:  No Known Allergies  Objective:     Vital Signs (Most Recent):  Temp: 96.6 °F (35.9 °C) (09/17/18 0745)  Pulse: 102 (09/17/18 0800)  Resp: (!) 24 (09/17/18 0700)  BP: (!) 118/94 (09/15/18 1845)  SpO2: 95 % (09/17/18 0800) Vital Signs (24h Range):  Temp:  [96.1 °F (35.6 °C)-98 °F (36.7 °C)] 96.6 °F (35.9 °C)  Pulse:  [] 102  Resp:  [24-28] 24  SpO2:  [95 %-100 %] 95 %  Arterial Line BP: ()/(63-97) 130/93     Weight: 70.2 kg (154 lb 12.2 oz)  Body mass index is 24.98 kg/m².    Intake/Output - Last 3 Shifts       09/15 0700 - 09/16 0659 09/16 0700 - 09/17 0659 09/17 0700 - 09/18 0659    I.V. (mL/kg) 5819.5 (82.9) 3923 (55.9)     Blood 3028      NG/ 410 0    IV Piggyback 1000      Total Intake(mL/kg) 9997.5 (142.4) 4333 (61.7) 0 (0)    Urine (mL/kg/hr) 945 (0.6) 1145 (0.7) 100 (0.6)     Drains 50 185     Other 800 975     Stool 50 75     Total Output 1845 2380 100    Net +8152.5 +1953 -100                 Physical Exam  Gen: NAD, intubated and sedated, open eyes and turns head to voice but not following commands  HEENT: NCAT, ETT in place, NGT in place  Pulm: Coarse BS bilaterally  Vent Mode: A/C  Oxygen Concentration (%):  [40-50] 40  Resp Rate Total:  [24 br/min-29 br/min] 29 br/min  Vt Set:  [350 mL] 350 mL  PEEP/CPAP:  [5 cmH20-10 cmH20] 5 cmH20  Mean Airway Pressure:  [8.4 isS56-33 cmH20] 8.5 cmH20  Abd: Soft, non distended, benign exam. Ostomy with blood tinged sweat and gas, mucosa appears dusky, PEG with minimal serosang output, incision dressing C/D/I  +Flank edema.  Extremities: no cyanosis or edema, or clubbing  Skin: Skin color, texture, turgor normal. No rashes or lesions      Significant Labs:  CBC:   Recent Labs   Lab  09/17/18   0300   WBC  15.39*   RBC  2.91*   HGB  8.5*   HCT  24.1*   PLT  118*   MCV  83   MCH  29.2   MCHC  35.3     CMP:   Recent Labs   Lab  09/17/18   0300   GLU  91   CALCIUM  7.9*   ALBUMIN  1.6*   PROT  3.4*   NA  139   K  4.1   CO2  21*   CL  113*   BUN  14   CREATININE  0.9   ALKPHOS  89   ALT  17   AST  18   BILITOT  1.3*       Significant Diagnostics:  I have reviewed and interpreted all pertinent imaging results/findings within the past 24 hours.    Assessment/Plan:     * Perforated diverticulum of large intestine    62 year old man with perforated diverticulitis and free air s/p Tash's procedure 9/12/18     Neuro:  Sedation: Propofol gtt, wean to off this morning for SBT   Patient continues to open eyes, but per nursing reports not following commands     CV:  - HDS; off levo and vaso gtt  - EKG post code with sinus tachycardia, no troponin bump  - A line 9/15 and central line IJ placed 9/16  - continue flotrac, SVV improved    Pulm:  - intubated in severe ARDS after likely aspiration with respiratory code  - wean vent as tolerated per ARDSnet  protocol, current settings Fi O2 40%, PEEP5  - ABG q6  - CXR showing bilateral diffuse infiltrates, no significant change from 9/16. Daily while intubated.  - SBT today     GI/FEN/Lytes:  - NPO  - continue NG  - Patient has PEG in remnant stomach- cont to gravity, flushing q6 hours, if blood from PEG concern for re-bleed of ulcer- monitor  - cont protonix gtt  - CMP, Mg, Phos qday  - replace lytes PRN per ICU protocol  - holding TFs this morning while attempting SBT/possible extubation     Renal:  - monitor BUN/Cr (baseline 0.8)  - monitor UOP     Heme/ID:  - H/H stable since OR, will space labs to daily   - transfused 9/15 3U prbcs, 2 ffp, plt, cryo for acute GI bleed  - peritoneal cx growing Bacteroides and E. Coli  - Zosyn (day 4)  - lactate wnl  - f/u repeat Blood/Urine cx 9/15     Endo:  SSI     PPX: Lovenox    Dispo: continue SICU care, wean vent with possible extubation based on SBT, monitor neuro status            Maureen Estrella MD  General Surgery  [facility]

## 2018-09-17 NOTE — PLAN OF CARE
No acute events throughout shift  SBP > 90  MAP > 60  Accu cks q 4 hr  H&H stable; daily labs now  Pt opens eyes; follows commands/moves all extremities  Vent settings: A/C 40% FiO2 & 10 PEEP w/ RR of 28; O2 sats 97% & above  Gtts: protonix @ 8 mg/hr; propofol @ 40 mcg/kg/min; LR @ 125 cc/hr  Rt fem quad lumen D/C'd & Rt IJ TLC placed per MD  TF initiated @ 10 cc/hr = goal; pt tolerating well w/ no residuals   Adequate urine output; 30-60 cc/hr  Minimal sanguineous output from G tube & colostomy; see flow sheet for further details   K & Mg replaced  Plan of care reviewed w/ pt & pt's brothers  Plan to extubate in AM  All questions & concerns addressed per pt's brothers  All VS currently stable; CVPs: 6, 6, & 7 mmHg  Will continue to monitor closely

## 2018-09-17 NOTE — PROGRESS NOTES
Surgical ICU team notified that pt tachycardiac and hypertensive (. ) awaiting orders. Will continue to monitor pt closely

## 2018-09-17 NOTE — ASSESSMENT & PLAN NOTE
Neuro:  Sedation: Propofol gtt. Currently paused for SBT this AM.   Pain: Percocet PRN     Pulm:  - intubated  -Vent Mode: A/C  Oxygen Concentration (%):  [40-50] 40  Resp Rate Total:  [24 br/min-29 br/min] 24 br/min  Vt Set:  [350 mL] 350 mL  PEEP/CPAP:  [5 cmH20-10 cmH20] 5 cmH20  Mean Airway Pressure:  [8.4 bwD62-14 cmH20] 8.4 cmH20  - ABG 7.504/31.5/149/24.8/2  - ABG PRN  - daily CXRs     CV:  - HDS, off pressors    - 9/15: 3UpRBCs, 2 U FFP prior to OR  - 9/15: 1 U plts, 1 U cryo based on TEG study    GI/FEN/Lytes:  - NPO  - LR @ 125. Discontinued this AM.   - TF at 10 mL/hr. Also d/c'd this AM.    - Replace lytes PRN      Renal:  - BUN/Cr at 14/0.9  - monitor UOP     Heme:  - H/H stable at 8.5/24.1  - CBC, INR, fibrinogen q 4    ID:  - WBC at 15.39  - peritoneal cx growing Bacteroides and E. Coli  - Zosyn q8 (day 4) treating e.coli from peritoneal fluid 9/12  - lactate currently at 1.4  - Repeat Blood/Urine cx NGTD     Endo:  - SSI    PPX:   - Lovenox, protonix gtt    Dispo:   - Cont SICU care

## 2018-09-17 NOTE — SUBJECTIVE & OBJECTIVE
Interval History/Significant Events:   NAEON. Pt remains intubated. Propofol gtt stopped this AM with plan for SBT and possible extubation. HDS, off pressors. UOP stable at 1.145L x24 hrs. No other acute issues.     Follow-up For: Procedure(s) (LRB):  LAPAROTOMY,EXPLORATORY (N/A)  GASTROSTOMY (N/A)  EGD (ESOPHAGOGASTRODUODENOSCOPY) (N/A)    Post-Operative Day: 2 Days Post-Op    Objective:     Vital Signs (Most Recent):  Temp: 96.4 °F (35.8 °C) (09/17/18 0535)  Pulse: 86 (09/17/18 0535)  Resp: (!) 28 (09/16/18 1941)  BP: (!) 118/94 (09/15/18 1845)  SpO2: 100 % (09/17/18 0535) Vital Signs (24h Range):  Temp:  [96.1 °F (35.6 °C)-98.4 °F (36.9 °C)] 96.4 °F (35.8 °C)  Pulse:  [75-99] 86  Resp:  [28] 28  SpO2:  [96 %-100 %] 100 %  Arterial Line BP: ()/(63-97) 114/82     Weight: 70.2 kg (154 lb 12.2 oz)  Body mass index is 24.98 kg/m².      Intake/Output Summary (Last 24 hours) at 9/17/2018 0655  Last data filed at 9/17/2018 0500  Gross per 24 hour   Intake 4333 ml   Output 2380 ml   Net 1953 ml       Physical Exam   Constitutional: He appears well-developed and well-nourished.   HENT:   Head: Normocephalic and atraumatic.   Eyes:   Pupils not equal but are reactive to light   Cardiovascular: Normal rate and regular rhythm.   No murmur heard.  Pulmonary/Chest: He has rales.   intubated   Abdominal: Soft. Bowel sounds are normal.   Neurological:   Sedated   Skin: Skin is warm and dry.       Vents:  Vent Mode: A/C (09/17/18 0535)  Ventilator Initiated: Yes (09/14/18 2114)  Set Rate: 24 bmp (09/17/18 0535)  Vt Set: 350 mL (09/17/18 0535)  PEEP/CPAP: 5 cmH20 (09/17/18 0535)  Oxygen Concentration (%): 40 (09/17/18 0535)  Peak Airway Pressure: 16 cmH2O (09/17/18 0535)  Plateau Pressure: 0 cmH20 (09/17/18 0535)  Total Ve: 8.79 mL (09/17/18 0535)  F/VT Ratio<105 (RSBI): (!) 78.87 (09/16/18 1941)    Lines/Drains/Airways     Central Venous Catheter Line                 Percutaneous Central Line Insertion/Assessment - triple  lumen  09/16/18 1310 right internal jugular less than 1 day          Drain                 Colostomy 09/12/18 LLQ 5 days         NG/OG Tube 09/14/18 2038 Perla castañeda Left nostril 2 days         Urethral Catheter 09/1955 Latex 16 Fr. 2 days         Gastrostomy/Enterostomy 09/15/18 1621 Gastrostomy tube w/o balloon LUQ decompression 1 day          Airway                 Airway - Non-Surgical 09/14/18 1915 Other (Comment) 2 days          Arterial Line                 Arterial Line 09/14/18 1953 Left Radial 2 days          Pressure Ulcer                 Pressure Injury 09/13/18 1534 lower Coccyx Stage 3 3 days          Peripheral Intravenous Line                 Midline Catheter Insertion/Assessment  - Single Lumen 09/13/18 1748 Right brachial vein 20g x 10cm 3 days         Peripheral IV - Single Lumen 09/13/18 1858 Anterior;Right Forearm 3 days                Significant Labs:    CBC/Anemia Profile:  Recent Labs   Lab  09/16/18   0751  09/16/18   1607  09/17/18   0300   WBC  13.40*  14.64*  15.39*   HGB  8.8*  8.5*  8.5*   HCT  24.5*  23.7*  24.1*   PLT  158  130*  118*   MCV  82  82  83   RDW  16.2*  16.2*  16.7*        Chemistries:  Recent Labs   Lab  09/15/18   2345  09/16/18   0300  09/16/18   1607  09/17/18   0300   NA  139  140  139  139   K  4.5  4.3  3.6  4.1   CL  113*  114*  112*  113*   CO2  20*  20*  22*  21*   BUN  14  14  14  14   CREATININE  0.8  0.9  0.9  0.9   CALCIUM  8.4*  7.7*  8.6*  7.9*   ALBUMIN   --   2.1*  1.7*  1.6*   PROT   --   3.7*  3.3*  3.4*   BILITOT   --   3.0*  1.8*  1.3*   ALKPHOS   --   81  78  89   ALT   --   17  17  17   AST   --   22  23  18   MG  2.3  2.2  1.8  2.1   PHOS  2.8  2.8   --   2.3*       Recent Lab Results       09/17/18  0301 09/17/18  0300 09/16/18  2329 09/16/18 2021 09/16/18  1924      Immature Granulocytes  1.0        Immature Grans (Abs)  0.15  Comment:  Mild elevation in immature granulocytes is non specific and   can be seen in a variety of conditions  including stress response,   acute inflammation, trauma and pregnancy. Correlation with other   laboratory and clinical findings is essential.          Albumin  1.6        Alkaline Phosphatase  89        Allens Test   N/A N/A      ALT  17        Anion Gap  5        Aniso          AST  18        Baso #  0.02        Basophil%  0.1        Total Bilirubin  1.3  Comment:  For infants and newborns, interpretation of results should be based  on gestational age, weight and in agreement with clinical  observations.  Premature Infant recommended reference ranges:  Up to 24 hours.............<8.0 mg/dL  Up to 48 hours............<12.0 mg/dL  3-5 days..................<15.0 mg/dL  6-29 days.................<15.0 mg/dL          Site   Joseline/UAC Joseline/UAC      BUN, Bld  14        Calcium  7.9        Calcium, Ion  1.21        Chloride  113        CO2  21        Creatinine  0.9        DelSys   Adult Vent Adult Vent      Differential Method  Automated        eGFR if   >60.0        eGFR if non   >60.0  Comment:  Calculation used to obtain the estimated glomerular filtration  rate (eGFR) is the CKD-EPI equation.           Eos #  0.1        Eosinophil%  0.4        Fibrinogen          FiO2   40 40      Large/Giant Platelets          Glucose  91        Gran # (ANC)  13.8        Gran%  89.3        Hematocrit  24.1        Hemoglobin  8.5        Hypo          Coumadin Monitoring INR  1.3  Comment:  Coumadin Therapy:  2.0 - 3.0 for INR for all indicators except mechanical heart valves  and antiphospholipid syndromes which should use 2.5 - 3.5.          Lactate, Scott          Lymph #  1.1        Lymph%  7.1        Magnesium  2.1        MCH  29.2        MCHC  35.3        MCV  83        Min Vol          Mode   AC/PRVC AC/PRVC      Mono #  0.3        Mono%  2.1        MPV  10.8        nRBC  0        Pappenheimer Bodies          PEEP   8 8      Phosphorus  2.3        PiP          Platelet Estimate          Platelets   118        POC BE   2 0      POC HCO3   24.8 23.6      POC PCO2   31.5 30.7      POC PH   7.504 7.494      POC PO2   149 126      POC SATURATED O2   100 99      POC TCO2   26 25      POCT Glucose 85    88     Poik          Potassium  4.1        Total Protein  3.4        Protime  13.0        Rate   28 28      RBC  2.91        RDW  16.7        Sample   ARTERIAL ARTERIAL      Sodium  139        Sp02          Spherocytes          Target Cells          Vt   350 350      WBC  15.39                    09/16/18  1721 09/16/18  1614 09/16/18  1613 09/16/18  1607 09/16/18  1408      Immature Granulocytes    0.9      Immature Grans (Abs)    0.13  Comment:  Mild elevation in immature granulocytes is non specific and   can be seen in a variety of conditions including stress response,   acute inflammation, trauma and pregnancy. Correlation with other   laboratory and clinical findings is essential.        Albumin    1.7      Alkaline Phosphatase    78      Allens Test N/A    N/A     ALT    17      Anion Gap    5      Aniso          AST    23      Baso #    0.01      Basophil%    0.1      Total Bilirubin    1.8  Comment:  For infants and newborns, interpretation of results should be based  on gestational age, weight and in agreement with clinical  observations.  Premature Infant recommended reference ranges:  Up to 24 hours.............<8.0 mg/dL  Up to 48 hours............<12.0 mg/dL  3-5 days..................<15.0 mg/dL  6-29 days.................<15.0 mg/dL        Site Joseline/C    [facility]     BUN, Bld    14      Calcium    8.6      Calcium, Ion    1.37      Chloride    112      CO2    22      Creatinine    0.9      DelSys Adult Vent    Adult Vent     Differential Method    Automated      eGFR if     >60.0      eGFR if non     >60.0  Comment:  Calculation used to obtain the estimated glomerular filtration  rate (eGFR) is the CKD-EPI equation.         Eos #    0.1      Eosinophil%    0.4       Fibrinogen          FiO2 40    40     Large/Giant Platelets          Glucose    101      Gran # (ANC)    13.1      Gran%    89.1      Hematocrit    23.7      Hemoglobin    8.5      Hypo          Coumadin Monitoring INR          Lactate, Scott          Lymph #    1.0      Lymph%    7.0      Magnesium    1.8      MCH    29.4      MCHC    35.9      MCV    82      Min Vol 9.8    10.3     Mode AC/PRVC    AC/PRVC     Mono #    0.4      Mono%    2.5      MPV    11.0      nRBC    0      Pappenheimer Bodies          PEEP 10    10     Phosphorus          PiP 21    21     Platelet Estimate          Platelets    130      POC BE 2    0     POC HCO3 25.3    23.0     POC PCO2 33.0    30.1     POC PH 7.492    7.492     POC PO2 176    163     POC SATURATED O2 100    100     POC TCO2 26    24     POCT Glucose  93 54       Poik          Potassium    3.6      Total Protein    3.3      Protime          Rate 28    28     RBC    2.89      RDW    16.2      Sample ARTERIAL    ARTERIAL     Sodium    139      Sp02 96    100     Spherocytes          Target Cells          Vt 350    350     WBC    14.64                  09/16/18  1127 09/16/18  0915 09/16/18  0759 09/16/18  0751      Immature Granulocytes    1.1     Immature Grans (Abs)    0.15  Comment:  Mild elevation in immature granulocytes is non specific and   can be seen in a variety of conditions including stress response,   acute inflammation, trauma and pregnancy. Correlation with other   laboratory and clinical findings is essential.       Albumin         Alkaline Phosphatase         Allens Test  N/A       ALT         Anion Gap         Aniso    Slight     AST         Baso #    0.01     Basophil%    0.1     Total Bilirubin         Site  Joseline/UAC       BUN, Bld         Calcium         Calcium, Ion         Chloride         CO2         Creatinine         DelSys  Adult Vent       Differential Method    Automated     eGFR if          eGFR if non          Eos #     0.0     Eosinophil%    0.2     Fibrinogen    201     FiO2  50       Large/Giant Platelets    Present     Glucose         Gran # (ANC)    12.1     Gran%    90.5     Hematocrit    24.5     Hemoglobin    8.8     Hypo    Occasional     Coumadin Monitoring INR    1.3  Comment:  Coumadin Therapy:  2.0 - 3.0 for INR for all indicators except mechanical heart valves  and antiphospholipid syndromes which should use 2.5 - 3.5.       Lactate, Scott    1.4  Comment:  Falsely low lactic acid results can be found in samples   containing >=13.0 mg/dL total bilirubin and/or >=3.5 mg/dL   direct bilirubin.       Lymph #    0.8     Lymph%    5.6     Magnesium         MCH    29.5     MCHC    35.9     MCV    82     Min Vol  10.2       Mode  AC/PRVC       Mono #    0.3     Mono%    2.5     MPV    10.6     nRBC    0     Pappenheimer Bodies    Present     PEEP  10       Phosphorus         PiP  21       Platelet Estimate    Appears normal     Platelets    158     POC BE  2       POC HCO3  24.9       POC PCO2  32.7       POC PH  7.490       POC PO2  174       POC SATURATED O2  100       POC TCO2  26       POCT Glucose 76  95      Poik    Slight     Potassium         Total Protein         Protime    12.7     Rate  28       RBC    2.98     RDW    16.2     Sample  ARTERIAL       Sodium         Sp02  99       Spherocytes    Occasional     Target Cells    Occasional     Vt  350       WBC    13.40           Significant Imaging:  I have reviewed all pertinent imaging results/findings within the past 24 hours.

## 2018-09-17 NOTE — PROGRESS NOTES
[facility]  Critical Care - Surgery  Progress Note    Patient Name: Amari Saez  MRN: [id number]  Admission Date: 9/12/2018  Hospital Length of Stay: 5 days  Code Status: Full Code  Attending Provider: Judd Agudelo MD  Primary Care Provider: Chaim Valdovinos MD   Principal Problem: Perforated diverticulum of large intestine    Subjective:     Hospital/ICU Course:  9/14: Transferred from floor to SICU for higher level of care.     Interval History/Significant Events:   NAEON. Pt remains intubated. Propofol gtt stopped this AM with plan for SBT and possible extubation. HDS, off pressors. UOP stable at 1.145L x24 hrs. No other acute issues.     Follow-up For: Procedure(s) (LRB):  LAPAROTOMY,EXPLORATORY (N/A)  GASTROSTOMY (N/A)  EGD (ESOPHAGOGASTRODUODENOSCOPY) (N/A)    Post-Operative Day: 2 Days Post-Op    Objective:     Vital Signs (Most Recent):  Temp: 96.4 °F (35.8 °C) (09/17/18 0535)  Pulse: 86 (09/17/18 0535)  Resp: (!) 28 (09/16/18 1941)  BP: (!) 118/94 (09/15/18 1845)  SpO2: 100 % (09/17/18 0535) Vital Signs (24h Range):  Temp:  [96.1 °F (35.6 °C)-98.4 °F (36.9 °C)] 96.4 °F (35.8 °C)  Pulse:  [75-99] 86  Resp:  [28] 28  SpO2:  [96 %-100 %] 100 %  Arterial Line BP: ()/(63-97) 114/82     Weight: 70.2 kg (154 lb 12.2 oz)  Body mass index is 24.98 kg/m².      Intake/Output Summary (Last 24 hours) at 9/17/2018 0655  Last data filed at 9/17/2018 0500  Gross per 24 hour   Intake 4333 ml   Output 2380 ml   Net 1953 ml       Physical Exam   Constitutional: He appears well-developed and well-nourished.   HENT:   Head: Normocephalic and atraumatic.   Eyes:   Pupils not equal but are reactive to light   Cardiovascular: Normal rate and regular rhythm.   No murmur heard.  Pulmonary/Chest: He has rales.   intubated   Abdominal: Soft. Bowel sounds are normal.   Neurological:   Sedated   Skin: Skin is warm and dry.       Vents:  Vent Mode: A/C (09/17/18 0535)  Ventilator Initiated: Yes (09/14/18  2114)  Set Rate: 24 bmp (09/17/18 0535)  Vt Set: 350 mL (09/17/18 0535)  PEEP/CPAP: 5 cmH20 (09/17/18 0535)  Oxygen Concentration (%): 40 (09/17/18 0535)  Peak Airway Pressure: 16 cmH2O (09/17/18 0535)  Plateau Pressure: 0 cmH20 (09/17/18 0535)  Total Ve: 8.79 mL (09/17/18 0535)  F/VT Ratio<105 (RSBI): (!) 78.87 (09/16/18 1941)    Lines/Drains/Airways     Central Venous Catheter Line                 Percutaneous Central Line Insertion/Assessment - triple lumen  09/16/18 1310 right internal jugular less than 1 day          Drain                 Colostomy 09/12/18 LLQ 5 days         NG/OG Tube 09/14/18 2038 [place] sump Left nostril 2 days         Urethral Catheter 09/1955 Latex 16 Fr. 2 days         Gastrostomy/Enterostomy 09/15/18 1621 Gastrostomy tube w/o balloon LUQ decompression 1 day          Airway                 Airway - Non-Surgical 09/14/18 1915 Other (Comment) 2 days          Arterial Line                 Arterial Line 09/14/18 1953 Left Radial 2 days          Pressure Ulcer                 Pressure Injury 09/13/18 1534 lower Coccyx Stage 3 3 days          Peripheral Intravenous Line                 Midline Catheter Insertion/Assessment  - Single Lumen 09/13/18 1748 Right brachial vein 20g x 10cm 3 days         Peripheral IV - Single Lumen 09/13/18 1858 Anterior;Right Forearm 3 days                Significant Labs:    CBC/Anemia Profile:  Recent Labs   Lab  09/16/18   0751  09/16/18   1607  09/17/18   0300   WBC  13.40*  14.64*  15.39*   HGB  8.8*  8.5*  8.5*   HCT  24.5*  23.7*  24.1*   PLT  158  130*  118*   MCV  82  82  83   RDW  16.2*  16.2*  16.7*        Chemistries:  Recent Labs   Lab  09/15/18   2345  09/16/18   0300  09/16/18   1607  09/17/18   0300   NA  139  140  139  139   K  4.5  4.3  3.6  4.1   CL  113*  114*  112*  113*   CO2  20*  20*  22*  21*   BUN  14  14  14  14   CREATININE  0.8  0.9  0.9  0.9   CALCIUM  8.4*  7.7*  8.6*  7.9*   ALBUMIN   --   2.1*  1.7*  1.6*   PROT   --   3.7*   3.3*  3.4*   BILITOT   --   3.0*  1.8*  1.3*   ALKPHOS   --   81  78  89   ALT   --   17  17  17   AST   --   22  23  18   MG  2.3  2.2  1.8  2.1   PHOS  2.8  2.8   --   2.3*       Recent Lab Results       09/17/18  0301 09/17/18  0300 09/16/18  2329 09/16/18 2021 09/16/18  1924      Immature Granulocytes  1.0        Immature Grans (Abs)  0.15  Comment:  Mild elevation in immature granulocytes is non specific and   can be seen in a variety of conditions including stress response,   acute inflammation, trauma and pregnancy. Correlation with other   laboratory and clinical findings is essential.          Albumin  1.6        Alkaline Phosphatase  89        Allens Test   N/A N/A      ALT  17        Anion Gap  5        Aniso          AST  18        Baso #  0.02        Basophil%  0.1        Total Bilirubin  1.3  Comment:  For infants and newborns, interpretation of results should be based  on gestational age, weight and in agreement with clinical  observations.  Premature Infant recommended reference ranges:  Up to 24 hours.............<8.0 mg/dL  Up to 48 hours............<12.0 mg/dL  3-5 days..................<15.0 mg/dL  6-29 days.................<15.0 mg/dL          Site   Joseline/UAC Joseline/UAC      BUN, Bld  14        Calcium  7.9        Calcium, Ion  1.21        Chloride  113        CO2  21        Creatinine  0.9        DelSys   Adult Vent Adult Vent      Differential Method  Automated        eGFR if   >60.0        eGFR if non   >60.0  Comment:  Calculation used to obtain the estimated glomerular filtration  rate (eGFR) is the CKD-EPI equation.           Eos #  0.1        Eosinophil%  0.4        Fibrinogen          FiO2   40 40      Large/Giant Platelets          Glucose  91        Gran # (ANC)  13.8        Gran%  89.3        Hematocrit  24.1        Hemoglobin  8.5        Hypo          Coumadin Monitoring INR  1.3  Comment:  Coumadin Therapy:  2.0 - 3.0 for INR for all indicators  except mechanical heart valves  and antiphospholipid syndromes which should use 2.5 - 3.5.          Lactate, Scott          Lymph #  1.1        Lymph%  7.1        Magnesium  2.1        MCH  29.2        MCHC  35.3        MCV  83        Min Vol          Mode   AC/PRVC AC/PRVC      Mono #  0.3        Mono%  2.1        MPV  10.8        nRBC  0        Pappenheimer Bodies          PEEP   8 8      Phosphorus  2.3        PiP          Platelet Estimate          Platelets  118        POC BE   2 0      POC HCO3   24.8 23.6      POC PCO2   31.5 30.7      POC PH   7.504 7.494      POC PO2   149 126      POC SATURATED O2   100 99      POC TCO2   26 25      POCT Glucose 85    88     Poik          Potassium  4.1        Total Protein  3.4        Protime  13.0        Rate   28 28      RBC  2.91        RDW  16.7        Sample   ARTERIAL ARTERIAL      Sodium  139        Sp02          Spherocytes          Target Cells          Vt   350 350      WBC  15.39                    09/16/18  1721 09/16/18  1614 09/16/18  1613 09/16/18  1607 09/16/18  1408      Immature Granulocytes    0.9      Immature Grans (Abs)    0.13  Comment:  Mild elevation in immature granulocytes is non specific and   can be seen in a variety of conditions including stress response,   acute inflammation, trauma and pregnancy. Correlation with other   laboratory and clinical findings is essential.        Albumin    1.7      Alkaline Phosphatase    78      Allens Test N/A    N/A     ALT    17      Anion Gap    5      Aniso          AST    23      Baso #    0.01      Basophil%    0.1      Total Bilirubin    1.8  Comment:  For infants and newborns, interpretation of results should be based  on gestational age, weight and in agreement with clinical  observations.  Premature Infant recommended reference ranges:  Up to 24 hours.............<8.0 mg/dL  Up to 48 hours............<12.0 mg/dL  3-5 days..................<15.0 mg/dL  6-29 days.................<15.0 mg/dL        Site  Joseline/UAC    Joseline/UAC     BUN, Bld    14      Calcium    8.6      Calcium, Ion    1.37      Chloride    112      CO2    22      Creatinine    0.9      DelSys Adult Vent    Adult Vent     Differential Method    Automated      eGFR if     >60.0      eGFR if non     >60.0  Comment:  Calculation used to obtain the estimated glomerular filtration  rate (eGFR) is the CKD-EPI equation.         Eos #    0.1      Eosinophil%    0.4      Fibrinogen          FiO2 40    40     Large/Giant Platelets          Glucose    101      Gran # (ANC)    13.1      Gran%    89.1      Hematocrit    23.7      Hemoglobin    8.5      Hypo          Coumadin Monitoring INR          Lactate, Scott          Lymph #    1.0      Lymph%    7.0      Magnesium    1.8      MCH    29.4      MCHC    35.9      MCV    82      Min Vol 9.8    10.3     Mode AC/PRVC    AC/PRVC     Mono #    0.4      Mono%    2.5      MPV    11.0      nRBC    0      Pappenheimer Bodies          PEEP 10    10     Phosphorus          PiP 21    21     Platelet Estimate          Platelets    130      POC BE 2    0     POC HCO3 25.3    23.0     POC PCO2 33.0    30.1     POC PH 7.492    7.492     POC PO2 176    163     POC SATURATED O2 100    100     POC TCO2 26    24     POCT Glucose  93 54       Poik          Potassium    3.6      Total Protein    3.3      Protime          Rate 28    28     RBC    2.89      RDW    16.2      Sample ARTERIAL    ARTERIAL     Sodium    139      Sp02 96    100     Spherocytes          Target Cells          Vt 350    350     WBC    14.64                  09/16/18  1127 09/16/18  0915 09/16/18  0759 09/16/18  0751      Immature Granulocytes    1.1     Immature Grans (Abs)    0.15  Comment:  Mild elevation in immature granulocytes is non specific and   can be seen in a variety of conditions including stress response,   acute inflammation, trauma and pregnancy. Correlation with other   laboratory and clinical findings is  essential.       Albumin         Alkaline Phosphatase         Allens Test  N/A       ALT         Anion Gap         Aniso    Slight     AST         Baso #    0.01     Basophil%    0.1     Total Bilirubin         Site  Joseline/UAC       BUN, Bld         Calcium         Calcium, Ion         Chloride         CO2         Creatinine         DelSys  Adult Vent       Differential Method    Automated     eGFR if          eGFR if non          Eos #    0.0     Eosinophil%    0.2     Fibrinogen    201     FiO2  50       Large/Giant Platelets    Present     Glucose         Gran # (ANC)    12.1     Gran%    90.5     Hematocrit    24.5     Hemoglobin    8.8     Hypo    Occasional     Coumadin Monitoring INR    1.3  Comment:  Coumadin Therapy:  2.0 - 3.0 for INR for all indicators except mechanical heart valves  and antiphospholipid syndromes which should use 2.5 - 3.5.       Lactate, Scott    1.4  Comment:  Falsely low lactic acid results can be found in samples   containing >=13.0 mg/dL total bilirubin and/or >=3.5 mg/dL   direct bilirubin.       Lymph #    0.8     Lymph%    5.6     Magnesium         MCH    29.5     MCHC    35.9     MCV    82     Min Vol  10.2       Mode  AC/PRVC       Mono #    0.3     Mono%    2.5     MPV    10.6     nRBC    0     Pappenheimer Bodies    Present     PEEP  10       Phosphorus         PiP  21       Platelet Estimate    Appears normal     Platelets    158     POC BE  2       POC HCO3  24.9       POC PCO2  32.7       POC PH  7.490       POC PO2  174       POC SATURATED O2  100       POC TCO2  26       POCT Glucose 76  95      Poik    Slight     Potassium         Total Protein         Protime    12.7     Rate  28       RBC    2.98     RDW    16.2     Sample  ARTERIAL       Sodium         Sp02  99       Spherocytes    Occasional     Target Cells    Occasional     Vt  350       WBC    13.40           Significant Imaging:  I have reviewed all pertinent imaging  results/findings within the past 24 hours.    Assessment/Plan:     Acute respiratory distress    Neuro:  Sedation: Propofol gtt. Currently paused for SBT this AM.   Pain: Percocet PRN     Pulm:  - intubated  -Vent Mode: A/C  Oxygen Concentration (%):  [40-50] 40  Resp Rate Total:  [24 br/min-29 br/min] 24 br/min  Vt Set:  [350 mL] 350 mL  PEEP/CPAP:  [5 cmH20-10 cmH20] 5 cmH20  Mean Airway Pressure:  [8.4 doA67-43 cmH20] 8.4 cmH20  - ABG 7.504/31.5/149/24.8/2  - ABG PRN  - daily CXRs     CV:  - HDS, off pressors    - 9/15: 3UpRBCs, 2 U FFP prior to OR  - 9/15: 1 U plts, 1 U cryo based on TEG study    GI/FEN/Lytes:  - NPO  - LR @ 125. Discontinued this AM.   - TF at 10 mL/hr. Also d/c'd this AM.    - Replace lytes PRN      Renal:  - BUN/Cr at 14/0.9  - monitor UOP     Heme:  - H/H stable at 8.5/24.1  - CBC, INR, fibrinogen q 4    ID:  - WBC at 15.39  - peritoneal cx growing Bacteroides and E. Coli  - Zosyn q8 (day 4) treating e.coli from peritoneal fluid 9/12  - lactate currently at 1.4  - Repeat Blood/Urine cx NGTD     Endo:  - SSI    PPX:   - Lovenox, protonix gtt    Dispo:   - Cont SICU care        Critical care was time spent personally by me on the following activities: development of treatment plan with patient or surrogate and bedside caregivers, discussions with consultants, evaluation of patient's response to treatment, examination of patient, ordering and performing treatments and interventions, ordering and review of laboratory studies, ordering and review of radiographic studies, pulse oximetry, re-evaluation of patient's condition.  This critical care time did not overlap with that of any other provider or involve time for any procedures.     Fer Smith MD  Critical Care - Surgery  [facility]

## 2018-09-17 NOTE — PROGRESS NOTES
Orders received to discontinue MIVF and tube feeds at this time. Also turn off propofol at this time and wean to extubate.

## 2018-09-17 NOTE — PROGRESS NOTES
No acute events this shift  Pt responds to voice and opens eyes, moves all extremities, but does not follow commands; sedated on propofol  Intubated on the vent AC rate of 24, 40% FiO2, and 5 of PEEP; vent settings being weaned as tolerated  NSR, systolic BP > 90, and MAP >60; no pressors needed this shift  UOP adequate throughout shift; CVP ranged from 10-11 throughout shift; SVV currently 9, and CI currently 2.0  Pt remains on LR gtt @ 125 and protonix gtt @ 8 mg/hr  Tube feeds infusing @ goal rate of 10 cc/hr; max residual of 10; accu cks monitored Q4H  Labs monitored daily; H&H currently stable, electrolytes replaced PRN  G-tube remains intact with bile colored output with red streaks noted this shift; flushed Q6H  Pt turned Q2H; dressing applied to breakdown and pressure points; no further skin breakdown noted  POC reviewed and all questions and concerns addressed with family at bedside

## 2018-09-17 NOTE — PROGRESS NOTES
All sedation off, pt following commands with all 4 extremities. Nods and gestures appropriately. Pt remains agitated and thrashing in bed. Pt switched to CPAP on vent by RT. Md notified that pts HR 115s and pt hypertensive, SBP 150s-170s, while on CPAP. Will continue to monitor pt closely.

## 2018-09-17 NOTE — PLAN OF CARE
Visited patient. Eyes closed, sedated, sticking tongue out, coughing, w/ICU nurse encouraging him to cough, now that he has been extubated. See below.        09/17/18 1310   Discharge Reassessment   Assessment Type Discharge Planning Reassessment   Provided patient/caregiver education on the expected discharge date and the discharge plan No  (D/c date & plan TBD. Remains in ICU-just extubated. D/c plan prior to ICU admit was HH. Will need new PT/OT recs. (Potential Barrier to D/c needs: Medicaid insurer.))   Discharge Plan A Home Health;Home with family   Discharge Plan B Home with family

## 2018-09-18 NOTE — CONSULTS
"  [facility]  Adult Nutrition  Consult Note    SUMMARY     Recommendations    Recommendation/Intervention:   As medically able, recommend increasing TF goal rate.   Isosource 1.5 @ 45mL/hr.   - Provides 1620kcals, 73g protein and 825mL free water.   - Hold for residuals >500mL.     RD to monitor.    Goals: Pt to receive >85% EEN and EPN.  Nutrition Goal Status: new  Communication of RD Recs: reviewed with RN    Reason for Assessment    Reason for Assessment: consult  Diagnosis: other (see comments)(perforated diverticulum of large intestines)  Relevant Medical History: HTN, gastric bypass (2003), tobacco abuse  General Information Comments: s/p rapid response, code, PEG and ex-lap. Pt intubated and sedated. No family at bedside. NFPE completed - no signs of malnutrition at this time. Weight stable x 3 years. TF started, tolerating at 20mL/hr.  Nutrition Discharge Planning: unable to determine at this time    Nutrition Risk Screen    Nutrition Risk Screen: no indicators present    Nutrition/Diet History    Do you have any cultural, spiritual, Hindu conflicts, given your current situation?: none noted  Factors Affecting Nutritional Intake: altered gastrointestinal function, NPO, on mechanical ventilation    Anthropometrics    Temp: 96.6 °F (35.9 °C)  Height: 5' 6" (167.6 cm)  Height (inches): 66 in  Weight Method: Bed Scale  Weight: 70.2 kg (154 lb 12.2 oz)  Weight (lb): 154.76 lb  Ideal Body Weight (IBW), Male: 142 lb  % Ideal Body Weight, Male (lb): 92.96 lb  BMI (Calculated): 21.3  BMI Grade: 18.5-24.9 - normal       Lab/Procedures/Meds    Pertinent Labs Reviewed: reviewed  Pertinent Medications Reviewed: reviewed  Pertinent Medications Comments: lasix, D10, fentanyl, insulin    Physical Findings/Assessment    Overall Physical Appearance: nourished, on ventilator support, edematous  Tubes: gastrostomy tube, nasogastric tube  Skin: pressure ulcer(s), edema, incision(s)(St 3 coccyx, " ulceration)    Estimated/Assessed Needs    Weight Used For Calorie Calculations: 59.9 kg (132 lb 0.9 oz)  Energy Calorie Requirements (kcal): 1459kcal  Energy Need Method: [facility]  Protein Requirements: 72-90g(1.2-1.5g/kg)  Weight Used For Protein Calculations: 59.9 kg (132 lb 0.9 oz)  Fluid Requirements (mL): 1mL/kcal or per MD     RDA Method (mL): 1459         Nutrition Prescription Ordered    Current Diet Order: NPO  Nutrition Order Comments: TF at goal  Current Nutrition Support Formula Ordered: Isosource 1.5  Current Nutrition Support Rate Ordered: 20 (ml)  Current Nutrition Support Frequency Ordered: mL/hr    Evaluation of Received Nutrient/Fluid Intake    Enteral Calories (kcal): 720  Enteral Protein (gm): 33  Enteral (Free Water) Fluid (mL): 367  Lipid Calories (kcals): 380 kcals(propofol @ 14.4mL/hr)  Other Calories (kcal): 1100    % Kcal Needs: 75  % Protein Needs: 47    IV Fluid (mL): 1200  I/O: +I/O, good UOP    Energy Calories Required: not meeting needs  Protein Required: not meeting needs  Fluid Required: other (see comments)(per MD)    Tolerance: tolerating    % Intake of Estimated Energy Needs: 75 - 100 %  % Meal Intake: NPO    Nutrition Risk    Level of Risk/Frequency of Follow-up: (f/u 2x/week)     Assessment and Plan    Nutrition Problem  Inadequate energy intake    Related to (etiology):   TF provision    Signs and Symptoms (as evidenced by):   Pt receiving <85% EEN and EPN.     Nutrition Diagnosis Status:   New    Monitor and Evaluation    Food and Nutrient Intake: enteral nutrition intake  Food and Nutrient Adminstration: enteral and parenteral nutrition administration  Anthropometric Measurements: weight, weight change, body mass index  Biochemical Data, Medical Tests and Procedures: electrolyte and renal panel, gastrointestinal profile, glucose/endocrine profile, lipid profile, inflammatory profile  Nutrition-Focused Physical Findings: overall appearance     Nutrition Follow-Up    RD  Follow-up?: Yes

## 2018-09-18 NOTE — PROGRESS NOTES
Dr. Cage notified of pt becoming more hypotensive; BP currently 86/58 with a MAP of 65, CVP of 8, CO of 2.3, CI of 1.2, SV of 29, and SVV of 18. Also notified of Lasix response and pt diuresing approximately 1.2 L over the past 3 hours. Orders received to continue to monitor pt and notify physician when MAP becomes less than 65.

## 2018-09-18 NOTE — PROGRESS NOTES
No acute events this shift  Pt responds to voice and opens eyes, moves all extremities, and follow commands; sedated on propofol and fentanyl  Intubated on the vent AC rate of 20, 50% FiO2, and 8 of PEEP; vent settings being weaned as tolerated  NSR, systolic BP > 90, and MAP >60; no pressors needed this shift (pt had one episode of hypotension this shift)  UOP adequate throughout shift; CVP ranged from 4-8 throughout shift; SVV currently 18, and CI currently 1.8  Tube feeds infusing @ goal rate of 10 cc/hr; no residuals   accu cks monitored very frequently; D50 given multiple times for hypoglycemia  Labs monitored daily; H&H currently stable, electrolytes replaced PRN  G-tube remains intact with bile colored output with red streaks noted this shift; flushed Q6H  Pt turned Q2H; dressing applied to breakdown and pressure points; no further skin breakdown noted  POC reviewed and all questions and concerns addressed with family at bedside

## 2018-09-18 NOTE — PROGRESS NOTES
Dr. Cage notified of a BG less than 47. Orders received to restart tube feeds and give an amp of D50. Will recheck blood glucose in 15 minutes.

## 2018-09-18 NOTE — PROGRESS NOTES
[facility]  Critical Care - Surgery  Progress Note    Patient Name: Amari Saez  MRN: [id number]  Admission Date: 9/12/2018  Hospital Length of Stay: 6 days  Code Status: Full Code  Attending Provider: Judd Agudelo MD  Primary Care Provider: Chaim Valdovinos MD   Principal Problem: Perforated diverticulum of large intestine    Subjective:     Hospital/ICU Course:  9/14: Transferred from floor to SICU for higher level of care.     Interval History/Significant Events:   Pt reintubated overnight 2/2 repetative desaturations yesterday afternoon. Remained HDS, off pressors. TF at goal of 10 mL/hr. Good UOP at 2.695L x24 hours. No other acute issues.     Follow-up For: Procedure(s) (LRB):  LAPAROTOMY,EXPLORATORY (N/A)  GASTROSTOMY (N/A)  EGD (ESOPHAGOGASTRODUODENOSCOPY) (N/A)    Post-Operative Day: 3 Days Post-Op    Objective:     Vital Signs (Most Recent):  Temp: 96.6 °F (35.9 °C) (09/18/18 0513)  Pulse: 88 (09/18/18 0600)  Resp: 20 (09/18/18 0600)  BP: 110/85 (09/17/18 1729)  SpO2: 100 % (09/18/18 0600) Vital Signs (24h Range):  Temp:  [96.4 °F (35.8 °C)-98.2 °F (36.8 °C)] 96.6 °F (35.9 °C)  Pulse:  [] 88  Resp:  [20-37] 20  SpO2:  [67 %-100 %] 100 %  BP: (110-155)/() 110/85  Arterial Line BP: ()/() 112/79     Weight: 70.2 kg (154 lb 12.2 oz)  Body mass index is 24.98 kg/m².      Intake/Output Summary (Last 24 hours) at 9/18/2018 0708  Last data filed at 9/18/2018 0600  Gross per 24 hour   Intake 1082.32 ml   Output 2845 ml   Net -1762.68 ml       Physical Exam   Constitutional: He appears well-developed and well-nourished.   HENT:   Head: Normocephalic and atraumatic.   Eyes:   Pupils not equal but are reactive to light   Cardiovascular: Normal rate and regular rhythm.   No murmur heard.  Pulmonary/Chest: He has rales.   intubated   Abdominal: Soft. Bowel sounds are normal.   Neurological:   Sedated   Skin: Skin is warm and dry.       Vents:  Vent Mode: A/C (09/18/18  0141)  Ventilator Initiated: Yes (09/14/18 2114)  Set Rate: 20 bmp (09/18/18 0513)  Vt Set: 350 mL (09/17/18 0936)  Pressure Support: 10 cmH20 (09/17/18 1020)  PEEP/CPAP: 8 cmH20 (09/18/18 0513)  Oxygen Concentration (%): 50 (09/18/18 0600)  Peak Airway Pressure: 24 cmH2O (09/18/18 0513)  Plateau Pressure: 0 cmH20 (09/17/18 2233)  Total Ve: 9.36 mL (09/18/18 0513)  Negative Inspiratory Force (cm H2O): -28 (09/17/18 1145)  F/VT Ratio<105 (RSBI): (!) (P) 43.48 (09/18/18 0513)    Lines/Drains/Airways     Central Venous Catheter Line                 Percutaneous Central Line Insertion/Assessment - triple lumen  09/16/18 1310 right internal jugular 1 day          Drain                 Colostomy 09/12/18 LLQ 6 days         NG/OG Tube 09/14/18 2038 [place] sump Left nostril 3 days         Urethral Catheter 09/1955 Latex 16 Fr. 3 days         Gastrostomy/Enterostomy 09/15/18 1621 Gastrostomy tube w/o balloon LUQ decompression 2 days          Arterial Line                 Arterial Line 09/14/18 1953 Left Radial 3 days          Pressure Ulcer                 Pressure Injury 09/13/18 1534 lower Coccyx Stage 3 4 days          Peripheral Intravenous Line                 Midline Catheter Insertion/Assessment  - Single Lumen 09/13/18 1748 Right brachial vein 20g x 10cm 4 days         Peripheral IV - Single Lumen 09/13/18 1858 Anterior;Right Forearm 4 days                Significant Labs:    CBC/Anemia Profile:  Recent Labs   Lab  09/17/18   0300  09/17/18 1837 09/18/18   0300   WBC  15.39*  23.30*  17.90*   HGB  8.5*  9.5*  8.6*   HCT  24.1*  27.5*  26.0*   PLT  118*  143*  105*   MCV  83  85  86   RDW  16.7*  16.8*  17.0*        Chemistries:  Recent Labs   Lab  09/16/18   1607  09/17/18   0300  09/17/18   1837  09/18/18   0300   NA  139  139  140  142   K  3.6  4.1  4.2  3.5   CL  112*  113*  113*  113*   CO2  22*  21*  21*  23   BUN  14  14  14  14   CREATININE  0.9  0.9  0.9  0.9   CALCIUM  8.6*  7.9*  7.6*  7.6*    ALBUMIN  1.7*  1.6*  1.6*  1.4*   PROT  3.3*  3.4*  4.0*  3.3*   BILITOT  1.8*  1.3*  1.4*  1.2*   ALKPHOS  78  89  103  103   ALT  17  17  24  19   AST  23  18  42*  26   MG  1.8  2.1   --   1.5*   PHOS   --   2.3*   --   3.1       Recent Lab Results       09/18/18  0511 09/18/18  0319 09/18/18  0300 09/18/18  0203 09/18/18  0201      Immature Granulocytes   0.8       Immature Grans (Abs)   0.14  Comment:  Mild elevation in immature granulocytes is non specific and   can be seen in a variety of conditions including stress response,   acute inflammation, trauma and pregnancy. Correlation with other   laboratory and clinical findings is essential.         Albumin   1.4       Alkaline Phosphatase   103       Allens Test          ALT   19       Anion Gap   6       Aniso          AST   26       Baso #   0.02       Basophilic Stippling          Basophil%   0.1       Total Bilirubin   1.2  Comment:  For infants and newborns, interpretation of results should be based  on gestational age, weight and in agreement with clinical  observations.  Premature Infant recommended reference ranges:  Up to 24 hours.............<8.0 mg/dL  Up to 48 hours............<12.0 mg/dL  3-5 days..................<15.0 mg/dL  6-29 days.................<15.0 mg/dL         Site          BUN, Bld   14       Calcium   7.6       Calcium, Ion   1.14       Chloride   113       CO2   23       Creatinine   0.9       DelSys          Differential Method   Automated       eGFR if    >60.0       eGFR if non    >60.0  Comment:  Calculation used to obtain the estimated glomerular filtration  rate (eGFR) is the CKD-EPI equation.          Eos #   0.0       Eosinophil%   0.2       FiO2          Large/Giant Platelets          Glucose   93       Gran # (ANC)   15.9       Gran%   89.0       Hematocrit   26.0       Hemoglobin   8.6       Hypo          Coumadin Monitoring INR   1.4  Comment:  Coumadin Therapy:  2.0 - 3.0 for INR for  all indicators except mechanical heart valves  and antiphospholipid syndromes which should use 2.5 - 3.5.         Lymph #   1.4       Lymph%   7.8       Magnesium   1.5       MCH   28.6       MCHC   33.1       MCV   86       Mode          Mono #   0.4       Mono%   2.1       MPV   11.7       nRBC   0       PEEP          Phosphorus   3.1       Platelet Estimate          Platelets   105       POC BE          POC HCO3          POC PCO2          POC PH          POC PO2          POC SATURATED O2          POC TCO2          POCT Glucose 61 99  63 20     Poik          Potassium   3.5       Total Protein   3.3       Protime   14.3       Rate          RBC   3.01       RDW   17.0       Sample          Sodium   142       Target Cells          Troponin I   0.048  Comment:  The reference interval for Troponin I represents the 99th percentile   cutoff   for our facility and is consistent with 3rd generation assay   performance.         WBC   17.90                   09/17/18  2322 09/17/18  2248 09/17/18  2245 09/17/18  1949 09/17/18  1937      Immature Granulocytes          Immature Grans (Abs)          Albumin          Alkaline Phosphatase          Allens Test    N/A      ALT          Anion Gap          Aniso          AST          Baso #          Basophilic Stippling          Basophil%          Total Bilirubin          Site    Joseline/UAC      BUN, Bld          Calcium          Calcium, Ion          Chloride          CO2          Creatinine          DelSys    Adult Vent      Differential Method          eGFR if           eGFR if non           Eos #          Eosinophil%          FiO2    60      Large/Giant Platelets          Glucose          Gran # (ANC)          Gran%          Hematocrit          Hemoglobin          Hypo          Coumadin Monitoring INR          Lymph #          Lymph%          Magnesium          MCH          MCHC          MCV          Mode    AC/PRVC      Mono #          Mono%           MPV          nRBC          PEEP    8      Phosphorus          Platelet Estimate          Platelets          POC BE    0      POC HCO3    23.4      POC PCO2    30.9      POC PH    7.486      POC PO2    174      POC SATURATED O2    100      POC TCO2    24      POCT Glucose 90 67   166     Poik          Potassium          Total Protein          Protime          Rate    24      RBC          RDW          Sample    ARTERIAL      Sodium          Target Cells          Troponin I   0.050  Comment:  The reference interval for Troponin I represents the 99th percentile   cutoff   for our facility and is consistent with 3rd generation assay   performance.         WBC                      09/17/18  1919 09/17/18  1841 09/17/18  1837 09/17/18  1816 09/17/18  1746      Immature Granulocytes   1.3       Immature Grans (Abs)   0.30  Comment:  Mild elevation in immature granulocytes is non specific and   can be seen in a variety of conditions including stress response,   acute inflammation, trauma and pregnancy. Correlation with other   laboratory and clinical findings is essential.         Albumin   1.6       Alkaline Phosphatase   103       Allens Test    N/A      ALT   24       Anion Gap   6       Aniso   Slight       AST   42  Comment:  *Result may be interfered by visible hemolysis       Baso #   0.03       Basophilic Stippling   Occasional       Basophil%   0.1       Total Bilirubin   1.4  Comment:  For infants and newborns, interpretation of results should be based  on gestational age, weight and in agreement with clinical  observations.  Premature Infant recommended reference ranges:  Up to 24 hours.............<8.0 mg/dL  Up to 48 hours............<12.0 mg/dL  3-5 days..................<15.0 mg/dL  6-29 days.................<15.0 mg/dL         [facility]      BUN, Bld   14       Calcium   7.6       Calcium, Ion          Chloride   113       CO2   21       Creatinine   0.9       DelSys          Differential Method    Automated       eGFR if    >60.0       eGFR if non    >60.0  Comment:  Calculation used to obtain the estimated glomerular filtration  rate (eGFR) is the CKD-EPI equation.          Eos #   0.0       Eosinophil%   0.0       FiO2          Large/Giant Platelets   Present       Glucose   68       Gran # (ANC)   21.7       Gran%   93.2       Hematocrit   27.5       Hemoglobin   9.5       Hypo   Occasional       Coumadin Monitoring INR          Lymph #   0.8       Lymph%   3.3       Magnesium          MCH   29.4       MCHC   34.5       MCV   85       Mode          Mono #   0.5       Mono%   2.1       MPV   11.4       nRBC   0       PEEP          Phosphorus          Platelet Estimate   Decreased       Platelets   143       POC BE    0      POC HCO3    24.5      POC PCO2    35.9      POC PH    7.442      POC PO2    77      POC SATURATED O2    96      POC TCO2    26      POCT Glucose 47 61   60     Poik   Slight       Potassium   4.2       Total Protein   4.0       Protime          Rate          RBC   3.23       RDW   16.8       Sample    ARTERIAL      Sodium   140       Target Cells   Occasional       Troponin I          WBC   23.30                   09/17/18  1742 09/17/18  1700 09/17/18  1632 09/17/18  1536 09/17/18  1535      Immature Granulocytes          Immature Grans (Abs)          Albumin          Alkaline Phosphatase          Allens Test   N/A N/A      ALT          Anion Gap          Aniso          AST          Baso #          Basophilic Stippling          Basophil%          Total Bilirubin          Site   Joseline/UAC Joseline/UAC      BUN, Bld          Calcium          Calcium, Ion          Chloride          CO2          Creatinine          DelSys          Differential Method          eGFR if           eGFR if non           Eos #          Eosinophil%          FiO2          Large/Giant Platelets          Glucose          Gran # (ANC)          Gran%           Hematocrit          Hemoglobin          Hypo          Coumadin Monitoring INR          Lymph #          Lymph%          Magnesium          MCH          MCHC          MCV          Mode          Mono #          Mono%          MPV          nRBC          PEEP          Phosphorus          Platelet Estimate          Platelets          POC BE   -1 -2      POC HCO3   23.1 21.9      POC PCO2   34.0 30.4      POC PH   7.439 7.467      POC PO2   65 58      POC SATURATED O2   93 92      POC TCO2   24 23      POCT Glucose  108   44     Poik          Potassium          Total Protein          Protime          Rate          RBC          RDW          Sample   ARTERIAL ARTERIAL      Sodium          Target Cells          Troponin I 0.076  Comment:  The reference interval for Troponin I represents the 99th percentile   cutoff   for our facility and is consistent with 3rd generation assay   performance.           WBC                      09/17/18  1442 09/17/18  1206 09/17/18  1204 09/17/18  0810      Immature Granulocytes         Immature Grans (Abs)         Albumin         Alkaline Phosphatase         Allens Test N/A N/A       ALT         Anion Gap         Aniso         AST         Baso #         Basophilic Stippling         Basophil%         Total Bilirubin         Site Joseline/UAC Joseline/UAC       BUN, Bld         Calcium         Calcium, Ion         Chloride         CO2         Creatinine         DelSys         Differential Method         eGFR if          eGFR if non          Eos #         Eosinophil%         FiO2         Large/Giant Platelets         Glucose         Gran # (ANC)         Gran%         Hematocrit         Hemoglobin         Hypo         Coumadin Monitoring INR         Lymph #         Lymph%         Magnesium         MCH         MCHC         MCV         Mode         Mono #         Mono%         MPV         nRBC         PEEP         Phosphorus         Platelet Estimate         Platelets          POC BE -2 -1       POC HCO3 21.5 22.6       POC PCO2 30.7 32.3       POC PH 7.454 7.454       POC PO2 45 161       POC SATURATED O2 83 100       POC TCO2 22 24       POCT Glucose   56 89     Poik         Potassium         Total Protein         Protime         Rate         RBC         RDW         Sample ARTERIAL ARTERIAL       Sodium         Target Cells         Troponin I         WBC               Significant Imaging:  I have reviewed all pertinent imaging results/findings within the past 24 hours.    Assessment/Plan:     Acute respiratory distress    Neuro:  Sedation: Propofol and Fentanyl gtt.   Pain: Percocet PRN     Pulm:  - intubated  -Vent Mode: A/C  Oxygen Concentration (%):  [40-71] 50  Resp Rate Total:  [18 br/min-49 br/min] 20 br/min  Vt Set:  [350 mL] 350 mL  PEEP/CPAP:  [5 cmH20-8 cmH20] 8 cmH20  Pressure Support:  [10 cmH20] 10 cmH20  Mean Airway Pressure:  [6.7 xjQ81-54 cmH20] 12 cmH20  - ABG 7.486/30.9/174/23.4/0  - ABG PRN  - daily CXRs     CV:  - HDS, off pressors    - 9/15: 3UpRBCs, 2 U FFP prior to OR  - 9/15: 1 U plts, 1 U cryo based on TEG study    GI/FEN/Lytes:  - NPO  - D10 at 50 mL/hr  - TF at 10 mL/hr.     - Replace lytes PRN      Renal:  - BUN/Cr at 14/0.9  - monitor UOP     Heme:  - H/H stable at 8.6/26.0  - CBC, INR, fibrinogen q 4    ID:  - WBC at 17.90  - peritoneal cx growing Bacteroides and E. Coli  - Zosyn d/c'd.  - Repeat Blood/Urine cx NGTD     Endo:  - SSI    PPX:   - Lovenox, protonix gtt    Dispo:   - Cont SICU care           Critical care was time spent personally by me on the following activities: development of treatment plan with patient or surrogate and bedside caregivers, discussions with consultants, evaluation of patient's response to treatment, examination of patient, ordering and performing treatments and interventions, ordering and review of laboratory studies, ordering and review of radiographic studies, pulse oximetry, re-evaluation of patient's condition.  This  critical care time did not overlap with that of any other provider or involve time for any procedures.     Fer Smith MD  Critical Care - Surgery  [facility]

## 2018-09-18 NOTE — PHYSICIAN QUERY
PT Name: Amari Saez  MR #: [id number]     Physician Query Form - Documentation Clarification      CDS/: Leila Escalona RN                 Contact information:[email or web address]    This form is a permanent document in the medical record.     Query Date: September 18, 2018    By submitting this query, we are merely seeking further clarification of documentation. Please utilize your independent clinical judgment when addressing the question(s) below.    The Medical record reflects the following:    Supporting Clinical Findings Location in Medical Record    We performed upper endoscopy with manual assistance through the open abdomen to enter the stomach retrograde via the biliopancreatic limb. They identified a bleeding gastric ulcer in the lesser curve, but were unable to intervene given the angle.  A 12 mm trocar was then placed through the gastrotomy to allow access for the endoscope.  Dr. Rawls was then able to place a clip and epinephrine injections to the bleeding ulcer with good hemostasis. Please refer to the gastroenterology procedure note for more information on their procedure. OP note 9/15   Impression:  - Normal esophagus.  - Gastric bypass with intact staple line and normal      gastric pouch mucosa. Gastrojejunal anastomosis      characterized by healthy appearing mucosa. Normal      Familia-limb and normal jejuno-jejunal anastomosis.      Normal duodenum.  - Blood seen throughout the upper GI tract      secondary to bleeding from the remnant stomach and      migration through the GI tract.  - Intact gastrostomy with a patent G-tube present      within the remnant/excluded stomach.  - Gastric ulcer with spurting hemorrhage (Seng      Class Ia). Clip (MR conditional) was placed. Injected.  - No specimens collected.  - Complex and lengthy, intra-operative endoscopy      requiring use of two different scope and      extraordinary measures and techniques as described       above (modifier .22). EGD 9/15                                                                             Doctor, Please specify diagnosis or diagnoses associated with above clinical findings.    Please specify the acuity of the gastric ulcer.     Provider Use Only      __x__acute gastric ulcer    ____chronic gastric ulcer    ____acute on chronic gastric ulcer    ____other_________________________                                                                                                               [  ] Clinically undetermined

## 2018-09-18 NOTE — PROGRESS NOTES
Progress Note  Surgery    Admit Date: 9/12/2018  Attending: Jammie  S/P: Procedure(s) (LRB):  LAPAROTOMY,EXPLORATORY (N/A)  GASTROSTOMY (N/A)  EGD (ESOPHAGOGASTRODUODENOSCOPY) (N/A)    Post-operative Day: 3 Days Post-Op    Hospital Day: 7    SUBJECTIVE:     Re-intubated yesterday for hypoxia, likely due to fluid overload/ ARDS from Friday.      Required multiple pushes of D50 overnight.  No other events.  BP stable, and diuresed very well with just 20 of Lasix.  CXR improved.  Only sweat per ostomy.        OBJECTIVE:     Vital Signs (Most Recent)  Temp: 96.6 °F (35.9 °C) (09/18/18 0513)  Pulse: 88 (09/18/18 0600)  Resp: 20 (09/18/18 0600)  BP: 110/85 (09/17/18 1729)  SpO2: 100 % (09/18/18 0600)    Vital Signs Range (Last 24H):  Temp:  [96.4 °F (35.8 °C)-98.2 °F (36.8 °C)]   Pulse:  []   Resp:  [20-37]   BP: (110-155)/()   SpO2:  [67 %-100 %]   Arterial Line BP: ()/()     I & O (Last 24H):    Intake/Output Summary (Last 24 hours) at 9/18/2018 0703  Last data filed at 9/18/2018 0600  Gross per 24 hour   Intake 1082.32 ml   Output 2845 ml   Net -1762.68 ml       Physical Exam:  Gen: Intubated, sedated  HEENT: NCAT  Pulm: Mechanical breath sounds  Abd: Soft.  Ostomy bag in place over serous leakage site from incision.  Otherwise, incision is c/d/i.  Ostomy with sweat only.    Extremities: no cyanosis or edema, or clubbing  Skin: Skin color, texture, turgor normal. No rashes or lesions    Laboratory:  CBC:   Recent Labs   Lab  09/18/18   0300   WBC  17.90*   RBC  3.01*   HGB  8.6*   HCT  26.0*   PLT  105*   MCV  86   MCH  28.6   MCHC  33.1     CMP:   Recent Labs   Lab  09/18/18   0300   GLU  93   CALCIUM  7.6*   ALBUMIN  1.4*   PROT  3.3*   NA  142   K  3.5   CO2  23   CL  113*   BUN  14   CREATININE  0.9   ALKPHOS  103   ALT  19   AST  26   BILITOT  1.2*     Coagulation:   Recent Labs   Lab  09/18/18   0300   INR  1.4*     Labs within the past 24 hours have been reviewed.    ASSESSMENT/PLAN:      Assessment: 62 year old man with perforated diverticulitis and free air s/p Tash's procedure 9/12, ex-lap for bleeding, with intraop endoscopic control of bleeding remnant gastric ulcer 9/15    Plan:  Continue diuresis- goal of 1.5 - 2L off today if pressures tolerate.  20 Lasix this AM  NPO  TF to 20- F/u nutrition recs for final goal  D10 gtt for hypoglycemia- hope to wean with the increased TF rate  Consider TPN   Awaiting return of bowel function  Daily CXR  Lovenox  Home meds    GI bleed:  Hb stable.  BID protonix    Juan Carlos Hooper MD  General Surgery, PGY-5  [phone]

## 2018-09-18 NOTE — PT/OT/SLP DISCHARGE
Occupational Therapy Discharge Summary    Amari Saez  MRN: [id number]   Principal Problem: Perforated diverticulum of large intestine      Patient Discharged from acute Occupational Therapy on 9/14/2018.  Please refer to prior OT note dated 9/14/2018 for functional status.    Assessment:      Patient was discharged unexpectedly.  Information required to complete an accurate discharge summary is unknown.  Refer to therapy initial evaluation and last progress note for initial and most recent functional status and goal achievement.  Recommendations made may be found in medical record.    Objective:     GOALS:   Multidisciplinary Problems     Occupational Therapy Goals        Problem: Occupational Therapy Goal    Goal Priority Disciplines Outcome Interventions   Occupational Therapy Goal     OT, PT/OT Ongoing (interventions implemented as appropriate)    Description:  Goals to be met by: 9/20/18     Patient will increase functional independence with ADLs by performing:    UE Dressing with Set-up Assistance.  LE Dressing with Set-up Assistance.  Grooming while standing at sink with Stand-by Assistance.  Toileting from toilet with Stand-by Assistance for hygiene and clothing management.   Supine to sit with Contact Guard Assistance.  Toilet transfer to toilet with Stand-by Assistance.                      Reasons for Discontinuation of Therapy Services  Transfer to alternate level of care.      Plan:     Patient Discharged to: ICU    Nagi Vazquez, OT  9/18/2018

## 2018-09-18 NOTE — ASSESSMENT & PLAN NOTE
Neuro:  Sedation: Propofol and Fentanyl gtt.   Pain: Percocet PRN     Pulm:  - intubated  -Vent Mode: A/C  Oxygen Concentration (%):  [40-71] 50  Resp Rate Total:  [18 br/min-49 br/min] 20 br/min  Vt Set:  [350 mL] 350 mL  PEEP/CPAP:  [5 cmH20-8 cmH20] 8 cmH20  Pressure Support:  [10 cmH20] 10 cmH20  Mean Airway Pressure:  [6.7 mxU35-95 cmH20] 12 cmH20  - ABG 7.486/30.9/174/23.4/0  - ABG PRN  - daily CXRs     CV:  - HDS, off pressors    - 9/15: 3UpRBCs, 2 U FFP prior to OR  - 9/15: 1 U plts, 1 U cryo based on TEG study    GI/FEN/Lytes:  - NPO  - D10 at 50 mL/hr  - TF at 10 mL/hr.     - Replace lytes PRN      Renal:  - BUN/Cr at 14/0.9  - monitor UOP     Heme:  - H/H stable at 8.6/26.0  - CBC, INR, fibrinogen q 4    ID:  - WBC at 17.90  - peritoneal cx growing Bacteroides and E. Coli  - Zosyn d/c'd.  - Repeat Blood/Urine cx NGTD     Endo:  - SSI    PPX:   - Lovenox, protonix gtt    Dispo:   - Cont SICU care

## 2018-09-18 NOTE — SUBJECTIVE & OBJECTIVE
Interval History/Significant Events:   Pt reintubated overnight 2/2 repetative desaturations yesterday afternoon. Remained HDS, off pressors. TF at goal of 10 mL/hr. Good UOP at 2.695L x24 hours. No other acute issues.     Follow-up For: Procedure(s) (LRB):  LAPAROTOMY,EXPLORATORY (N/A)  GASTROSTOMY (N/A)  EGD (ESOPHAGOGASTRODUODENOSCOPY) (N/A)    Post-Operative Day: 3 Days Post-Op    Objective:     Vital Signs (Most Recent):  Temp: 96.6 °F (35.9 °C) (09/18/18 0513)  Pulse: 88 (09/18/18 0600)  Resp: 20 (09/18/18 0600)  BP: 110/85 (09/17/18 1729)  SpO2: 100 % (09/18/18 0600) Vital Signs (24h Range):  Temp:  [96.4 °F (35.8 °C)-98.2 °F (36.8 °C)] 96.6 °F (35.9 °C)  Pulse:  [] 88  Resp:  [20-37] 20  SpO2:  [67 %-100 %] 100 %  BP: (110-155)/() 110/85  Arterial Line BP: ()/() 112/79     Weight: 70.2 kg (154 lb 12.2 oz)  Body mass index is 24.98 kg/m².      Intake/Output Summary (Last 24 hours) at 9/18/2018 0708  Last data filed at 9/18/2018 0600  Gross per 24 hour   Intake 1082.32 ml   Output 2845 ml   Net -1762.68 ml       Physical Exam   Constitutional: He appears well-developed and well-nourished.   HENT:   Head: Normocephalic and atraumatic.   Eyes:   Pupils not equal but are reactive to light   Cardiovascular: Normal rate and regular rhythm.   No murmur heard.  Pulmonary/Chest: He has rales.   intubated   Abdominal: Soft. Bowel sounds are normal.   Neurological:   Sedated   Skin: Skin is warm and dry.       Vents:  Vent Mode: A/C (09/18/18 0141)  Ventilator Initiated: Yes (09/14/18 2114)  Set Rate: 20 bmp (09/18/18 0513)  Vt Set: 350 mL (09/17/18 0936)  Pressure Support: 10 cmH20 (09/17/18 1020)  PEEP/CPAP: 8 cmH20 (09/18/18 0513)  Oxygen Concentration (%): 50 (09/18/18 0600)  Peak Airway Pressure: 24 cmH2O (09/18/18 0513)  Plateau Pressure: 0 cmH20 (09/17/18 2233)  Total Ve: 9.36 mL (09/18/18 0513)  Negative Inspiratory Force (cm H2O): -28 (09/17/18 1145)  F/VT Ratio<105 (RSBI): (!) (P) 43.48  (09/18/18 0513)    Lines/Drains/Airways     Central Venous Catheter Line                 Percutaneous Central Line Insertion/Assessment - triple lumen  09/16/18 1310 right internal jugular 1 day          Drain                 Colostomy 09/12/18 LLQ 6 days         NG/OG Tube 09/14/18 2038 Barrett adamap Left nostril 3 days         Urethral Catheter 09/1955 Latex 16 Fr. 3 days         Gastrostomy/Enterostomy 09/15/18 1621 Gastrostomy tube w/o balloon LUQ decompression 2 days          Arterial Line                 Arterial Line 09/14/18 1953 Left Radial 3 days          Pressure Ulcer                 Pressure Injury 09/13/18 1534 lower Coccyx Stage 3 4 days          Peripheral Intravenous Line                 Midline Catheter Insertion/Assessment  - Single Lumen 09/13/18 1748 Right brachial vein 20g x 10cm 4 days         Peripheral IV - Single Lumen 09/13/18 1858 Anterior;Right Forearm 4 days                Significant Labs:    CBC/Anemia Profile:  Recent Labs   Lab  09/17/18   0300  09/17/18   1837  09/18/18   0300   WBC  15.39*  23.30*  17.90*   HGB  8.5*  9.5*  8.6*   HCT  24.1*  27.5*  26.0*   PLT  118*  143*  105*   MCV  83  85  86   RDW  16.7*  16.8*  17.0*        Chemistries:  Recent Labs   Lab  09/16/18   1607  09/17/18   0300  09/17/18   1837  09/18/18   0300   NA  139  139  140  142   K  3.6  4.1  4.2  3.5   CL  112*  113*  113*  113*   CO2  22*  21*  21*  23   BUN  14  14  14  14   CREATININE  0.9  0.9  0.9  0.9   CALCIUM  8.6*  7.9*  7.6*  7.6*   ALBUMIN  1.7*  1.6*  1.6*  1.4*   PROT  3.3*  3.4*  4.0*  3.3*   BILITOT  1.8*  1.3*  1.4*  1.2*   ALKPHOS  78  89  103  103   ALT  17  17  24  19   AST  23  18  42*  26   MG  1.8  2.1   --   1.5*   PHOS   --   2.3*   --   3.1       Recent Lab Results       09/18/18  0511 09/18/18  0319 09/18/18  0300 09/18/18  0203 09/18/18  0201      Immature Granulocytes   0.8       Immature Grans (Abs)   0.14  Comment:  Mild elevation in immature granulocytes is non specific  and   can be seen in a variety of conditions including stress response,   acute inflammation, trauma and pregnancy. Correlation with other   laboratory and clinical findings is essential.         Albumin   1.4       Alkaline Phosphatase   103       Allens Test          ALT   19       Anion Gap   6       Aniso          AST   26       Baso #   0.02       Basophilic Stippling          Basophil%   0.1       Total Bilirubin   1.2  Comment:  For infants and newborns, interpretation of results should be based  on gestational age, weight and in agreement with clinical  observations.  Premature Infant recommended reference ranges:  Up to 24 hours.............<8.0 mg/dL  Up to 48 hours............<12.0 mg/dL  3-5 days..................<15.0 mg/dL  6-29 days.................<15.0 mg/dL         Site          BUN, Bld   14       Calcium   7.6       Calcium, Ion   1.14       Chloride   113       CO2   23       Creatinine   0.9       DelSys          Differential Method   Automated       eGFR if    >60.0       eGFR if non    >60.0  Comment:  Calculation used to obtain the estimated glomerular filtration  rate (eGFR) is the CKD-EPI equation.          Eos #   0.0       Eosinophil%   0.2       FiO2          Large/Giant Platelets          Glucose   93       Gran # (ANC)   15.9       Gran%   89.0       Hematocrit   26.0       Hemoglobin   8.6       Hypo          Coumadin Monitoring INR   1.4  Comment:  Coumadin Therapy:  2.0 - 3.0 for INR for all indicators except mechanical heart valves  and antiphospholipid syndromes which should use 2.5 - 3.5.         Lymph #   1.4       Lymph%   7.8       Magnesium   1.5       MCH   28.6       MCHC   33.1       MCV   86       Mode          Mono #   0.4       Mono%   2.1       MPV   11.7       nRBC   0       PEEP          Phosphorus   3.1       Platelet Estimate          Platelets   105       POC BE          POC HCO3          POC PCO2          POC PH          POC PO2           POC SATURATED O2          POC TCO2          POCT Glucose 61 99  63 20     Poik          Potassium   3.5       Total Protein   3.3       Protime   14.3       Rate          RBC   3.01       RDW   17.0       Sample          Sodium   142       Target Cells          Troponin I   0.048  Comment:  The reference interval for Troponin I represents the 99th percentile   cutoff   for our facility and is consistent with 3rd generation assay   performance.         WBC   17.90                   09/17/18  2322 09/17/18  2248 09/17/18  2245 09/17/18  1949 09/17/18  1937      Immature Granulocytes          Immature Grans (Abs)          Albumin          Alkaline Phosphatase          Allens Test    N/A      ALT          Anion Gap          Aniso          AST          Baso #          Basophilic Stippling          Basophil%          Total Bilirubin          Site    Joseline/UAC      BUN, Bld          Calcium          Calcium, Ion          Chloride          CO2          Creatinine          DelSys    Adult Vent      Differential Method          eGFR if           eGFR if non           Eos #          Eosinophil%          FiO2    60      Large/Giant Platelets          Glucose          Gran # (ANC)          Gran%          Hematocrit          Hemoglobin          Hypo          Coumadin Monitoring INR          Lymph #          Lymph%          Magnesium          MCH          MCHC          MCV          Mode    AC/PRVC      Mono #          Mono%          MPV          nRBC          PEEP    8      Phosphorus          Platelet Estimate          Platelets          POC BE    0      POC HCO3    23.4      POC PCO2    30.9      POC PH    7.486      POC PO2    174      POC SATURATED O2    100      POC TCO2    24      POCT Glucose 90 67   166     Poik          Potassium          Total Protein          Protime          Rate    24      RBC          RDW          Sample    ARTERIAL      Sodium          Target Cells           Troponin I   0.050  Comment:  The reference interval for Troponin I represents the 99th percentile   cutoff   for our facility and is consistent with 3rd generation assay   performance.         WBC                      09/17/18  1919 09/17/18  1841 09/17/18  1837 09/17/18  1816 09/17/18  1746      Immature Granulocytes   1.3       Immature Grans (Abs)   0.30  Comment:  Mild elevation in immature granulocytes is non specific and   can be seen in a variety of conditions including stress response,   acute inflammation, trauma and pregnancy. Correlation with other   laboratory and clinical findings is essential.         Albumin   1.6       Alkaline Phosphatase   103       Allens Test    N/A      ALT   24       Anion Gap   6       Aniso   Slight       AST   42  Comment:  *Result may be interfered by visible hemolysis       Baso #   0.03       Basophilic Stippling   Occasional       Basophil%   0.1       Total Bilirubin   1.4  Comment:  For infants and newborns, interpretation of results should be based  on gestational age, weight and in agreement with clinical  observations.  Premature Infant recommended reference ranges:  Up to 24 hours.............<8.0 mg/dL  Up to 48 hours............<12.0 mg/dL  3-5 days..................<15.0 mg/dL  6-29 days.................<15.0 mg/dL         Site    [place]      BUN, Bld   14       Calcium   7.6       Calcium, Ion          Chloride   113       CO2   21       Creatinine   0.9       DelSys          Differential Method   Automated       eGFR if    >60.0       eGFR if non    >60.0  Comment:  Calculation used to obtain the estimated glomerular filtration  rate (eGFR) is the CKD-EPI equation.          Eos #   0.0       Eosinophil%   0.0       FiO2          Large/Giant Platelets   Present       Glucose   68       Gran # (ANC)   21.7       Gran%   93.2       Hematocrit   27.5       Hemoglobin   9.5       Hypo   Occasional       Coumadin Monitoring INR           Lymph #   0.8       Lymph%   3.3       Magnesium          MCH   29.4       MCHC   34.5       MCV   85       Mode          Mono #   0.5       Mono%   2.1       MPV   11.4       nRBC   0       PEEP          Phosphorus          Platelet Estimate   Decreased       Platelets   143       POC BE    0      POC HCO3    24.5      POC PCO2    35.9      POC PH    7.442      POC PO2    77      POC SATURATED O2    96      POC TCO2    26      POCT Glucose 47 61   60     Poik   Slight       Potassium   4.2       Total Protein   4.0       Protime          Rate          RBC   3.23       RDW   16.8       Sample    ARTERIAL      Sodium   140       Target Cells   Occasional       Troponin I          WBC   23.30                   09/17/18  1742 09/17/18  1700 09/17/18  1632 09/17/18  1536 09/17/18  1535      Immature Granulocytes          Immature Grans (Abs)          Albumin          Alkaline Phosphatase          Allens Test   N/A N/A      ALT          Anion Gap          Aniso          AST          Baso #          Basophilic Stippling          Basophil%          Total Bilirubin          Site   Joseline/UAC Joseline/UAC      BUN, Bld          Calcium          Calcium, Ion          Chloride          CO2          Creatinine          DelSys          Differential Method          eGFR if           eGFR if non           Eos #          Eosinophil%          FiO2          Large/Giant Platelets          Glucose          Gran # (ANC)          Gran%          Hematocrit          Hemoglobin          Hypo          Coumadin Monitoring INR          Lymph #          Lymph%          Magnesium          MCH          MCHC          MCV          Mode          Mono #          Mono%          MPV          nRBC          PEEP          Phosphorus          Platelet Estimate          Platelets          POC BE   -1 -2      POC HCO3   23.1 21.9      POC PCO2   34.0 30.4      POC PH   7.439 7.467      POC PO2   65 58      POC SATURATED O2    93 92      POC TCO2   24 23      POCT Glucose  108   44     Poik          Potassium          Total Protein          Protime          Rate          RBC          RDW          Sample   ARTERIAL ARTERIAL      Sodium          Target Cells          Troponin I 0.076  Comment:  The reference interval for Troponin I represents the 99th percentile   cutoff   for our facility and is consistent with 3rd generation assay   performance.           WBC                      09/17/18  1442 09/17/18  1206 09/17/18  1204 09/17/18  0810      Immature Granulocytes         Immature Grans (Abs)         Albumin         Alkaline Phosphatase         Allens Test N/A N/A       ALT         Anion Gap         Aniso         AST         Baso #         Basophilic Stippling         Basophil%         Total Bilirubin         Site Joseline/UAC Joseline/UAC       BUN, Bld         Calcium         Calcium, Ion         Chloride         CO2         Creatinine         DelSys         Differential Method         eGFR if          eGFR if non          Eos #         Eosinophil%         FiO2         Large/Giant Platelets         Glucose         Gran # (ANC)         Gran%         Hematocrit         Hemoglobin         Hypo         Coumadin Monitoring INR         Lymph #         Lymph%         Magnesium         MCH         MCHC         MCV         Mode         Mono #         Mono%         MPV         nRBC         PEEP         Phosphorus         Platelet Estimate         Platelets         POC BE -2 -1       POC HCO3 21.5 22.6       POC PCO2 30.7 32.3       POC PH 7.454 7.454       POC PO2 45 161       POC SATURATED O2 83 100       POC TCO2 22 24       POCT Glucose   56 89     Poik         Potassium         Total Protein         Protime         Rate         RBC         RDW         Sample ARTERIAL ARTERIAL       Sodium         Target Cells         Troponin I         WBC               Significant Imaging:  I have reviewed all pertinent imaging  results/findings within the past 24 hours.

## 2018-09-18 NOTE — PLAN OF CARE
"Problem: Patient Care Overview  Goal: Plan of Care Review  Outcome: Ongoing (interventions implemented as appropriate)  Dx: s/p code    Shift Events: extubated, reintubated    Neuro: arouses to voice, moves all extremities, follows commands     Vital Signs: /85   Pulse 87   Temp 97 °F (36.1 °C)   Resp (!) 24   Ht 5' 6" (1.676 m)   Wt 70.2 kg (154 lb 12.2 oz)   SpO2 100%   BMI 24.98 kg/m²     Intake/Gtts/Diet: NPO. All gtts stopped. Propofol, fentanyl restarted after reintubation    Output: mcneill lasix given to diuress pt    Pain Management:: started on fentanyl gtt    Labs: troponin and stat labs sent when pt decompensated     Skin: pts skin weeping, stage 3 to sacrum. Midline incision           "

## 2018-09-18 NOTE — PLAN OF CARE
Problem: Patient Care Overview  Goal: Plan of Care Review  Outcome: Ongoing (interventions implemented as appropriate)  Nutrition assessment completed. Please see RD note for details.    Recommendation/Intervention:   As medically able, recommend increasing TF goal rate.   Isosource 1.5 @ 45mL/hr.   - Provides 1620kcals, 73g protein and 825mL free water.   - Hold for residuals >500mL.      RD to monitor.     Goals: Pt to receive >85% EEN and EPN.  Nutrition Goal Status: new  Communication of RD Recs: reviewed with RN

## 2018-09-19 NOTE — PLAN OF CARE
Problem: Patient Care Overview  Goal: Plan of Care Review  Outcome: Ongoing (interventions implemented as appropriate)  No s/s of distress today. Pt remains intubated (current settings A/C rate 20, PEEP 5, FiO2 40%). Propofol IV provides light sedation. Fentanyl IV provides full effective pain relief.  TPN IV and tube feedings for nutrition, with accu-check frequency decreased to q4h due to stability. Plan of care reviewed with pt, but no evidence of understanding demonstrated. Emotional support offered.

## 2018-09-19 NOTE — ASSESSMENT & PLAN NOTE
Neuro:  Sedation: Propofol and Fentanyl gtt.   Pain: Percocet PRN     Pulm:  - intubated  -Vent Mode: A/C  Oxygen Concentration (%):  [40-50] 40  Resp Rate Total:  [20 br/min-25 br/min] 25 br/min  Vt Set:  [380 mL] 380 mL  PEEP/CPAP:  [5 cmH20-8 cmH20] 5 cmH20  Mean Airway Pressure:  [8.4 [id number] cmH20] 9.3 cmH20  - ABG 7.373/42.5/102/24.7/-1  - ABG PRN  - daily CXRs. Most recent (9/19) showing improved aeration in the lower lung zones.    CV:  - HDS, off pressors    - 9/15: 3UpRBCs, 2 U FFP prior to OR  - 9/15: 1 U plts, 1 U cryo based on TEG study    GI/FEN/Lytes:  - NPO  - TPM at 60 mL/hr.  - Replace lytes PRN      Renal:  - BUN/Cr at 18/0.9  - monitor UOP     Heme:  - H/H stable at 8.8/26.2  - CBC, INR, fibrinogen q 4    ID:  - WBC at 20.59  - peritoneal cx growing Bacteroides and E. Coli  - Zosyn d/c'd.  - Repeat Blood/Urine cx NGTD     Endo:  - SSI    PPX:   - Lovenox, protonix gtt    Dispo:   - Cont SICU care

## 2018-09-19 NOTE — SUBJECTIVE & OBJECTIVE
Interval History/Significant Events:   NAEON. HDS, off pressors. Remains intubated and sedated on Propofol and Fentanyl gtt. ET tube advanced 1cm yesterday to 23cm at the lip. TPN at 60 mL/hr. UOP at 2.66L x24 hrs. No other acute changes.    Follow-up For: Procedure(s) (LRB):  LAPAROTOMY,EXPLORATORY (N/A)  GASTROSTOMY (N/A)  EGD (ESOPHAGOGASTRODUODENOSCOPY) (N/A)    Post-Operative Day: 4 Days Post-Op    Objective:     Vital Signs (Most Recent):  Temp: 97.5 °F (36.4 °C) (09/19/18 0718)  Pulse: 104 (09/19/18 0718)  Resp: (!) 21 (09/19/18 0718)  BP: 110/85 (09/17/18 1729)  SpO2: 100 % (09/19/18 0718) Vital Signs (24h Range):  Temp:  [96.4 °F (35.8 °C)-97.9 °F (36.6 °C)] 97.5 °F (36.4 °C)  Pulse:  [] 104  Resp:  [16-25] 21  SpO2:  [59 %-100 %] 100 %  Arterial Line BP: ()/(57-90) 131/84     Weight: 81 kg (178 lb 9.2 oz)  Body mass index is 28.82 kg/m².      Intake/Output Summary (Last 24 hours) at 9/19/2018 0728  Last data filed at 9/19/2018 0600  Gross per 24 hour   Intake 2502 ml   Output 2990 ml   Net -488 ml       Physical Exam   Constitutional: He appears well-developed and well-nourished.   HENT:   Head: Normocephalic and atraumatic.   Eyes:   Pupils not equal but are reactive to light   Cardiovascular: Normal rate and regular rhythm.   No murmur heard.  Pulmonary/Chest: He has rales.   intubated   Abdominal: Soft. Bowel sounds are normal.   Neurological:   Sedated   Skin: Skin is warm and dry.       Vents:  Vent Mode: A/C (09/19/18 0718)  Ventilator Initiated: Yes (09/14/18 2114)  Set Rate: 20 bmp (09/19/18 0718)  Vt Set: 380 mL (09/19/18 0718)  Pressure Support: 10 cmH20 (09/17/18 1020)  PEEP/CPAP: 5 cmH20 (09/19/18 0718)  Oxygen Concentration (%): 40 (09/19/18 0700)  Peak Airway Pressure: 19 cmH2O (09/19/18 0718)  Plateau Pressure: 0 cmH20 (09/19/18 0718)  Total Ve: 9.56 mL (09/19/18 0718)  Negative Inspiratory Force (cm H2O): -28 (09/17/18 1145)  F/VT Ratio<105 (RSBI): (!) 48.84 (09/19/18  0718)    Lines/Drains/Airways     Central Venous Catheter Line                 Percutaneous Central Line Insertion/Assessment - triple lumen  09/16/18 1310 right internal jugular 2 days          Drain                 Colostomy 09/12/18 LLQ 7 days         NG/OG Tube 09/14/18 2038 Perla castañeda Left nostril 4 days         Urethral Catheter 09/1955 Latex 16 Fr. 4 days         Gastrostomy/Enterostomy 09/15/18 1621 Gastrostomy tube w/o balloon LUQ decompression 3 days          Airway                 Airway - Non-Surgical 09/17/18 1720 Endotracheal Tube 1 day          Arterial Line                 Arterial Line 09/14/18 1953 Left Radial 4 days          Pressure Ulcer                 Pressure Injury 09/13/18 1534 lower Coccyx Stage 3 5 days          Peripheral Intravenous Line                 Midline Catheter Insertion/Assessment  - Single Lumen 09/13/18 1748 Right brachial vein 20g x 10cm 5 days         Peripheral IV - Single Lumen 09/13/18 1858 Anterior;Right Forearm 5 days                Significant Labs:    CBC/Anemia Profile:  Recent Labs   Lab  09/17/18   1837  09/18/18   0300  09/18/18   1655  09/19/18   0232   WBC  23.30*  17.90*   --   20.59*   HGB  9.5*  8.6*  9.5*  8.8*   HCT  27.5*  26.0*  27.1*  26.2*   PLT  143*  105*   --   117*   MCV  85  86   --   87   RDW  16.8*  17.0*   --   17.7*        Chemistries:  Recent Labs   Lab  09/17/18   1837  09/18/18   0300  09/19/18   0232   NA  140  142  141   K  4.2  3.5  4.2   CL  113*  113*  113*   CO2  21*  23  23   BUN  14  14  18   CREATININE  0.9  0.9  0.9   CALCIUM  7.6*  7.6*  7.4*   ALBUMIN  1.6*  1.4*  1.2*   PROT  4.0*  3.3*  3.4*   BILITOT  1.4*  1.2*  0.7   ALKPHOS  103  103  103   ALT  24  19  17   AST  42*  26  15   MG   --   1.5*  1.8   PHOS   --   3.1  3.8       Recent Lab Results  (Last 25 results in the past 24 hours)      09/19/18  0712 09/19/18  0626 09/19/18  0421 09/19/18  0243 09/19/18  0241      Immature Granulocytes          Immature Grans  (Abs)          Albumin          Alkaline Phosphatase          Allens Test     N/A     ALT          Anion Gap          AST          Baso #          Basophil%          Total Bilirubin          Site     Joseline/UAC     BUN, Bld          Calcium          Calcium, Ion          Chloride          CO2          Creatinine          DelSys     Adult Vent     Differential Method          EF          eGFR if           eGFR if non           Eos #          Eosinophil%          FiO2     40     Glucose          Gran # (ANC)          Gran%          Hematocrit          Hemoglobin          Coumadin Monitoring INR          Lymph #          Lymph%          Magnesium          MCH          MCHC          MCV          Min Vol     8.3     Mode     AC/PRVC     Mono #          Mono%          MPV          Mitral Valve Mobility          nRBC          Est. PA Systolic Pressure          PEEP     5     Pericardial Effusion          Phosphorus          PiP     17     Platelets          POC BE     -1     POC HCO3     24.7     POC PCO2     42.5     POC PH     7.373     POC PO2     102     POC SATURATED O2     98     POC TCO2     26     POCT Glucose 168 160 154 177      Potassium          Total Protein          Protime          Rate     20     RBC          RDW          Sample     ARTERIAL     Sodium          Sp02     100     Tricuspid Valve Regurgitation          Vt     380     WBC                      09/19/18  0232 09/19/18  0042 09/18/18  2332 09/18/18  2210 09/18/18  2107      Immature Granulocytes 1.2         Immature Grans (Abs) 0.24  Comment:  Mild elevation in immature granulocytes is non specific and   can be seen in a variety of conditions including stress response,   acute inflammation, trauma and pregnancy. Correlation with other   laboratory and clinical findings is essential.           Albumin 1.2         Alkaline Phosphatase 103         Allens Test          ALT 17         Anion Gap 5         AST 15          Baso # 0.03         Basophil% 0.1         Total Bilirubin 0.7  Comment:  For infants and newborns, interpretation of results should be based  on gestational age, weight and in agreement with clinical  observations.  Premature Infant recommended reference ranges:  Up to 24 hours.............<8.0 mg/dL  Up to 48 hours............<12.0 mg/dL  3-5 days..................<15.0 mg/dL  6-29 days.................<15.0 mg/dL           Site          BUN, Bld 18         Calcium 7.4         Calcium, Ion 1.09         Chloride 113         CO2 23         Creatinine 0.9         DelSys          Differential Method Automated         EF          eGFR if  >60.0         eGFR if non  >60.0  Comment:  Calculation used to obtain the estimated glomerular filtration  rate (eGFR) is the CKD-EPI equation.            Eos # 0.1         Eosinophil% 0.4         FiO2          Glucose 182         Gran # (ANC) 18.7         Gran% 91.0         Hematocrit 26.2         Hemoglobin 8.8         Coumadin Monitoring INR 1.2  Comment:  Coumadin Therapy:  2.0 - 3.0 for INR for all indicators except mechanical heart valves  and antiphospholipid syndromes which should use 2.5 - 3.5.           Lymph # 1.0         Lymph% 4.8         Magnesium 1.8         MCH 29.1         MCHC 33.6         MCV 87         Min Vol          Mode          Mono # 0.5         Mono% 2.5         MPV 12.0         Mitral Valve Mobility          nRBC 0         Est. PA Systolic Pressure          PEEP          Pericardial Effusion          Phosphorus 3.8         PiP          Platelets 117         POC BE          POC HCO3          POC PCO2          POC PH          POC PO2          POC SATURATED O2          POC TCO2          POCT Glucose  155 168 175 232     Potassium 4.2         Total Protein 3.4         Protime 12.4         Rate          RBC 3.02         RDW 17.7         Sample          Sodium 141         Sp02          Tricuspid Valve Regurgitation          Vt           WBC 20.59                     09/18/18  2104 09/18/18  2033 09/18/18  1812 09/18/18  1708 09/18/18  1655      Immature Granulocytes          Immature Grans (Abs)          Albumin          Alkaline Phosphatase          Allens Test          ALT          Anion Gap          AST          Baso #          Basophil%          Total Bilirubin          Site          BUN, Bld          Calcium          Calcium, Ion          Chloride          CO2          Creatinine          DelSys          Differential Method          EF          eGFR if           eGFR if non           Eos #          Eosinophil%          FiO2          Glucose          Gran # (ANC)          Gran%          Hematocrit     27.1     Hemoglobin     9.5     Coumadin Monitoring INR     1.2  Comment:  Coumadin Therapy:  2.0 - 3.0 for INR for all indicators except mechanical heart valves  and antiphospholipid syndromes which should use 2.5 - 3.5.       Lymph #          Lymph%          Magnesium          MCH          MCHC          MCV          Min Vol          Mode          Mono #          Mono%          MPV          Mitral Valve Mobility          nRBC          Est. PA Systolic Pressure          PEEP          Pericardial Effusion          Phosphorus          PiP          Platelets          POC BE          POC HCO3          POC PCO2          POC PH          POC PO2          POC SATURATED O2          POC TCO2          POCT Glucose <20 <20 97 125      Potassium          Total Protein          Protime     12.3     Rate          RBC          RDW          Sample          Sodium          Sp02          Tricuspid Valve Regurgitation          Vt          WBC                      09/18/18  1617 09/18/18  1516 09/18/18  1358 09/18/18  1331 09/18/18  1209      Immature Granulocytes          Immature Grans (Abs)          Albumin          Alkaline Phosphatase          Allens Test          ALT          Anion Gap          AST          Baso #           Basophil%          Total Bilirubin          Site          BUN, Bld          Calcium          Calcium, Ion          Chloride          CO2          Creatinine          DelSys          Differential Method          EF          eGFR if           eGFR if non           Eos #          Eosinophil%          FiO2          Glucose          Gran # (ANC)          Gran%          Hematocrit          Hemoglobin          Coumadin Monitoring INR          Lymph #          Lymph%          Magnesium          MCH          MCHC          MCV          Min Vol          Mode          Mono #          Mono%          MPV          Mitral Valve Mobility          nRBC          Est. PA Systolic Pressure          PEEP          Pericardial Effusion          Phosphorus          PiP          Platelets          POC BE          POC HCO3          POC PCO2          POC PH          POC PO2          POC SATURATED O2          POC TCO2          POCT Glucose 117 108 112 118 101     Potassium          Total Protein          Protime          Rate          RBC          RDW          Sample          Sodium          Sp02          Tricuspid Valve Regurgitation          Vt          WBC                      09/18/18  1104 09/18/18  1001 09/18/18  0937 09/18/18  0928 09/18/18  0856      Immature Granulocytes          Immature Grans (Abs)          Albumin          Alkaline Phosphatase          Allens Test   N/A       ALT          Anion Gap          AST          Baso #          Basophil%          Total Bilirubin          Site   Joseline/UAC       BUN, Bld          Calcium          Calcium, Ion          Chloride          CO2          Creatinine          DelSys   Adult Vent       Differential Method          EF    55      eGFR if           eGFR if non           Eos #          Eosinophil%          FiO2   40       Glucose          Gran # (ANC)          Gran%          Hematocrit          Hemoglobin          Coumadin  Monitoring INR          Lymph #          Lymph%          Magnesium          MCH          MCHC          MCV          Min Vol          Mode   AC/PRVC       Mono #          Mono%          MPV          Mitral Valve Mobility    NORMAL      nRBC          Est. PA Systolic Pressure    51.84      PEEP   8       Pericardial Effusion    SMALL      Phosphorus          PiP          Platelets          POC BE   1       POC HCO3   25.4       POC PCO2   38.6       POC PH   7.427       POC PO2   134       POC SATURATED O2   99       POC TCO2   27       POCT Glucose 131 100   107     Potassium          Total Protein          Protime          Rate   20       RBC          RDW          Sample   ARTERIAL       Sodium          Sp02   100       Tricuspid Valve Regurgitation    TRIVIAL      Vt   380       WBC                          Significant Imaging:  I have reviewed all pertinent imaging results/findings within the past 24 hours.

## 2018-09-19 NOTE — PROGRESS NOTES
Progress Note  Surgery    Admit Date: 9/12/2018  Attending: Jammie  S/P: Procedure(s) (LRB):  LAPAROTOMY,EXPLORATORY (N/A)  GASTROSTOMY (N/A)  EGD (ESOPHAGOGASTRODUODENOSCOPY) (N/A)    Post-operative Day: 4 Days Post-Op    Hospital Day: 8    SUBJECTIVE:     Re-intubated 9/17 for hypoxia, likely due to fluid overload/ ARDS from Friday, improved vent settings over the course of the day yesterday (minimal settings FiO2 40%, PEEP 5) and improved appearance of CXR this morning.       No other events,  BP stable, and diuresed very well with just 20 of Lasix.  Only sweat per ostomy.        OBJECTIVE:     Vital Signs (Most Recent)  Temp: 97.7 °F (36.5 °C) (09/19/18 0800)  Pulse: 106 (09/19/18 0800)  Resp: 20 (09/19/18 0800)  BP: 110/85 (09/17/18 1729)  SpO2: 100 % (09/19/18 0800)    Vital Signs Range (Last 24H):  Temp:  [96.4 °F (35.8 °C)-97.9 °F (36.6 °C)]   Pulse:  []   Resp:  [16-25]   SpO2:  [59 %-100 %]   Arterial Line BP: ()/(57-90)     I & O (Last 24H):    Intake/Output Summary (Last 24 hours) at 9/19/2018 0858  Last data filed at 9/19/2018 0800  Gross per 24 hour   Intake 2482 ml   Output 3190 ml   Net -708 ml       Physical Exam:  Gen: Intubated, sedated  HEENT: NCAT  Pulm: Mechanical breath sounds  Abd: Soft.  Ostomy bag in place over serous leakage site from incision.  Otherwise, incision is c/d/i.  Ostomy with sweat only.    Extremities: no cyanosis or edema, or clubbing  Skin: Skin color, texture, turgor normal. No rashes or lesions    Laboratory:  CBC:   Recent Labs   Lab  09/19/18   0232   WBC  20.59*   RBC  3.02*   HGB  8.8*   HCT  26.2*   PLT  117*   MCV  87   MCH  29.1   MCHC  33.6     CMP:   Recent Labs   Lab  09/19/18 0232   GLU  182*   CALCIUM  7.4*   ALBUMIN  1.2*   PROT  3.4*   NA  141   K  4.2   CO2  23   CL  113*   BUN  18   CREATININE  0.9   ALKPHOS  103   ALT  17   AST  15   BILITOT  0.7     Coagulation:   Recent Labs   Lab  09/19/18 0232   INR  1.2     Labs within the past 24  hours have been reviewed.    ASSESSMENT/PLAN:     Assessment: 62 year old man with perforated diverticulitis and free air s/p Tash's procedure 9/12, ex-lap for bleeding, with intraop endoscopic control of bleeding remnant gastric ulcer 9/15    Plan:  Continue diuresis today.  20 Lasix this AM  NPO  Hold TFs this AM in anticipation of SBT, plan to resume at 20, with likely increase to 30, following SBT- F/u nutrition recs for final goal  Continue TPN   Awaiting return of bowel function  Daily CXR  Lovenox  Home meds    GI bleed:  Hb stable.  BID protonix    Steffanie Estrella MD  PGY-2 General Surgery   [phone]

## 2018-09-19 NOTE — PLAN OF CARE
Problem: Patient Care Overview  Goal: Plan of Care Review  Plan of care reviewed with pt and family. Pt remains on fentanyl and propofol gtts per MD order. Pt remains on ventilator at 40 % 5 PEEP. Pt maintaining sats >95%. Pt UO range 100-300 this shift. Pt remains on tube feeds at 20cc/hr. Pt positioned off sacral wound for protection. VSS. Will continue to monitor.

## 2018-09-19 NOTE — PROGRESS NOTES
[facility]  Critical Care - Surgery  Progress Note    Patient Name: Amari Saez  MRN: [id number]  Admission Date: 9/12/2018  Hospital Length of Stay: 7 days  Code Status: Full Code  Attending Provider: Judd Agudelo MD  Primary Care Provider: Chaim Valdovinos MD   Principal Problem: Perforated diverticulum of large intestine    Subjective:     Hospital/ICU Course:  9/14: Transferred from floor to SICU for higher level of care.     Interval History/Significant Events:   NAEON. HDS, off pressors. Remains intubated and sedated on Propofol and Fentanyl gtt. ET tube advanced 1cm yesterday to 23cm at the lip. TPN at 60 mL/hr. UOP at 2.66L x24 hrs. No other acute changes.    Follow-up For: Procedure(s) (LRB):  LAPAROTOMY,EXPLORATORY (N/A)  GASTROSTOMY (N/A)  EGD (ESOPHAGOGASTRODUODENOSCOPY) (N/A)    Post-Operative Day: 4 Days Post-Op    Objective:     Vital Signs (Most Recent):  Temp: 97.5 °F (36.4 °C) (09/19/18 0718)  Pulse: 104 (09/19/18 0718)  Resp: (!) 21 (09/19/18 0718)  BP: 110/85 (09/17/18 1729)  SpO2: 100 % (09/19/18 0718) Vital Signs (24h Range):  Temp:  [96.4 °F (35.8 °C)-97.9 °F (36.6 °C)] 97.5 °F (36.4 °C)  Pulse:  [] 104  Resp:  [16-25] 21  SpO2:  [59 %-100 %] 100 %  Arterial Line BP: ()/(57-90) 131/84     Weight: 81 kg (178 lb 9.2 oz)  Body mass index is 28.82 kg/m².      Intake/Output Summary (Last 24 hours) at 9/19/2018 0728  Last data filed at 9/19/2018 0600  Gross per 24 hour   Intake 2502 ml   Output 2990 ml   Net -488 ml       Physical Exam   Constitutional: He appears well-developed and well-nourished.   HENT:   Head: Normocephalic and atraumatic.   Eyes:   Pupils not equal but are reactive to light   Cardiovascular: Normal rate and regular rhythm.   No murmur heard.  Pulmonary/Chest: He has rales.   intubated   Abdominal: Soft. Bowel sounds are normal.   Neurological:   Sedated   Skin: Skin is warm and dry.       Vents:  Vent Mode: A/C (09/19/18 0759)  Ventilator  Initiated: Yes (09/14/18 2114)  Set Rate: 20 bmp (09/19/18 0718)  Vt Set: 380 mL (09/19/18 0718)  Pressure Support: 10 cmH20 (09/17/18 1020)  PEEP/CPAP: 5 cmH20 (09/19/18 0718)  Oxygen Concentration (%): 40 (09/19/18 0700)  Peak Airway Pressure: 19 cmH2O (09/19/18 0718)  Plateau Pressure: 0 cmH20 (09/19/18 0718)  Total Ve: 9.56 mL (09/19/18 0718)  Negative Inspiratory Force (cm H2O): -28 (09/17/18 1145)  F/VT Ratio<105 (RSBI): (!) 48.84 (09/19/18 0718)    Lines/Drains/Airways     Central Venous Catheter Line                 Percutaneous Central Line Insertion/Assessment - triple lumen  09/16/18 1310 right internal jugular 2 days          Drain                 Colostomy 09/12/18 LLQ 7 days         NG/OG Tube 09/14/18 2038 Sterling sump Left nostril 4 days         Urethral Catheter 09/1955 Latex 16 Fr. 4 days         Gastrostomy/Enterostomy 09/15/18 1621 Gastrostomy tube w/o balloon LUQ decompression 3 days          Airway                 Airway - Non-Surgical 09/17/18 1720 Endotracheal Tube 1 day          Arterial Line                 Arterial Line 09/14/18 1953 Left Radial 4 days          Pressure Ulcer                 Pressure Injury 09/13/18 1534 lower Coccyx Stage 3 5 days          Peripheral Intravenous Line                 Midline Catheter Insertion/Assessment  - Single Lumen 09/13/18 1748 Right brachial vein 20g x 10cm 5 days         Peripheral IV - Single Lumen 09/13/18 1858 Anterior;Right Forearm 5 days                Significant Labs:    CBC/Anemia Profile:  Recent Labs   Lab  09/17/18   1837  09/18/18   0300  09/18/18   1655  09/19/18   0232   WBC  23.30*  17.90*   --   20.59*   HGB  9.5*  8.6*  9.5*  8.8*   HCT  27.5*  26.0*  27.1*  26.2*   PLT  143*  105*   --   117*   MCV  85  86   --   87   RDW  16.8*  17.0*   --   17.7*        Chemistries:  Recent Labs   Lab  09/17/18   1837  09/18/18   0300  09/19/18   0232   NA  140  142  141   K  4.2  3.5  4.2   CL  113*  113*  113*   CO2  21*  23  23   BUN  14   14  18   CREATININE  0.9  0.9  0.9   CALCIUM  7.6*  7.6*  7.4*   ALBUMIN  1.6*  1.4*  1.2*   PROT  4.0*  3.3*  3.4*   BILITOT  1.4*  1.2*  0.7   ALKPHOS  103  103  103   ALT  24  19  17   AST  42*  26  15   MG   --   1.5*  1.8   PHOS   --   3.1  3.8       Recent Lab Results  (Last 25 results in the past 24 hours)      09/19/18  0712 09/19/18  0626 09/19/18  0421 09/19/18  0243 09/19/18  0241      Immature Granulocytes          Immature Grans (Abs)          Albumin          Alkaline Phosphatase          Allens Test     N/A     ALT          Anion Gap          AST          Baso #          Basophil%          Total Bilirubin          Site     Joseline/UAC     BUN, Bld          Calcium          Calcium, Ion          Chloride          CO2          Creatinine          DelSys     Adult Vent     Differential Method          EF          eGFR if           eGFR if non           Eos #          Eosinophil%          FiO2     40     Glucose          Gran # (ANC)          Gran%          Hematocrit          Hemoglobin          Coumadin Monitoring INR          Lymph #          Lymph%          Magnesium          MCH          MCHC          MCV          Min Vol     8.3     Mode     AC/PRVC     Mono #          Mono%          MPV          Mitral Valve Mobility          nRBC          Est. PA Systolic Pressure          PEEP     5     Pericardial Effusion          Phosphorus          PiP     17     Platelets          POC BE     -1     POC HCO3     24.7     POC PCO2     42.5     POC PH     7.373     POC PO2     102     POC SATURATED O2     98     POC TCO2     26     POCT Glucose 168 160 154 177      Potassium          Total Protein          Protime          Rate     20     RBC          RDW          Sample     ARTERIAL     Sodium          Sp02     100     Tricuspid Valve Regurgitation          Vt     380     WBC                      09/19/18  0232 09/19/18  0042 09/18/18  2332 09/18/18  2210 09/18/18  2107       Immature Granulocytes 1.2         Immature Grans (Abs) 0.24  Comment:  Mild elevation in immature granulocytes is non specific and   can be seen in a variety of conditions including stress response,   acute inflammation, trauma and pregnancy. Correlation with other   laboratory and clinical findings is essential.           Albumin 1.2         Alkaline Phosphatase 103         Allens Test          ALT 17         Anion Gap 5         AST 15         Baso # 0.03         Basophil% 0.1         Total Bilirubin 0.7  Comment:  For infants and newborns, interpretation of results should be based  on gestational age, weight and in agreement with clinical  observations.  Premature Infant recommended reference ranges:  Up to 24 hours.............<8.0 mg/dL  Up to 48 hours............<12.0 mg/dL  3-5 days..................<15.0 mg/dL  6-29 days.................<15.0 mg/dL           Site          BUN, Bld 18         Calcium 7.4         Calcium, Ion 1.09         Chloride 113         CO2 23         Creatinine 0.9         DelSys          Differential Method Automated         EF          eGFR if  >60.0         eGFR if non  >60.0  Comment:  Calculation used to obtain the estimated glomerular filtration  rate (eGFR) is the CKD-EPI equation.            Eos # 0.1         Eosinophil% 0.4         FiO2          Glucose 182         Gran # (ANC) 18.7         Gran% 91.0         Hematocrit 26.2         Hemoglobin 8.8         Coumadin Monitoring INR 1.2  Comment:  Coumadin Therapy:  2.0 - 3.0 for INR for all indicators except mechanical heart valves  and antiphospholipid syndromes which should use 2.5 - 3.5.           Lymph # 1.0         Lymph% 4.8         Magnesium 1.8         MCH 29.1         MCHC 33.6         MCV 87         Min Vol          Mode          Mono # 0.5         Mono% 2.5         MPV 12.0         Mitral Valve Mobility          nRBC 0         Est. PA Systolic Pressure          PEEP          Pericardial  Effusion          Phosphorus 3.8         PiP          Platelets 117         POC BE          POC HCO3          POC PCO2          POC PH          POC PO2          POC SATURATED O2          POC TCO2          POCT Glucose  155 168 175 232     Potassium 4.2         Total Protein 3.4         Protime 12.4         Rate          RBC 3.02         RDW 17.7         Sample          Sodium 141         Sp02          Tricuspid Valve Regurgitation          Vt          WBC 20.59                     09/18/18  2104 09/18/18  2033 09/18/18  1812 09/18/18  1708 09/18/18  1655      Immature Granulocytes          Immature Grans (Abs)          Albumin          Alkaline Phosphatase          Allens Test          ALT          Anion Gap          AST          Baso #          Basophil%          Total Bilirubin          Site          BUN, Bld          Calcium          Calcium, Ion          Chloride          CO2          Creatinine          DelSys          Differential Method          EF          eGFR if           eGFR if non           Eos #          Eosinophil%          FiO2          Glucose          Gran # (ANC)          Gran%          Hematocrit     27.1     Hemoglobin     9.5     Coumadin Monitoring INR     1.2  Comment:  Coumadin Therapy:  2.0 - 3.0 for INR for all indicators except mechanical heart valves  and antiphospholipid syndromes which should use 2.5 - 3.5.       Lymph #          Lymph%          Magnesium          MCH          MCHC          MCV          Min Vol          Mode          Mono #          Mono%          MPV          Mitral Valve Mobility          nRBC          Est. PA Systolic Pressure          PEEP          Pericardial Effusion          Phosphorus          PiP          Platelets          POC BE          POC HCO3          POC PCO2          POC PH          POC PO2          POC SATURATED O2          POC TCO2          POCT Glucose <20 <20 97 125      Potassium          Total Protein           Protime     12.3     Rate          RBC          RDW          Sample          Sodium          Sp02          Tricuspid Valve Regurgitation          Vt          WBC                      09/18/18  1617 09/18/18  1516 09/18/18  1358 09/18/18  1331 09/18/18  1209      Immature Granulocytes          Immature Grans (Abs)          Albumin          Alkaline Phosphatase          Allens Test          ALT          Anion Gap          AST          Baso #          Basophil%          Total Bilirubin          Site          BUN, Bld          Calcium          Calcium, Ion          Chloride          CO2          Creatinine          DelSys          Differential Method          EF          eGFR if           eGFR if non           Eos #          Eosinophil%          FiO2          Glucose          Gran # (ANC)          Gran%          Hematocrit          Hemoglobin          Coumadin Monitoring INR          Lymph #          Lymph%          Magnesium          MCH          MCHC          MCV          Min Vol          Mode          Mono #          Mono%          MPV          Mitral Valve Mobility          nRBC          Est. PA Systolic Pressure          PEEP          Pericardial Effusion          Phosphorus          PiP          Platelets          POC BE          POC HCO3          POC PCO2          POC PH          POC PO2          POC SATURATED O2          POC TCO2          POCT Glucose 117 108 112 118 101     Potassium          Total Protein          Protime          Rate          RBC          RDW          Sample          Sodium          Sp02          Tricuspid Valve Regurgitation          Vt          WBC                      09/18/18  1104 09/18/18  1001 09/18/18  0937 09/18/18  0928 09/18/18  0856      Immature Granulocytes          Immature Grans (Abs)          Albumin          Alkaline Phosphatase          Allens Test   N/A       ALT          Anion Gap          AST          Baso #          Basophil%           Total Bilirubin          Site   Joseline/UAC       BUN, Bld          Calcium          Calcium, Ion          Chloride          CO2          Creatinine          DelSys   Adult Vent       Differential Method          EF    55      eGFR if           eGFR if non           Eos #          Eosinophil%          FiO2   40       Glucose          Gran # (ANC)          Gran%          Hematocrit          Hemoglobin          Coumadin Monitoring INR          Lymph #          Lymph%          Magnesium          MCH          MCHC          MCV          Min Vol          Mode   AC/PRVC       Mono #          Mono%          MPV          Mitral Valve Mobility    NORMAL      nRBC          Est. PA Systolic Pressure    51.84      PEEP   8       Pericardial Effusion    SMALL      Phosphorus          PiP          Platelets          POC BE   1       POC HCO3   25.4       POC PCO2   38.6       POC PH   7.427       POC PO2   134       POC SATURATED O2   99       POC TCO2   27       POCT Glucose 131 100   107     Potassium          Total Protein          Protime          Rate   20       RBC          RDW          Sample   ARTERIAL       Sodium          Sp02   100       Tricuspid Valve Regurgitation    TRIVIAL      Vt   380       WBC                          Significant Imaging:  I have reviewed all pertinent imaging results/findings within the past 24 hours.    Assessment/Plan:     Acute respiratory distress    Neuro:  Sedation: Propofol and Fentanyl gtt.   Pain: Percocet PRN     Pulm:  - intubated  -Vent Mode: A/C  Oxygen Concentration (%):  [40-50] 40  Resp Rate Total:  [20 br/min-25 br/min] 25 br/min  Vt Set:  [380 mL] 380 mL  PEEP/CPAP:  [5 cmH20-8 cmH20] 5 cmH20  Mean Airway Pressure:  [8.4 ggW71-21 cmH20] 9.3 cmH20  - ABG 7.373/42.5/102/24.7/-1  - ABG PRN  - daily CXRs. Most recent (9/19) showing improved aeration in the lower lung zones.    CV:  - HDS, off pressors    - 9/15: 3UpRBCs, 2 U FFP prior to OR  - 9/15: 1  U plts, 1 U cryo based on TEG study    GI/FEN/Lytes:  - NPO  - TPM at 60 mL/hr.  - Replace lytes PRN      Renal:  - BUN/Cr at 18/0.9  - monitor UOP     Heme:  - H/H stable at 8.8/26.2  - CBC, INR, fibrinogen q 4    ID:  - WBC at 20.59  - peritoneal cx growing Bacteroides and E. Coli  - Zosyn d/c'd.  - Repeat Blood/Urine cx NGTD     Endo:  - SSI    PPX:   - Lovenox, protonix gtt    Dispo:   - Cont SICU care             Critical care was time spent personally by me on the following activities: development of treatment plan with patient or surrogate and bedside caregivers, discussions with consultants, evaluation of patient's response to treatment, examination of patient, ordering and performing treatments and interventions, ordering and review of laboratory studies, ordering and review of radiographic studies, pulse oximetry, re-evaluation of patient's condition.  This critical care time did not overlap with that of any other provider or involve time for any procedures.     Fer Smiht MD  Critical Care - Surgery  [facility]

## 2018-09-19 NOTE — PLAN OF CARE
"Problem: Patient Care Overview  Goal: Plan of Care Review  Outcome: Ongoing (interventions implemented as appropriate)  Dx: Perforated Diverticulum of Large Intestine    Neuro: Arouses to voice and Follows commands    Vital Signs: /85   Pulse 110   Temp 97.9 °F (36.6 °C)   Resp (!) 25   Ht 5' 6" (1.676 m)   Wt 81 kg (178 lb 9.2 oz)   SpO2 100%   BMI 28.82 kg/m²  CVP 7-8,  AC 40% 5 PEEP    Intake/Gtts/Diet: Propofol, Fentanyl, TPN    Output: UOP: 985cc/shift    Pain Management: Fentanyl gtt     Labs: Accu q1h    Skin: Heels and elbows w/o redness or breakdown.  Pressure ulcer to sacrum dressed w/ Mepilex; cdi.  Midline abdominal incision CANDIE, bottom of incision leaking moderate amount of serous fluid through staples.  Multiple sites of weeping fluid.          "

## 2018-09-20 NOTE — SIGNIFICANT EVENT
Called to bedside by nurse for hematoma present L thumb.  Pt still able to move his L hand and fingers.   Noted color change of L thumb and distal fingertips, cold to touch.   No radial pulse appreciated.   Brachial artery doppler signal present, however no signal appreciated in radial, ulnar, or palmar arch.   Vascular team called immediately. Formal consult to follow.     Antonieta Grajeda MD, PGY1  [phone]

## 2018-09-20 NOTE — PROGRESS NOTES
Dr. Grajeda notified of pt with extreme agitation despite being on max dose of Precedex. MD states she will place one time order for haldol, keep pt on spontaneous, and do not restart Fentanyl gtt. MD aware pt not to go to CT until 1230. All orders noted and will be carried out.

## 2018-09-20 NOTE — CONSULTS
Amarimariana Saez  09/20/2018    Vascular Surgery Consult Note    CC: acute left hand ischemia    HPI:  Patient is a 62 y.o. male with a h/o HTN, smoking, and prior gastric bypass who presented to ED with abdominal pain found to have acute abdomen with free air from perforated diverticulitis, underwent Tash's procedure on 09/12/18 then subsequently re-lap/endoscopic gastric ulcer bleeding control on 0915/18. Since then he was extubated and re-intubated for ARDS on 09/17/18, intermittently on pressors. Left radial arterial line placed on 09/14/18 and removed 09/20/18 due to malfunction. New onset left thumb cyanosis and signs of ischemia. We are called to evaluate.     Patient is currently intubated but easily awakens but not following commands on precedex.       Past Medical History:   Diagnosis Date    Anxiety     Hypertension     Nicotine abuse      Past Surgical History:   Procedure Laterality Date    CARPAL TUNNEL RELEASE Bilateral     COLECTOMY,SIGMOID  9/12/2018    Performed by Judd Agudelo MD at [facility] OR [address] [place] [address]    COLOSTOMY  9/12/2018    Procedure: COLOSTOMY;  Surgeon: Judd Agudelo MD;  Location: [address];  Service: General;;    COLOSTOMY  9/12/2018    Performed by Judd Agudelo MD at [facility] OR [address] [place] [address]    EGD (ESOPHAGOGASTRODUODENOSCOPY) N/A 9/15/2018    Performed by Jarod Pitt MD at [facility] OR [address] [place] [address]    ESOPHAGOGASTRODUODENOSCOPY N/A 9/15/2018    Procedure: EGD (ESOPHAGOGASTRODUODENOSCOPY);  Surgeon: Jarod Pitt MD;  Location: [address];  Service: General;  Laterality: N/A;    EYE SURGERY      vein occlusion -laser sugery for burst blood vessel     EYE SURGERY      lasix    GASTRIC BYPASS  2004    GASTROSTOMY N/A 9/15/2018    Procedure: GASTROSTOMY;  Surgeon: Jarod Pitt MD;  Location: [address];  Service: General;  Laterality: N/A;  insertion of G-tube    GASTROSTOMY N/A 9/15/2018    Performed by Jarod Pitt MD at [facility] OR [facility]  [facility]    KNEE SURGERY  1980s    arthroscopy    LAPAROTOMY,EXPLORATORY N/A 9/15/2018    [facility] at [facility] OR [facility]    LAPAROTOMY,EXPLORATORY N/A 9/12/2018    Performed by Judd Agudelo MD at [facility] OR [facility]     Family History   Problem Relation Age of Onset    Diabetes Unknown     Parkinsonism Mother     Alzheimer's disease Sister         she lives with his other sister    Alzheimer's disease Maternal Uncle     Stroke Father         hypertension?    Cancer Neg Hx      Social History     Socioeconomic History    Marital status: Single     Spouse name: Not on file    Number of children: Not on file    Years of education: Not on file    Highest education level: Not on file   Social Needs    Financial resource strain: Not on file    Food insecurity - worry: Not on file    Food insecurity - inability: Not on file    Transportation needs - medical: Not on file    Transportation needs - non-medical: Not on file   Occupational History    Not on file   Tobacco Use    Smoking status: Current Every Day Smoker     Packs/day: 1.00     Years: 30.00     Pack years: 30.00     Types: Cigarettes   Substance and Sexual Activity    Alcohol use: Yes     Alcohol/week: 1.2 oz     Types: 2 Shots of liquor per week    Drug use: No    Sexual activity: Yes     Partners: Male     Birth control/protection: Condom   Other Topics Concern    Not on file   Social History Narrative    Previously worked on [employer] - retired due to severe carpal tunnel. Now does maintenance work at a school.      Review of patient's allergies indicates:  No Known Allergies    Current Facility-Administered Medications:     0.9%  NaCl infusion (for blood administration), , Intravenous, Q24H PRNTI Jr., MD    albuterol-ipratropium 2.5 mg-0.5 mg/3 mL nebulizer solution 3 mL, 3 mL, Nebulization, Q4H, Nicole Shea MD, 3 mL at 09/20/18 1609    chlorhexidine 0.12 % solution 15 mL, 15 mL,  Mouth/Throat, BID, Maureen Estrella MD, 15 mL at 09/20/18 0945    dexmedetomidine (PRECEDEX) 400mcg/100mL 0.9% NaCL infusion, 0.2 mcg/kg/hr, Intravenous, Continuous, Fer Smith MD, Last Rate: 28.4 mL/hr at 09/20/18 1600, 1.4 mcg/kg/hr at 09/20/18 1600    dextrose 50% injection 12.5 g, 12.5 g, Intravenous, PRN, Tyesha Ellis MD, 12.5 g at 09/18/18 0207    dextrose 50% injection 12.5 g, 12.5 g, Intravenous, PRN, Nicole Shea MD    dextrose 50% injection 25 g, 25 g, Intravenous, PRN, Tyesha Ellis MD, 25 g at 09/18/18 2037    enoxaparin injection 40 mg, 40 mg, Subcutaneous, Daily, Antonieta Grajeda MD, 40 mg at 09/19/18 1731    fentaNYL 2500 mcg in 0.9% sodium chloride 250 mL infusion premix (titrating), , Intravenous, Continuous, CSunshine Hooper Jr., MD, Stopped at 09/20/18 0740    fentaNYL injection 25 mcg, 25 mcg, Intravenous, Q3H PRN, Antonieta Grajeda MD, 25 mcg at 09/20/18 1645    glucagon (human recombinant) injection 1 mg, 1 mg, Intramuscular, PRN, Tyesha Ellis MD    glucose chewable tablet 16 g, 16 g, Oral, PRN, Tyesha Ellis MD    glucose chewable tablet 24 g, 24 g, Oral, PRN, Tyesha Ellis MD    haloperidol lactate injection 5 mg, 5 mg, Intravenous, Q4H PRN, Antonieta Grajeda MD    heparin 25,000 units in dextrose 5% (100 units/ml) IV bolus from bag - ADDITIONAL PRN BOLUS - 30 units/kg, 30 Units/kg (Adjusted), Intravenous, PRN, Antonieta Grajeda MD    heparin 25,000 units in dextrose 5% (100 units/ml) IV bolus from bag - ADDITIONAL PRN BOLUS - 60 units/kg, 60 Units/kg (Adjusted), Intravenous, PRN, Antonieta Grajeda MD    heparin 25,000 units in dextrose 5% 250 mL (100 units/mL) infusion HIGH INTENSITY nomogram - [facility], 18 Units/kg/hr (Adjusted), Intravenous, Continuous, Antonieta Grajeda MD    insulin aspart U-100 pen 0-5 Units, 0-5 Units, Subcutaneous, QID (AC + HS) PRN, Tyesha Ellis MD    magnesium sulfate 2g in water 50mL IVPB (premix), 2 g,  Intravenous, PRN, Fer Smith MD, 2 g at 09/20/18 0730    magnesium sulfate 2g in water 50mL IVPB (premix), 4 g, Intravenous, PRN, Fer Smith MD, 4 g at 09/14/18 2108    ondansetron injection 4 mg, 4 mg, Intravenous, Q8H PRNTI Jr., MD    pantoprazole injection 40 mg, 40 mg, Intravenous, Q12H, Fer Smith MD, 40 mg at 09/20/18 0850    piperacillin-tazobactam 4.5 g in sodium chloride 0.9% 100 mL IVPB (ready to mix system), 4.5 g, Intravenous, Q8H, TI Hooper Jr., MD, Last Rate: 25 mL/hr at 09/20/18 1645, 4.5 g at 09/20/18 1645    potassium chloride 20 mEq in 100 mL IVPB (FOR CENTRAL LINE ADMINISTRATION ONLY), 20 mEq, Intravenous, Fer HUSSEIN MD    potassium chloride 40 mEq in 100 mL IVPB (FOR CENTRAL LINE ADMINISTRATION ONLY), 40 mEq, Intravenous, PRNFer MD, Last Rate: 25 mL/hr at 09/16/18 2200, 40 mEq at 09/16/18 2200    potassium chloride 40 mEq in 100 mL IVPB (FOR CENTRAL LINE ADMINISTRATION ONLY), 40 mEq, Intravenous, PRN, Fer Smith MD, Last Rate: 25 mL/hr at 09/18/18 0723, 40 mEq at 09/18/18 0723    sodium chloride 0.9% flush 3 mL, 3 mL, Intravenous, Q8H, TI Hooper Jr., MD, 3 mL at 09/20/18 1425    sodium chloride 0.9% flush 3 mL, 3 mL, Intravenous, PRFer GHOTRA MD, 3 mL at 09/18/18 1359    sodium phosphate 15 mmol in dextrose 5 % 250 mL IVPB, 15 mmol, Intravenous, Fer HUSSEIN MD, Last Rate: 83.3 mL/hr at 09/17/18 0425, 15 mmol at 09/17/18 0425    sodium phosphate 20.01 mmol in dextrose 5 % 250 mL IVPB, 20.01 mmol, Intravenous, PRNFer MD    sodium phosphate 30 mmol in dextrose 5 % 250 mL IVPB, 30 mmol, Intravenous, PRN, Fer Smith MD    TPN ADULT CENTRAL LINE CUSTOM, , Intravenous, Continuous, Fer Smith MD, Last Rate: 40 mL/hr at 09/19/18 2141    REVIEW OF SYSTEMS:  Unable to obtain    PHYSICAL EXAM:      Pulse: 76  Temp: 99.1 °F (37.3 °C)      General  appearance:  Intubated   Neurological:  CAMP                 Neck: Supple, no significant adenopathy; thyroid is not enlarged                Chest:  Clear to auscultation, no wheezes, rales or rhonchi, symmetric air entry      No use of accessory muscles             Cardiac: Normal rate and regular rhythm, S1 and S2 normal; PMI non-displaced      Extremities:   Palpable pulses BLE      Palpable rad/ulnar RUE      Cyanotic Left thumb, cool hand      Triphasic ulnar signal at wrist, ultrasound color dopper flow of radial artery down to previous a-line site.     LAB RESULTS:  Lab Results   Component Value Date    K 4.2 09/20/2018    K 4.2 09/19/2018    K 3.5 09/18/2018    CREATININE 0.8 09/20/2018    CREATININE 0.9 09/19/2018    CREATININE 0.9 09/18/2018     Lab Results   Component Value Date    WBC 22.63 (H) 09/20/2018    WBC 24.36 (H) 09/20/2018    WBC 26.95 (H) 09/20/2018    HCT 20.7 (L) 09/20/2018    HCT 23.0 (L) 09/20/2018    HCT 25.6 (L) 09/20/2018     (L) 09/20/2018     (L) 09/20/2018     (L) 09/20/2018     Lab Results   Component Value Date    HGBA1C 5.3 12/06/2010    HGBA1C 6.0 04/20/2005    HGBA1C 6.5 (H) 01/26/2004       IMAGING:    IMP/PLAN:  62 y.o. male with h/o HTN, smoking, and prior gastric bypass who presented to ED with abdominal pain found to have acute abdomen with free air from perforated diverticulitis, underwent Tash's procedure on 09/12/18 then subsequently re-lap/endoscopic gastric ulcer bleeding control on 0915/18. Since then he was extubated and re-intubated for ARDS on 09/17/18, intermittently on pressors. Left radial arterial line placed on 09/14/18 and removed 09/20/18 due to malfunction. New onset left thumb cyanosis and signs of ischemia.    - recommend systemic heparinization if ok with surgery team.   - keep L hand warm with warm compress/heated blanket  - nitro paste to hand q6h if pressure tolerates.   - no good endovascular/open intervention for this  patient. Recommend con't above mentioned supportive care.     Vascular surgery will follow from distance. Thank you for consult.       Won Benitez MD  Vascular/Endovascular Surgery Fellow    Vascular Attending  Agree with above  Seen by me in PM 9/20/18 - strong biphasic L ulnar doppler signal and weaker biphasic arch  L radial most likely thrombosed from L radial A-line that had been there for several days as opposed to embolization  As he can be anticoagulated, would do this for 6 weeks - if he can tolerate it - as it may recanalize  Nitropaste to L 1st digit  May check L arm arterial u/s and PPGs    Mayank Whatley MD FACS

## 2018-09-20 NOTE — PROGRESS NOTES
Dr. Harding aware of pt with extreme agitation off of sedation and on a SBT. Pt not following commands and unable to get extubation parameters. HR 1teens, hypertensive and tachypneic. MD gave order to start precedex gtt and reevaluate SBT after starting. Will carry out and continue to monitor closely.

## 2018-09-20 NOTE — PROGRESS NOTES
Dr. Smith aware that radial ju completely dampened with no waveform appreciated and no blood able to be aspirated despite dressing change, power flush and wrist repositioning with arm board. MD gave order to d/c arterial line. Will carryout and continue to monitor closely.

## 2018-09-20 NOTE — PROGRESS NOTES
Dr. Grajeda at bedside and MD notified of pt with hematoma to L thumb, hand cold to touch and no radial or ulnar pulse via doppler. This is a new change. Bare hugger applied to L hand and vascular called to bedside immediately. Will continue to monitor closely.

## 2018-09-20 NOTE — ASSESSMENT & PLAN NOTE
Neuro:  Sedation: Propofol and Fentanyl gtt.   Pain: Percocet PRN     Pulm:  - intubated  -Vent Mode: A/C  Oxygen Concentration (%):  [40] 40  Resp Rate Total:  [20 br/min-38 br/min] 27 br/min  Vt Set:  [380 mL] 380 mL  PEEP/CPAP:  [5 cmH20] 5 cmH20  Mean Airway Pressure:  [7.4 cmH20-9.6 cmH20] 9.6 cmH20  -   Recent Labs   Lab  09/20/18   0339   PH  7.456*   PCO2  42.6   PO2  105*   HCO3  30.0*   POCSATURATED  98   BE  6     - daily CXRs. Most recent (9/20) showing little change vs yesterdays film.    CV:  - HDS, off pressors    - 9/15: 3UpRBCs, 2 U FFP prior to OR  - 9/15: 1 U plts, 1 U cryo based on TEG study    GI/FEN/Lytes:  - NPO  - TPM at 30 mL/hr.  - Replace lytes PRN      Renal:  - BUN/Cr at 18/0.9  - monitor UOP     Heme:  - H/H stable at 8.6/25.6  - CBC, INR, fibrinogen q 4    ID:  - WBC at 26.5  - peritoneal cx growing Bacteroides and E. Coli  - Zosyn d/c'd.  - Repeat Blood/Urine cx NGTD     Endo:  - SSI    PPX:   - Lovenox, protonix gtt    Dispo:   - Cont SICU care

## 2018-09-20 NOTE — ASSESSMENT & PLAN NOTE
Neuro:  Sedation: Propofol and precedex gtt.   Pain: Percocet PRN     Pulm:  - intubated  -Vent Mode: Spont  Oxygen Concentration (%):  [40] 40  Resp Rate Total:  [20 br/min-35 br/min] 24 br/min  Vt Set:  [380 mL] 380 mL  PEEP/CPAP:  [5 cmH20] 5 cmH20  Pressure Support:  [10 cmH20] 10 cmH20  Mean Airway Pressure:  [7.4 cmH20-9.6 cmH20] 8.5 cmH20  -   Recent Labs   Lab  09/20/18   0339   PH  7.456*   PCO2  42.6   PO2  105*   HCO3  30.0*   POCSATURATED  98   BE  6     - daily CXRs.    CV:  - HDS, off pressors    - 9/15: 3UpRBCs, 2 U FFP prior to OR  - 9/15: 1 U plts, 1 U cryo based on TEG study    GI/FEN/Lytes:  - NPO  - Tube feeds held this am   - Replace lytes PRN      Renal:  - BUN/Cr at 27/0.7  -  UOP 2.2L; net positive 4L overall    Heme:  - H/H stable at 8.5/25.4  - CBC, INR, fibrinogen q4  - hep gtt now; 18u/kg/hr; most recent PTT >150; repeat pending will hold gtt for now     ID:  - WBC downtrending; 15.1 today (26.5 yesterday)  - Resp Cx with many GN rods, moderate WBCs  -- Zosyn started back   - Blood/Urine cx NGTD   -- CT Chest/Abdomen w/ contrast performed yesterday  --- bilateral lung consolidation noted concerning for infection or an aspiration     Endo:  - SSI     PPX:   - Lovenox, protonix gtt    Dispo:   - Cont SICU care

## 2018-09-20 NOTE — PROGRESS NOTES
Progress Note  Surgery    Admit Date: 9/12/2018  Attending: Jammie  S/P: Procedure(s) (LRB):  LAPAROTOMY,EXPLORATORY (N/A)  GASTROSTOMY (N/A)  EGD (ESOPHAGOGASTRODUODENOSCOPY) (N/A)    Post-operative Day: 5 Days Post-Op    Hospital Day: 9    SUBJECTIVE:     NAEON.  Unable to extubate yesterday AM.    OBJECTIVE:     Vital Signs (Most Recent)  Temp: 99.9 °F (37.7 °C) (09/20/18 0400)  Pulse: (!) 112 (09/20/18 0400)  Resp: 19 (09/20/18 0400)  BP: 99/74 (09/20/18 0400)  SpO2: 100 % (09/20/18 0400)    Vital Signs Range (Last 24H):  Temp:  [97.5 °F (36.4 °C)-100 °F (37.8 °C)]   Pulse:  [102-123]   Resp:  [17-26]   BP: ()/(66-91)   SpO2:  [99 %-100 %]   Arterial Line BP: (107-160)/()     I & O (Last 24H):    Intake/Output Summary (Last 24 hours) at 9/20/2018 0648  Last data filed at 9/20/2018 0400  Gross per 24 hour   Intake 2456.39 ml   Output 3020 ml   Net -563.61 ml       Physical Exam:  Gen: Intubated, sedated  HEENT: NCAT  Pulm: Mechanical breath sounds  Abd: Soft.  Ostomy bag in place over serous leakage site from incision.  Otherwise, incision is c/d/i.  Ostomy with sweat only, appearing ready to slough.    Extremities: no cyanosis or edema, or clubbing  Skin: Skin color, texture, turgor normal. No rashes or lesions    Laboratory:  CBC:   Recent Labs   Lab  09/20/18   0323   WBC  26.95*   RBC  2.88*   HGB  8.6*   HCT  25.6*   PLT  141*   MCV  89   MCH  29.9   MCHC  33.6     CMP:   Recent Labs   Lab  09/20/18   0323   GLU  133*   CALCIUM  7.7*   ALBUMIN  1.2*   PROT  3.9*   NA  143   K  4.2   CO2  25   CL  111*   BUN  22   CREATININE  0.8   ALKPHOS  181*   ALT  19   AST  36   BILITOT  0.8     Coagulation:   Recent Labs   Lab  09/20/18   0323   INR  1.2     Labs within the past 24 hours have been reviewed.    ASSESSMENT/PLAN:     Assessment: 62 year old man with perforated diverticulitis and free air s/p Tash's procedure 9/12, ex-lap for bleeding, with intraop endoscopic control of bleeding remnant  gastric ulcer 9/15    Plan:  Continue diuresis today.  40 Lasix x2 today.    NPO  SBT this AM  If unable to extubate, increase TF to 40  Awaiting return of bowel function  Daily CXR  Lovenox  Home meds    GI bleed:  Hb stable.  BID protonix    Juan Carlos Hooper MD  General Surgery, PGY-5   [phone]

## 2018-09-20 NOTE — PROGRESS NOTES
Pt brought to CT with RN and RT. Oxygenated with portable vent. ambu bag present. Precedex and TPN infusing. Will continue to monitor closely.   \      1410: returned from CT. Connected to bedside monitor and ventilator. VSS. Pt tolerated procedure well.

## 2018-09-20 NOTE — PLAN OF CARE
Problem: Patient Care Overview  Goal: Plan of Care Review  Outcome: Ongoing (interventions implemented as appropriate)  Pt remains free from falls and injuries. Vent, Betty with flotrack, R IJ TLC, Ponce, NGT, colostomy, G tube, multiple wound pouches draining serous fluid remain. Propofol, fentanyl and TPN gtts infusing. Blood glucose monitored as ordered; no supplemental insulin required. Pt repositioned and cleaned frequently. T max=100 and GP=877t, but both improved with Tylenol X1. Plan of care discussed with pt's brother. No acute issues overnight.

## 2018-09-20 NOTE — SUBJECTIVE & OBJECTIVE
Interval History/Significant Events:   No acute events overnight. Patient remains intubated, sedated, weaning vent as tolerated. Per nursing patient had tmax of 100 degrees and HR of 120, received tyelnol x1 overnight.     Follow-up For: Procedure(s) (LRB):  LAPAROTOMY,EXPLORATORY (N/A)  GASTROSTOMY (N/A)  EGD (ESOPHAGOGASTRODUODENOSCOPY) (N/A)    Post-Operative Day: 5 Days Post-Op    Objective:     Vital Signs (Most Recent):  Temp: 99.5 °F (37.5 °C) (09/20/18 0723)  Pulse: 109 (09/20/18 0723)  Resp: (!) 26 (09/20/18 0723)  BP: 102/72 (09/20/18 0700)  SpO2: 100 % (09/20/18 0723) Vital Signs (24h Range):  Temp:  [97.7 °F (36.5 °C)-100 °F (37.8 °C)] 99.5 °F (37.5 °C)  Pulse:  [106-123] 109  Resp:  [17-26] 26  SpO2:  [99 %-100 %] 100 %  BP: ()/(66-91) 102/72  Arterial Line BP: ()/() 98/78     Weight: 81 kg (178 lb 9.2 oz)  Body mass index is 28.82 kg/m².      Intake/Output Summary (Last 24 hours) at 9/20/2018 0748  Last data filed at 9/20/2018 0700  Gross per 24 hour   Intake 2752.99 ml   Output 3700 ml   Net -947.01 ml       Physical Exam   Constitutional: He appears well-developed and well-nourished.   HENT:   Head: Normocephalic and atraumatic.   Cardiovascular: Normal rate and regular rhythm.   No murmur heard.  Pulmonary/Chest: He has rales.   intubated   Abdominal: Soft. Bowel sounds are normal.   Neurological:   Sedated   Skin: Skin is warm and dry.       Vents:  Vent Mode: A/C (09/20/18 0723)  Ventilator Initiated: Yes (09/14/18 2114)  Set Rate: 20 bmp (09/20/18 0723)  Vt Set: 380 mL (09/20/18 0723)  Pressure Support: 10 cmH20 (09/17/18 1020)  PEEP/CPAP: 5 cmH20 (09/20/18 0723)  Oxygen Concentration (%): 40 (09/20/18 0723)  Peak Airway Pressure: 16 cmH2O (09/20/18 0723)  Plateau Pressure: 0 cmH20 (09/20/18 0723)  Total Ve: 8.77 mL (09/20/18 0723)  Negative Inspiratory Force (cm H2O): -28 (09/17/18 1145)  F/VT Ratio<105 (RSBI): 351.35 (09/20/18 0723)    Lines/Drains/Airways     Central Venous  Catheter Line                 Percutaneous Central Line Insertion/Assessment - triple lumen  09/16/18 1310 right internal jugular 3 days          Drain                 Colostomy 09/12/18 LLQ 8 days         NG/OG Tube 09/14/18 2038 Perla galanp Left nostril 5 days         Gastrostomy/Enterostomy 09/15/18 1621 Gastrostomy tube w/o balloon LUQ decompression 4 days         Urethral Catheter 09/20/18 0620 Temperature probe less than 1 day          Airway                 Airway - Non-Surgical 09/17/18 1720 Endotracheal Tube 2 days          Arterial Line                 Arterial Line 09/14/18 1953 Left Radial 5 days          Pressure Ulcer                 Pressure Injury 09/13/18 1534 lower Coccyx Stage 3 6 days          Peripheral Intravenous Line                 Midline Catheter Insertion/Assessment  - Single Lumen 09/13/18 1748 Right brachial vein 20g x 10cm 6 days                Significant Labs:    CBC/Anemia Profile:  Recent Labs   Lab  09/18/18   1655  09/19/18   0232  09/20/18   0323   WBC   --   20.59*  26.95*   HGB  9.5*  8.8*  8.6*   HCT  27.1*  26.2*  25.6*   PLT   --   117*  141*   MCV   --   87  89   RDW   --   17.7*  17.8*        Chemistries:  Recent Labs   Lab  09/19/18   0232  09/20/18   0323   NA  141  143   K  4.2  4.2   CL  113*  111*   CO2  23  25   BUN  18  22   CREATININE  0.9  0.8   CALCIUM  7.4*  7.7*   ALBUMIN  1.2*  1.2*   PROT  3.4*  3.9*   BILITOT  0.7  0.8   ALKPHOS  103  181*   ALT  17  19   AST  15  36   MG  1.8  1.8   PHOS  3.8  2.9       All pertinent labs within the past 24 hours have been reviewed.

## 2018-09-20 NOTE — PROGRESS NOTES
Dr. Smith aware that arterial line is not giving waveform and unable to draw back blood from it. Dressing changed, power flushed, arm board placed and no change in waveform. MD gave verbal order to d/c line. Will have NIBP cycling [id number]. Will continue to monitor closely.

## 2018-09-20 NOTE — PROGRESS NOTES
[facility]  Critical Care - Surgery  Progress Note    Patient Name: Amari Saez  MRN: [id number]  Admission Date: 9/12/2018  Hospital Length of Stay: 8 days  Code Status: Full Code  Attending Provider: Judd Agudelo MD  Primary Care Provider: Chaim Valdovinos MD   Principal Problem: Perforated diverticulum of large intestine    Subjective:     Hospital/ICU Course:  9/14: Transferred from floor to SICU for higher level of care.     Interval History/Significant Events:   No acute events overnight. Patient remains intubated, sedated, weaning vent as tolerated. Per nursing patient had tmax of 100 degrees and HR of 120, received tyelnol x1 overnight.     Follow-up For: Procedure(s) (LRB):  LAPAROTOMY,EXPLORATORY (N/A)  GASTROSTOMY (N/A)  EGD (ESOPHAGOGASTRODUODENOSCOPY) (N/A)    Post-Operative Day: 5 Days Post-Op    Objective:     Vital Signs (Most Recent):  Temp: 99.5 °F (37.5 °C) (09/20/18 0723)  Pulse: 109 (09/20/18 0723)  Resp: (!) 26 (09/20/18 0723)  BP: 102/72 (09/20/18 0700)  SpO2: 100 % (09/20/18 0723) Vital Signs (24h Range):  Temp:  [97.7 °F (36.5 °C)-100 °F (37.8 °C)] 99.5 °F (37.5 °C)  Pulse:  [106-123] 109  Resp:  [17-26] 26  SpO2:  [99 %-100 %] 100 %  BP: ()/(66-91) 102/72  Arterial Line BP: ()/() 98/78     Weight: 81 kg (178 lb 9.2 oz)  Body mass index is 28.82 kg/m².      Intake/Output Summary (Last 24 hours) at 9/20/2018 0748  Last data filed at 9/20/2018 0700  Gross per 24 hour   Intake 2752.99 ml   Output 3700 ml   Net -947.01 ml       Physical Exam   Constitutional: He appears well-developed and well-nourished.   HENT:   Head: Normocephalic and atraumatic.   Cardiovascular: Normal rate and regular rhythm.   No murmur heard.  Pulmonary/Chest: He has rales.   intubated   Abdominal: Soft. Bowel sounds are normal.   Neurological:   Sedated   Skin: Skin is warm and dry.       Vents:  Vent Mode: A/C (09/20/18 9600)  Ventilator Initiated: Yes (09/14/18 2114)  Set Rate:  20 bmp (09/20/18 0723)  Vt Set: 380 mL (09/20/18 0723)  Pressure Support: 10 cmH20 (09/17/18 1020)  PEEP/CPAP: 5 cmH20 (09/20/18 0723)  Oxygen Concentration (%): 40 (09/20/18 0723)  Peak Airway Pressure: 16 cmH2O (09/20/18 0723)  Plateau Pressure: 0 cmH20 (09/20/18 0723)  Total Ve: 8.77 mL (09/20/18 0723)  Negative Inspiratory Force (cm H2O): -28 (09/17/18 1145)  F/VT Ratio<105 (RSBI): 351.35 (09/20/18 0723)    Lines/Drains/Airways     Central Venous Catheter Line                 Percutaneous Central Line Insertion/Assessment - triple lumen  09/16/18 1310 right internal jugular 3 days          Drain                 Colostomy 09/12/18 LLQ 8 days         NG/OG Tube 09/14/18 2038 Friedens sump Left nostril 5 days         Gastrostomy/Enterostomy 09/15/18 1621 Gastrostomy tube w/o balloon LUQ decompression 4 days         Urethral Catheter 09/20/18 0620 Temperature probe less than 1 day          Airway                 Airway - Non-Surgical 09/17/18 1720 Endotracheal Tube 2 days          Arterial Line                 Arterial Line 09/14/18 1953 Left Radial 5 days          Pressure Ulcer                 Pressure Injury 09/13/18 1534 lower Coccyx Stage 3 6 days          Peripheral Intravenous Line                 Midline Catheter Insertion/Assessment  - Single Lumen 09/13/18 1748 Right brachial vein 20g x 10cm 6 days                Significant Labs:    CBC/Anemia Profile:  Recent Labs   Lab  09/18/18   1655  09/19/18   0232  09/20/18   0323   WBC   --   20.59*  26.95*   HGB  9.5*  8.8*  8.6*   HCT  27.1*  26.2*  25.6*   PLT   --   117*  141*   MCV   --   87  89   RDW   --   17.7*  17.8*        Chemistries:  Recent Labs   Lab  09/19/18   0232  09/20/18   0323   NA  141  143   K  4.2  4.2   CL  113*  111*   CO2  23  25   BUN  18  22   CREATININE  0.9  0.8   CALCIUM  7.4*  7.7*   ALBUMIN  1.2*  1.2*   PROT  3.4*  3.9*   BILITOT  0.7  0.8   ALKPHOS  103  181*   ALT  17  19   AST  15  36   MG  1.8  1.8   PHOS  3.8  2.9       All  pertinent labs within the past 24 hours have been reviewed.          Assessment/Plan:   Neuro:  Sedation: Propofol and Fentanyl gtt.   Pain: Percocet PRN     Pulm:  - intubated  -Vent Mode: Spont  Oxygen Concentration (%):  [40] 40  Resp Rate Total:  [20 br/min-35 br/min] 24 br/min  Vt Set:  [380 mL] 380 mL  PEEP/CPAP:  [5 cmH20] 5 cmH20  Pressure Support:  [10 cmH20] 10 cmH20  Mean Airway Pressure:  [7.4 cmH20-9.6 cmH20] 8.5 cmH20  -   Recent Labs   Lab  09/20/18   0339   PH  7.456*   PCO2  42.6   PO2  105*   HCO3  30.0*   POCSATURATED  98   BE  6     - daily CXRs. Most recent (9/20) showing little change vs yesterdays film.    CV:  - HDS, off pressors    - 9/15: 3UpRBCs, 2 U FFP prior to OR  - 9/15: 1 U plts, 1 U cryo based on TEG study    GI/FEN/Lytes:  - NPO  - TPM at 30 mL/hr; holding now for possible extubation this am  - Replace lytes PRN      Renal:  - BUN/Cr at 18/0.9  - monitor UOP     Heme:  - H/H stable at 8.6/25.6  - CBC, INR, fibrinogen q 4    ID:  - WBC at 26.5, up from 20 yesterday  - peritoneal cx growing Bacteroides and E. Coli  - Zosyn d/c'd.  - Repeat Blood/Urine cx NGTD   -- CT Chest/Abdomen w/ contrast ordered     Endo:  - SSI    PPX:   - Lovenox, protonix gtt    Dispo:   - Cont SICU care     Critical care was time spent personally by me on the following activities: development of treatment plan with patient or surrogate and bedside caregivers, discussions with consultants, evaluation of patient's response to treatment, examination of patient, ordering and performing treatments and interventions, ordering and review of laboratory studies, ordering and review of radiographic studies, pulse oximetry, re-evaluation of patient's condition.  This critical care time did not overlap with that of any other provider or involve time for any procedures.     Payam Emerson MD  Critical Care - Surgery  [facility]

## 2018-09-20 NOTE — SUBJECTIVE & OBJECTIVE
Interval History/Significant Events: Overnight patient said to agitated requiring precedex and propofol gtt. Remains intubated. Per nursing patients left hand found to be cool to touch with difficult to pulses to appreciate. Started on hep gtt overnight; pulses able to be dopplered this am.     Follow-up For: Procedure(s) (LRB):  LAPAROTOMY,EXPLORATORY (N/A)  GASTROSTOMY (N/A)  EGD (ESOPHAGOGASTRODUODENOSCOPY) (N/A)    Post-Operative Day: 5 Days Post-Op    Objective:     Vital Signs (Most Recent):  Temp: 99.5 °F (37.5 °C) (09/20/18 0902)  Pulse: (!) 114 (09/20/18 0902)  Resp: (!) 24 (09/20/18 0902)  BP: 124/82 (09/20/18 0900)  SpO2: 98 % (09/20/18 0902) Vital Signs (24h Range):  Temp:  [98.1 °F (36.7 °C)-100 °F (37.8 °C)] 99.5 °F (37.5 °C)  Pulse:  [106-123] 114  Resp:  [17-36] 24  SpO2:  [98 %-100 %] 98 %  BP: ()/() 124/82  Arterial Line BP: ()/() 136/118     Weight: 81 kg (178 lb 9.2 oz)  Body mass index is 28.82 kg/m².      Intake/Output Summary (Last 24 hours) at 9/20/2018 0919  Last data filed at 9/20/2018 0900  Gross per 24 hour   Intake 2857.99 ml   Output 3345 ml   Net -487.01 ml       Physical Exam   Constitutional: He appears well-developed and well-nourished.   HENT:   Head: Normocephalic and atraumatic.   Neck: Neck supple.   Cardiovascular: Normal rate, regular rhythm and intact distal pulses.   No murmur heard.  Pulmonary/Chest: He has rales.   intubated   Abdominal: Soft. Bowel sounds are normal.   Neurological: He is alert.   Sedated   Skin: Skin is warm. He is diaphoretic.   Psychiatric:   Anxious/agitated appearing.       Vents:  Vent Mode: Spont (09/20/18 0902)  Ventilator Initiated: Yes (09/14/18 2114)  Set Rate: 20 bmp (09/20/18 0723)  Vt Set: 380 mL (09/20/18 0723)  Pressure Support: 10 cmH20 (09/20/18 0902)  PEEP/CPAP: 5 cmH20 (09/20/18 0902)  Oxygen Concentration (%): 40 (09/20/18 0902)  Peak Airway Pressure: 16 cmH2O (09/20/18 0902)  Plateau Pressure: 0 cmH20  (09/20/18 0902)  Total Ve: 11.7 mL (09/20/18 0902)  Negative Inspiratory Force (cm H2O): -28 (09/17/18 1145)  F/VT Ratio<105 (RSBI): (!) 48.88 (09/20/18 0902)    Lines/Drains/Airways     Central Venous Catheter Line                 Percutaneous Central Line Insertion/Assessment - triple lumen  09/16/18 1310 right internal jugular 3 days          Drain                 Colostomy 09/12/18 LLQ 8 days         NG/OG Tube 09/14/18 2038 Boyd sump Left nostril 5 days         Gastrostomy/Enterostomy 09/15/18 1621 Gastrostomy tube w/o balloon LUQ decompression 4 days         Urethral Catheter 09/20/18 0620 Temperature probe less than 1 day          Airway                 Airway - Non-Surgical 09/17/18 1720 Endotracheal Tube 2 days          Arterial Line                 Arterial Line 09/14/18 1953 Left Radial 5 days          Pressure Ulcer                 Pressure Injury 09/13/18 1534 lower Coccyx Stage 3 6 days          Peripheral Intravenous Line                 Midline Catheter Insertion/Assessment  - Single Lumen 09/13/18 1748 Right brachial vein 20g x 10cm 6 days                Significant Labs:    CBC/Anemia Profile:  Recent Labs   Lab  09/18/18   1655  09/19/18   0232  09/20/18   0323   WBC   --   20.59*  26.95*   HGB  9.5*  8.8*  8.6*   HCT  27.1*  26.2*  25.6*   PLT   --   117*  141*   MCV   --   87  89   RDW   --   17.7*  17.8*        Chemistries:  Recent Labs   Lab  09/19/18   0232  09/20/18   0323   NA  141  143   K  4.2  4.2   CL  113*  111*   CO2  23  25   BUN  18  22   CREATININE  0.9  0.8   CALCIUM  7.4*  7.7*   ALBUMIN  1.2*  1.2*   PROT  3.4*  3.9*   BILITOT  0.7  0.8   ALKPHOS  103  181*   ALT  17  19   AST  15  36   MG  1.8  1.8   PHOS  3.8  2.9       All pertinent labs within the past 24 hours have been reviewed.    Significant Imaging:  I have reviewed and interpreted all pertinent imaging results/findings within the past 24 hours.

## 2018-09-21 PROBLEM — I99.8 ISCHEMIA OF DIGITS OF HAND: Status: ACTIVE | Noted: 2018-01-01

## 2018-09-21 NOTE — PROGRESS NOTES
[facility]  Vascular Surgery  Progress Note    Patient Name: Amari Saez  MRN: [id number]  Admission Date: 9/12/2018  Primary Care Provider: Chaim Valdovinos MD    Subjective:     Interval History: No acute events. Cyanosis of left thumb improved, hand warmer this AM.     Post-Op Info:  Procedure(s) (LRB):  LAPAROTOMY,EXPLORATORY (N/A)  GASTROSTOMY (N/A)  EGD (ESOPHAGOGASTRODUODENOSCOPY) (N/A)   6 Days Post-Op       Medications:  Continuous Infusions:   dexmedetomidine (PRECEDEX) infusion 0.2 mcg/kg/hr (09/21/18 0800)    fentanyl Stopped (09/20/18 0740)    heparin (porcine) in D5W Stopped (09/21/18 0740)    propofol 35 mcg/kg/min (09/21/18 0902)     Scheduled Meds:   albuterol-ipratropium  3 mL Nebulization Q4H    chlorhexidine  15 mL Mouth/Throat BID    nitroGLYCERIN 2% TD oint  2 inch Topical (Top) Q6H    pantoprozole (PROTONIX) IV  40 mg Intravenous Q12H    piperacillin-tazobactam (ZOSYN) IVPB  4.5 g Intravenous Q8H    propofol        sodium chloride 0.9%  3 mL Intravenous Q8H     PRN Meds:sodium chloride, dextrose 50%, dextrose 50%, dextrose 50%, fentaNYL, glucagon (human recombinant), glucose, glucose, heparin (PORCINE), heparin (PORCINE), insulin aspart U-100, magnesium sulfate IVPB, magnesium sulfate IVPB, ondansetron, potassium chloride in water, potassium chloride in water, potassium chloride in water, sodium chloride 0.9%, sodium phosphate IVPB, sodium phosphate IVPB, sodium phosphate IVPB     Objective:     Vital Signs (Most Recent):  Temp: (!) 95.5 °F (35.3 °C) (09/21/18 0942)  Pulse: 66 (09/21/18 0942)  Resp: (!) 22 (09/21/18 0942)  BP: 103/66 (09/21/18 0900)  SpO2: 96 % (09/21/18 0942) Vital Signs (24h Range):  Temp:  [95.5 °F (35.3 °C)-99.1 °F (37.3 °C)] 95.5 °F (35.3 °C)  Pulse:  [] 66  Resp:  [16-54] 22  SpO2:  [90 %-100 %] 96 %  BP: ()/() 103/66     Date 09/21/18 0700 - 09/22/18 0659   Shift 4845-6097 1018-2394 4709-6694 24 Hour Total   INTAKE   I.V.(mL/kg)  53(0.7)   53(0.7)   NG/GT 40   40   Shift Total(mL/kg) 93(1.3)   93(1.3)   OUTPUT   Urine(mL/kg/hr) 135   135   Drains 40   40   Stool 10   10   Shift Total(mL/kg) 185(2.5)   185(2.5)   Weight (kg) 73.2 73.2 73.2 73.2       Physical Exam   General appearance:          Intubated              Neurological:             CAMP                            Neck:            Supple, no significant adenopathy; thyroid is not enlarged                           Chest:            Clear to auscultation, no wheezes, rales or rhonchi, symmetric air entry                                                  No use of accessory muscles                        Cardiac:           Normal rate and regular rhythm, S1 and S2 normal; PMI non-displaced                 Extremities:            Palpable pulses BLE                                                  Palpable rad/ulnar RUE                                                  Improved cyanosis Left thumb, hand warmer                                                    Triphasic ulnar signal at wrist, ultrasound color dopper flow of radial artery down to previous a-line site.         Significant Labs:  ABGs:   Recent Labs   Lab  09/21/18   0453   PH  7.474*   PCO2  39.9   PO2  65*   HCO3  29.3*   POCSATURATED  94*   BE  6     CBC:   Recent Labs   Lab  09/21/18   0404   WBC  15.11*  15.11*  15.11*   RBC  2.80*  2.80*  2.80*   HGB  8.5*  8.5*  8.5*   HCT  25.4*  25.4*  25.4*   PLT  142*  142*  142*   MCV  91  91  91   MCH  30.4  30.4  30.4   MCHC  33.5  33.5  33.5     CMP:   Recent Labs   Lab  09/21/18   0404   GLU  114*   CALCIUM  7.5*   ALBUMIN  1.0*   PROT  3.6*   NA  144   K  3.7   CO2  27   CL  109   BUN  27*   CREATININE  0.7   ALKPHOS  157*   ALT  28   AST  54*   BILITOT  0.8     Coagulation:   Recent Labs   Lab  09/21/18   0404  09/21/18   0549   LABPROT  14.3*   --    INR  1.4*   --    APTT  >150.0*  >150.0*       Significant Diagnostics:  I have reviewed all pertinent  imaging results/findings within the past 24 hours.    Assessment/Plan:     Ischemia of digits of hand    62 y.o. male with h/o HTN, smoking, and prior gastric bypass who presented to ED with abdominal pain found to have acute abdomen with free air from perforated diverticulitis, underwent Tash's procedure on 09/12/18 then subsequently re-lap/endoscopic gastric ulcer bleeding control on 0915/18. Since then he was extubated and re-intubated for ARDS on 09/17/18, intermittently on pressors. Left radial arterial line placed on 09/14/18 and removed 09/20/18 due to malfunction. New onset left thumb cyanosis and signs of ischemia. Improved after starting heparin and other conservative measures.      - recommend systemic heparinization if ok with surgery team - will need this for 6 weeks if no contraindication    - keep L hand warm with warm compress/heated blanket  - nitro paste to hand q6h if pressure tolerates                Herson Curry MD  Vascular Surgery  [facility]

## 2018-09-21 NOTE — PROGRESS NOTES
Dr. Emerson and Dr. Grajeda at bedside. MDs aware of hypotension, agitation, gtts and overall pt stats. MD to possibly place arterial line and per Dr. Emerson hold off on going to CT at this time. Will continue to monitor closely.

## 2018-09-21 NOTE — ANESTHESIA POSTPROCEDURE EVALUATION
"Anesthesia Post Evaluation    Patient: Amari Saez    Procedure(s) Performed: Procedure(s) (LRB):  LAPAROTOMY,EXPLORATORY (N/A)  GASTROSTOMY (N/A)  EGD (ESOPHAGOGASTRODUODENOSCOPY) (N/A)    Final Anesthesia Type: general  Patient location during evaluation: PACU  Patient participation: Yes- Able to Participate  Level of consciousness: awake and alert and oriented  Post-procedure vital signs: reviewed and stable  Pain management: adequate  Airway patency: patent  PONV status at discharge: No PONV  Anesthetic complications: no      Cardiovascular status: hemodynamically stable  Respiratory status: unassisted, spontaneous ventilation and room air  Hydration status: euvolemic  Follow-up not needed.        Visit Vitals  BP (!) 81/52 (BP Location: Right arm, Patient Position: Lying)   Pulse 76   Temp 35.6 °C (96.1 °F) (Core Bladder)   Resp (!) 21   Ht 5' 6" (1.676 m)   Wt 73.2 kg (161 lb 6 oz)   SpO2 (!) 91%   BMI 26.05 kg/m²       Pain/Wilbert Score: Pain Assessment Performed: Yes (9/21/2018  3:00 AM)  Presence of Pain: non-verbal indicators absent (9/21/2018  3:00 AM)  Pain Rating Prior to Med Admin: 0 (9/20/2018 11:20 PM)        "

## 2018-09-21 NOTE — PROGRESS NOTES
Dr. Grajeda notified of pt with extreme agitation despite haldol 5mg IVP, Precedex at max dose and Fentanyl IVP not due again for 45 minutes. MD gave order for additional 25mcg Fentanyl IVP one time dose. MD also notified of increase in INR from 0300 this morning. Order to recheck INR with CBC at 2000. Will continue to monitor closely.

## 2018-09-21 NOTE — PLAN OF CARE
Problem: Patient Care Overview  Goal: Plan of Care Review  Outcome: Ongoing (interventions implemented as appropriate)  Pt vitals stable throughout shift. Pt restless and thrashing in bed, fentanyl and propofol gtts started for sedation and comfort. Vent settings AC 40/5. Urine output adequate. Drain output appropriate. Soft bilateral wrist restraints continued to prevent line pulling. Pt turned Q2h to prevent further skin breakdown. Plan to perform SBT in am. POC reviewed with pt and brother. RN at bs to monitor and answer all questions. See flowsheets for full assessment details.

## 2018-09-21 NOTE — PROGRESS NOTES
Dr. Grajeda aware of hypotension 60s/40s. MD gave verbal order to administer 500cc NS bolus. Will carry out and continue to monitor closely.

## 2018-09-21 NOTE — PROGRESS NOTES
" [facility]  Adult Nutrition  Progress Note    SUMMARY       Recommendations    Recommendation/Intervention:   1. Continue current TF as tolerated while pt on propofol.    -Once propofol able to be d/c, recommend increasing to 45mL/hr.    -Will provide 1620kcal, 73g protein, and 825mL fluid. Will monitor.   Goals: Pt to receive >85% EEN and EPN.  Nutrition Goal Status: goal met  Communication of RD Recs: reviewed with RN    Reason for Assessment    Reason for Assessment: RD follow-up  Diagnosis: other (see comments)(s/p multiple abd surgeries, perf diverticulum of large intes)  Relevant Medical History: HTN, gastric bypass (2003), tobacco abuse  General Information Comments: Pt remains intubated. Receving PEG tube feeds at goal rate, tolerating per RN. RN reports feeds were held this AM, but she was unsure why. Reports no residuals, restarted feeds with no issues. NFPE completed 9/18  - no signs of malnutrition at this time. Weight stable x 3 years. Wt has been up and down since admit, but pt with edema.   Nutrition Discharge Planning: unable to determine at this time    Nutrition/Diet History    Do you have any cultural, spiritual, Sabianist conflicts, given your current situation?: none noted  Factors Affecting Nutritional Intake: altered gastrointestinal function, NPO, on mechanical ventilation    Anthropometrics    Temp: (!) 95.5 °F (35.3 °C)  Height: 5' 6" (167.6 cm)  Height (inches): 66 in  Weight Method: Bed Scale  Weight: 73.2 kg (161 lb 6 oz)  Weight (lb): 161.38 lb  Ideal Body Weight (IBW), Male: 142 lb  % Ideal Body Weight, Male (lb): 92.96   BMI (Calculated): 21.3  BMI Grade: 18.5-24.9 - normal     Lab/Procedures/Meds    Pertinent Labs Reviewed: reviewed  Pertinent Labs Comments: BUN 27, Ca 7.5, P 2.5  Pertinent Medications Reviewed: reviewed  Pertinent Medications Comments: precedex, fentanyl, propofol    Physical Findings/Assessment    Overall Physical Appearance: nourished, on " ventilator support, edematous  Tubes: gastrostomy tube, nasogastric tube(colostomy)  Skin: pressure ulcer(s), edema, incision(s)(St 3 coccyx, ulceration)    Estimated/Assessed Needs    Weight Used For Calorie Calculations: 59.9 kg (132 lb 0.9 oz)  Energy Calorie Requirements (kcal): 1567  Energy Need Method: [facility]  Protein Requirements: 72-90g(1.2-1.5g/kg)  Weight Used For Protein Calculations: 59.9 kg (132 lb 0.9 oz)  Fluid Requirements (mL): 1mL/kcal or per MD  RDA Method (mL): 1567     Nutrition Prescription Ordered    Current Diet Order: NPO  Nutrition Order Comments: TF at goal  Current Nutrition Support Formula Ordered: Isosource 1.5  Current Nutrition Support Rate Ordered: 40 (ml)  Current Nutrition Support Frequency Ordered: mL/hr    Evaluation of Received Nutrient/Fluid Intake    Enteral Calories (kcal): 1440  Enteral Protein (gm): 65  Enteral (Free Water) Fluid (mL): 733  Lipid Calories (kcals): 333 kcals  Total Calories (kcal): 1773  % Kcal Needs: 113  % Protein Needs: 90  IV Fluid (mL): 1200  I/O: +19L  Energy Calories Required: exceed needs  Protein Required: meeting needs  Fluid Required: other (see comments)(per MD)  Comments: No BM documented  Tolerance: tolerating  % Intake of Estimated Energy Needs: 75 - 100 %  % Meal Intake: NPO    Nutrition Risk    Level of Risk/Frequency of Follow-up: (f/u 2x/week)     Assessment and Plan    Nutrition Problem  Inadequate energy intake     Related to (etiology):   TF provision     Signs and Symptoms (as evidenced by):   Pt receiving <85% EEN and EPN.      Nutrition Diagnosis Status:   Improving    Monitor and Evaluation    Food and Nutrient Intake: enteral nutrition intake  Food and Nutrient Adminstration: enteral and parenteral nutrition administration  Anthropometric Measurements: weight, weight change, body mass index  Biochemical Data, Medical Tests and Procedures: electrolyte and renal panel, gastrointestinal profile, glucose/endocrine profile, lipid  profile, inflammatory profile  Nutrition-Focused Physical Findings: overall appearance     Nutrition Follow-Up    RD Follow-up?: Yes

## 2018-09-21 NOTE — PLAN OF CARE
Problem: Patient Care Overview  Goal: Plan of Care Review  Outcome: Revised  Pt remains intubated and thrashing in the bed.  Follows commands minimally, continues to be uncooperative.  Precedex at maximum dose, and PRN fentanyl doses utilized without success.  Pt VS as noted, extremely hypertensive during periods of extreme agitation. UOP adequate and tolerating tube feeding.  Continues to be extremely moist, weeping from skin.  Minimal ostomy output. LUE continues with palpable pulse.  Plan of care reviewed with pt's brother via phone.  Questions encouraged and addressed.

## 2018-09-21 NOTE — ASSESSMENT & PLAN NOTE
62 y.o. male with h/o HTN, smoking, and prior gastric bypass who presented to ED with abdominal pain found to have acute abdomen with free air from perforated diverticulitis, underwent Tash's procedure on 09/12/18 then subsequently re-lap/endoscopic gastric ulcer bleeding control on 0915/18. Since then he was extubated and re-intubated for ARDS on 09/17/18, intermittently on pressors. Left radial arterial line placed on 09/14/18 and removed 09/20/18 due to malfunction. New onset left thumb cyanosis and signs of ischemia. Improved after starting heparin and other conservative measures.      - recommend systemic heparinization if ok with surgery team - will need this for 6 weeks if no contraindication    - keep L hand warm with warm compress/heated blanket  - nitro paste to hand q6h if pressure tolerates

## 2018-09-21 NOTE — PROGRESS NOTES
Progress Note  Surgery    Admit Date: 9/12/2018  Attending: Jammie  S/P: Procedure(s) (LRB):  LAPAROTOMY,EXPLORATORY (N/A)  GASTROSTOMY (N/A)  EGD (ESOPHAGOGASTRODUODENOSCOPY) (N/A)    Post-operative Day: 6 Days Post-Op    Hospital Day: 10    SUBJECTIVE:     NAEON. New onset left thumb cyanosis and signs of ischemia yesterday, seen by vascular and started on heparing gtt with subsequent improvement this morning. Has substantial amount of respiratory secretions      OBJECTIVE:     Vital Signs (Most Recent)  Temp: 96.1 °F (35.6 °C) (09/21/18 0800)  Pulse: 76 (09/21/18 0800)  Resp: (!) 21 (09/21/18 0800)  BP: (!) 81/52 (09/21/18 0800)  SpO2: (!) 91 % (09/21/18 0800)    Vital Signs Range (Last 24H):  Temp:  [96.1 °F (35.6 °C)-99.5 °F (37.5 °C)]   Pulse:  []   Resp:  [16-54]   BP: ()/()   SpO2:  [90 %-100 %]     I & O (Last 24H):    Intake/Output Summary (Last 24 hours) at 9/21/2018 0817  Last data filed at 9/21/2018 0800  Gross per 24 hour   Intake 7088.12 ml   Output 2850 ml   Net 4238.12 ml       Physical Exam:  Gen: Intubated, sedated  HEENT: NCAT  Pulm: Mechanical breath sounds  Abd: Soft.  Ostomy bag in place over serous leakage site from incision.  Otherwise, incision is c/d/i.  Ostomy with sweat only, appearing ready to slough.    Extremities: no cyanosis or edema, or clubbing  Skin: Skin color, texture, turgor normal. No rashes or lesions    Laboratory:  CBC:   Recent Labs   Lab  09/21/18   0404   WBC  15.11*  15.11*  15.11*   RBC  2.80*  2.80*  2.80*   HGB  8.5*  8.5*  8.5*   HCT  25.4*  25.4*  25.4*   PLT  142*  142*  142*   MCV  91  91  91   MCH  30.4  30.4  30.4   MCHC  33.5  33.5  33.5     CMP:   Recent Labs   Lab  09/21/18   0404   GLU  114*   CALCIUM  7.5*   ALBUMIN  1.0*   PROT  3.6*   NA  144   K  3.7   CO2  27   CL  109   BUN  27*   CREATININE  0.7   ALKPHOS  157*   ALT  28   AST  54*   BILITOT  0.8     Coagulation:   Recent Labs   Lab  09/21/18   0404  09/21/18    0549   INR  1.4*   --    APTT  >150.0*  >150.0*     Labs within the past 24 hours have been reviewed.    ASSESSMENT/PLAN:     Assessment: 62 year old man with perforated diverticulitis and free air s/p Tash's procedure 9/12, ex-lap for bleeding, with intraop endoscopic control of bleeding remnant gastric ulcer 9/15    Plan:  Continue diuresis again today.  40 Lasix x2 today.    NPO  Will hold off on SBT today, f/u CXR with possible bronch later today  Increase TF to 40  Awaiting return of bowel function  Daily CXR  Lovenox  Home meds    GI bleed:  Hb stable.  BID protonix    Steffanie Estrella MD  PGY-2 General Surgery   [phone]

## 2018-09-21 NOTE — PROGRESS NOTES
Dr. Hooper at bedside. MD updated on neuro status, VS with hypotension, vent settings, and overall pt status. MD gave order to restart tube feeds at 40ml/hr, and pt to rest on ventilator tonight, retry SBT in morning. Will carry out and continue to monitor closely.

## 2018-09-21 NOTE — NURSING
Dr. Grajeda at bedside since 2330 due to pt extreme agitation and combativeness despite additional dose haldol and fentanyl. New orders pending

## 2018-09-21 NOTE — PROGRESS NOTES
[facility]  Critical Care - Surgery  Progress Note    Patient Name: Amari Saez  MRN: [id number]  Admission Date: 9/12/2018  Hospital Length of Stay: 9 days  Code Status: Full Code  Attending Provider: Judd Agudelo MD  Primary Care Provider: Chaim Valdovinos MD   Principal Problem: Perforated diverticulum of large intestine    Subjective:     Hospital/ICU Course:  9/14: Transferred from floor to SICU for higher level of care.     Interval History/Significant Events: Overnight patient said to agitated requiring precedex and propofol gtt. Remains intubated. Per nursing patients left hand found to be cool to touch with difficult to pulses to appreciate. Started on hep gtt overnight; pulses able to be dopplered this am.     Follow-up For: Procedure(s) (LRB):  LAPAROTOMY,EXPLORATORY (N/A)  GASTROSTOMY (N/A)  EGD (ESOPHAGOGASTRODUODENOSCOPY) (N/A)    Post-Operative Day: 5 Days Post-Op    Objective:     Vital Signs (Most Recent):  Temp: 99.5 °F (37.5 °C) (09/20/18 0902)  Pulse: (!) 114 (09/20/18 0902)  Resp: (!) 24 (09/20/18 0902)  BP: 124/82 (09/20/18 0900)  SpO2: 98 % (09/20/18 0902) Vital Signs (24h Range):  Temp:  [98.1 °F (36.7 °C)-100 °F (37.8 °C)] 99.5 °F (37.5 °C)  Pulse:  [106-123] 114  Resp:  [17-36] 24  SpO2:  [98 %-100 %] 98 %  BP: ()/() 124/82  Arterial Line BP: ()/() 136/118     Weight: 81 kg (178 lb 9.2 oz)  Body mass index is 28.82 kg/m².      Intake/Output Summary (Last 24 hours) at 9/20/2018 0919  Last data filed at 9/20/2018 0900  Gross per 24 hour   Intake 2857.99 ml   Output 3345 ml   Net -487.01 ml       Physical Exam   Constitutional: He appears well-developed and well-nourished.   HENT:   Head: Normocephalic and atraumatic.   Neck: Neck supple.   Cardiovascular: Normal rate, regular rhythm and intact distal pulses.   No murmur heard.  Pulmonary/Chest: He has rales.   intubated   Abdominal: Soft. Bowel sounds are normal.   Neurological: He is alert.    Sedated   Skin: Skin is warm. He is diaphoretic.   Psychiatric:   Anxious/agitated appearing.       Vents:  Vent Mode: Spont (09/20/18 0902)  Ventilator Initiated: Yes (09/14/18 2114)  Set Rate: 20 bmp (09/20/18 0723)  Vt Set: 380 mL (09/20/18 0723)  Pressure Support: 10 cmH20 (09/20/18 0902)  PEEP/CPAP: 5 cmH20 (09/20/18 0902)  Oxygen Concentration (%): 40 (09/20/18 0902)  Peak Airway Pressure: 16 cmH2O (09/20/18 0902)  Plateau Pressure: 0 cmH20 (09/20/18 0902)  Total Ve: 11.7 mL (09/20/18 0902)  Negative Inspiratory Force (cm H2O): -28 (09/17/18 1145)  F/VT Ratio<105 (RSBI): (!) 48.88 (09/20/18 0902)    Lines/Drains/Airways     Central Venous Catheter Line                 Percutaneous Central Line Insertion/Assessment - triple lumen  09/16/18 1310 right internal jugular 3 days          Drain                 Colostomy 09/12/18 LLQ 8 days         NG/OG Tube 09/14/18 2038 Hettinger sump Left nostril 5 days         Gastrostomy/Enterostomy 09/15/18 1621 Gastrostomy tube w/o balloon LUQ decompression 4 days         Urethral Catheter 09/20/18 0620 Temperature probe less than 1 day          Airway                 Airway - Non-Surgical 09/17/18 1720 Endotracheal Tube 2 days          Arterial Line                 Arterial Line 09/14/18 1953 Left Radial 5 days          Pressure Ulcer                 Pressure Injury 09/13/18 1534 lower Coccyx Stage 3 6 days          Peripheral Intravenous Line                 Midline Catheter Insertion/Assessment  - Single Lumen 09/13/18 1748 Right brachial vein 20g x 10cm 6 days                Significant Labs:    CBC/Anemia Profile:  Recent Labs   Lab  09/18/18   1655  09/19/18   0232  09/20/18   0323   WBC   --   20.59*  26.95*   HGB  9.5*  8.8*  8.6*   HCT  27.1*  26.2*  25.6*   PLT   --   117*  141*   MCV   --   87  89   RDW   --   17.7*  17.8*        Chemistries:  Recent Labs   Lab  09/19/18   0232  09/20/18   0323   NA  141  143   K  4.2  4.2   CL  113*  111*   CO2  23  25   BUN  18  22    CREATININE  0.9  0.8   CALCIUM  7.4*  7.7*   ALBUMIN  1.2*  1.2*   PROT  3.4*  3.9*   BILITOT  0.7  0.8   ALKPHOS  103  181*   ALT  17  19   AST  15  36   MG  1.8  1.8   PHOS  3.8  2.9       All pertinent labs within the past 24 hours have been reviewed.    Significant Imaging:  I have reviewed and interpreted all pertinent imaging results/findings within the past 24 hours.    Assessment/Plan:     Acute respiratory distress    Neuro:  Sedation: Propofol and precedex gtt.   Pain: Percocet PRN     Pulm:  - intubated  -Vent Mode: Spont  Oxygen Concentration (%):  [40] 40  Resp Rate Total:  [20 br/min-35 br/min] 24 br/min  Vt Set:  [380 mL] 380 mL  PEEP/CPAP:  [5 cmH20] 5 cmH20  Pressure Support:  [10 cmH20] 10 cmH20  Mean Airway Pressure:  [7.4 cmH20-9.6 cmH20] 8.5 cmH20  -   Recent Labs   Lab  09/20/18   0339   PH  7.456*   PCO2  42.6   PO2  105*   HCO3  30.0*   POCSATURATED  98   BE  6     - daily CXRs.    CV:  - HDS, off pressors    - 9/15: 3UpRBCs, 2 U FFP prior to OR  - 9/15: 1 U plts, 1 U cryo based on TEG study    GI/FEN/Lytes:  - NPO  - Tube feeds held this am   - Replace lytes PRN      Renal:  - BUN/Cr at 27/0.7  -  UOP 2.2L; net positive 4L overall    Heme:  - H/H stable at 8.5/25.4  - CBC, INR, fibrinogen q4  - hep gtt now; 18u/kg/hr; most recent PTT >150; repeat pending will hold gtt for now     ID:  - WBC downtrending; 15.1 today (26.5 yesterday)  - Resp Cx with many GN rods, moderate WBCs  -- Zosyn started back   - Blood/Urine cx NGTD   -- CT Chest/Abdomen w/ contrast performed yesterday  --- bilateral lung consolidation noted concerning for infection or an aspiration     Endo:  - SSI     PPX:   - Lovenox, protonix gtt    Dispo:   - Cont SICU care           Critical care was time spent personally by me on the following activities: development of treatment plan with patient or surrogate and bedside caregivers, discussions with consultants, evaluation of patient's response to treatment, examination of  patient, ordering and performing treatments and interventions, ordering and review of laboratory studies, ordering and review of radiographic studies, pulse oximetry, re-evaluation of patient's condition.  This critical care time did not overlap with that of any other provider or involve time for any procedures.     Payam Emerson MD  Critical Care - Surgery  [facility]

## 2018-09-21 NOTE — PROGRESS NOTES
Dr. Grajeda notified of drop in H/H from morning labs. MD gave no additional orders at this time. Will continue to monitor closely.

## 2018-09-22 PROBLEM — T81.30XA WOUND DEHISCENCE: Status: ACTIVE | Noted: 2018-01-01

## 2018-09-22 NOTE — PROGRESS NOTES
Upon entering room, pt tachypneic with a RR in the 30s and tachycardic.Fentanyl and propofol gtts increased in attempt to sedate and comfort patient. Repeat ABG performed showing PO2 of 51 and O2 of 86. Respiratory reported these results to SI, FiO2 increased to 60%, pt now satting 89% on pulse ox. No other orders given. Will continue to monitor closely.

## 2018-09-22 NOTE — PROGRESS NOTES
Progress Note  Surgery    Admit Date: 9/12/2018  Attending: Jammie  S/P: Procedure(s) (LRB):  LAPAROTOMY,EXPLORATORY (N/A)  GASTROSTOMY (N/A)  EGD (ESOPHAGOGASTRODUODENOSCOPY) (N/A)    Post-operative Day: 7 Days Post-Op    Hospital Day: 11    SUBJECTIVE:     No acute events overnight, afebrile. Remains intubated and sedated. SBT attempted this morning but patient desaturated during attempt and as such vent settings resumed. Tolerating tube feeds. Ostomy with gas and stool in the bag.     OBJECTIVE:     Vital Signs (Most Recent)  Temp: 99.7 °F (37.6 °C) (09/22/18 0906)  Pulse: (!) 118 (09/22/18 0906)  Resp: (!) 21 (09/22/18 0906)  BP: 130/79 (09/22/18 0900)  SpO2: (!) 91 % (09/22/18 0906)    Vital Signs Range (Last 24H):  Temp:  [95.5 °F (35.3 °C)-101.3 °F (38.5 °C)]   Pulse:  []   Resp:  [12-41]   BP: ()/(51-92)   SpO2:  [86 %-100 %]     I & O (Last 24H):    Intake/Output Summary (Last 24 hours) at 9/22/2018 0907  Last data filed at 9/22/2018 0900  Gross per 24 hour   Intake 1399 ml   Output 1550 ml   Net -151 ml       Physical Exam:  Gen: Intubated, sedated  HEENT: NCAT  Pulm: Mechanical breath sounds  Abd: Soft.  Ostomy bag in place over serous leakage site from incision.  Otherwise, incision is c/d/i.  Ostomy with gas and stool in bag, appearing ready to slough.  G tube with 200cc murky brown output  Extremities: no cyanosis or edema, or clubbing  Skin: Skin color, texture, turgor normal. No rashes or lesions    Laboratory:  CBC:   Recent Labs   Lab  09/22/18   0500   WBC  27.87*   RBC  3.14*   HGB  9.5*   HCT  29.2*   PLT  187   MCV  93   MCH  30.3   MCHC  32.5     CMP:   Recent Labs   Lab  09/22/18   0500   GLU  78   CALCIUM  7.8*   ALBUMIN  1.1*   PROT  4.4*   NA  144   K  4.2   CO2  27   CL  109   BUN  37*   CREATININE  0.8   ALKPHOS  184*   ALT  36   AST  80*   BILITOT  0.8     Coagulation:   Recent Labs   Lab  09/22/18   0500   INR  1.4*   APTT  32.2*     Labs within the past 24 hours have  been reviewed.    ASSESSMENT/PLAN:     Assessment: 62 year old man with perforated diverticulitis and free air s/p Tash's procedure 9/12, ex-lap for bleeding, with intraop endoscopic control of bleeding remnant gastric ulcer 9/15    Plan:  NPO  F/u CXR  Increase TF to 45  Will plan for repeat SBT tomorrow and if fails plan for bedside perc trach on monday  Heparin gtt   Home meds    GI bleed:  Hb stable.  BID protonix    Steffanie Estrella MD  PGY-2 General Surgery   [phone]

## 2018-09-22 NOTE — PROGRESS NOTES
Patient seen for follow up ostomy care. Patient intubated, pouch appears to be leaking. Pouch changed. Stoma necrotic, mushy brown stool/output noted. Dr. Pitt notified of stoma appearance, Dr. Pitt came to bedside to assess stoma. Colostomy repouched with barrier ring and coloplast susanna pouch. Serosanguineous to dark liquid output noted from lower abdominal incision. Redness and purple discoloration noted. Dr. Pitt's team removed lower abdominal incision staples. Packed with wet to dry per Dr. Pitt's request, orders placed for nursing to perform daily. Patient tolerated care well. See flow sheet and photographs below for additional documentation. Nursing to continue care. Wound care to follow prn.     09/22/18 1132       Incision/Site 09/15/18 1825 Abdomen   Date First Assessed/Time First Assessed: 09/15/18 1825   Location: Abdomen   Wound Image    Incision WDL ex  (lower abdomen)   Drainage Amount Moderate   Drainage Characteristics/Odor Serosanguineous;No odor   Appearance Moist;Pink;Adipose   Periwound Area Redness  (purple discoloration)   Wound Edges Dehisced   Wound Length (cm) 3 cm   Wound Width (cm) 0.5 cm   Wound Depth (cm) 2.5 cm   Wound Volume (cm^3) 3.75 cm^3   Tunneling (depth (cm)/location) 2.5 at 12 oclock   Care Cleansed with:;Sterile normal saline   Dressing Applied;Gauze, wet to dry;Foam   Dressing Change Due 09/23/18   colostomy

## 2018-09-22 NOTE — PROCEDURES
"Amari Saez is a 62 y.o. male patient.    Temp: 96.1 °F (35.6 °C) (18 1600)  Pulse: 95 (18 1600)  Resp: (!) 28 (18 1600)  BP: (!) 54/20 (18 1500)  SpO2: 98 % (18 1600)  Weight: 73.2 kg (161 lb 6 oz) (18 0600)  Height: 5' 6" (167.6 cm) (18 1246)       Arterial Line  Date/Time: 2018 4:27 PM  Location procedure was performed: [facility] (SICU)  Performed by: Andreea Angela MD  Authorized by: Andreea Angela MD   Assisting provider: Tyesha Ellis MD  Pre-op Diagnosis: respiratory failure  Post-operative diagnosis: respiratory failure  Consent Done: Yes  Risks and benefits: risks, benefits and alternatives were discussed  Patient identity confirmed:  and name  Time out: Immediately prior to procedure a "time out" was called to verify the correct patient, procedure, equipment, support staff and site/side marked as required.  Preparation: Patient was prepped and draped in the usual sterile fashion.  Indications: multiple ABGs, respiratory failure and hemodynamic monitoring  Location: left femoral  Anesthesia: see MAR for details  Patient sedated: yes  Sedatives: propofol  Seldinger technique: Seldinger technique used  Number of attempts: 3  Complications: No  Post-procedure: line sutured and dressing applied  Patient tolerance: Patient tolerated the procedure well with no immediate complications          Andreea Angela  2018  "

## 2018-09-22 NOTE — PROGRESS NOTES
[facility]  Vascular Surgery  Progress Note    Patient Name: Amari Saez  MRN: [id number]  Admission Date: 9/12/2018  Primary Care Provider: Chaim Valdovinos MD    Subjective:     Interval History: No events. Hand warmer    Post-Op Info:  Procedure(s) (LRB):  LAPAROTOMY,EXPLORATORY (N/A)  GASTROSTOMY (N/A)  EGD (ESOPHAGOGASTRODUODENOSCOPY) (N/A)   7 Days Post-Op       Medications:  Continuous Infusions:   fentanyl 12.5 mL/hr at 09/22/18 1000    propofol 50 mcg/kg/min (09/22/18 1000)     Scheduled Meds:   albuterol-ipratropium  3 mL Nebulization Q4H    chlorhexidine  15 mL Mouth/Throat BID    enoxaparin  1 mg/kg Subcutaneous BID    furosemide  40 mg Intravenous BID    nitroGLYCERIN 2% TD oint  2 inch Topical (Top) Q6H    pantoprozole (PROTONIX) IV  40 mg Intravenous Q12H    piperacillin-tazobactam (ZOSYN) IVPB  4.5 g Intravenous Q8H    sodium chloride 0.9%  3 mL Intravenous Q8H     PRN Meds:sodium chloride, dextrose 50%, dextrose 50%, dextrose 50%, fentaNYL, glucagon (human recombinant), glucose, glucose, insulin aspart U-100, ondansetron, potassium chloride in water, potassium chloride in water, potassium chloride in water, sodium chloride 0.9%, sodium phosphate IVPB, sodium phosphate IVPB, sodium phosphate IVPB     Objective:     Vital Signs (Most Recent):  Temp: 100.2 °F (37.9 °C) (09/22/18 1000)  Pulse: (!) 111 (09/22/18 1000)  Resp: 18 (09/22/18 1000)  BP: (!) 147/65 (09/22/18 1000)  SpO2: (!) 90 % (09/22/18 1000) Vital Signs (24h Range):  Temp:  [95.5 °F (35.3 °C)-101.3 °F (38.5 °C)] 100.2 °F (37.9 °C)  Pulse:  [] 111  Resp:  [12-41] 18  SpO2:  [86 %-100 %] 90 %  BP: ()/(51-92) 147/65     Date 09/22/18 0700 - 09/23/18 0659   Shift 4273-8657 2763-7659 2730-0039 24 Hour Total   INTAKE   NG/   135   Shift Total(mL/kg) 135(1.8)   135(1.8)   OUTPUT   Urine(mL/kg/hr) 180   180   Drains 20   20   Shift Total(mL/kg) 200(2.7)   200(2.7)   Weight (kg) 73.2 73.2 73.2 73.2        Physical Exam     General appearance:          Intubated              Neurological:             CAMP                            Neck:            Supple, no significant adenopathy; thyroid is not enlarged                           Chest:            Clear to auscultation, no wheezes, rales or rhonchi, symmetric air entry                                                  No use of accessory muscles                        Cardiac:           Normal rate and regular rhythm, S1 and S2 normal; PMI non-displaced                 Extremities:            Palpable pulses BLE                                                  Palpable rad/ulnar RUE                                                  Improved cyanosis Left thumb, hand warmer                                                    Triphasic ulnar signal at wrist, ultrasound color dopper flow of radial artery down to previous a-line site.         Significant Labs:  ABGs:   Recent Labs   Lab  09/22/18   0726   PH  7.408   PCO2  42.4   PO2  51*   HCO3  26.7   POCSATURATED  86*   BE  2     CBC:   Recent Labs   Lab  09/22/18   0752   WBC  25.83*   RBC  2.78*   HGB  8.3*   HCT  26.8*   PLT  180   MCV  96   MCH  29.9   MCHC  31.0*     CMP:   Recent Labs   Lab  09/22/18   0500   GLU  78   CALCIUM  7.8*   ALBUMIN  1.1*   PROT  4.4*   NA  144   K  4.2   CO2  27   CL  109   BUN  37*   CREATININE  0.8   ALKPHOS  184*   ALT  36   AST  80*   BILITOT  0.8     Coagulation:   Recent Labs   Lab  09/22/18   0500   LABPROT  13.8*   INR  1.4*   APTT  32.2*       Significant Diagnostics:  I have reviewed all pertinent imaging results/findings within the past 24 hours.    Assessment/Plan:     Ischemia of digits of hand    62 y.o. male with h/o HTN, smoking, and prior gastric bypass who presented to ED with abdominal pain found to have acute abdomen with free air from perforated diverticulitis, underwent Tash's procedure on 09/12/18 then subsequently re-lap/endoscopic gastric ulcer bleeding  control on 0915/18. Since then he was extubated and re-intubated for ARDS on 09/17/18, intermittently on pressors. Left radial arterial line placed on 09/14/18 and removed 09/20/18 due to malfunction. New onset left thumb cyanosis and signs of ischemia. Improved after starting heparin and other conservative measures.      - recommend systemic heparinization if ok with surgery team - will need this for 6 weeks if no contraindication    - keep L hand warm with warm compress/heated blanket  - nitro paste to hand q6h if pressure tolerates                Herberth Levi MD  Vascular Surgery  [facility]

## 2018-09-22 NOTE — ASSESSMENT & PLAN NOTE
Neuro:  Sedation: Propofol and fentanyl gtt.   Pain: Percocet PRN     Pulm:  - intubated, sedated  - FiO2 increased to 60% this am s/p ABG below.  -Vent Mode: A/C  Oxygen Concentration (%):  [] 100  Resp Rate Total:  [21 br/min-55 br/min] 21 br/min  Vt Set:  [380 mL] 380 mL  PEEP/CPAP:  [5 cmH20] 5 cmH20  Pressure Support:  [10 cmH20] 10 cmH20  Mean Airway Pressure:  [3.9 cmH20-9.7 cmH20] 3.9 cmH20  -   Recent Labs   Lab  09/22/18   0726   PH  7.408   PCO2  42.4   PO2  51*   HCO3  26.7   POCSATURATED  86*   BE  2   -daily CXRs.  - SBT this am was done. Patient noted to have quickly desaturated during the trial down to the 70s. Patient was placed back on previous vent setting and ABG was performed: 7.43/41/38/27/4.   - Will discuss with staff and primary service today about possible bronch    CV:  - Tachy overnight to rates in the 120s. Metoprolol 5mg given once for which patient responded to   - 9/15: 3UpRBCs, 2 U FFP prior to OR  - 9/15: 1 U plts, 1 U cryo based on TEG study    GI/FEN/Lytes:  - NPO  - Tube feeds running at 45   - Replace lytes PRN    Renal:  - BUN/Cr at 37/0.8( 27/0.7 yesterday)  -  UOP 1.1L; net negative 283cc for past 24 hours     Heme:  - H/H stable at 9.5/29.3; (8.5/25.4 yesterday)  - CBC, PT/INR q6  - hep gtt stopped per primary team yesterday afternoon. Lovenox 70 BID currently   - PT/INR/PTT: 13.8/1.4/32.3    ID:  - WBC uprending; 27.8 this am. Was 15.1 yesterday and 26.5 the day prior.  - Resp Cx positive for E.coli; moderate WBCs  -- Zosyn started back   - Urine Cx: positive for redd   - Blood cx NGTD   -- CT Chest/Abdomen w/ contrast 9/20  --- bilateral lung consolidation noted concerning for infection or an aspiration     Endo:  - SSI   - POCT 80-120s overnight     PPX:   -protonix    Dispo:   - Cont SICU care

## 2018-09-22 NOTE — PLAN OF CARE
Problem: Patient Care Overview  Goal: Plan of Care Review  Patient con't to have oxygenation problems throughout night. Agitated w/ RASS of +2 for most of night. On diprivan and Fent gtt. Increased Fio2 to 50% w/ Spo2 of 88%; goal to keep >90%. Asked resident about doing SBT w/ patient having oxygenation issues-denied wanting ABG/cont SBT. Patient failed SBT w/ PO2 of 38; placed back on ACVC 60%. Tube feeds increased to 45 cc/hr. Goal. No high residuals. Goal is to attempt 1 more SBT Sunday then plan for trach placement Monday. Possible future LTAC placement.

## 2018-09-22 NOTE — SUBJECTIVE & OBJECTIVE
Interval History/Significant Events: Overnight patient remained intubated, sedated on prop and fent gtt. Patient had SBT this morning which he desaturated during and was then placed back on previous vent setting.     Follow-up For: Procedure(s) (LRB):  LAPAROTOMY,EXPLORATORY (N/A)  GASTROSTOMY (N/A)  EGD (ESOPHAGOGASTRODUODENOSCOPY) (N/A)    Post-Operative Day: 7 Days Post-Op    Objective:     Vital Signs (Most Recent):  Temp: 99.9 °F (37.7 °C) (09/22/18 0700)  Pulse: 107 (09/22/18 0715)  Resp: 12 (09/22/18 0715)  BP: 106/66(Simultaneous filing. User may not have seen previous data.) (09/22/18 0700)  SpO2: (!) 86 % (09/22/18 0715) Vital Signs (24h Range):  Temp:  [95.5 °F (35.3 °C)-101.3 °F (38.5 °C)] 99.9 °F (37.7 °C)  Pulse:  [] 107  Resp:  [12-41] 12  SpO2:  [86 %-100 %] 86 %  BP: ()/(51-92) 106/66     Weight: 73.2 kg (161 lb 6 oz)  Body mass index is 26.05 kg/m².      Intake/Output Summary (Last 24 hours) at 9/22/2018 0750  Last data filed at 9/22/2018 0700  Gross per 24 hour   Intake 1379 ml   Output 1535 ml   Net -156 ml       Physical Exam   Constitutional: He appears well-developed and well-nourished.   HENT:   Head: Normocephalic and atraumatic.   Neck: Neck supple.   Cardiovascular: Normal rate, regular rhythm and intact distal pulses.   No murmur heard.  Pulmonary/Chest: He has rales. He exhibits no tenderness.   intubated   Abdominal: Soft. Bowel sounds are normal.   Neurological: He is alert.   Sedated  Follows commands intermediately    Skin: Skin is warm. He is diaphoretic.   Psychiatric:   Anxious/agitated appearing.       Vents:  Vent Mode: A/C (09/22/18 0218)  Ventilator Initiated: Yes (09/14/18 2114)  Set Rate: 20 bmp (09/22/18 0553)  Vt Set: 380 mL (09/22/18 0553)  Pressure Support: 10 cmH20 (09/22/18 0544)  PEEP/CPAP: 5 cmH20 (09/22/18 0553)  Oxygen Concentration (%): 100 (09/22/18 0715)  Peak Airway Pressure: 11 cmH2O (09/22/18 0553)  Plateau Pressure: 0 cmH20 (09/22/18 0553)  Total  Ve: 13.1 mL (09/22/18 0553)  Negative Inspiratory Force (cm H2O): -28 (09/17/18 1145)  F/VT Ratio<105 (RSBI): (!) 66.67 (09/22/18 0553)    Lines/Drains/Airways     Central Venous Catheter Line                 Percutaneous Central Line Insertion/Assessment - triple lumen  09/16/18 1310 right internal jugular 5 days          Drain                 Colostomy 09/12/18 LLQ 10 days         NG/OG Tube 09/14/18 2038 [place] sump Left nostril 7 days         Gastrostomy/Enterostomy 09/15/18 1621 Gastrostomy tube w/o balloon LUQ decompression 6 days         Urethral Catheter 09/20/18 0620 Temperature probe 2 days          Airway                 Airway - Non-Surgical 09/17/18 1720 Endotracheal Tube 4 days          Pressure Ulcer                 Pressure Injury 09/13/18 1534 lower Coccyx Stage 3 8 days          Peripheral Intravenous Line                 Midline Catheter Insertion/Assessment  - Single Lumen 09/13/18 1748 Right brachial vein 20g x 10cm 8 days                Significant Labs:    CBC/Anemia Profile:  Recent Labs   Lab  09/21/18   1100  09/21/18   2024  09/22/18   0500   WBC  18.07*  16.98*  27.87*   HGB  8.2*  8.9*  9.5*   HCT  24.4*  27.1*  29.2*   PLT  141*  167  187   MCV  91  92  93   RDW  17.6*  18.2*  18.7*        Chemistries:  Recent Labs   Lab  09/21/18   0404  09/22/18   0500   NA  144  144   K  3.7  4.2   CL  109  109   CO2  27  27   BUN  27*  37*   CREATININE  0.7  0.8   CALCIUM  7.5*  7.8*   ALBUMIN  1.0*  1.1*   PROT  3.6*  4.4*   BILITOT  0.8  0.8   ALKPHOS  157*  184*   ALT  28  36   AST  54*  80*   MG  1.8  1.9   PHOS  2.5*  3.3       All pertinent labs within the past 24 hours have been reviewed.    Significant Imaging:  I have reviewed and interpreted all pertinent imaging results/findings within the past 24 hours.

## 2018-09-22 NOTE — SIGNIFICANT EVENT
Patient's BP dropped to MAP on 50 and CVP was 3. Patient was given 500cc of albumin with good response. 2L bolus of LR is currently running. Flotrac monitor applied. STAT labs sent (CBC, BMP, and lactate). Patient also started having episodes of desaturation to 80s%. Switched to ARDS protocol (currently on AC/VC, Vt 350, rate 30, FiO2 70%, and PEEP 12). Discussed with Dr. Pitt. Will follow up.        Tyesha Ellis MD  Surgical ICU  [facility]  SpectraLink # [id number]  Pager # [phone]

## 2018-09-22 NOTE — NURSING
Attempted repeat SBT in the am. Checked with resident overnight to verify that she wanted to do an SBT on a patient on 50% Fio2 with a Sp02 of 90%-patient became extremely tachycardic and tachypneic with RR of 35 and above. ABG showed PO2 of 38. No previous ABG to compare to per request of the resident; patient placed back on rate ACVC 20 50%. Increased Fio2 to 60% after sat's were steadily declining and in 80's.

## 2018-09-22 NOTE — SUBJECTIVE & OBJECTIVE
Medications:  Continuous Infusions:   fentanyl 12.5 mL/hr at 09/22/18 1000    propofol 50 mcg/kg/min (09/22/18 1000)     Scheduled Meds:   albuterol-ipratropium  3 mL Nebulization Q4H    chlorhexidine  15 mL Mouth/Throat BID    enoxaparin  1 mg/kg Subcutaneous BID    furosemide  40 mg Intravenous BID    nitroGLYCERIN 2% TD oint  2 inch Topical (Top) Q6H    pantoprozole (PROTONIX) IV  40 mg Intravenous Q12H    piperacillin-tazobactam (ZOSYN) IVPB  4.5 g Intravenous Q8H    sodium chloride 0.9%  3 mL Intravenous Q8H     PRN Meds:sodium chloride, dextrose 50%, dextrose 50%, dextrose 50%, fentaNYL, glucagon (human recombinant), glucose, glucose, insulin aspart U-100, ondansetron, potassium chloride in water, potassium chloride in water, potassium chloride in water, sodium chloride 0.9%, sodium phosphate IVPB, sodium phosphate IVPB, sodium phosphate IVPB     Objective:     Vital Signs (Most Recent):  Temp: 100.2 °F (37.9 °C) (09/22/18 1000)  Pulse: (!) 111 (09/22/18 1000)  Resp: 18 (09/22/18 1000)  BP: (!) 147/65 (09/22/18 1000)  SpO2: (!) 90 % (09/22/18 1000) Vital Signs (24h Range):  Temp:  [95.5 °F (35.3 °C)-101.3 °F (38.5 °C)] 100.2 °F (37.9 °C)  Pulse:  [] 111  Resp:  [12-41] 18  SpO2:  [86 %-100 %] 90 %  BP: ()/(51-92) 147/65     Date 09/22/18 0700 - 09/23/18 0659   Shift 5714-8694 8387-2497 5930-8399 24 Hour Total   INTAKE   NG/   135   Shift Total(mL/kg) 135(1.8)   135(1.8)   OUTPUT   Urine(mL/kg/hr) 180   180   Drains 20   20   Shift Total(mL/kg) 200(2.7)   200(2.7)   Weight (kg) 73.2 73.2 73.2 73.2       Physical Exam     General appearance:          Intubated              Neurological:             CAMP                            Neck:            Supple, no significant adenopathy; thyroid is not enlarged                           Chest:            Clear to auscultation, no wheezes, rales or rhonchi, symmetric air entry                                                  No use of  accessory muscles                        Cardiac:           Normal rate and regular rhythm, S1 and S2 normal; PMI non-displaced                 Extremities:            Palpable pulses BLE                                                  Palpable rad/ulnar RUE                                                  Improved cyanosis Left thumb, hand warmer                                                    Triphasic ulnar signal at wrist, ultrasound color dopper flow of radial artery down to previous a-line site.         Significant Labs:  ABGs:   Recent Labs   Lab  09/22/18   0726   PH  7.408   PCO2  42.4   PO2  51*   HCO3  26.7   POCSATURATED  86*   BE  2     CBC:   Recent Labs   Lab  09/22/18   0752   WBC  25.83*   RBC  2.78*   HGB  8.3*   HCT  26.8*   PLT  180   MCV  96   MCH  29.9   MCHC  31.0*     CMP:   Recent Labs   Lab  09/22/18   0500   GLU  78   CALCIUM  7.8*   ALBUMIN  1.1*   PROT  4.4*   NA  144   K  4.2   CO2  27   CL  109   BUN  37*   CREATININE  0.8   ALKPHOS  184*   ALT  36   AST  80*   BILITOT  0.8     Coagulation:   Recent Labs   Lab  09/22/18   0500   LABPROT  13.8*   INR  1.4*   APTT  32.2*       Significant Diagnostics:  I have reviewed all pertinent imaging results/findings within the past 24 hours.

## 2018-09-22 NOTE — PROGRESS NOTES
[facility]  Critical Care - Surgery  Progress Note    Patient Name: Amari Saez  MRN: [id number]  Admission Date: 9/12/2018  Hospital Length of Stay: 10 days  Code Status: Full Code  Attending Provider: Judd Agudelo MD  Primary Care Provider: Chaim Valdovinos MD   Principal Problem: Perforated diverticulum of large intestine    Subjective:     Hospital/ICU Course:  9/14: Transferred from floor to SICU for higher level of care.     Interval History/Significant Events: Overnight patient remained intubated, sedated on prop and fent gtt. Patient had SBT this morning which he desaturated during and was then placed back on previous vent setting.     Follow-up For: Procedure(s) (LRB):  LAPAROTOMY,EXPLORATORY (N/A)  GASTROSTOMY (N/A)  EGD (ESOPHAGOGASTRODUODENOSCOPY) (N/A)    Post-Operative Day: 7 Days Post-Op    Objective:     Vital Signs (Most Recent):  Temp: 99.9 °F (37.7 °C) (09/22/18 0700)  Pulse: 107 (09/22/18 0715)  Resp: 12 (09/22/18 0715)  BP: 106/66(Simultaneous filing. User may not have seen previous data.) (09/22/18 0700)  SpO2: (!) 86 % (09/22/18 0715) Vital Signs (24h Range):  Temp:  [95.5 °F (35.3 °C)-101.3 °F (38.5 °C)] 99.9 °F (37.7 °C)  Pulse:  [] 107  Resp:  [12-41] 12  SpO2:  [86 %-100 %] 86 %  BP: ()/(51-92) 106/66     Weight: 73.2 kg (161 lb 6 oz)  Body mass index is 26.05 kg/m².      Intake/Output Summary (Last 24 hours) at 9/22/2018 0750  Last data filed at 9/22/2018 0700  Gross per 24 hour   Intake 1379 ml   Output 1535 ml   Net -156 ml       Physical Exam   Constitutional: He appears well-developed and well-nourished.   HENT:   Head: Normocephalic and atraumatic.   Neck: Neck supple.   Cardiovascular: Normal rate, regular rhythm and intact distal pulses.   No murmur heard.  Pulmonary/Chest: He has rales. He exhibits no tenderness.   intubated   Abdominal: Soft. Bowel sounds are normal.   Neurological: He is alert.   Sedated  Follows commands intermediately    Skin:  Skin is warm. He is diaphoretic.   Psychiatric:   Anxious/agitated appearing.       Vents:  Vent Mode: A/C (09/22/18 0553)  Ventilator Initiated: Yes (09/14/18 2114)  Set Rate: 20 bmp (09/22/18 0553)  Vt Set: 380 mL (09/22/18 0553)  Pressure Support: 10 cmH20 (09/22/18 0544)  PEEP/CPAP: 5 cmH20 (09/22/18 0553)  Oxygen Concentration (%): 100 (09/22/18 0715)  Peak Airway Pressure: 11 cmH2O (09/22/18 0553)  Plateau Pressure: 0 cmH20 (09/22/18 0553)  Total Ve: 13.1 mL (09/22/18 0553)  Negative Inspiratory Force (cm H2O): -28 (09/17/18 1145)  F/VT Ratio<105 (RSBI): (!) 66.67 (09/22/18 0553)    Lines/Drains/Airways     Central Venous Catheter Line                 Percutaneous Central Line Insertion/Assessment - triple lumen  09/16/18 1310 right internal jugular 5 days          Drain                 Colostomy 09/12/18 LLQ 10 days         NG/OG Tube 09/14/18 2038 [place] sump Left nostril 7 days         Gastrostomy/Enterostomy 09/15/18 1621 Gastrostomy tube w/o balloon LUQ decompression 6 days         Urethral Catheter 09/20/18 0620 Temperature probe 2 days          Airway                 Airway - Non-Surgical 09/17/18 1720 Endotracheal Tube 4 days          Pressure Ulcer                 Pressure Injury 09/13/18 1534 lower Coccyx Stage 3 8 days          Peripheral Intravenous Line                 Midline Catheter Insertion/Assessment  - Single Lumen 09/13/18 1748 Right brachial vein 20g x 10cm 8 days                Significant Labs:    CBC/Anemia Profile:  Recent Labs   Lab  09/21/18   1100  09/21/18   2024  09/22/18   0500   WBC  18.07*  16.98*  27.87*   HGB  8.2*  8.9*  9.5*   HCT  24.4*  27.1*  29.2*   PLT  141*  167  187   MCV  91  92  93   RDW  17.6*  18.2*  18.7*        Chemistries:  Recent Labs   Lab  09/21/18   0404  09/22/18   0500   NA  144  144   K  3.7  4.2   CL  109  109   CO2  27  27   BUN  27*  37*   CREATININE  0.7  0.8   CALCIUM  7.5*  7.8*   ALBUMIN  1.0*  1.1*   PROT  3.6*  4.4*   BILITOT  0.8  0.8    ALKPHOS  157*  184*   ALT  28  36   AST  54*  80*   MG  1.8  1.9   PHOS  2.5*  3.3       All pertinent labs within the past 24 hours have been reviewed.    Significant Imaging:  I have reviewed and interpreted all pertinent imaging results/findings within the past 24 hours.    Assessment/Plan:     Acute respiratory distress    Neuro:  Sedation: Propofol and fentanyl gtt.   Pain: Percocet PRN     Pulm:  - intubated, sedated  - FiO2 increased to 60% this am s/p ABG below.  -Vent Mode: A/C  Oxygen Concentration (%):  [] 100  Resp Rate Total:  [21 br/min-55 br/min] 21 br/min  Vt Set:  [380 mL] 380 mL  PEEP/CPAP:  [5 cmH20] 5 cmH20  Pressure Support:  [10 cmH20] 10 cmH20  Mean Airway Pressure:  [3.9 cmH20-9.7 cmH20] 3.9 cmH20  -   Recent Labs   Lab  09/22/18   0726   PH  7.408   PCO2  42.4   PO2  51*   HCO3  26.7   POCSATURATED  86*   BE  2   -daily CXRs.  - SBT this am was done. Patient noted to have quickly desaturated during the trial down to the 70s. Patient was placed back on previous vent setting and ABG was performed: 7.43/41/38/27/4.   - Will discuss with staff and primary service today about possible bronch    CV:  - Tachy overnight to rates in the 120s. Metoprolol 5mg given once for which patient responded to   - 9/15: 3UpRBCs, 2 U FFP prior to OR  - 9/15: 1 U plts, 1 U cryo based on TEG study    GI/FEN/Lytes:  - NPO  - Tube feeds running at 45   - Replace lytes PRN    Renal:  - BUN/Cr at 37/0.8( 27/0.7 yesterday)  -  UOP 1.1L; net negative 283cc for past 24 hours     Heme:  - H/H stable at 9.5/29.3; (8.5/25.4 yesterday)  - CBC, PT/INR q6  - hep gtt stopped per primary team yesterday afternoon. Lovenox 70 BID currently   - PT/INR/PTT: 13.8/1.4/32.3    ID:  - WBC uprending; 27.8 this am. Was 15.1 yesterday and 26.5 the day prior.  - Resp Cx positive for E.coli; moderate WBCs  -- Zosyn started back   - Urine Cx: positive for redd   - Blood cx NGTD   -- CT Chest/Abdomen w/ contrast 9/20  --- bilateral  lung consolidation noted concerning for infection or an aspiration     Endo:  - SSI   - POCT 80-120s overnight     PPX:   -protonix    Dispo:   - Cont SICU care          Critical care was time spent personally by me on the following activities: development of treatment plan with patient or surrogate and bedside caregivers, discussions with consultants, evaluation of patient's response to treatment, examination of patient, ordering and performing treatments and interventions, ordering and review of laboratory studies, ordering and review of radiographic studies, pulse oximetry, re-evaluation of patient's condition.  This critical care time did not overlap with that of any other provider or involve time for any procedures.     Payam Emerson MD  Critical Care - Surgery  [facility]

## 2018-09-22 NOTE — PROGRESS NOTES
Attempted SBT this AM. Patient had an increase in RR and de-sat'ed. SBT was ended, ABG was obtained and results shown to MD, see flowsheet. Pt placed back on previous settings and will continue to monitor.

## 2018-09-22 NOTE — PROCEDURES
"Amari Saez is a 62 y.o. male patient.    Temp: 97.2 °F (36.2 °C) (09/22/18 1424)  Pulse: 89 (09/22/18 1424)  Resp: (!) 28 (09/22/18 1424)  BP: 107/77 (09/22/18 1400)  SpO2: (!) 94 % (09/22/18 1424)  Weight: 73.2 kg (161 lb 6 oz) (09/21/18 0600)  Height: 5' 6" (167.6 cm) (09/12/18 1246)       Arterial Line  Date/Time: 9/22/2018 2:41 PM  Location procedure was performed: [facility] SURGICAL ICU (SICU)  Performed by: Tyesha Ellis MD  Authorized by: Jarod Pitt MD   Assisting provider: Andreea Angela MD  Consent Done: Emergent Situation  Preparation: Patient was prepped and draped in the usual sterile fashion.  Indications: multiple ABGs and hemodynamic monitoring  Location: left femoral  Patient sedated: yes  Sedatives: propofol  Analgesia: fentanyl  Vitals: Vital signs were monitored during sedation.  Needle gauge: 20  Number of attempts: 3  Complications: No  Estimated blood loss (mL): 3  Post-procedure: line sutured and dressing applied  Patient tolerance: Patient tolerated the procedure well with no immediate complications        Tyesha Ellis MD  9/22/2018  "

## 2018-09-22 NOTE — PROGRESS NOTES
Abhijit Rosario at bedside to perform bronch. Pt adequately sedated for procedure. Pt began to desat, bronch stopped per MD Pitt. Will continue to monitor closely.     Bronch completed. Samples sent to lab

## 2018-09-22 NOTE — PROCEDURES
[facility]  Bronchoscopy   Procedure Note    SUMMARY     Date of Procedure: 9/22/2018    Procedure: Bronchoscopy, Diagnostic  Bronchoalveolar lavage, BAL    Provider: Jarod Pitt MD    Pre-Procedure Diagnosis:   1. Sepsis.  2. Right lower lobe infiltrate.    Post-Procedure Diagnosis: Same    Anesthesia: General Anesthesia    Procedure in Detail:   Patient was consented from family for the procedure with all risk and benefit of the procedure explained in detail.  Family was given the opportunity to ask questions and all concerns were answered.  The bronchocope was inserted into the main airway via the endotracheal tube. An anatomical survey was done of the main airways and the subsegmental bronchus. Trachea was clear. There was mild amount of thin secretions in the left main and subsegmental bronchi. The right lower lobe bronchus was filled with thick, purulent appearing secretion.  A bronchialalveolar lavage was performed of the right lower lobe using aliquots of normal saline instilled into the airways then aspirated back. The lobe was cleared following the aspiration. There was no significant secretions involving the right middle and upper lobes. Bronchoscope was withdrawn and procedure was completed.    Complications: None; patient tolerated the procedure well.    Estimated Blood Loss (EBL): None           Implants: None    Specimens: Sent purulent appearing BAL for quantitative culture       Condition: Critical        Attestation: I was present for and personally performe the procedure in its entirety.

## 2018-09-22 NOTE — NURSING
Patient has continued to have lower Spo2 levels throughout the night, ranging from 85-91%, 85% being the lowest, was informed by SICU resident to maintain Spo2 >90%. Spoke with SICU resident again regarding sat's and patient agitation/decreased UOP-1 dose of lasix given 40 mg and 5 mg metoprolol for tachycardia in the 130's. Patient has had minimal response UOP wise but Spo2 is better (93%) and heartrate decreased.

## 2018-09-23 NOTE — PLAN OF CARE
Problem: Patient Care Overview  Goal: Plan of Care Review  Outcome: Ongoing (interventions implemented as appropriate)  Pt vitals stable throughout shift. Vent settings weaned to AC 40/8. Fentanyl and propofol for sedation and comfort. TF at 40ml/hr, D10 gtt infusing to maintain BG>70. Q2h accuchecks, D50 given as needed. Urine output adequate, drain output appropriate. Echo performed at bedside. Soft bilateral wrist restraints continued to prevent line pulling. Pt turned Q2h. POC reviewed with pt and brother. RN at  to monitor and answer all questions. See flowsheets for full assessment details.

## 2018-09-23 NOTE — SUBJECTIVE & OBJECTIVE
Interval History/Significant Events: Right IJ CVC exchanged over guidewire and left femoral A-line placed yesterday. Episodes of hypotension requiring volume resuscitation with good response (500cc albumin, ~6L crystalloids, and 2u PRBC).    Follow-up For: Procedure(s) (LRB):  LAPAROTOMY,EXPLORATORY (N/A)  GASTROSTOMY (N/A)  EGD (ESOPHAGOGASTRODUODENOSCOPY) (N/A)    Post-Operative Day: 8 Days Post-Op    Objective:     Vital Signs (Most Recent):  Temp: 98.4 °F (36.9 °C) (09/23/18 0507)  Pulse: 93 (09/23/18 0507)  Resp: (!) 29 (09/23/18 0507)  BP: (!) 28/12 (09/22/18 2013)  SpO2: 99 % (09/23/18 0507) Vital Signs (24h Range):  Temp:  [96.1 °F (35.6 °C)-100.2 °F (37.9 °C)] 98.4 °F (36.9 °C)  Pulse:  [] 93  Resp:  [12-36] 29  SpO2:  [85 %-100 %] 99 %  BP: ()/(12-79) 28/12  Arterial Line BP: ()/(47-72) 87/51     Weight: 73.2 kg (161 lb 6 oz)  Body mass index is 26.05 kg/m².      Intake/Output Summary (Last 24 hours) at 9/23/2018 0613  Last data filed at 9/23/2018 0515  Gross per 24 hour   Intake 7169 ml   Output 2145 ml   Net 5024 ml       Physical Exam   Constitutional: He appears well-developed. He appears listless. He has a sickly appearance. He is sedated and intubated.   HENT:   Head: Normocephalic and atraumatic.   Eyes:   Pupils are unequal (L>R), but both reactive to light   Neck: Neck supple.   Cardiovascular: Normal rate, regular rhythm and intact distal pulses.   No murmur heard.  Pulmonary/Chest: He is intubated. He has rales. He exhibits no tenderness.   Abdominal: Soft. He exhibits no distension.   Musculoskeletal: He exhibits edema.   Neurological: He appears listless.   Follows commands  Moves all extremities  Withdraws to pain   Skin: Skin is warm. He is diaphoretic.   Psychiatric:   Anxious/agitated appearing   Nursing note and vitals reviewed.      Vents:  Vent Mode: A/C (09/23/18 0507)  Ventilator Initiated: Yes (09/14/18 2114)  Set Rate: 30 bmp (09/23/18 0507)  Vt Set: 350 mL  (09/23/18 0507)  Pressure Support: 10 cmH20 (09/22/18 0544)  PEEP/CPAP: 10 cmH20 (09/23/18 0507)  Oxygen Concentration (%): 71 (09/23/18 0507)  Peak Airway Pressure: 26 cmH2O (09/23/18 0507)  Plateau Pressure: 0 cmH20 (09/23/18 0507)  Total Ve: 12.6 mL (09/23/18 0507)  Negative Inspiratory Force (cm H2O): 10 (09/23/18 0335)  F/VT Ratio<105 (RSBI): (!) 76.72 (09/23/18 0507)    Lines/Drains/Airways     Central Venous Catheter Line                 Percutaneous Central Line Insertion/Assessment - triple lumen  09/22/18 1720 right internal jugular less than 1 day          Drain                 Colostomy 09/12/18 LLQ 11 days         NG/OG Tube 09/14/18 2038 [place] sump Left nostril 8 days         Gastrostomy/Enterostomy 09/15/18 1621 Gastrostomy tube w/o balloon LUQ decompression 7 days         Open Drain 09/22/18 Anterior;Right;Ventral Groin Other (see comments) 1 day         Urethral Catheter 09/22/18 1230 Temperature probe less than 1 day          Airway                 Airway - Non-Surgical 09/17/18 1720 Endotracheal Tube 5 days          Arterial Line                 Arterial Line 09/22/18 1340 Left Femoral less than 1 day          Pressure Ulcer                 Pressure Injury 09/13/18 1534 lower Coccyx Stage 3 9 days          Peripheral Intravenous Line                 Midline Catheter Insertion/Assessment  - Single Lumen 09/13/18 1748 Right brachial vein 20g x 10cm 9 days                Significant Labs:    CBC/Anemia Profile:  Recent Labs   Lab  09/22/18   1800  09/22/18   2205  09/23/18   0330   WBC  28.12*  31.06*  33.99*   HGB  6.9*  10.8*  11.4*   HCT  21.0*  32.4*  34.0*   PLT  138*  144*  148*   MCV  94  91  91   RDW  18.7*  16.5*  17.3*        Chemistries:  Recent Labs   Lab  09/22/18   0500  09/22/18   1346  09/22/18   1847  09/23/18   0330   NA  144  145   --   139   K  4.2  4.1  3.6  4.0   CL  109  111*   --   109   CO2  27  26   --   24   BUN  37*  41*   --   37*   CREATININE  0.8  0.9   --   0.8    CALCIUM  7.8*  7.3*   --   7.5*   ALBUMIN  1.1*   --    --   1.4*   PROT  4.4*   --    --   4.2*   BILITOT  0.8   --    --   0.8   ALKPHOS  184*   --    --   126   ALT  36   --    --   23   AST  80*   --    --   49*   MG  1.9   --   1.6  2.1   PHOS  3.3   --    --   3.8       All pertinent labs within the past 24 hours have been reviewed.      Significant Imaging:  I have reviewed and interpreted all pertinent imaging results/findings within the past 24 hours.

## 2018-09-23 NOTE — PLAN OF CARE
Problem: Patient Care Overview  Goal: Plan of Care Review  Outcome: Ongoing (interventions implemented as appropriate)  Pt severely decompensated during shift. Pt bronched and relined. Labs monitored Q6h. Tube feeds held, D10 gtt ordered for low BG. Q2h acchecks. Fentanly and propofol for sedation and comfort.  Albumin and LR boluses given to maintain MAP>65. Distal portion of midline incision opened and packed with a wet to dry dressing. Soft bilateral wrist restraints continued to prevent line pulling. Plan to give 2uPRBC and possible CT of abdomen for drop in H&H. POC reviewed with pt. RN at  to monitor and answer all questions. See flowsheets for full assessment details.

## 2018-09-23 NOTE — ASSESSMENT & PLAN NOTE
Neuro:  - Sedation: Propofol  - Pain: fentanyl     Pulm:  - Intubated  Vent Mode: A/C  Oxygen Concentration (%):  [] 100  Resp Rate Total:  [20 br/min-42 br/min] 35 br/min  Vt Set:  [350 mL-380 mL] 350 mL  PEEP/CPAP:  [8 [id number] cmH20] 10 cmH20  Mean Airway Pressure:  [10 akF70-77 cmH20] 15 cmH20  - Daily CXR  - Daily SBT  - ABG:  PH  7.396   PCO2  41.6   PO2  70*   HCO3  25.5   POCSATURATED  94*   BE  1     CV:  - Episodes of hypotension requiring volume resuscitation with good response (500cc albumin, ~6L crystalloids, and 2u PRBC)  - 9/15: 3UpRBCs, 2 U FFP prior to OR  - 9/15: 1 U plts, 1 U cryo based on TEG study    GI/FEN/Lytes:  - NPO, holding TF overnight  - Trend CMP  - Replace lytes PRN    Renal:  - BUN/Cr at 37/0.8  - Monitor UOP    Heme:  - Hgb 11.4 (s/p 2u PRBC on 9/22)  - CBC, PT/INR q6  - Heparin gtt stopped per primary team yesterday afternoon. Lovenox 70 BID currently     ID:  - WBC uprending; 34 this am. Was 27 yesterday  - Resp Cx positive for E.coli resistent to E. Coli  - Abx: cefepime  - Urine Cx: positive for redd   - Blood cx NGTD   - CT Chest/Abdomen w/ contrast 9/20: bilateral lung consolidation noted concerning for infection or an aspiration     Endo:  - Hypoglycemic after holding TF  - D10 infusion @ 10 mL/hr      PPx:   - PPI      Dispo:   - Cont SICU care

## 2018-09-23 NOTE — SUBJECTIVE & OBJECTIVE
Medications:  Continuous Infusions:   dextrose 10 % in water (D10W) 50 mL/hr at 09/23/18 1000    fentanyl 5 mL/hr at 09/23/18 1000    propofol 45 mcg/kg/min (09/23/18 1000)     Scheduled Meds:   albumin human 5%  500 mL Intravenous Once    albuterol-ipratropium  3 mL Nebulization Q4H    ceFEPime (MAXIPIME) IVPB  2 g Intravenous Q12H    chlorhexidine  15 mL Mouth/Throat BID    enoxaparin  1 mg/kg Subcutaneous BID    nitroGLYCERIN 2% TD oint  2 inch Topical (Top) Q6H    pantoprozole (PROTONIX) IV  40 mg Intravenous Q12H    sodium chloride 0.9%  3 mL Intravenous Q8H    vancomycin (VANCOCIN) IVPB  1,250 mg Intravenous Q12H     PRN Meds:sodium chloride, sodium chloride, dextrose 50%, dextrose 50%, dextrose 50%, fentaNYL, glucagon (human recombinant), glucose, glucose, insulin aspart U-100, ondansetron, potassium chloride in water, potassium chloride in water, potassium chloride in water, sodium chloride 0.9%, sodium phosphate IVPB, sodium phosphate IVPB, sodium phosphate IVPB     Objective:     Vital Signs (Most Recent):  Temp: 97.7 °F (36.5 °C) (09/23/18 1000)  Pulse: 87 (09/23/18 1000)  Resp: (!) 24 (09/23/18 1000)  BP: (!) 28/12 (09/22/18 2013)  SpO2: 99 % (09/23/18 1000) Vital Signs (24h Range):  Temp:  [96.1 °F (35.6 °C)-99.3 °F (37.4 °C)] 97.7 °F (36.5 °C)  Pulse:  [] 87  Resp:  [12-36] 24  SpO2:  [85 %-100 %] 99 %  BP: ()/(12-77) 28/12  Arterial Line BP: ()/(47-72) 93/56     Date 09/23/18 0700 - 09/24/18 0659   Shift 0456-9166 0723-8288 2094-7080 24 Hour Total   INTAKE   NG/GT 60   60   Shift Total(mL/kg) 60(0.8)   60(0.8)   OUTPUT   Urine(mL/kg/hr) 205   205   Drains 175   175   Shift Total(mL/kg) 380(5.2)   380(5.2)   Weight (kg) 73.2 73.2 73.2 73.2       Physical Exam  General appearance:          Intubated              Neurological:             CAMP                            Neck:            Supple, no significant adenopathy; thyroid is not  enlarged                           Chest:            Clear to auscultation, no wheezes, rales or rhonchi, symmetric air entry                                                  No use of accessory muscles                        Cardiac:           Normal rate and regular rhythm, S1 and S2 normal; PMI non-displaced                 Extremities:            Palpable pulses BLE                                                  Palpable rad/ulnar RUE                                                  Improved cyanosis Left thumb, hand warmer                                                    Triphasic ulnar signal at wrist, ultrasound color dopper flow of radial artery down to previous a-line site.      Significant Labs:  ABGs:   Recent Labs   Lab  09/23/18   0349   PH  7.396   PCO2  41.6   PO2  70*   HCO3  25.5   POCSATURATED  94*   BE  1     CBC:   Recent Labs   Lab  09/23/18   1000   WBC  31.05*   RBC  3.61*   HGB  11.1*   HCT  32.7*   PLT  138*   MCV  91   MCH  30.7   MCHC  33.9     CMP:   Recent Labs   Lab  09/23/18   0330   GLU  89   CALCIUM  7.5*   ALBUMIN  1.4*   PROT  4.2*   NA  139   K  4.0   CO2  24   CL  109   BUN  37*   CREATININE  0.8   ALKPHOS  126   ALT  23   AST  49*   BILITOT  0.8     Coagulation:   Recent Labs   Lab  09/23/18   0330   LABPROT  16.4*   INR  1.7*   APTT  43.1*       Significant Diagnostics:  CXR: Slight improvement in bilateral infiltrates.

## 2018-09-23 NOTE — PROGRESS NOTES
Progress Note  Surgery    Admit Date: 9/12/2018  Attending: Jammie  S/P: Procedure(s) (LRB):  LAPAROTOMY,EXPLORATORY (N/A)  GASTROSTOMY (N/A)  EGD (ESOPHAGOGASTRODUODENOSCOPY) (N/A)    Post-operative Day: 8 Days Post-Op    Hospital Day: 12    SUBJECTIVE:     Required 3 L crystalloid and 500 albumin yesterday for hypotension, with good effect.  Never required pressors.  2 PRBC then given for low Hb, with a more than appropriate response.  Increase in vent settings.  Bronch performed yesterday, with findings consistent with pneumonia.  BAL performed.      OBJECTIVE:     Vital Signs (Most Recent)  Temp: 97.7 °F (36.5 °C) (09/23/18 0900)  Pulse: 94 (09/23/18 0900)  Resp: (!) 27 (09/23/18 0900)  BP: (!) 28/12 (09/22/18 2013)  SpO2: 100 % (09/23/18 0900)    Vital Signs Range (Last 24H):  Temp:  [96.1 °F (35.6 °C)-100.2 °F (37.9 °C)]   Pulse:  []   Resp:  [12-36]   BP: ()/(12-77)   SpO2:  [85 %-100 %]   Arterial Line BP: ()/(47-72)     I & O (Last 24H):    Intake/Output Summary (Last 24 hours) at 9/23/2018 0933  Last data filed at 9/23/2018 0900  Gross per 24 hour   Intake 7029 ml   Output 2675 ml   Net 4354 ml       Physical Exam:  Gen: Intubated, sedated  HEENT: NCAT  Pulm: Mechanical breath sounds  Abd: Soft.  Ostomy bag in place over serous leakage site from incision.  Otherwise, incision is c/d/i.  Ostomy with sweat only, appearing ready to slough.    Extremities: no cyanosis or edema, or clubbing  Skin: Skin color, texture, turgor normal. No rashes or lesions    Laboratory:  CBC:   Recent Labs   Lab  09/23/18   0330   WBC  33.99*   RBC  3.74*   HGB  11.4*   HCT  34.0*   PLT  148*   MCV  91   MCH  30.5   MCHC  33.5     CMP:   Recent Labs   Lab  09/23/18 0330   GLU  89   CALCIUM  7.5*   ALBUMIN  1.4*   PROT  4.2*   NA  139   K  4.0   CO2  24   CL  109   BUN  37*   CREATININE  0.8   ALKPHOS  126   ALT  23   AST  49*   BILITOT  0.8     Coagulation:   Recent Labs   Lab  09/23/18 0330   INR  1.7*    APTT  43.1*     Labs within the past 24 hours have been reviewed.    ASSESSMENT/PLAN:     Assessment: 62 year old man with perforated diverticulitis and free air s/p Tash's procedure 9/12, ex-lap for bleeding, with intraop endoscopic control of bleeding remnant gastric ulcer 9/15    Plan:  Neuro: Asymmetric pupils, but still following commands and no other focal deficits.  If CT scan is obtained of the abdomen, we would scan the head at the same time    Resp: Worsening vent settings.  No room to diurese at the moment  Continue tx of PNA    CV: Pulm HTN.  Good EF.  Repeat Echo today, may need nitric  HDS this AM, but required plenty of volume yesterday    Renal: Cr stable.  Making adequate UOP    Abd/FEN/GI:TF held yesterday during hypotension.  Likely restart today.  Ostomy no longer productive this morning, but nothing out NGT either and abdomen is nondistended    Heme/ID: Hb more than appropriate response to PRBC yesterday  Continue BID protonix for previous GIB (no evidence of continued bleeding per G tube)  E. Coli PNA: Cefepime.  Vanc has been added to broaden coverage  Only rare yeast in BAL   Will consider repeating CT scan in next few days if he does not improve     Dispo: Continue critical care    Juan Carlos Hooper MD  General Surgery, PGY-5   [phone]

## 2018-09-23 NOTE — PROGRESS NOTES
[facility]  Vascular Surgery  Progress Note    Patient Name: Amari Saez  MRN: [id number]  Admission Date: 9/12/2018  Primary Care Provider: Chaim Valdovinos MD    Subjective:     Interval History: No significant clinical changes.     Post-Op Info:  Procedure(s) (LRB):  LAPAROTOMY,EXPLORATORY (N/A)  GASTROSTOMY (N/A)  EGD (ESOPHAGOGASTRODUODENOSCOPY) (N/A)   8 Days Post-Op       Medications:  Continuous Infusions:   dextrose 10 % in water (D10W) 50 mL/hr at 09/23/18 1000    fentanyl 5 mL/hr at 09/23/18 1000    propofol 45 mcg/kg/min (09/23/18 1000)     Scheduled Meds:   albumin human 5%  500 mL Intravenous Once    albuterol-ipratropium  3 mL Nebulization Q4H    ceFEPime (MAXIPIME) IVPB  2 g Intravenous Q12H    chlorhexidine  15 mL Mouth/Throat BID    enoxaparin  1 mg/kg Subcutaneous BID    nitroGLYCERIN 2% TD oint  2 inch Topical (Top) Q6H    pantoprozole (PROTONIX) IV  40 mg Intravenous Q12H    sodium chloride 0.9%  3 mL Intravenous Q8H    vancomycin (VANCOCIN) IVPB  1,250 mg Intravenous Q12H     PRN Meds:sodium chloride, sodium chloride, dextrose 50%, dextrose 50%, dextrose 50%, fentaNYL, glucagon (human recombinant), glucose, glucose, insulin aspart U-100, ondansetron, potassium chloride in water, potassium chloride in water, potassium chloride in water, sodium chloride 0.9%, sodium phosphate IVPB, sodium phosphate IVPB, sodium phosphate IVPB     Objective:     Vital Signs (Most Recent):  Temp: 97.7 °F (36.5 °C) (09/23/18 1000)  Pulse: 87 (09/23/18 1000)  Resp: (!) 24 (09/23/18 1000)  BP: (!) 28/12 (09/22/18 2013)  SpO2: 99 % (09/23/18 1000) Vital Signs (24h Range):  Temp:  [96.1 °F (35.6 °C)-99.3 °F (37.4 °C)] 97.7 °F (36.5 °C)  Pulse:  [] 87  Resp:  [12-36] 24  SpO2:  [85 %-100 %] 99 %  BP: ()/(12-77) 28/12  Arterial Line BP: ()/(47-72) 93/56     Date 09/23/18 0700 - 09/24/18 0659   Shift 5947-8961 3545-4446 0879-2881 24 Hour Total   INTAKE   NG/GT 60   60   Shift  Total(mL/kg) 60(0.8)   60(0.8)   OUTPUT   Urine(mL/kg/hr) 205   205   Drains 175   175   Shift Total(mL/kg) 380(5.2)   380(5.2)   Weight (kg) 73.2 73.2 73.2 73.2       Physical Exam  General appearance:          Intubated              Neurological:             CAMP                            Neck:            Supple, no significant adenopathy; thyroid is not enlarged                           Chest:            Clear to auscultation, no wheezes, rales or rhonchi, symmetric air entry                                                  No use of accessory muscles                        Cardiac:           Normal rate and regular rhythm, S1 and S2 normal; PMI non-displaced                 Extremities:            Palpable pulses BLE                                                  Palpable rad/ulnar RUE                                                  Improved cyanosis Left thumb, hand warmer                                                    Triphasic ulnar signal at wrist, ultrasound color dopper flow of radial artery down to previous a-line site.      Significant Labs:  ABGs:   Recent Labs   Lab  09/23/18   0349   PH  7.396   PCO2  41.6   PO2  70*   HCO3  25.5   POCSATURATED  94*   BE  1     CBC:   Recent Labs   Lab  09/23/18   1000   WBC  31.05*   RBC  3.61*   HGB  11.1*   HCT  32.7*   PLT  138*   MCV  91   MCH  30.7   MCHC  33.9     CMP:   Recent Labs   Lab  09/23/18   0330   GLU  89   CALCIUM  7.5*   ALBUMIN  1.4*   PROT  4.2*   NA  139   K  4.0   CO2  24   CL  109   BUN  37*   CREATININE  0.8   ALKPHOS  126   ALT  23   AST  49*   BILITOT  0.8     Coagulation:   Recent Labs   Lab  09/23/18   0330   LABPROT  16.4*   INR  1.7*   APTT  43.1*       Significant Diagnostics:  CXR: Slight improvement in bilateral infiltrates.     Assessment/Plan:     Ischemia of digits of hand    62 y.o. male with h/o HTN, smoking, and prior gastric bypass who presented to ED with abdominal pain found to have acute abdomen with free air from  perforated diverticulitis, underwent Tash's procedure on 09/12/18 then subsequently re-lap/endoscopic gastric ulcer bleeding control on 0915/18. Since then he was extubated and re-intubated for ARDS on 09/17/18, intermittently on pressors. Left radial arterial line placed on 09/14/18 and removed 09/20/18 due to malfunction. New onset left thumb cyanosis and signs of ischemia. Improved after starting heparin and other conservative measures.      - recommend systemic heparinization if ok with surgery team - will need this for 6 weeks if no contraindication    - keep L hand warm with warm compress/heated blanket  - nitro paste to hand q6h if pressure tolerates  - no plans for vascular intervention  - L thumb with mild ischemic changes at the tip. If this progresses to gangrene, recommend consulting hand surgery for possible amputation.                Herson Curry MD  Vascular Surgery  [facility]

## 2018-09-23 NOTE — PROGRESS NOTES
[facility]  Critical Care - Surgery  Progress Note    Patient Name: Amari Saez  MRN: [id number]  Admission Date: 9/12/2018  Hospital Length of Stay: 11 days  Code Status: Full Code  Attending Provider: Judd Agudelo MD  Primary Care Provider: hCaim Valdovinos MD   Principal Problem: Perforated diverticulum of large intestine    Subjective:     Hospital/ICU Course:  Admitted with perforated diverticulitis (free intraperitoneal air on admission CXR). S/p emergent Tash procedure on 9/12. On 9/14, a rapid response was called after pt became hypoxic, tachycardic, and tachypneic on the floor. He was placed on a venti mask however pt further decompensated and became pulseless. Code blue was called where pt was intubated, chest compressions were performed, and epi was given x2 where ROSC was attained. Pt was subsequently transferred to SICU for higher level of care. On 9/15, Hgb went from 7.8 to 4.3, then 3.9 on repeat. Pt was taken emergently to OR for ex-lap with intraop endoscopic control of bleeding with hemostatic clip in remnant stomach. He was given 3u pRBCs, 2U FFP prior to/in OR. Patient was extubated on 9/17, but had to be re-intubated on the same day due to significant respiratory failure. CXR with bilateral infiltrates (R>L). Patient was intermittently on pressors. Left radial arterial line placed on 09/14/18 and removed 09/20/18 due to malfunction. Vascular surgery consulted for left thumb cyanosis and signs of ischemia. Patient was started on heparin gtt, then transitioned to therapeutic enoxaparin. Respiratory cultures growing E. Coli. On 9/22, bronchoscopy was done and BAL cultures sent. Patient had episodes of hypotension requiring volume resuscitation with good response (500cc albumin, ~6L crystalloids, and 2u PRBC).    Interval History/Significant Events: Right IJ CVC exchanged over guidewire and left femoral A-line placed yesterday. Episodes of hypotension requiring volume  resuscitation with good response (500cc albumin, ~6L crystalloids, and 2u PRBC).    Follow-up For: Procedure(s) (LRB):  LAPAROTOMY,EXPLORATORY (N/A)  GASTROSTOMY (N/A)  EGD (ESOPHAGOGASTRODUODENOSCOPY) (N/A)    Post-Operative Day: 8 Days Post-Op    Objective:     Vital Signs (Most Recent):  Temp: 98.4 °F (36.9 °C) (09/23/18 0507)  Pulse: 93 (09/23/18 0507)  Resp: (!) 29 (09/23/18 0507)  BP: (!) 28/12 (09/22/18 2013)  SpO2: 99 % (09/23/18 0507) Vital Signs (24h Range):  Temp:  [96.1 °F (35.6 °C)-100.2 °F (37.9 °C)] 98.4 °F (36.9 °C)  Pulse:  [] 93  Resp:  [12-36] 29  SpO2:  [85 %-100 %] 99 %  BP: ()/(12-79) 28/12  Arterial Line BP: ()/(47-72) 87/51     Weight: 73.2 kg (161 lb 6 oz)  Body mass index is 26.05 kg/m².      Intake/Output Summary (Last 24 hours) at 9/23/2018 0613  Last data filed at 9/23/2018 0515  Gross per 24 hour   Intake 7169 ml   Output 2145 ml   Net 5024 ml       Physical Exam   Constitutional: He appears well-developed. He appears listless. He has a sickly appearance. He is sedated and intubated.   HENT:   Head: Normocephalic and atraumatic.   Eyes:   Pupils are unequal (L>R), but both reactive to light   Neck: Neck supple.   Cardiovascular: Normal rate, regular rhythm and intact distal pulses.   No murmur heard.  Pulmonary/Chest: He is intubated. He has rales. He exhibits no tenderness.   Abdominal: Soft. He exhibits no distension.   Musculoskeletal: He exhibits edema.   Neurological: He appears listless.   Follows commands  Moves all extremities  Withdraws to pain   Skin: Skin is warm. He is diaphoretic.   Psychiatric:   Anxious/agitated appearing   Nursing note and vitals reviewed.      Vents:  Vent Mode: A/C (09/23/18 0507)  Ventilator Initiated: Yes (09/14/18 2114)  Set Rate: 30 bmp (09/23/18 0507)  Vt Set: 350 mL (09/23/18 0507)  Pressure Support: 10 cmH20 (09/22/18 0544)  PEEP/CPAP: 10 cmH20 (09/23/18 0507)  Oxygen Concentration (%): 71 (09/23/18 0507)  Peak Airway Pressure:  26 cmH2O (09/23/18 0507)  Plateau Pressure: 0 cmH20 (09/23/18 0507)  Total Ve: 12.6 mL (09/23/18 0507)  Negative Inspiratory Force (cm H2O): 10 (09/23/18 0335)  F/VT Ratio<105 (RSBI): (!) 76.72 (09/23/18 0507)    Lines/Drains/Airways     Central Venous Catheter Line                 Percutaneous Central Line Insertion/Assessment - triple lumen  09/22/18 1720 right internal jugular less than 1 day          Drain                 Colostomy 09/12/18 LLQ 11 days         NG/OG Tube 09/14/18 2038 Ripton sump Left nostril 8 days         Gastrostomy/Enterostomy 09/15/18 1621 Gastrostomy tube w/o balloon LUQ decompression 7 days         Open Drain 09/22/18 Anterior;Right;Ventral Groin Other (see comments) 1 day         Urethral Catheter 09/22/18 1230 Temperature probe less than 1 day          Airway                 Airway - Non-Surgical 09/17/18 1720 Endotracheal Tube 5 days          Arterial Line                 Arterial Line 09/22/18 1340 Left Femoral less than 1 day          Pressure Ulcer                 Pressure Injury 09/13/18 1534 lower Coccyx Stage 3 9 days          Peripheral Intravenous Line                 Midline Catheter Insertion/Assessment  - Single Lumen 09/13/18 1748 Right brachial vein 20g x 10cm 9 days                Significant Labs:    CBC/Anemia Profile:  Recent Labs   Lab  09/22/18   1800  09/22/18   2205  09/23/18   0330   WBC  28.12*  31.06*  33.99*   HGB  6.9*  10.8*  11.4*   HCT  21.0*  32.4*  34.0*   PLT  138*  144*  148*   MCV  94  91  91   RDW  18.7*  16.5*  17.3*        Chemistries:  Recent Labs   Lab  09/22/18   0500  09/22/18   1346  09/22/18   1847  09/23/18   0330   NA  144  145   --   139   K  4.2  4.1  3.6  4.0   CL  109  111*   --   109   CO2  27  26   --   24   BUN  37*  41*   --   37*   CREATININE  0.8  0.9   --   0.8   CALCIUM  7.8*  7.3*   --   7.5*   ALBUMIN  1.1*   --    --   1.4*   PROT  4.4*   --    --   4.2*   BILITOT  0.8   --    --   0.8   ALKPHOS  184*   --    --   126   ALT  36    --    --   23   AST  80*   --    --   49*   MG  1.9   --   1.6  2.1   PHOS  3.3   --    --   3.8       All pertinent labs within the past 24 hours have been reviewed.      Significant Imaging:  I have reviewed and interpreted all pertinent imaging results/findings within the past 24 hours.    Assessment/Plan:     * Perforated diverticulum of large intestine    Neuro:  - Sedation: Propofol  - Pain: fentanyl     Pulm:  - Intubated  Vent Mode: A/C  Oxygen Concentration (%):  [] 100  Resp Rate Total:  [20 br/min-42 br/min] 35 br/min  Vt Set:  [350 mL-380 mL] 350 mL  PEEP/CPAP:  [8 nlH17-37 cmH20] 10 cmH20  Mean Airway Pressure:  [10 yrH93-60 cmH20] 15 cmH20  - Daily CXR  - Daily SBT  - ABG:  PH  7.396   PCO2  41.6   PO2  70*   HCO3  25.5   POCSATURATED  94*   BE  1     CV:  - Episodes of hypotension requiring volume resuscitation with good response (500cc albumin, ~6L crystalloids, and 2u PRBC)  - 9/15: 3UpRBCs, 2 U FFP prior to OR  - 9/15: 1 U plts, 1 U cryo based on TEG study    GI/FEN/Lytes:  - NPO, holding TF overnight  - Trend CMP  - Replace lytes PRN    Renal:  - BUN/Cr at 37/0.8  - Monitor UOP    Heme:  - Hgb 11.4 (s/p 2u PRBC on 9/22)  - CBC, PT/INR q6  - Heparin gtt stopped per primary team yesterday afternoon. Lovenox 70 BID currently     ID:  - WBC uprending; 34 this am. Was 27 yesterday  - Resp Cx positive for E.coli resistent to E. Coli  - Abx: cefepime  - Urine Cx: positive for redd   - Blood cx NGTD   - CT Chest/Abdomen w/ contrast 9/20: bilateral lung consolidation noted concerning for infection or an aspiration     Endo:  - Hypoglycemic after holding TF  - D10 infusion @ 10 mL/hr      PPx:   - PPI      Dispo:   - Cont SICU care            Critical care was time spent personally by me on the following activities: development of treatment plan with patient or surrogate and bedside caregivers, discussions with consultants, evaluation of patient's response to treatment, examination of patient,  ordering and performing treatments and interventions, ordering and review of laboratory studies, ordering and review of radiographic studies, pulse oximetry, re-evaluation of patient's condition.  This critical care time did not overlap with that of any other provider or involve time for any procedures.         Tyesha Ellis MD  Surgical ICU  [facility]  [employer]Link # [id number]  Pager # [phone]

## 2018-09-24 NOTE — PROGRESS NOTES
[facility]  Critical Care - Surgery  Progress Note    Patient Name: Amari Saez  MRN: [id number]  Admission Date: 9/12/2018  Hospital Length of Stay: 12 days  Code Status: Full Code  Attending Provider: Judd Agudelo MD  Primary Care Provider: Chaim Valdovinos MD   Principal Problem: Perforated diverticulum of large intestine    Subjective:     Hospital/ICU Course:  Admitted with perforated diverticulitis (free intraperitoneal air on admission CXR). S/p emergent Tash procedure on 9/12. On 9/14, a rapid response was called after pt became hypoxic, tachycardic, and tachypneic on the floor. He was placed on a venti mask however pt further decompensated and became pulseless. Code blue was called where pt was intubated, chest compressions were performed, and epi was given x2 where ROSC was attained. Pt was subsequently transferred to SICU for higher level of care. On 9/15, Hgb went from 7.8 to 4.3, then 3.9 on repeat. Pt was taken emergently to OR for ex-lap with intraop endoscopic control of bleeding with hemostatic clip in remnant stomach. He was given 3u pRBCs, 2U FFP prior to/in OR. Patient was extubated on 9/17, but had to be re-intubated on the same day due to significant respiratory failure. CXR with bilateral infiltrates (R>L). Patient was intermittently on pressors. Left radial arterial line placed on 09/14/18 and removed 09/20/18 due to malfunction. Vascular surgery consulted for left thumb cyanosis and signs of ischemia. Patient was started on heparin gtt, then transitioned to therapeutic enoxaparin. Respiratory cultures growing E. Coli. On 9/22, bronchoscopy was done and BAL cultures sent. Patient had episodes of hypotension requiring volume resuscitation with good response (500cc albumin, ~6L crystalloids, and 2u PRBC).    Interval History/Significant Events: Required 1 bump of of 100%FIO2 last night. Otherwise tolerated 50%/8 well.    Follow-up For: Procedure(s)  (LRB):  LAPAROTOMY,EXPLORATORY (N/A)  GASTROSTOMY (N/A)  EGD (ESOPHAGOGASTRODUODENOSCOPY) (N/A)    Post-Operative Day: 8 Days Post-Op    Objective:     Vital Signs (Most Recent):  Temp: 97.9 °F (36.6 °C) (09/24/18 0715)  Pulse: 87 (09/24/18 0715)  Resp: (!) 28 (09/24/18 0715)  BP: (!) 28/12 (09/22/18 2013)  SpO2: 100 % (09/24/18 0715) Vital Signs (24h Range):  Temp:  [97.2 °F (36.2 °C)-98.8 °F (37.1 °C)] 97.9 °F (36.6 °C)  Pulse:  [] 87  Resp:  [11-33] 28  SpO2:  [77 %-100 %] 100 %  Arterial Line BP: ()/(52-78) 111/74     Weight: 73.2 kg (161 lb 6 oz)  Body mass index is 26.05 kg/m².      Intake/Output Summary (Last 24 hours) at 9/24/2018 0733  Last data filed at 9/24/2018 0705  Gross per 24 hour   Intake 2752 ml   Output 3285 ml   Net -533 ml       Physical Exam   Constitutional: He appears well-developed. He appears listless. He has a sickly appearance. He is sedated and intubated.   HENT:   Head: Normocephalic and atraumatic.   Eyes:   Pupils are unequal (L>R), but both reactive to light   Neck: Neck supple.   Cardiovascular: Normal rate, regular rhythm and intact distal pulses.   No murmur heard.  Pulmonary/Chest: He is intubated. He has rales. He exhibits no tenderness.   Abdominal: Soft. He exhibits no distension.   Musculoskeletal: He exhibits edema.   Neurological: He appears listless.   Follows commands  Moves all extremities  Withdraws to pain   Skin: Skin is warm. He is diaphoretic.   Psychiatric:   Anxious/agitated appearing   Nursing note and vitals reviewed.      Vents:  Vent Mode: A/C (09/24/18 0505)  Ventilator Initiated: Yes (09/14/18 2114)  Set Rate: 28 bmp (09/24/18 0505)  Vt Set: 350 mL (09/24/18 0505)  Pressure Support: 10 cmH20 (09/22/18 0544)  PEEP/CPAP: 8 cmH20 (09/24/18 0505)  Oxygen Concentration (%): 50 (09/24/18 0715)  Peak Airway Pressure: 21 cmH2O (09/24/18 0505)  Plateau Pressure: 0 cmH20 (09/24/18 0505)  Total Ve: 11.9 mL (09/24/18 0505)  Negative Inspiratory Force (cm  H2O): 10 (09/23/18 0335)  F/VT Ratio<105 (RSBI): (!) 68.8 (09/24/18 0505)    Lines/Drains/Airways     Central Venous Catheter Line                 Percutaneous Central Line Insertion/Assessment - triple lumen  09/22/18 1720 right internal jugular 1 day          Drain                 Colostomy 09/12/18 LLQ 12 days         NG/OG Tube 09/14/18 2038 [place] sump Left nostril 9 days         Gastrostomy/Enterostomy 09/15/18 1621 Gastrostomy tube w/o balloon LUQ decompression 8 days         Open Drain 09/22/18 Anterior;Right;Ventral Groin Other (see comments) 2 days         Urethral Catheter 09/22/18 1230 Temperature probe 1 day          Airway                 Airway - Non-Surgical 09/17/18 1720 Endotracheal Tube 6 days          Arterial Line                 Arterial Line 09/22/18 1340 Left Femoral 1 day          Pressure Ulcer                 Pressure Injury 09/13/18 1534 lower Coccyx Stage 3 10 days          Peripheral Intravenous Line                 Midline Catheter Insertion/Assessment  - Single Lumen 09/13/18 1748 Right brachial vein 20g x 10cm 10 days                Significant Labs:    CBC/Anemia Profile:  Recent Labs   Lab  09/23/18   1600  09/23/18   2133  09/24/18   0334   WBC  32.15*  33.15*  31.97*   HGB  12.0*  12.2*  11.9*   HCT  35.1*  36.3*  36.3*   PLT  145*  124*  116*   MCV  91  91  92   RDW  17.6*  17.7*  17.5*        Chemistries:  Recent Labs   Lab  09/23/18   0330  09/23/18   1800  09/24/18   0334   NA  139  141  139   K  4.0  4.0  3.7   CL  109  112*  111*   CO2  24  21*  22*   BUN  37*  31*  32*   CREATININE  0.8  0.8  0.7   CALCIUM  7.5*  7.4*  7.4*   ALBUMIN  1.4*  1.3*  1.2*   PROT  4.2*  4.2*  4.1*   BILITOT  0.8  0.8  0.7   ALKPHOS  126  134  173*   ALT  23  22  21   AST  49*  37  32   MG  2.1  1.8  1.8   PHOS  3.8   --   3.6       All pertinent labs within the past 24 hours have been reviewed.      Significant Imaging:  I have reviewed and interpreted all pertinent imaging results/findings  within the past 24 hours.    Assessment/Plan:     * Perforated diverticulum of large intestine    Neuro:  - Sedation: Propofol  - Pain: fentanyl     Pulm:  - Intubated  Vent Mode: A/C  Oxygen Concentration (%):  [] 50  Resp Rate Total:  [28 br/min-37 br/min] 28 br/min  Vt Set:  [350 mL] 350 mL  PEEP/CPAP:  [8 cmH20-10 cmH20] 8 cmH20  Mean Airway Pressure:  [11 toN86-84 cmH20] 15 cmH20  - Daily CXR  - Daily SBT  - Trend ABGs     CV:  - Episodes of hypotension 9/22 requiring volume resuscitation with good response (500cc albumin, ~6L crystalloids, and 2u PRBC)  - 9/15: 3UpRBCs, 2 U FFP prior to OR  - 9/15: 1 U plts, 1 U cryo based on TEG study    GI/FEN/Lytes:  - NPO  - Trend CMP  - Replace lytes PRN    Renal:  - BUN/Cr at 32/0.7  - Monitor UOP    Heme:  - Hgb 11.9 (s/p 2u PRBC on 9/22)  - CBC, PT/INR q6  - Heparin gtt stopped per primary team due to elevated aPTT. Lovenox 70 BID currently     ID:  - WBC downtrending 31.9(33)  - BAL positive for E.coli   - Abx: cefepime and vanc (day 2)  - Urine Cx: positive for candida   - Blood cx NGTD   - CT Chest/Abdomen w/ contrast 9/20: bilateral lung consolidation noted concerning for infection or an aspiration, consider repeat if no improvement with abx      Endo:  - Hypoglycemic after holding TF, can restart until midnight   - D10 infusion @ 50 mL/hr      PPx:   - PPI                                                                                                     Dispo:   - Cont SICU care  - tracheostomy tomorrow           Critical care was time spent personally by me on the following activities: development of treatment plan with patient or surrogate and bedside caregivers, discussions with consultants, evaluation of patient's response to treatment, examination of patient, ordering and performing treatments and interventions, ordering and review of laboratory studies, ordering and review of radiographic studies, pulse oximetry, re-evaluation of patient's condition.   This critical care time did not overlap with that of any other provider or involve time for any procedures.     Antonieta Grajeda MD  Critical Care - Surgery  [facility]

## 2018-09-24 NOTE — PLAN OF CARE
Problem: Patient Care Overview  Goal: Plan of Care Review  Outcome: Ongoing (interventions implemented as appropriate)  Pt sedated on propofol and fentanyl throughout shift. Vitals stable. Pt intubated on A/C 50% FiO2 and 8 PEEP, rate set at 28 BPM. Pt trach procedure pushed back until tomorrow, no time noted as of yet. Tube feeds restarted through G-tube with goal of 45 ml/hr; tube feeds currently at 20 ml/hr and pt tolerating well with no abdominal distention and minimal residuals noted. Colostomy bag output was 100 ml of thick brown mushy stool noted. UOP adequate with 45-60 ml/hr of urine noted per hour. Pt received D50W 12.5 grams x3 throughout shift for BG <80 and pt on D10W gtt at 50 ml/hr for hypoglycemia. Blood sugar checked q2h. CBC drawn q6 hour and remains stable although leukocytosis still noted. Pt condition discussed with brother over the phone and pt brother Ole updated on pt plan of care.

## 2018-09-24 NOTE — SUBJECTIVE & OBJECTIVE
Interval History/Significant Events: Required 1 bump of of 100%FIO2 last night. Otherwise tolerated 50%/8 well.    Follow-up For: Procedure(s) (LRB):  LAPAROTOMY,EXPLORATORY (N/A)  GASTROSTOMY (N/A)  EGD (ESOPHAGOGASTRODUODENOSCOPY) (N/A)    Post-Operative Day: 8 Days Post-Op    Objective:     Vital Signs (Most Recent):  Temp: 97.9 °F (36.6 °C) (09/24/18 0715)  Pulse: 87 (09/24/18 0715)  Resp: (!) 28 (09/24/18 0715)  BP: (!) 28/12 (09/22/18 2013)  SpO2: 100 % (09/24/18 0715) Vital Signs (24h Range):  Temp:  [97.2 °F (36.2 °C)-98.8 °F (37.1 °C)] 97.9 °F (36.6 °C)  Pulse:  [] 87  Resp:  [11-33] 28  SpO2:  [77 %-100 %] 100 %  Arterial Line BP: ()/(52-78) 111/74     Weight: 73.2 kg (161 lb 6 oz)  Body mass index is 26.05 kg/m².      Intake/Output Summary (Last 24 hours) at 9/24/2018 0733  Last data filed at 9/24/2018 0705  Gross per 24 hour   Intake 2752 ml   Output 3285 ml   Net -533 ml       Physical Exam   Constitutional: He appears well-developed. He appears listless. He has a sickly appearance. He is sedated and intubated.   HENT:   Head: Normocephalic and atraumatic.   Eyes:   Pupils are unequal (L>R), but both reactive to light   Neck: Neck supple.   Cardiovascular: Normal rate, regular rhythm and intact distal pulses.   No murmur heard.  Pulmonary/Chest: He is intubated. He has rales. He exhibits no tenderness.   Abdominal: Soft. He exhibits no distension.   Musculoskeletal: He exhibits edema.   Neurological: He appears listless.   Follows commands  Moves all extremities  Withdraws to pain   Skin: Skin is warm. He is diaphoretic.   Psychiatric:   Anxious/agitated appearing   Nursing note and vitals reviewed.      Vents:  Vent Mode: A/C (09/24/18 0505)  Ventilator Initiated: Yes (09/14/18 2114)  Set Rate: 28 bmp (09/24/18 0505)  Vt Set: 350 mL (09/24/18 0505)  Pressure Support: 10 cmH20 (09/22/18 0544)  PEEP/CPAP: 8 cmH20 (09/24/18 0505)  Oxygen Concentration (%): 50 (09/24/18 0715)  Peak Airway  Pressure: 21 cmH2O (09/24/18 0505)  Plateau Pressure: 0 cmH20 (09/24/18 0505)  Total Ve: 11.9 mL (09/24/18 0505)  Negative Inspiratory Force (cm H2O): 10 (09/23/18 0335)  F/VT Ratio<105 (RSBI): (!) 68.8 (09/24/18 0505)    Lines/Drains/Airways     Central Venous Catheter Line                 Percutaneous Central Line Insertion/Assessment - triple lumen  09/22/18 1720 right internal jugular 1 day          Drain                 Colostomy 09/12/18 LLQ 12 days         NG/OG Tube 09/14/18 2038 [place] sump Left nostril 9 days         Gastrostomy/Enterostomy 09/15/18 1621 Gastrostomy tube w/o balloon LUQ decompression 8 days         Open Drain 09/22/18 Anterior;Right;Ventral Groin Other (see comments) 2 days         Urethral Catheter 09/22/18 1230 Temperature probe 1 day          Airway                 Airway - Non-Surgical 09/17/18 1720 Endotracheal Tube 6 days          Arterial Line                 Arterial Line 09/22/18 1340 Left Femoral 1 day          Pressure Ulcer                 Pressure Injury 09/13/18 1534 lower Coccyx Stage 3 10 days          Peripheral Intravenous Line                 Midline Catheter Insertion/Assessment  - Single Lumen 09/13/18 1748 Right brachial vein 20g x 10cm 10 days                Significant Labs:    CBC/Anemia Profile:  Recent Labs   Lab  09/23/18   1600  09/23/18   2133  09/24/18   0334   WBC  32.15*  33.15*  31.97*   HGB  12.0*  12.2*  11.9*   HCT  35.1*  36.3*  36.3*   PLT  145*  124*  116*   MCV  91  91  92   RDW  17.6*  17.7*  17.5*        Chemistries:  Recent Labs   Lab  09/23/18   0330  09/23/18   1800  09/24/18   0334   NA  139  141  139   K  4.0  4.0  3.7   CL  109  112*  111*   CO2  24  21*  22*   BUN  37*  31*  32*   CREATININE  0.8  0.8  0.7   CALCIUM  7.5*  7.4*  7.4*   ALBUMIN  1.4*  1.3*  1.2*   PROT  4.2*  4.2*  4.1*   BILITOT  0.8  0.8  0.7   ALKPHOS  126  134  173*   ALT  23  22  21   AST  49*  37  32   MG  2.1  1.8  1.8   PHOS  3.8   --   3.6       All pertinent labs  within the past 24 hours have been reviewed.      Significant Imaging:  I have reviewed and interpreted all pertinent imaging results/findings within the past 24 hours.

## 2018-09-24 NOTE — PROGRESS NOTES
Progress Note  Surgery    Admit Date: 9/12/2018  Attending: Jammie  S/P: Procedure(s) (LRB):  LAPAROTOMY,EXPLORATORY (N/A)  GASTROSTOMY (N/A)  EGD (ESOPHAGOGASTRODUODENOSCOPY) (N/A)    Post-operative Day: 9 Days Post-Op    Hospital Day: 13    SUBJECTIVE:     Vent weaned over last 24 hours.  Did require bump one time to 100% FiO2 last night, but otherwise has been 50% / 8 PEEP.  Gas improved this AM.      OBJECTIVE:     Vital Signs (Most Recent)  Temp: 98.2 °F (36.8 °C) (09/24/18 0530)  Pulse: 93 (09/24/18 0530)  Resp: (!) 28 (09/24/18 0530)  BP: (!) 28/12 (09/22/18 2013)  SpO2: 100 % (09/24/18 0530)    Vital Signs Range (Last 24H):  Temp:  [97.2 °F (36.2 °C)-98.8 °F (37.1 °C)]   Pulse:  []   Resp:  [11-33]   SpO2:  [77 %-100 %]   Arterial Line BP: ()/(52-78)     I & O (Last 24H):    Intake/Output Summary (Last 24 hours) at 9/24/2018 0605  Last data filed at 9/24/2018 0500  Gross per 24 hour   Intake 1836 ml   Output 3400 ml   Net -1564 ml       Physical Exam:  Gen: Intubated, sedated  HEENT: NCAT  Pulm: Mechanical breath sounds  Abd: Soft.  Ostomy bag in place over serous leakage site from incision.  Otherwise, incision is c/d/i.  Ostomy with stool in bag  Extremities: R groin fem site with ascites.  Ostomy bag in place  Skin: Skin color, texture, turgor normal. No rashes or lesions    Laboratory:  CBC:   Recent Labs   Lab  09/24/18 0334   WBC  31.97*   RBC  3.95*   HGB  11.9*   HCT  36.3*   PLT  116*   MCV  92   MCH  30.1   MCHC  32.8     CMP:   Recent Labs   Lab  09/24/18 0334   GLU  86   CALCIUM  7.4*   ALBUMIN  1.2*   PROT  4.1*   NA  139   K  3.7   CO2  22*   CL  111*   BUN  32*   CREATININE  0.7   ALKPHOS  173*   ALT  21   AST  32   BILITOT  0.7     Coagulation:   Recent Labs   Lab  09/24/18   0334   INR  1.5*   APTT  45.1*     Labs within the past 24 hours have been reviewed.    ASSESSMENT/PLAN:     Assessment: 62 year old man with perforated diverticulitis and free air s/p Tash's  procedure 9/12, ex-lap for bleeding, with intraop endoscopic control of bleeding remnant gastric ulcer 9/15    Plan:  Neuro: Asymmetric pupils, but still following commands and no other focal deficits.  If CT scan is obtained of the abdomen, we would scan the head at the same time    Resp: Vent settings improved yesterday.  Now stable at 50%, 5 PEEP.  No room to diurese at the moment- but net negative due to drainage from groin wound from former line.  Continue tx of PNA    CV: Pulm HTN and right heart failure.  Good LVEF.  Consider nitric  HDS     Renal: Cr stable.  Making adequate UOP    Abd/FEN/GI:Continue TF after trach  Ostomy productive    Heme/ID:   Continue BID protonix for previous GIB (no evidence of continued bleeding per G tube)  E. Coli PNA: Cefepime.  Vanc has been added to broaden coverage  Only rare yeast in BAL   Will consider repeating CT scan in next few days if he does not improve     Dispo: Continue critical care.  Will discuss timing of trach with Dr. Jammie Hooper MD  General Surgery, PGY-5   [phone]

## 2018-09-24 NOTE — PLAN OF CARE
09/24/18 1619   Discharge Reassessment   Assessment Type Discharge Planning Reassessment   Provided patient/caregiver education on the expected discharge date and the discharge plan ((D/c date TBD. Per Dr Greenberg, patient to get Trach tomorrow.  Remains in ICU-on Vent. D/c plan prior to ICU admit was HH. Will need new PT/OT recs. (Potential Barrier to D/c needs: Medicaid insurer.)))   Do you have any problems affording any of your prescribed medications? No   Discharge Plan A Long-term acute care facility (LTAC)   Discharge Plan B Skilled Nursing Facility

## 2018-09-24 NOTE — PROGRESS NOTES
Antimicrobial Stewardship Review    : 1955    Estimated Creatinine Clearance: 98.7 mL/min (based on SCr of 0.7 mg/dL).    Wt Readings from Last 1 Encounters:   18 73.2 kg (161 lb 6 oz)       Review of patient's allergies indicates:  No Known Allergies    This patient has been identified as being at moderate to high risk for development of a hospital acquired infection (LEI). Based on review of the information provided within the electronic medical record at this time, the Antimicrobial Stewardship Team recommends the following changes to the medication record to aid in mitigating LEI risk during this admission:    Consultation with the infections disease service for expanded infectious diseases work up as well as addition of anaerobic coverage with metronidazole.         Disclaimer: Recommendations provided by the Antimicrobial Stewardship Team do not constitute formal consultation by the Infectious Diseases service. Decisions made are based on readily retrievable data from the electronic medical record at the time of review only. Final treatment decisions rest with the primary team however, the ID service is available for consultation if clinically warranted. Please page [phone] for additional questions or advice.

## 2018-09-24 NOTE — ASSESSMENT & PLAN NOTE
Neuro:  - Sedation: Propofol  - Pain: fentanyl     Pulm:  - Intubated  Vent Mode: A/C  Oxygen Concentration (%):  [] 50  Resp Rate Total:  [28 br/min-37 br/min] 28 br/min  Vt Set:  [350 mL] 350 mL  PEEP/CPAP:  [8 cmH20-10 cmH20] 8 cmH20  Mean Airway Pressure:  [11 xrL25-80 cmH20] 15 cmH20  - Daily CXR  - Daily SBT  - Trend ABGs     CV:  - Episodes of hypotension 9/22 requiring volume resuscitation with good response (500cc albumin, ~6L crystalloids, and 2u PRBC)  - 9/15: 3UpRBCs, 2 U FFP prior to OR  - 9/15: 1 U plts, 1 U cryo based on TEG study    GI/FEN/Lytes:  - NPO, holding TF overnight  - Trend CMP  - Replace lytes PRN    Renal:  - BUN/Cr at 32/0.7  - Monitor UOP    Heme:  - Hgb 11.9 (s/p 2u PRBC on 9/22)  - CBC, PT/INR q6  - Heparin gtt stopped per primary team yesterday afternoon. Lovenox 70 BID currently     ID:  - WBC downtrending 31.9(33)  - BAL positive for E.coli   - Abx: cefepime and vanc (day 2)  - Urine Cx: positive for candida   - Blood cx NGTD   - CT Chest/Abdomen w/ contrast 9/20: bilateral lung consolidation noted concerning for infection or an aspiration, consider repeat if no improvement with abx      Endo:  - Hypoglycemic after holding TF  - D10 infusion @ 50 mL/hr      PPx:   - PPI      Dispo:   - Cont SICU care  - tracheostomy today

## 2018-09-25 NOTE — PROGRESS NOTES
Pt continues to destat to 82-87% on A/C Fi02 50% and PEEP of 8, suctioned pt multiple times with little clear-white secretions removed, called resp therapist, charge and SICU MD. SICU Dr. Smith at bedside. PRN ABG done. Fi02 changed to 100% with PEEP 10, Sp02 92-94%. Will continue to monitor.

## 2018-09-25 NOTE — CONSULTS
Consult received. Full note to follow. Please call for questions.    Thanks,    Niharika Larsen DO  Infectious Disease Fellow  P: [phone]

## 2018-09-25 NOTE — PROCEDURES
DATE 9/24/2018    PREPROCEDURE DIAGNOSIS: Tension pneumothorax.    POSTPROCEDURE DIAGNOSIS: Same.    PROCEDURE PERFORMED: Emergent thoracostomy tube placement    ATTENDING SURGEON: Judd Agudelo MD    HOUSESTAFF SURGEON: Ronni Garcia MD (RES)    ANESTHESIA: Local    ESTIMATED BLOOD LOSS: 1 mL    FINDINGS: Acute rush of air with needle access to right hemithorax. Rapid improvement in cardiopulmonary status following tube placement.    COMPLICATIONS: None.    INDICATION: Patient is a 62-year-old who was admitted on 9/12/18 with perforated diverticulitis who underwent emergent exploratory laparotomy and Tash procedure. He remains critically ill in the Surgical Intensive Care Unit. He developed sudden decrease in blood pressure and increasing ventilatory requirements to FiO2 100% with decreasing oxygen saturation. Plain film of the chest was obtained by the SICU resident showing large pneumothorax, after which I was contacted.    PROCEDURE IN DETAIL: The patient was identified in the SICU. He was placed supine in the bed and positioned with his right arm above his head to expose the right chest wall. A timeout was performed and all team members present agreed that this was the correct procedure on the correct patient. I began the procedure by prepping and draping the right chest wall in the standard sterile fashion. I marked an appropriate position for pigtail catheter insertion in the fourth intercostal space. Local anesthetic was administered. The right chest was cannulated with a hollow bore needle. A large rush of air was returned upon entry. The guidewire was passed via the hollow bore needle without issue. A small skin incision was made at the skin entry site. Next, the dilator was passed several times to allow for passage of the Ira catheter. The catheter was placed over the guidewire and into the right hemithorax. The three-way stopcock and adapter were connected and placed to wall suction via  the PleuEvac containment system. There was a large air leak for a brief moment followed by resolution. The catheter was sutured in placed. Sterile dressings were applied. Post-procedure chest x-ray was obtained to evaluate placement and re-expansion of the lung.

## 2018-09-25 NOTE — PROCEDURES
"Amari Saez is a 62 y.o. male patient.    Temp: 97.3 °F (36.3 °C) (09/25/18 0715)  Pulse: 95 (09/25/18 0715)  Resp: 19 (09/25/18 0715)  BP: (!) 164/119 (09/25/18 0701)  SpO2: 96 % (09/25/18 0715)  Weight: 76.4 kg (168 lb 6.9 oz) (09/25/18 0200)  Height: 5' 6" (167.6 cm) (09/25/18 0200)       Chest Tube Insertion  Date/Time: 9/25/2018 7:44 AM  Performed by: TI Hooper Jr., MD  Authorized by: TI Hooper Jr., MD   Indications: pneumothorax    Anesthesia:  Local Anesthetic: lidocaine 1% without epinephrine  Anesthetic total: 10 mL  Preparation: skin prepped with ChloraPrep  Placement location: right lateral  Scalpel size: 11  Tube size: 24 [place]  Dissection instrument: finger and [place] clamp  Tube connected to: suction  Drainage characteristics: serosanguinous  Drainage amount: 30 ml  Suture material: 0 silk  Dressing: 4x4 sterile gauze          ANN Hooper Jr.  9/25/2018  "

## 2018-09-25 NOTE — PROGRESS NOTES
Chest tube retracted by 3 cm to better evacuate PTX.    Will plan on weaning sedation 3PM now that he is trached.

## 2018-09-25 NOTE — CONSULTS
[facility]  Infectious Disease  Consult Note    Patient Name: Amari Saez  MRN: [id number]  Admission Date: 9/12/2018  Hospital Length of Stay: 13 days  Attending Physician: Judd Agudelo MD  Primary Care Provider: Chaim Valdovinos MD     Isolation Status: No active isolations    Patient information was obtained from past medical records and ER records.      Consults  Assessment/Plan:     Aspiration pneumonia of right lower lobe due to gastric secretions    Patient is a 61 yo male presenting with SOB and abdominal pain found to have a perforated diverticula s/p nick procedure and ex lap. His hospital course has been complicated by PEA arrest requiring chest compressions and subsequent intubation. CXR showing diffuse pulmonary edema. Significant leukocytosis without fevers. Bronchoscopy cultures growing E. Coli, Adrianne and Stenotrophomonas maltophilia. E. Coli Sensitive to cefalosporins, Flouroquinolones, Ertapenem, tobramycin/genamycin. S. Maltophilia often seen on ventilated patients or severely immunocompromised patients. Currently treated with Cefepime and previous Vanc (since discontinued 2 doses (9/24-9/25). Downtrending WCC.     Plan:   - Patient scheduled for a Trach today.   - Continues to be on a ventilator   - Recommend de-escalating Cefepime to ceftriaxone 1g q24.   - Previous peritoneal fluid culture showing rare candida.   - Recommend PO fluconazole 800mg loading dose and 400mg after for 14 days total.             Thank you for your consult. I will follow-up with patient. Please contact us if you have any additional questions.    Jarod Alatorre MD  Infectious Disease  [facility]    Subjective:     Principal Problem: Perforated diverticulum of large intestine    HPI: Mr. Amari Saez is a 61 yo male with a PMHx of HTN, Gastric bypass, tobacco abuse presents to the ED on 9/12 with SOB and Abdominal pain. He was found to have free air within his abdomen which  was caused by a perforated diverticulitis and was brought into the OR for surgical evaluation where a Tash procedure was performed. On 9/14, a rapid response was called after pt became hypoxic, tachycardic, and tachypneic on the floor. He was placed on a venti mask however pt further decompensated and became pulseless. Code blue was called where pt was intubated, chest compressions were performed, and epi was given x2 where ROSC was attained. Pt was subsequently transferred to SICU for higher level of care. On 9/15, Hgb went from 7.8 to 4.3, then 3.9 on repeat. Pt was taken emergently to OR for ex-lap with intraop endoscopic control of bleeding with hemostatic clip in remnant stomach. He was given 3u pRBCs, 2U FFP prior to/in OR. Patient was extubated on 9/17, but had to be re-intubated on the same day due to significant respiratory failure. CXR with bilateral infiltrates (R>L). Patient was intermittently on pressors. Left radial arterial line placed on 09/14/18 and removed 09/20/18 due to malfunction. Patient has been intubated since.  On 9/22, bronchoscopy was done and BAL cultures sent which grew E. Coli, Candida and Sternotrophomin maltophilia (pending sensitivities).         Past Medical History:   Diagnosis Date    Anxiety     Hypertension     Nicotine abuse      Past Surgical History:   Procedure Laterality Date    CARPAL TUNNEL RELEASE Bilateral     COLECTOMY,SIGMOID  9/12/2018    Performed by Judd Agudelo MD at [facility] OR [address] [place] [address]    COLOSTOMY  9/12/2018    Procedure: COLOSTOMY;  Surgeon: Judd Agudelo MD;  Location: [facility] OR [address] [place] [address];  Service: General;;    COLOSTOMY  9/12/2018    Performed by Judd Agudelo MD at [facility] OR [address] [place] [address]    EGD (ESOPHAGOGASTRODUODENOSCOPY) N/A 9/15/2018    Performed by Jarod Pitt MD at [facility] OR [address] [place] [address]    ESOPHAGOGASTRODUODENOSCOPY N/A 9/15/2018    Procedure: EGD (ESOPHAGOGASTRODUODENOSCOPY);  Surgeon: Jarod Pitt MD;  Location: [facility] OR  [address] [place] [address];  Service: General;  Laterality: N/A;    EYE SURGERY      vein occlusion -laser sugery for burst blood vessel     EYE SURGERY      lasix    GASTRIC BYPASS  2004    GASTROSTOMY N/A 9/15/2018    Procedure: GASTROSTOMY;  Surgeon: Jarod Pitt MD;  Location: [facility] OR [address] [place] [address];  Service: General;  Laterality: N/A;  insertion of G-tube    GASTROSTOMY N/A 9/15/2018    Performed by Jarod Pitt MD at [facility] OR [address] [place] [address]    KNEE SURGERY  1980s    arthroscopy    LAPAROTOMY,EXPLORATORY N/A 9/15/2018    Performed by Jarod Pitt MD at [facility] OR [address] [place] [address]    LAPAROTOMY,EXPLORATORY N/A 9/12/2018    Performed by Judd Agudelo MD at [facility] OR [address] [place] [address]       Review of patient's allergies indicates:  No Known Allergies    Medications:  Medications Prior to Admission   Medication Sig    alprazolam (XANAX) 0.5 MG tablet Take 1 tablet (0.5 mg total) by mouth daily as needed for Anxiety.    blood pressure test kit-medium Kit 1 kit by Misc.(Non-Drug; Combo Route) route once daily.    escitalopram oxalate (LEXAPRO) 10 MG tablet TAKE 1 TABLET BY MOUTH EVERY DAY    lisinopril 10 MG tablet TAKE 1 TABLET BY MOUTH EVERY DAY     Antibiotics (From admission, onward)    Start     Stop Route Frequency Ordered    09/24/18 0930  ceFEPIme injection 2 g      -- IV Every 8 hours (non-standard times) 09/24/18 0925        Antifungals (From admission, onward)    Start     Stop Route Frequency Ordered    09/25/18 0800  fluconazole (DIFLUCAN) IVPB 400 mg 200 mL      -- IV Every 24 hours (non-standard times) 09/25/18 0656        Antivirals (From admission, onward)    None             There is no immunization history on file for this patient.    Family History     Problem Relation (Age of Onset)    Alzheimer's disease Sister, Maternal Uncle    Diabetes     Parkinsonism Mother    Stroke Father        Social History     Socioeconomic History    Marital status: Single     Spouse name: None    Number of children: None    Years of  education: None    Highest education level: None   Social Needs    Financial resource strain: None    Food insecurity - worry: None    Food insecurity - inability: None    Transportation needs - medical: None    Transportation needs - non-medical: None   Occupational History    None   Tobacco Use    Smoking status: Current Every Day Smoker     Packs/day: 1.00     Years: 30.00     Pack years: 30.00     Types: Cigarettes   Substance and Sexual Activity    Alcohol use: Yes     Alcohol/week: 1.2 oz     Types: 2 Shots of liquor per week    Drug use: No    Sexual activity: Yes     Partners: Male     Birth control/protection: Condom   Other Topics Concern    None   Social History Narrative    Previously worked on [email or web address] - retired due to severe carpal tunnel. Now does maintenance work at a school.      Review of Systems   Unable to perform ROS: Intubated     Objective:     Vital Signs (Most Recent):  Temp: 97.3 °F (36.3 °C) (09/25/18 0930)  Pulse: 93 (09/25/18 0930)  Resp: (!) 23 (09/25/18 0930)  BP: (!) 126/90 (09/25/18 0930)  SpO2: 96 % (09/25/18 0930) Vital Signs (24h Range):  Temp:  [97.3 °F (36.3 °C)-99.3 °F (37.4 °C)] 97.3 °F (36.3 °C)  Pulse:  [] 93  Resp:  [13-37] 23  SpO2:  [80 %-100 %] 96 %  BP: ()/() 126/90  Arterial Line BP: ()/() 125/73     Weight: 76.4 kg (168 lb 6.9 oz)  Body mass index is 27.19 kg/m².    Estimated Creatinine Clearance: 98.7 mL/min (based on SCr of 0.7 mg/dL).    Physical Exam   Constitutional: He appears well-developed. He is sedated and intubated.   HENT:   Head: Normocephalic and atraumatic.   Eyes:   Pupils are unequal (L>R), but both reactive to light   Neck: Neck supple.   Cardiovascular: Normal rate, regular rhythm and intact distal pulses.   No murmur heard.  Pulmonary/Chest: He is intubated. He has rales. He exhibits no tenderness.   Course breath sounds diffusely      Abdominal: Soft. He exhibits no distension.   Musculoskeletal: He  exhibits edema.   Neurological: GCS eye subscore is 4. GCS verbal subscore is 1. GCS motor subscore is 4.   Withdraws to pain   Skin:   R thumb with distal ischemic changes.   Blisters present on ventral R wrist    Psychiatric:   Anxious/agitated appearing   Nursing note and vitals reviewed.    Significant Labs:   BMP:   Recent Labs   Lab  09/25/18   0445   GLU  84   NA  138   K  4.1   CL  111*   CO2  20*   BUN  28*   CREATININE  0.7   CALCIUM  7.3*   MG  1.7     CBC:   Recent Labs   Lab  09/24/18   2150  09/25/18   0445  09/25/18   1000   WBC  28.37*  28.98*  25.48*   HGB  11.8*  12.1*  12.6*   HCT  36.9*  36.3*  38.0*   PLT  97*  103*  102*

## 2018-09-25 NOTE — PROGRESS NOTES
9/24/18 0745. SBP on ju is 77-90s with DBP 48-62 CO/CI decreased on the flowtrac although pt ekg is showing PACs and flowtrac will not be completely reliable r/t no in NSR. Sp02 still 91-94% on A/C Fi02 100% and 10 PEEP. UOP has decreased 10-30cc/hr. Ordered STAT EKG showing ST with Twave abnormality, MD stated not concerned with EKG. Charge RN and Dr. Smith at bedside. Ordered STAT CXR which indicated there has been interval development of a large right tension pneumothorax with shift of the mediastinum toward the left.  Left lung aeration is unchanged. Emergent Right pleural chest tube was placed at 2250 by Dr. Garcia with charge RN, and Dr. Smith at bedside. Dr. Smith called brothashley Handy to inform him we are placing chest tube as an emergent situation, Dr. Smith stated brother is aware and approvals although this will be at emergent situation. Chest tube expressed 85cc serosanguinous fluid directly after chest tube was placed. CXR completed and another ABG done 1 hour after insertion vent setttings changed A/C Fi02 70% and PEEP 10. Another ABG will be completed with am labs along with another CXR. HR 94-96 SBP (ju) 120-130s DBP 70-80s. Sp02 >98%. VSS, will continue to monitor closely.

## 2018-09-25 NOTE — PROGRESS NOTES
Progress Note  Surgery    Admit Date: 9/12/2018  Attending: Jammie  S/P: Procedure(s) (LRB):  LAPAROTOMY,EXPLORATORY (N/A)  GASTROSTOMY (N/A)  EGD (ESOPHAGOGASTRODUODENOSCOPY) (N/A)    Post-operative Day: 10 Days Post-Op    Hospital Day: 14    SUBJECTIVE:     Tension PTX overnight, uncertain etiology.  Right pigtail placed, with improvement in BP and oxygenation.  On 50% 10 PEEP this AM.  Weaned to 8 PEEP on rounds.  PTX improved but persistent on morning CXR.    1.5 L crystalloid given ON for UOP, with response.    OBJECTIVE:     Vital Signs (Most Recent)  Temp: 97.5 °F (36.4 °C) (09/25/18 0615)  Pulse: 94 (09/25/18 0615)  Resp: 16 (09/25/18 0615)  BP: (!) 170/109 (09/25/18 0600)  SpO2: 99 % (09/25/18 0615)    Vital Signs Range (Last 24H):  Temp:  [97.5 °F (36.4 °C)-99.3 °F (37.4 °C)]   Pulse:  []   Resp:  [13-37]   BP: ()/()   SpO2:  [80 %-100 %]   Arterial Line BP: ()/()     I & O (Last 24H):    Intake/Output Summary (Last 24 hours) at 9/25/2018 0650  Last data filed at 9/25/2018 0600  Gross per 24 hour   Intake 4256 ml   Output 4381 ml   Net -125 ml       Physical Exam:  Gen: Intubated, sedated  HEENT: NCAT  Pulm: Mechanical breath sounds  Abd: Soft.  Ostomy bag in place over serous leakage site from incision.  Otherwise, incision is c/d/i.  Ostomy with stool in bag  Extremities: R groin fem site with ascites.  Ostomy bag in place  Skin: Skin color, texture, turgor normal. No rashes or lesions    Laboratory:  CBC:   Recent Labs   Lab  09/25/18   0445   WBC  28.98*   RBC  4.01*   HGB  12.1*   HCT  36.3*   PLT  103*   MCV  91   MCH  30.2   MCHC  33.3     CMP:   Recent Labs   Lab  09/25/18   0445   GLU  84   CALCIUM  7.3*   ALBUMIN  1.1*   PROT  4.2*   NA  138   K  4.1   CO2  20*   CL  111*   BUN  28*   CREATININE  0.7   ALKPHOS  167*   ALT  21   AST  26   BILITOT  0.8     Coagulation:   Recent Labs   Lab  09/25/18   0446   INR  1.5*   APTT  43.1*     Labs within the past 24 hours  have been reviewed.    ASSESSMENT/PLAN:     Assessment: 62 year old man with perforated diverticulitis and free air s/p Tash's procedure 9/12, ex-lap for bleeding, with intraop endoscopic control of bleeding remnant gastric ulcer 9/15    Plan:  Neuro: Asymmetric pupils.  Withdrawing to pain.  If CT scan is obtained of the abdomen, we would scan the head at the same time  Daily sedation vacation.  Will be able to wean sedation post trach    Resp: PTX: Will upsize chest tube  Assuming stable/ improving vent settings after that, will perform bedside trach today  No room to diurese at the moment  Continue tx of PNA    CV: Pulm HTN and right heart failure.  Good LVEF.  Will use nitric if needed  HDS     Renal: Cr stable, BUN improved.  Making adequate UOP    Abd/FEN/GI:Continue TF per G tube  Ostomy productive    Heme/ID:   Continue BID protonix for previous GIB (no evidence of continued bleeding per G tube)  Therapeutic lovenox for Radial artery occlusion  PNA with E coli, candida and stenotrophomonas.  Cefepime, add diflucan.  Consider addition of bactrim for stenotrophomonas.  Will consult ID.  F/u sensitivities    Will consider repeating CT scan in next few days if he does not improve     Dispo: Continue critical care.      Juan Carlos Hooper MD  General Surgery, PGY-5   [phone]

## 2018-09-25 NOTE — SUBJECTIVE & OBJECTIVE
Interval History/Significant Events: Last night 9/24 he acutely decompensated and required an increase in vent settings. CXR done at that time showed large R tension pneumothorax requiring emergent chest tube. Vitals quickly normalized after chest tube was placed.     Follow-up For: Procedure(s) (LRB):  LAPAROTOMY,EXPLORATORY (N/A)  GASTROSTOMY (N/A)  EGD (ESOPHAGOGASTRODUODENOSCOPY) (N/A)    Post-Operative Day: 8 Days Post-Op    Objective:     Vital Signs (Most Recent):  Temp: 97.3 °F (36.3 °C) (09/25/18 0715)  Pulse: 95 (09/25/18 0715)  Resp: 19 (09/25/18 0715)  BP: (!) 164/119 (09/25/18 0701)  SpO2: 96 % (09/25/18 0715) Vital Signs (24h Range):  Temp:  [97.3 °F (36.3 °C)-99.3 °F (37.4 °C)] 97.3 °F (36.3 °C)  Pulse:  [] 95  Resp:  [13-37] 19  SpO2:  [80 %-100 %] 96 %  BP: ()/() 164/119  Arterial Line BP: ()/() 152/91     Weight: 76.4 kg (168 lb 6.9 oz)  Body mass index is 27.19 kg/m².      Intake/Output Summary (Last 24 hours) at 9/25/2018 0725  Last data filed at 9/25/2018 0600  Gross per 24 hour   Intake 4256 ml   Output 4311 ml   Net -55 ml       Physical Exam   Constitutional: He appears well-developed. He is sedated and intubated.   HENT:   Head: Normocephalic and atraumatic.   Eyes:   Pupils are unequal (L>R), but both reactive to light   Neck: Neck supple.   Cardiovascular: Normal rate, regular rhythm and intact distal pulses.   No murmur heard.  Pulmonary/Chest: He is intubated. He has rales. He exhibits no tenderness.   Abdominal: Soft. He exhibits no distension.   Musculoskeletal: He exhibits edema.   Neurological: GCS eye subscore is 4. GCS verbal subscore is 1. GCS motor subscore is 4.   Moves all extremities  Withdraws to pain   Skin:   R thumb with distal ischemic changes.   Blisters present on ventral R wrist    Psychiatric:   Anxious/agitated appearing   Nursing note and vitals reviewed.      Vents:  Vent Mode: A/C (09/25/18 0505)  Ventilator Initiated: Yes (09/14/18  2114)  Set Rate: 28 bmp (09/25/18 0505)  Vt Set: 350 mL (09/25/18 0505)  Pressure Support: 10 cmH20 (09/22/18 0544)  PEEP/CPAP: 10 cmH20 (09/25/18 0505)  Oxygen Concentration (%): 51 (09/25/18 0715)  Peak Airway Pressure: 17 cmH2O (09/25/18 0505)  Plateau Pressure: 29 cmH20 (09/25/18 0505)  Total Ve: 8.61 mL (09/25/18 0505)  Negative Inspiratory Force (cm H2O): 10 (09/23/18 0335)  F/VT Ratio<105 (RSBI): (!) 35.29 (09/25/18 0505)    Lines/Drains/Airways     Central Venous Catheter Line                 Percutaneous Central Line Insertion/Assessment - triple lumen  09/22/18 1720 right internal jugular 2 days          Drain                 Colostomy 09/12/18 LLQ 13 days         NG/OG Tube 09/14/18 2038 [place] sump Left nostril 10 days         Gastrostomy/Enterostomy 09/15/18 1621 Gastrostomy tube w/o balloon LUQ decompression 9 days         Open Drain 09/22/18 Anterior;Right;Ventral Groin Other (see comments) 3 days         Urethral Catheter 09/22/18 1230 Temperature probe 2 days         Chest Tube 09/24/18 1050 1 Right Fourth intercostal space;Pleural less than 1 day          Airway                 Airway - Non-Surgical 09/17/18 1720 Endotracheal Tube 7 days          Arterial Line                 Arterial Line 09/22/18 1340 Left Femoral 2 days          Pressure Ulcer                 Pressure Injury 09/13/18 1534 lower Coccyx Stage 3 11 days          Peripheral Intravenous Line                 Midline Catheter Insertion/Assessment  - Single Lumen 09/13/18 1748 Right brachial vein 20g x 10cm 11 days                Significant Labs:    CBC/Anemia Profile:  Recent Labs   Lab  09/24/18   1554  09/24/18   2150  09/25/18   0445   WBC  30.46*  28.37*  28.98*   HGB  12.0*  11.8*  12.1*   HCT  36.1*  36.9*  36.3*   PLT  106*  97*  103*   MCV  91  93  91   RDW  17.5*  17.7*  17.6*        Chemistries:  Recent Labs   Lab  09/23/18   1800  09/24/18   0334  09/24/18   1612  09/25/18   0445   NA  141  139  140  138   K  4.0  3.7  3.7   4.1   CL  112*  111*  111*  111*   CO2  21*  22*  23  20*   BUN  31*  32*  30*  28*   CREATININE  0.8  0.7  0.7  0.7   CALCIUM  7.4*  7.4*  7.5*  7.3*   ALBUMIN  1.3*  1.2*   --   1.1*   PROT  4.2*  4.1*   --   4.2*   BILITOT  0.8  0.7   --   0.8   ALKPHOS  134  173*   --   167*   ALT  22  21   --   21   AST  37  32   --   26   MG  1.8  1.8  1.6  1.7   PHOS   --   3.6   --   3.3       All pertinent labs within the past 24 hours have been reviewed.      Significant Imaging:  I have reviewed and interpreted all pertinent imaging results/findings within the past 24 hours.

## 2018-09-25 NOTE — ASSESSMENT & PLAN NOTE
Neuro:  - Sedation: Propofol  - Pain: fentanyl     Pulm:  - Intubated  Vent Mode: A/C  Oxygen Concentration (%):  [] 51  Resp Rate Total:  [28 br/min-39 br/min] 34 br/min  Vt Set:  [350 mL] 350 mL  PEEP/CPAP:  [8 cmH20-10 cmH20] 10 cmH20  Mean Airway Pressure:  [10 acT89-75 cmH20] 12 cmH20  - s/p chest tube placement s/p large R tension pneumothorax 9/24  - bigger CT placed today at bedside   - Daily SBT  - Trend ABGs   - plan for tracheostomy today     CV:  - Episodes of hypotension 9/22 requiring volume resuscitation with good response (500cc albumin, ~6L crystalloids, and 2u PRBC)  - 9/15: 3UpRBCs, 2 U FFP prior to OR  - 9/15: 1 U plts, 1 U cryo based on TEG study    GI/FEN/Lytes:  - NPO, holding TF overnight  - Trend CMP  - Replace lytes PRN    Renal:  - BUN/Cr at 28/0.7  - Monitor UOP    Heme:  - Hgb 12.1 (s/p 2u PRBC on 9/22)  - CBC, PT/INR q6  - Heparin gtt stopped per primary team 2/2 elevated aPTT. Lovenox 70 BID currently     ID:  - WBC downtrending - now 28.98(28.37)  - Abx: cefepime and vanc (day 2)  - Urine Cx: positive for candida   - Blood cx NGTD   - CT Chest/Abdomen w/ contrast 9/20: bilateral lung consolidation noted concerning for infection or an aspiration, consider repeat if no improvement with abx    - recent BAL (9/22) e. Coli, [place], stenotrophomonas maltophilia        - on cefepime, vanc, diflucan added  - will consult ID     Endo:  - TFs held overnight for trach today   - D10 infusion @ 50 mL/hr    PPx:   - PPI  - lovenox     Dispo:   - Cont SICU care  - tracheostomy today

## 2018-09-25 NOTE — PLAN OF CARE
Problem: Patient Care Overview  Goal: Plan of Care Review  Outcome: Ongoing (interventions implemented as appropriate)  Pt VSS at this time. [id number], SBP stable st this time, tmax 99.1F. Withdrawal from pain and spontaneously moving all extremities. Care explained and emotional support provided. Fentanyl @25, Propofol @50, D10 @50. Destaration event with cardiac effects please see notes for further details about large right pneumothorax with sternal shift and chest tube placement to relieve tension. Vent titrated per ABG A/C Fi02 50% PEEP 8. Remains free of falls and injury throughout shift. MD and charge updated updated throughout shift on condition, vitals, labs, and gtts. No new orders received, will continue to monitor.

## 2018-09-25 NOTE — PROCEDURES
DATE OF PROCEDURE: 09/25/2018    PREOPERATIVE DIAGNOSES:   1. Respiratory failure.     POSTOPERATIVE DIAGNOSES:   1. Respiratory failure.       PROCEDURES PERFORMED:   1. Percutaneous tracheostomy.     ATTENDING SURGEON: Judd Agudelo M.D.     HOUSESTAFF SURGEON: Maureen Estrella MD    : Juan Carlos Hooper MD    ANESTHESIA: General endotracheal.     ESTIMATED BLOOD LOSS: 3 mL     FINDINGS: A #8 Shiley tracheostomy placed without apparent complication.     SPECIMEN: None.     DRAINS: None.     COMPLICATIONS: None.     INDICATIONS: Amari Saez is a 62male admitted to [facility] with pneumoperitoneum and perforated sigmoid colon secondary to diverticulitis . The patient has failed attempts to wean from the ventilator. We have recommended to the family that the patient would benefit from placement of a percutaneous tracheostomy tube for the anticipated prolonged need for mechanical ventilation. We did obtain informed consent from the patient's family who expressed  understanding of the risks and benefits and gave consent to proceed.     OPERATIVE PROCEDURE: The procedure was performed in the bedside at the Intensive Care Unit. The patient was identified and monitored throughout. Appropriate position with neck in extension; anterior neck was prepped and draped. We identified  our tracheal landmarks, made a 2 cm midline cervical incision. Subcutaneous tissue was bluntly dissected, and appropriate position for the tracheostomy tube was noted. Using the bronchoscope, we withdrew the endotracheal tube to above the level of tracheostomy. The trachea was then cannulated with a hollow bore needle under direct vision. The guidewire was then placed and visualized in the trachea on bronchoscopy. Tracheostomy was then created by serial dilation; first with the punch dilator and then the Blue Rhino dilator. A #8 Shiley tracheostomy was loaded onto the appropriate size loading dilator and placed into the  trachea by Seldinger technique. Bronchoscopy through the tracheostomy confirmed appropriate position with visualization of the hawa. The  endotracheal tube was removed and the flange of the tracheostomy tube was secured in place using Velcro tracheostomy tape and two 2-0 Prolene sutures. The cuff of the tracheostomy tube was inflated.    The patient remained in critical but stable condition in the ICU at the end of the case. I was present and either scrubbed for or directed the entire procedure.

## 2018-09-25 NOTE — PROGRESS NOTES
Pt placed on 100% FiO2 for bedside trach. Sameer Arguello and Rufus present. Pt tolerated procedure well. FiO2 decreased back to 50% post procedure. Spare trach supplies at bedside. Will continue to monitor.

## 2018-09-25 NOTE — SIGNIFICANT EVENT
GENERAL SURGERY PROGRESS NOTE    Notified by SICU charge nurse that patient had developed pneumothorax, confirmed on recently obtained CXR at approx 10:45 PM with acute hemodynamic and respiratory decompensation. Presented to bedside promptly. Ira catheter placed to right chest emergently. SICU resident (Sarah) updated next of kin via phone as procedure was underway. See Procedure Note for full details. Procedure was uncomplicated with rapid improvement in cardiopulmonary parameters. Post-procedure CXR showed re-expansion of the R lung.      Ronni Garcia MD  Surgery Resident, PGY-IV  Pager: [phone]  9/24/2018 11:14 PM

## 2018-09-25 NOTE — PROGRESS NOTES
[facility]  Critical Care - Surgery  Progress Note    Patient Name: Amari Saez  MRN: [id number]  Admission Date: 9/12/2018  Hospital Length of Stay: 13 days  Code Status: Full Code  Attending Provider: Judd Agudelo MD  Primary Care Provider: Chaim Valdovinos MD   Principal Problem: Perforated diverticulum of large intestine    Subjective:     Hospital/ICU Course:  Admitted with perforated diverticulitis (free intraperitoneal air on admission CXR). S/p emergent Tash procedure on 9/12. On 9/14, a rapid response was called after pt became hypoxic, tachycardic, and tachypneic on the floor. He was placed on a venti mask however pt further decompensated and became pulseless. Code blue was called where pt was intubated, chest compressions were performed, and epi was given x2 where ROSC was attained. Pt was subsequently transferred to SICU for higher level of care. On 9/15, Hgb went from 7.8 to 4.3, then 3.9 on repeat. Pt was taken emergently to OR for ex-lap with intraop endoscopic control of bleeding with hemostatic clip in remnant stomach. He was given 3u pRBCs, 2U FFP prior to/in OR. Patient was extubated on 9/17, but had to be re-intubated on the same day due to significant respiratory failure. CXR with bilateral infiltrates (R>L). Patient was intermittently on pressors. Left radial arterial line placed on 09/14/18 and removed 09/20/18 due to malfunction. Vascular surgery consulted for left thumb cyanosis and signs of ischemia. Patient was started on heparin gtt, then transitioned to therapeutic enoxaparin. Respiratory cultures growing E. Coli. On 9/22, bronchoscopy was done and BAL cultures sent. Patient had episodes of hypotension requiring volume resuscitation with good response (500cc albumin, ~6L crystalloids, and 2u PRBC).    Interval History/Significant Events:   Last night 9/24 he acutely decompensated and required an increase in vent settings. CXR done at that time showed large R  tension pneumothorax requiring emergent chest tube. Vitals quickly normalized after chest tube was placed.     Follow-up For: Procedure(s) (LRB):  LAPAROTOMY,EXPLORATORY (N/A)  GASTROSTOMY (N/A)  EGD (ESOPHAGOGASTRODUODENOSCOPY) (N/A)    Post-Operative Day: 8 Days Post-Op    Objective:     Vital Signs (Most Recent):  Temp: 97.3 °F (36.3 °C) (09/25/18 0715)  Pulse: 95 (09/25/18 0715)  Resp: 19 (09/25/18 0715)  BP: (!) 164/119 (09/25/18 0701)  SpO2: 96 % (09/25/18 0715) Vital Signs (24h Range):  Temp:  [97.3 °F (36.3 °C)-99.3 °F (37.4 °C)] 97.3 °F (36.3 °C)  Pulse:  [] 95  Resp:  [13-37] 19  SpO2:  [80 %-100 %] 96 %  BP: ()/() 164/119  Arterial Line BP: ()/() 152/91     Weight: 76.4 kg (168 lb 6.9 oz)  Body mass index is 27.19 kg/m².      Intake/Output Summary (Last 24 hours) at 9/25/2018 0725  Last data filed at 9/25/2018 0600  Gross per 24 hour   Intake 4256 ml   Output 4311 ml   Net -55 ml       Physical Exam   Constitutional: He appears well-developed. He is sedated and intubated.   HENT:   Head: Normocephalic and atraumatic.   Eyes:   Pupils are unequal (L>R), but both reactive to light   Neck: Neck supple.   Cardiovascular: Normal rate, regular rhythm and intact distal pulses.   No murmur heard.  Pulmonary/Chest: He is intubated. He has rales. He exhibits no tenderness. R chest tube in place.   Abdominal: Soft. He exhibits no distension.   Musculoskeletal: He exhibits edema.   Neurological: GCS eye subscore is 4. GCS verbal subscore is 1. GCS motor subscore is 4.   Moves all extremities  Withdraws to pain   Skin:   R thumb with distal ischemic changes.   Blisters present on ventral R wrist    Psychiatric:   Anxious/agitated appearing   Nursing note and vitals reviewed.      Vents:  Vent Mode: A/C (09/25/18 0505)  Ventilator Initiated: Yes (09/14/18 2114)  Set Rate: 28 bmp (09/25/18 0505)  Vt Set: 350 mL (09/25/18 0505)  Pressure Support: 10 cmH20 (09/22/18 0544)  PEEP/CPAP: 10 cmH20  (09/25/18 0505)  Oxygen Concentration (%): 51 (09/25/18 0715)  Peak Airway Pressure: 17 cmH2O (09/25/18 0505)  Plateau Pressure: 29 cmH20 (09/25/18 0505)  Total Ve: 8.61 mL (09/25/18 0505)  Negative Inspiratory Force (cm H2O): 10 (09/23/18 0335)  F/VT Ratio<105 (RSBI): (!) 35.29 (09/25/18 0505)    Lines/Drains/Airways     Central Venous Catheter Line                 Percutaneous Central Line Insertion/Assessment - triple lumen  09/22/18 1720 right internal jugular 2 days          Drain                 Colostomy 09/12/18 LLQ 13 days         NG/OG Tube 09/14/18 2038 [place] sump Left nostril 10 days         Gastrostomy/Enterostomy 09/15/18 1621 Gastrostomy tube w/o balloon LUQ decompression 9 days         Open Drain 09/22/18 Anterior;Right;Ventral Groin Other (see comments) 3 days         Urethral Catheter 09/22/18 1230 Temperature probe 2 days         Chest Tube 09/24/18 1050 1 Right Fourth intercostal space;Pleural less than 1 day          Airway                 Airway - Non-Surgical 09/17/18 1720 Endotracheal Tube 7 days          Arterial Line                 Arterial Line 09/22/18 1340 Left Femoral 2 days          Pressure Ulcer                 Pressure Injury 09/13/18 1534 lower Coccyx Stage 3 11 days          Peripheral Intravenous Line                 Midline Catheter Insertion/Assessment  - Single Lumen 09/13/18 1748 Right brachial vein 20g x 10cm 11 days                Significant Labs:    CBC/Anemia Profile:  Recent Labs   Lab  09/24/18   1554  09/24/18   2150  09/25/18   0445   WBC  30.46*  28.37*  28.98*   HGB  12.0*  11.8*  12.1*   HCT  36.1*  36.9*  36.3*   PLT  106*  97*  103*   MCV  91  93  91   RDW  17.5*  17.7*  17.6*        Chemistries:  Recent Labs   Lab  09/23/18   1800  09/24/18   0334  09/24/18   1612  09/25/18   0445   NA  141  139  140  138   K  4.0  3.7  3.7  4.1   CL  112*  111*  111*  111*   CO2  21*  22*  23  20*   BUN  31*  32*  30*  28*   CREATININE  0.8  0.7  0.7  0.7   CALCIUM  7.4*   7.4*  7.5*  7.3*   ALBUMIN  1.3*  1.2*   --   1.1*   PROT  4.2*  4.1*   --   4.2*   BILITOT  0.8  0.7   --   0.8   ALKPHOS  134  173*   --   167*   ALT  22  21   --   21   AST  37  32   --   26   MG  1.8  1.8  1.6  1.7   PHOS   --   3.6   --   3.3       All pertinent labs within the past 24 hours have been reviewed.      Significant Imaging:  I have reviewed and interpreted all pertinent imaging results/findings within the past 24 hours.    Assessment/Plan:     * Perforated diverticulum of large intestine    Neuro:  - Sedation: Propofol  - Pain: fentanyl     Pulm:  - Intubated  Vent Mode: A/C  Oxygen Concentration (%):  [] 51  Resp Rate Total:  [28 br/min-39 br/min] 34 br/min  Vt Set:  [350 mL] 350 mL  PEEP/CPAP:  [8 cmH20-10 cmH20] 10 cmH20  Mean Airway Pressure:  [10 flP49-99 cmH20] 12 cmH20  - s/p chest tube placement s/p large R tension pneumothorax 9/24  - bigger CT placed today at bedside   - Daily SBT  - Trend ABGs   - plan for tracheostomy today     CV:  - Episodes of hypotension 9/22 requiring volume resuscitation with good response (500cc albumin, ~6L crystalloids, and 2u PRBC)  - 9/15: 3UpRBCs, 2 U FFP prior to OR  - 9/15: 1 U plts, 1 U cryo based on TEG study    GI/FEN/Lytes:  - NPO, holding TF overnight  - Trend CMP  - Replace lytes PRN    Renal:  - BUN/Cr at 28/0.7  - Monitor UOP    Heme:  - Hgb 12.1 (s/p 2u PRBC on 9/22)  - CBC, PT/INR q6  - Heparin gtt stopped per primary team 2/2 elevated aPTT. Lovenox 70 BID currently     ID:  - WBC downtrending - now 28.98(28.37)  - Abx: cefepime and vanc (day 2)  - Urine Cx: positive for candida   - Blood cx NGTD   - CT Chest/Abdomen w/ contrast 9/20: bilateral lung consolidation noted concerning for infection or an aspiration, consider repeat if no improvement with abx    - recent BAL (9/22) e. Coli, [place], stenotrophomonas maltophilia        - on cefepime, vanc, diflucan added  - will consult ID     Endo:  - TFs held overnight for trach today   - D10  infusion @ 50 mL/hr    PPx:   - PPI  - lovenox     Dispo:   - Cont SICU care  - tracheostomy today            Critical care was time spent personally by me on the following activities: development of treatment plan with patient or surrogate and bedside caregivers, discussions with consultants, evaluation of patient's response to treatment, examination of patient, ordering and performing treatments and interventions, ordering and review of laboratory studies, ordering and review of radiographic studies, pulse oximetry, re-evaluation of patient's condition.  This critical care time did not overlap with that of any other provider or involve time for any procedures.     Antonieta Grajeda MD  Critical Care - Surgery  [facility]

## 2018-09-25 NOTE — SUBJECTIVE & OBJECTIVE
Past Medical History:   Diagnosis Date    Anxiety     Hypertension     Nicotine abuse      Past Surgical History:   Procedure Laterality Date    CARPAL TUNNEL RELEASE Bilateral     COLECTOMY,SIGMOID  9/12/2018    Performed by Judd Agudelo MD at [facility] OR [address] [place] [address]    COLOSTOMY  9/12/2018    Procedure: COLOSTOMY;  Surgeon: Judd Agudelo MD;  Location: [address];  Service: General;;    COLOSTOMY  9/12/2018    Performed by Judd Agudelo MD at [facility] OR [address] [place] [address]    EGD (ESOPHAGOGASTRODUODENOSCOPY) N/A 9/15/2018    Performed by Jarod Pitt MD at [facility] OR [address] [place] [address]    ESOPHAGOGASTRODUODENOSCOPY N/A 9/15/2018    Procedure: EGD (ESOPHAGOGASTRODUODENOSCOPY);  Surgeon: Jarod Pitt MD;  Location: [address];  Service: General;  Laterality: N/A;    EYE SURGERY      vein occlusion -laser sugery for burst blood vessel     EYE SURGERY      lasix    GASTRIC BYPASS  2004    GASTROSTOMY N/A 9/15/2018    Procedure: GASTROSTOMY;  Surgeon: Jarod Pitt MD;  Location: [address];  Service: General;  Laterality: N/A;  insertion of G-tube    GASTROSTOMY N/A 9/15/2018    Performed by Jarod Pitt MD at [facility] OR [address] [place] [address]    KNEE SURGERY  1980s    arthroscopy    LAPAROTOMY,EXPLORATORY N/A 9/15/2018    Performed by Jarod Pitt MD at [facility] OR [address] [place] [address]    LAPAROTOMY,EXPLORATORY N/A 9/12/2018    Performed by Judd Agudelo MD at [facility] OR [address] [place] [address]       Review of patient's allergies indicates:  No Known Allergies    Medications:  Medications Prior to Admission   Medication Sig    alprazolam (XANAX) 0.5 MG tablet Take 1 tablet (0.5 mg total) by mouth daily as needed for Anxiety.    blood pressure test kit-medium Kit 1 kit by Misc.(Non-Drug; Combo Route) route once daily.    escitalopram oxalate (LEXAPRO) 10 MG tablet TAKE 1 TABLET BY MOUTH EVERY DAY    lisinopril 10 MG tablet TAKE 1 TABLET BY MOUTH EVERY DAY     Antibiotics (From admission, onward)    Start     Stop  Route Frequency Ordered    09/24/18 0930  ceFEPIme injection 2 g      -- IV Every 8 hours (non-standard times) 09/24/18 0925        Antifungals (From admission, onward)    Start     Stop Route Frequency Ordered    09/25/18 0800  fluconazole (DIFLUCAN) IVPB 400 mg 200 mL      -- IV Every 24 hours (non-standard times) 09/25/18 0656        Antivirals (From admission, onward)    None             There is no immunization history on file for this patient.    Family History     Problem Relation (Age of Onset)    Alzheimer's disease Sister, Maternal Uncle    Diabetes     Parkinsonism Mother    Stroke Father        Social History     Socioeconomic History    Marital status: Single     Spouse name: None    Number of children: None    Years of education: None    Highest education level: None   Social Needs    Financial resource strain: None    Food insecurity - worry: None    Food insecurity - inability: None    Transportation needs - medical: None    Transportation needs - non-medical: None   Occupational History    None   Tobacco Use    Smoking status: Current Every Day Smoker     Packs/day: 1.00     Years: 30.00     Pack years: 30.00     Types: Cigarettes   Substance and Sexual Activity    Alcohol use: Yes     Alcohol/week: 1.2 oz     Types: 2 Shots of liquor per week    Drug use: No    Sexual activity: Yes     Partners: Male     Birth control/protection: Condom   Other Topics Concern    None   Social History Narrative    Previously worked on [employer] - retired due to severe carpal tunnel. Now does maintenance work at a school.      Review of Systems   Unable to perform ROS: Intubated     Objective:     Vital Signs (Most Recent):  Temp: 97.3 °F (36.3 °C) (09/25/18 0930)  Pulse: 93 (09/25/18 0930)  Resp: (!) 23 (09/25/18 0930)  BP: (!) 126/90 (09/25/18 0930)  SpO2: 96 % (09/25/18 0930) Vital Signs (24h Range):  Temp:  [97.3 °F (36.3 °C)-99.3 °F (37.4 °C)] 97.3 °F (36.3 °C)  Pulse:  [] 93  Resp:  [13-37]  23  SpO2:  [80 %-100 %] 96 %  BP: ()/() 126/90  Arterial Line BP: ()/() 125/73     Weight: 76.4 kg (168 lb 6.9 oz)  Body mass index is 27.19 kg/m².    Estimated Creatinine Clearance: 98.7 mL/min (based on SCr of 0.7 mg/dL).    Physical Exam   Constitutional: He appears well-developed. He is sedated and intubated.   HENT:   Head: Normocephalic and atraumatic.   Eyes:   Pupils are unequal (L>R), but both reactive to light   Neck: Neck supple.   Cardiovascular: Normal rate, regular rhythm and intact distal pulses.   No murmur heard.  Pulmonary/Chest: He is intubated. He has rales. He exhibits no tenderness.   Course breath sounds diffusely      Abdominal: Soft. He exhibits no distension.   Musculoskeletal: He exhibits edema.   Neurological: GCS eye subscore is 4. GCS verbal subscore is 1. GCS motor subscore is 4.   Withdraws to pain   Skin:   R thumb with distal ischemic changes.   Blisters present on ventral R wrist    Psychiatric:   Anxious/agitated appearing   Nursing note and vitals reviewed.    Significant Labs:   BMP:   Recent Labs   Lab  09/25/18   0445   GLU  84   NA  138   K  4.1   CL  111*   CO2  20*   BUN  28*   CREATININE  0.7   CALCIUM  7.3*   MG  1.7     CBC:   Recent Labs   Lab  09/24/18   2150  09/25/18   0445  09/25/18   1000   WBC  28.37*  28.98*  25.48*   HGB  11.8*  12.1*  12.6*   HCT  36.9*  36.3*  38.0*   PLT  97*  103*  102*

## 2018-09-25 NOTE — ASSESSMENT & PLAN NOTE
Patient is a 63 yo male presenting with SOB and abdominal pain found to have a perforated diverticula s/p nick procedure and ex lap. His hospital course has been complicated by PEA arrest requiring chest compressions and subsequent intubation. CXR showing diffuse pulmonary edema. Significant leukocytosis without fevers. Bronchoscopy cultures growing E. Coli, Adrianne and Stenotrophomonas maltophilia. E. Coli Sensitive to cefalosporins, Flouroquinolones, Ertapenem, tobramycin/genamycin. S. Maltophilia often seen on ventilated patients or severely immunocompromised patients. Currently treated with Cefepime and previous Vanc (since discontinued 2 doses (9/24-9/25). Downtrending [facility].     Plan:   - Patient scheduled for a Trach today.   - Continues to be on a ventilator   - Recommend de-escalating Cefepime to ceftriaxone 1g q24.   - Previous peritoneal fluid culture showing rare candida.   - Recommend PO fluconazole 800mg loading dose and 400mg after for 14 days total.

## 2018-09-25 NOTE — PHYSICIAN QUERY
PT Name: Amari Saez  MR #: [id number]    Physician Query Form -Systemic Infectious Process Clarification     CDS/: Brandy E Capley               Contact information:  Spectralink:  [phone]  This form is a permanent document in the medical record.     Query Date: September 25, 2018     By submitting this query, we are merely seeking further clarification of documentation. Please utilize your independent clinical judgment when addressing the question(s) below.    The Medical record contains the following:     Indicators   Supporting Clinical Findings   Location in Medical Record   X HR RR BP Temp Episodes of hypotension requiring volume resuscitation with good response (500cc albumin, ~6L crystalloids, and 2u PRBC).    Vital Signs (24h Range):  Temp:  [96.1 °F (35.6 °C)-100.2 °F (37.9 °C)] 98.4 °F (36.9 °C)  Pulse:  [] 93  Resp:  [12-36] 29  SpO2:  [85 %-100 %] 99 %  BP: ()/(12-79) 28/12  Arterial Line BP: ()/(47-72) 87/51   Critical care surgery progress notes 9/23 0949   X Lactic Acid             Procalcitonin Lactate= 1.6 --> 2.9 --> 1.9 --> 1.6   Labs 9/22 1426 --> 9/22 1800 --> 9/23 0330 --> 9/23 1600   X WBC                Bands                     CRP WBC= 16.98 --> 31.06 --> 33.99    Bands= 20 --> 24 --> 7   Labs 9/21 2024 --> 9/22 2205 --> 9/23 0330    Labs 9/22 0500 --> 9/22 2305 --> 9/23 0330   X Culture(s) Urine Culture, Routine  CORDELIA ALBICANS    Respiratory Culture ESCHERICHIA COLI   Moderate     Respiratory Culture STENOTROPHOMONAS (X.) MALTOPHILIA   Moderate   Normal respiratory girish also present    Respiratory Culture ESCHERICHIA COLI   Moderate     Respiratory Culture STENOTROPHOMONAS (X.) MALTOPHILIA   Moderate   Normal respiratory girish also present    Aerobic Bacterial Culture ESCHERICHIA COLI   Few     Aerobic Bacterial Culture CANDIDA ALBICANS   Many     Aerobic Bacterial Culture STENOTROPHOMONAS (X.) MALTOPHILIA   Few   Specimen Source: Lung, RLL    Anaerobic  Culture CLOSTRIDIUM SUBTERMINALE   Few  P    Anaerobic Culture BIFIDOBACTERIUM SPECIES   Few   further identified as Bifidobacterium scardovii   Specimen Source: Lung, RLL  Status:  Preliminary result       VAP BAL CULTURE ESCHERICHIA COLI   10,000 - 49,999 cfu/ml  P    VAP BAL CULTURE CANDIDA ALBICANS   10,000 - 49,999 cfu/ml  P    VAP BAL CULTURE STENOTROPHOMONAS (X.) MALTOPHILIA   10,000 - 49,999 cfu/ml   Susceptibility pending  Specimen Source: BAL, RLL  Status:  Preliminary result      Urine culture 9/20    Respiratory culture 9/20              Respiratory culture 9/20              Aerobic culture 9/22                    Anaerobic culture 9/22                  Gram stain 9/22          AMS, Confusion, LOC, etc.     X Organ Dysfunction / Failure Acute hypoxemic respiratory failure   plan for tracheostomy today    Critical care surgery progress note 9/25   X Bacteremia or Sepsis / Septic Procedure: Bronchoscopy, Diagnostic. Bronchoalveolar lavage, BAL     Pre-Procedure Diagnosis:   1. Sepsis.  2. Right lower lobe infiltrate.     Post-Procedure Diagnosis: Same    The right lower lobe bronchus was filled with thick, purulent appearing secretion   General surgery procedures 9/22 1324   X Known or Suspected Source of Infection documented WBC 34 from 25. On Day 1 Cefepime and Vanc for Aspiration PNA.     Bronch performed yesterday, with findings consistent with pneumonia.   E. Coli PNA   Critical care surgery progress notes 9/23 0949    General s urgery progress notes 9/23    (Failed) Outpatient Treatment     X Medication ceFEPIme injection 2 gDose: 2 g : Intravenous : Every 8 hours   fluconazole (DIFLUCAN) IVPB 400 mg 200 mLDose: 400 mg : Intravenous : Every 24 hours   ceFEPIme injection 2 gDose: 2 g : Intravenous : Every 12 hours   piperacillin-tazobactam 4.5 g in sodium chloride 0.9% 100 mL IVPB (ready to mix system)Dose: 4.5 g : 25 mL/hr : Intravenous : Every 8 hours   vancomycin (VANCOCIN) 1,250 mg in dextrose  5 % 250 mL IVPBDose: 1,250 mg : 166.7 mL/hr : Intravenous : Every 12 hours   vancomycin in dextrose 5 % 1 gram/250 mL IVPB 1,000 mgDose: 1,000 mg : Intravenous : Every 12 hours    MAR 9/24  MAR 9/25  MAR 9/23  MAR 9/20  MAR 9/22  MAR 9/24    Treatment      Other       Provider, please specify diagnosis or diagnoses associated with above clinical findings.    [ x ] Sepsis  [  ] Severe Sepsis with Acute Organ Dysfunction/Failure (please specify organ dysfunction/failure): ___________________  [  ] Other: __________________________________  [  ] Clinically Undetermined    Please document in your progress notes daily for the duration of treatment until resolved and include in your discharge summary.

## 2018-09-25 NOTE — PROGRESS NOTES
Pt chest tube continuous bubbling and no output into chamber since 0000 9/25/18. Called Dr. Smith r/t continues to bubble with slight decrease between the hour of 9224-4331, MD aware and stated bubbling is expected and will continue to do so. Say COONEY RN and Glenn ALEGRE RN also came to assess. Chest tube is audible air leak sound at site. MD aware. VSS HR 95 [place] 125/75 map 95 Sp02 99% on A/C 70% Fi02 and 10 of PEEP. Will do another CXR and ABG in the morning to continue monitoring. No new orders at this time. Will continue to monitor.

## 2018-09-25 NOTE — PLAN OF CARE
Problem: Patient Care Overview  Goal: Plan of Care Review  Outcome: Ongoing (interventions implemented as appropriate)  Pt remains on ventilator, A/C 50% O2/ 5 Peep. O2 Sats 100%. Gtts: D10. Propofol and Fentanyl weaned off after trach placement. Trach placed at bedside this AM, pt tolerated procedure well. UOP 25-60cc/hr. 500cc albumin given for low UOP. TF restarted after trach placement. Copious serous drainage from right groin site. Colostomy site care performed and bag changed. Accuchecks q2hr. Pt moves all extremities spontaneously. No new skin breakdown noted at this time, new foam dressing applied to existing sacral wound. Pt and family updated with plan of care, all questions answered.

## 2018-09-26 NOTE — PROGRESS NOTES
Pt 2100 Lovenox dose held r/t tracheostomy stoma bleeding. Saturated T-gauze Q1hr. Charge notified. Dr. Ellis at bedside to assess trach bleeding. Surgicel ordered; applied by Dr. Monahan at 2220. Sp02 100%. VSS, will continue to monitor bleeding.

## 2018-09-26 NOTE — PROGRESS NOTES
Progress Note  Surgery    Admit Date: 9/12/2018  Attending: Jammie  S/P: Procedure(s) (LRB):  LAPAROTOMY,EXPLORATORY (N/A)  GASTROSTOMY (N/A)  EGD (ESOPHAGOGASTRODUODENOSCOPY) (N/A)    Post-operative Day: 11 Days Post-Op    Hospital Day: 15    SUBJECTIVE:     Given persistent PTX overnight, a larger chest tube was placed and trach was performed at bedside. Pt had some sanguinous oozing from trach site that improved with surgicel overnight. Required 500cc bolus overnight for low UOP. NGT removed this AM. Patient with high residuals from TFs and hypoglycemia on D10 gtt.     OBJECTIVE:     Vital Signs (Most Recent)  Temp: 99.1 °F (37.3 °C) (09/26/18 0715)  Pulse: 97 (09/26/18 1030)  Resp: (!) 30 (09/26/18 0729)  BP: 115/79 (09/26/18 1000)  SpO2: 100 % (09/26/18 1030)    Vital Signs Range (Last 24H):  Temp:  [95.7 °F (35.4 °C)-99.7 °F (37.6 °C)]   Pulse:  []   Resp:  [0-34]   BP: ()/()   SpO2:  [82 %-100 %]   Arterial Line BP: ()/()     I & O (Last 24H):    Intake/Output Summary (Last 24 hours) at 9/26/2018 1055  Last data filed at 9/26/2018 1000  Gross per 24 hour   Intake 3591.76 ml   Output 5072 ml   Net -1480.24 ml       Physical Exam:  Gen: Intubated, sedated  HEENT: NCAT  Pulm: Mechanical breath sounds, trach in place, dried blood  Abd: Soft.  Ostomy bag in place over serous leakage site from incision.  Otherwise, incision is c/d/i.  Ostomy with stool in bag  Extremities: R groin fem site with ascites.  Ostomy bag in place  Skin: Skin color, texture, turgor normal. No rashes or lesions    Laboratory:  CBC:   Recent Labs   Lab  09/26/18   1000   WBC  19.51*   RBC  3.17*   HGB  9.6*   HCT  28.3*   PLT  92*   MCV  89   MCH  30.3   MCHC  33.9     CMP:   Recent Labs   Lab  09/26/18   0350   GLU  85   CALCIUM  7.5*   ALBUMIN  1.4*   PROT  3.8*   NA  137   K  3.4*   CO2  18*   CL  112*   BUN  24*   CREATININE  0.6   ALKPHOS  131   ALT  22   AST  35   BILITOT  1.0     Coagulation:   Recent  Labs   Lab  09/25/18   0445  09/26/18   0350   INR  1.5*  1.7*   APTT  43.1*   --      Labs within the past 24 hours have been reviewed.    ASSESSMENT/PLAN:     Assessment: 62 year old man with perforated diverticulitis and free air s/p Tash's procedure 9/12, ex-lap for bleeding, with intraop endoscopic control of bleeding remnant gastric ulcer 9/15    Plan:  Neuro: Sedation weaned to off following PEG, following commands and moving extremities this morning, significantly improved    Resp:Chest tube upsized yesterday, improvement in CXR, no apparent ptx this morning  Continue to wean vent as tolerated   Continuous pulse ox  ABGs prn  Daily CXR  No room to diurese at the moment  Continue tx of PNA    CV: Pulm HTN and right heart failure.  Good LVEF.  Will use nitric if needed  HDS     Renal: Cr stable, BUN improved.  Making adequate UOP    Abd/FEN/GI:Continue TF per G tube  Will likely need TPN with high residuals and hypoglycemia on D10gtt suggestive that patient is not absorbing nutrition  D/c NGT today  Ostomy productive  May need J tube placement in OR next week    Heme/ID:   Continue BID protonix for previous GIB (no evidence of continued bleeding per G tube)  Therapeutic lovenox for Radial artery occlusion - holding this morning in setting of perc trach yesterday   PNA with E coli, candida and stenotrophomonas.  Rocephin and flucanozole as per ID recs. F/u sensitivities    Will consider repeating CT scan in next few days if he does not improve     Dispo: Continue critical care.      Steffanie Estrella MD  PGY-2 General Surgery   [phone]

## 2018-09-26 NOTE — PROGRESS NOTES
[facility]  Infectious Disease  Progress Note    Patient Name: Amari Saez  MRN: [id number]  Admission Date: 9/12/2018  Length of Stay: 14 days  Attending Physician: Judd Agudelo MD  Primary Care Provider: Chaim Valdovinos MD    Isolation Status: No active isolations  Assessment/Plan:      Aspiration pneumonia of right lower lobe due to gastric secretions    Patient is a 63 yo male presenting with SOB and abdominal pain found to have a perforated diverticula s/p nick procedure and ex lap. His hospital course has been complicated by PEA arrest requiring chest compressions and subsequent intubation. CXR showing diffuse pulmonary edema. Significant leukocytosis without fevers. Bronchoscopy cultures growing E. Coli, Adrianne and Stenotrophomonas maltophilia. E. Coli Sensitive to cefalosporins, Flouroquinolones, Ertapenem, tobramycin/genamycin. S. Maltophilia often seen on ventilated patients or severely immunocompromised patients. Currently treated with Cefepime and previous Vanc (since discontinued 2 doses (9/24-9/25). Downtrending [facility].     Plan:   - Patient de-escalated to ceftriaxone 1g q24.   - Recommend continuing fluconazole 800mg loading dose and 400mg after for 10-14 days total.   - New purulent drainage from chest tube. Recommend CT chest to r/o empyema and proper source control.   - No need to culture drainage as it is >24h old.           Thank you for your consult. I will follow-up with patient. Please contact us if you have any additional questions.    Jarod Alatorre MD  Infectious Disease  [facility]    Subjective:     Principal Problem:Perforated diverticulum of large intestine    HPI: Mr. Amari Saez is a 63 yo male with a PMHx of HTN, Gastric bypass, tobacco abuse presents to the ED on 9/12 with SOB and Abdominal pain. He was found to have free air within his abdomen which was caused by a perforated diverticulitis and was brought into the OR for surgical  evaluation where a Tash procedure was performed. On 9/14, a rapid response was called after pt became hypoxic, tachycardic, and tachypneic on the floor. He was placed on a venti mask however pt further decompensated and became pulseless. Code blue was called where pt was intubated, chest compressions were performed, and epi was given x2 where ROSC was attained. Pt was subsequently transferred to SICU for higher level of care. On 9/15, Hgb went from 7.8 to 4.3, then 3.9 on repeat. Pt was taken emergently to OR for ex-lap with intraop endoscopic control of bleeding with hemostatic clip in remnant stomach. He was given 3u pRBCs, 2U FFP prior to/in OR. Patient was extubated on 9/17, but had to be re-intubated on the same day due to significant respiratory failure. CXR with bilateral infiltrates (R>L). Patient was intermittently on pressors. Left radial arterial line placed on 09/14/18 and removed 09/20/18 due to malfunction. Patient has been intubated since.  On 9/22, bronchoscopy was done and BAL cultures sent which grew E. Coli, Candida and Sternotrophomin maltophilia (pending sensitivities).         Review of Systems   Unable to perform ROS: Acuity of condition     Objective:     Vital Signs (Most Recent):  Temp: 99.5 °F (37.5 °C) (09/26/18 1200)  Pulse: 87 (09/26/18 1445)  Resp: (!) 24 (09/26/18 1118)  BP: (!) 87/51 (09/26/18 1400)  SpO2: 100 % (09/26/18 1445) Vital Signs (24h Range):  Temp:  [95.7 °F (35.4 °C)-99.7 °F (37.6 °C)] 99.5 °F (37.5 °C)  Pulse:  [] 87  Resp:  [14-34] 24  SpO2:  [94 %-100 %] 100 %  BP: ()/() 87/51  Arterial Line BP: ()/() 109/62     Weight: 71.5 kg (157 lb 10.1 oz)  Body mass index is 25.44 kg/m².    Estimated Creatinine Clearance: 98.7 mL/min (based on SCr of 0.7 mg/dL).    Physical Exam   Constitutional: He appears well-developed.   HENT:   Head: Normocephalic and atraumatic.   Eyes:   Pupils are unequal (L>R), but both reactive to light   Neck: Neck  supple.   Cardiovascular: Normal rate, regular rhythm and intact distal pulses.   No murmur heard.  Pulmonary/Chest: He has rales. He exhibits no tenderness.   Course breath sounds diffusely about the same as yesterday. Extubated. There is purulent drainage from the chest tube.      Abdominal: Soft. He exhibits no distension.   Musculoskeletal: He exhibits edema.   Neurological: GCS eye subscore is 4. GCS verbal subscore is 1. GCS motor subscore is 4.   Withdraws to pain. Unable to speak due to trach but awake and nods to yes or no questions appropriately.    Skin:   R thumb with distal ischemic changes.   Blisters present on ventral R wrist    Psychiatric:   Anxious/agitated appearing   Nursing note and vitals reviewed.    Significant Labs:   CBC:   Recent Labs   Lab  09/25/18   2200  09/26/18   0350  09/26/18   1000   WBC  22.41*  19.24*  19.51*   HGB  10.5*  9.6*  9.6*   HCT  31.8*  28.6*  28.3*   PLT  90*  99*  92*     CMP:   Recent Labs   Lab  09/25/18   0445  09/26/18   0350  09/26/18   1000   NA  138  137  137   K  4.1  3.4*  3.6   CL  111*  112*  112*   CO2  20*  18*  20*   GLU  84  85  88   BUN  28*  24*  23   CREATININE  0.7  0.6  0.7   CALCIUM  7.3*  7.5*  7.5*   PROT  4.2*  3.8*   --    ALBUMIN  1.1*  1.4*   --    BILITOT  0.8  1.0   --    ALKPHOS  167*  131   --    AST  26  35   --    ALT  21  22   --    ANIONGAP  7*  7*  5*   EGFRNONAA  >60.0  >60.0  >60.0

## 2018-09-26 NOTE — PROGRESS NOTES
Called MD r/t decreased UOP 27-35 x4hrs. CVP 8, SVV 13%. 500cc bolus of LR ordered and given. VSS, will continue to monitor.

## 2018-09-26 NOTE — SUBJECTIVE & OBJECTIVE
Interval History/Significant Events: Yesterday, larger chest tube was placed and trach was performed at bedside. Pt had some sanguinous oozing from trach site that improved with surgicel. Required 500cc bolus overnight for low UOP.       Tash's  9/12    LAPAROTOMY,EXPLORATORY (N/A)  GASTROSTOMY (N/A)  EGD (ESOPHAGOGASTRODUODENOSCOPY) (N/A)  9/15        Objective:     Vital Signs (Most Recent):  Temp: 99.1 °F (37.3 °C) (09/26/18 0715)  Pulse: 99 (09/26/18 0915)  Resp: (!) 30 (09/26/18 0729)  BP: 121/75 (09/26/18 0900)  SpO2: 99 % (09/26/18 0915) Vital Signs (24h Range):  Temp:  [95.7 °F (35.4 °C)-99.7 °F (37.6 °C)] 99.1 °F (37.3 °C)  Pulse:  [] 99  Resp:  [0-34] 30  SpO2:  [82 %-100 %] 99 %  BP: ()/() 121/75  Arterial Line BP: ()/() 115/66     Weight: 71.5 kg (157 lb 10.1 oz)  Body mass index is 25.44 kg/m².      Intake/Output Summary (Last 24 hours) at 9/26/2018 1000  Last data filed at 9/26/2018 0800  Gross per 24 hour   Intake 3591.76 ml   Output 5012 ml   Net -1420.24 ml       Physical Exam   Constitutional: He appears well-developed. He is sedated.   HENT:   Head: Normocephalic and atraumatic.   Trach in place   Eyes:   Pupils are unequal (L>R), but both reactive to light   Neck: Neck supple.   Cardiovascular: Normal rate, regular rhythm and intact distal pulses.   No murmur heard.  Pulmonary/Chest: He has rales. He exhibits no tenderness.   Abdominal: Soft. He exhibits no distension.   Musculoskeletal: He exhibits edema.   Neurological:   Moves all extremities  Withdraws to pain   Skin:   R thumb with distal ischemic changes.   Blisters present on ventral R wrist    Psychiatric:   Anxious/agitated appearing   Nursing note and vitals reviewed.      Vents:  Vent Mode: Spont (09/26/18 0909)  Ventilator Initiated: Yes (09/14/18 2114)  Set Rate: 28 bmp (09/25/18 1712)  Vt Set: 350 mL (09/25/18 1712)  Pressure Support: 10 cmH20 (09/22/18 0544)  PEEP/CPAP: 5 cmH20 (09/26/18  0909)  Oxygen Concentration (%): 40 (09/26/18 0915)  Peak Airway Pressure: 30 cmH2O (09/26/18 0909)  Plateau Pressure: 29 cmH20 (09/26/18 0909)  Total Ve: 14.7 mL (09/26/18 0909)  Negative Inspiratory Force (cm H2O): 10 (09/23/18 0335)  F/VT Ratio<105 (RSBI): (!) 43.04 (09/26/18 0510)    Lines/Drains/Airways     Central Venous Catheter Line                 Percutaneous Central Line Insertion/Assessment - triple lumen  09/22/18 1720 right internal jugular 3 days          Drain                 Colostomy 09/12/18 LLQ 14 days         Gastrostomy/Enterostomy 09/15/18 1621 Gastrostomy tube w/o balloon LUQ decompression 10 days         Open Drain 09/22/18 Anterior;Right;Ventral Groin Other (see comments) 4 days         Urethral Catheter 09/22/18 1230 Temperature probe 3 days         Chest Tube 09/25/18 0800 1 Right Pleural 1 day          Airway                 Surgical Airway 09/25/18 1330 Shiley Cuffed less than 1 day          Arterial Line                 Arterial Line 09/22/18 1340 Left Femoral 3 days          Pressure Ulcer                 Pressure Injury 09/13/18 1534 lower Coccyx Stage 3 12 days          Peripheral Intravenous Line                 Midline Catheter Insertion/Assessment  - Single Lumen 09/13/18 1748 Right brachial vein 20g x 10cm 12 days                Significant Labs:    CBC/Anemia Profile:  Recent Labs   Lab  09/25/18   1555  09/25/18   2200  09/26/18   0350   WBC  25.39*  22.41*  19.24*   HGB  12.7*  10.5*  9.6*   HCT  38.5*  31.8*  28.6*   PLT  100*  90*  99*   MCV  92  91  90   RDW  18.0*  17.6*  17.0*        Chemistries:  Recent Labs   Lab  09/24/18   1612  09/25/18   0445  09/26/18   0350   NA  140  138  137   K  3.7  4.1  3.4*   CL  111*  111*  112*   CO2  23  20*  18*   BUN  30*  28*  24*   CREATININE  0.7  0.7  0.6   CALCIUM  7.5*  7.3*  7.5*   ALBUMIN   --   1.1*  1.4*   PROT   --   4.2*  3.8*   BILITOT   --   0.8  1.0   ALKPHOS   --   167*  131   ALT   --   21  22   AST   --   26  35   MG   1.6  1.7  1.8   PHOS   --   3.3  2.8       All pertinent labs within the past 24 hours have been reviewed.      Significant Imaging:  I have reviewed and interpreted all pertinent imaging results/findings within the past 24 hours.

## 2018-09-26 NOTE — ASSESSMENT & PLAN NOTE
Patient is a 63 yo male presenting with SOB and abdominal pain found to have a perforated diverticula s/p nick procedure and ex lap. His hospital course has been complicated by PEA arrest requiring chest compressions and subsequent intubation. CXR showing diffuse pulmonary edema. Significant leukocytosis without fevers. Bronchoscopy cultures growing E. Coli, Adrianne and Stenotrophomonas maltophilia. E. Coli Sensitive to cefalosporins, Flouroquinolones, Ertapenem, tobramycin/genamycin. S. Maltophilia often seen on ventilated patients or severely immunocompromised patients. Currently treated with Cefepime and previous Vanc (since discontinued 2 doses (9/24-9/25). Downtrending [facility].     Plan:   - Patient de-escalated to ceftriaxone 1g q24.   - Recommend continuing fluconazole 800mg loading dose and 400mg after for 10-14 days total.   - New purulent drainage from chest tube. Recommend CT chest to r/o empyema/loculated effusions and proper source control.   - No need to culture drainage as it is >24h old.

## 2018-09-26 NOTE — PROGRESS NOTES
Dr. Monahan at bedside added additional surgicel r/t continues to bleed. RT open suctioned trach only white-yellow secretions removed no pink-tinge or blood. VSS, will continue to monitor.

## 2018-09-26 NOTE — PROGRESS NOTES
" [facility] [address]  Adult Nutrition  Progress Note    SUMMARY       Recommendations  Recommendation/Intervention:   1. Recommend initiating TPN (Custom TPN 7.5% AA/16% Dextrose) w/lipids as ordered.   2. If transition back to TF desired once J-tube placed, recommend resuming Isosource 1.5 at 45 mL/hr.   RD to monitor.    Goals: Pt to receive >85% EEN and EPN.  Nutrition Goal Status: goal met  Communication of RD Recs: reviewed with RN    Reason for Assessment  Reason for Assessment: RD follow-up  Diagnosis: gastrointestinal disease, surgery/postoperative complications(perforated diverticulum)  Relevant Medical History: HTN, gastric bypass (2003), tobacco abuse  General Information Comments: Patient now s/p trach yesterday, on vent. TF held this morning for large residual, plan to start TPN w/lipids tonight. Possible J-tube placement next week. (NFPE completed 9/18. RD feels patient remains well nourished.)  Nutrition Discharge Planning: unable to determine at this time    Nutrition Risk Screen  Nutrition Risk Screen: no indicators present    Nutrition/Diet History  Do you have any cultural, spiritual, Buddhism conflicts, given your current situation?: none noted  Factors Affecting Nutritional Intake: altered gastrointestinal function, NPO, on mechanical ventilation    Anthropometrics  Temp: 99.5 °F (37.5 °C)  Height Method: Estimated  Height: 5' 6" (167.6 cm)  Height (inches): 66 in  Weight Method: Bed Scale  Weight: 71.5 kg (157 lb 10.1 oz)  Weight (lb): 157.63 lb  Ideal Body Weight (IBW), Male: 142 lb  % Ideal Body Weight, Male (lb): 111.01 lb  BMI (Calculated): 25.5  BMI Grade: 25 - 29.9 - overweight    Lab/Procedures/Meds  Pertinent Labs Reviewed: reviewed  Pertinent Labs Comments: K 3.4, Cl 112, BUN 24, Ca 7.5, Alb 1.4  Pertinent Medications Reviewed: reviewed  Pertinent Medications Comments: pantoprazole, precedex    Physical Findings/Assessment  Overall Physical Appearance: nourished, on " ventilator support, edematous  Tubes: gastrostomy tube, other (see comments)(colostomy)  Oral/Mouth Cavity: tooth/teeth missing  Skin: edema, incision(s), pressure ulcer(s)(stage 3)    Estimated/Assessed Needs  Weight Used For Calorie Calculations: 59.9 kg (132 lb 0.9 oz)(dosing weight)  Energy Calorie Requirements (kcal): 1642 kcal/day  Energy Need Method: [place]  Protein Requirements: 72-90 g/day(1.2-1.5 g/kg)  Weight Used For Protein Calculations: 59.9 kg (132 lb 0.9 oz)(dosing weight)  Fluid Requirements (mL): 1mL/kcal or per MD  Fluid Need Method: RDA Method  RDA Method (mL): 1642    Nutrition Prescription Ordered  Current Diet Order: NPO  Nutrition Order Comments: TF stopped  Current Nutrition Support Formula Ordered: (Custom TPN 7.5% AA/16% Dextrose + lipids)  Current Nutrition Support Rate Ordered: 50 (ml)  Current Nutrition Support Frequency Ordered: mL/hr    Evaluation of Received Nutrient/Fluid Intake  Parenteral Calories (kcal): 1013  Parenteral Protein (gm): 90  Parenteral Fluid (mL): 1200  Lipid Calories (kcals): 500 kcals  GIR (Glucose Infusion Rate) (mg/kg/min): 2.23 mg/kg/min  Total Calories (kcal): 1513  % Kcal Needs: 92  % Protein Needs: 100  I/O: -1.1L x 24hrs, +21.6L since admit  Energy Calories Required: meeting needs  Protein Required: meeting needs  Fluid Required: (per MD)  Comments: +ostomy output  Tolerance: (to start tonight)  % Intake of Estimated Energy Needs: 75 - 100 % (once TPN started)  % Meal Intake: NPO    Nutrition Risk  Level of Risk/Frequency of Follow-up: high(2x/week)     Assessment and Plan  Nutrition Problem  Inadequate energy intake     Related to (etiology):   Nutrition support provision     Signs and Symptoms (as evidenced by):   Pt receiving <85% EEN and EPN     Nutrition Diagnosis Status:   Improving    Monitor and Evaluation  Food and Nutrient Intake: energy intake, parenteral nutrition intake  Food and Nutrient Adminstration: enteral and parenteral nutrition  administration  Anthropometric Measurements: weight, weight change, body mass index  Biochemical Data, Medical Tests and Procedures: electrolyte and renal panel, gastrointestinal profile, glucose/endocrine profile, lipid profile, inflammatory profile  Nutrition-Focused Physical Findings: overall appearance     Nutrition Follow-Up  RD Follow-up?: Yes

## 2018-09-26 NOTE — PROGRESS NOTES
Called Dr. Monahan to inform tracheostomy site still bleeding. No new orders at this time, will continue to monitor.

## 2018-09-26 NOTE — ASSESSMENT & PLAN NOTE
Neuro:  - Sedation: Precedex  - Pain: fentanyl     Pulm:  - Intubated  Vent Mode: Spont  Oxygen Concentration (%):  [] 40  Resp Rate Total:  [18 br/min-43 br/min] 21 br/min  Vt Set:  [350 mL] 350 mL  PEEP/CPAP:  [5 cmH20] 5 cmH20  Mean Airway Pressure:  [7.2 mhC30-43 cmH20] 10 cmH20  - s/p chest tube placement s/p large R tension pneumothorax 9/24  - CT in place    - Daily SBT  - Trend ABGs   - trach tube in place     CV:  - Episodes of hypotension 9/22 requiring volume resuscitation with good response (500cc albumin, ~6L crystalloids, and 2u PRBC)  - 9/15: 3UpRBCs, 2 U FFP prior to OR  - 9/15: 1 U plts, 1 U cryo based on TEG study    GI/FEN/Lytes:  - TFs at 55  - Trend CMP  - Replace lytes PRN    Renal:  - BUN/Cr at 24/0.6  - Monitor UOP     Heme:  - Hgb 9.6(10.5)  - CBC, PT/INR q6  - Heparin gtt stopped per primary team 2/2 elevated aPTT. Lovenox 70 BID currently     ID:  - WBC downtrending  - Abx: cefepime and vanc  - Urine Cx: positive for candida   - Blood cx NGTD   - CT Chest/Abdomen w/ contrast 9/20: bilateral lung consolidation noted concerning for infection or an aspiration, consider repeat if no improvement with abx    - recent BAL (9/22) e. Coli, [place], stenotrophomonas maltophilia        - on ceftriaxone, fluconazole   - appreciate ID input     Endo:  - D10 infusion @ 50 mL/hr    PPx:   - PPI  - lovenox     Dispo:   - Cont SICU care

## 2018-09-26 NOTE — PHYSICIAN QUERY
PT Name: Amari Saez  MR #: [id number]     Physician Query Form - Documentation Clarification      CDS/: Brandy E Capley               Contact information:  Spectralink:  [phone]    This form is a permanent document in the medical record.     Query Date: September 26, 2018    By submitting this query, we are merely seeking further clarification of documentation. Please utilize your independent clinical judgment when addressing the question(s) below.    The Medical record reflects the following:    Supporting Clinical Findings Location in Medical Record     Aspiration pneumonia of right lower lobe due to gastric secretions  Patient is a 63 yo male presenting with SOB and abdominal pain found to have a perforated diverticula s/p nick procedure and ex lap. His hospital course has been complicated by PEA arrest requiring chest compressions and subsequent intubation. CXR showing diffuse pulmonary edema.    Impression    Return of a small right pneumothorax and worsening bibasilar infiltrates.     ID consult 9/25            CXR 9/25 3872                                                                            Doctor, Please specify diagnosis or diagnoses associated with above clinical findings.  Please specify acuity of pulmonary edema.     Provider Use Only       (x  )  Acute     (  )  Chronic     (  )  Other (please specify):  ________________________________                                                                                                             [  ] Clinically undetermined

## 2018-09-26 NOTE — PROGRESS NOTES
[facility]  Critical Care - Surgery  Progress Note    Patient Name: Amari Saez  MRN: [id number]  Admission Date: 9/12/2018  Hospital Length of Stay: 14 days  Code Status: Full Code  Attending Provider: Judd Agudelo MD  Primary Care Provider: Chaim Valdovinos MD   Principal Problem: Perforated diverticulum of large intestine    Subjective:     Hospital/ICU Course:  Admitted with perforated diverticulitis (free intraperitoneal air on admission CXR). S/p emergent Tash procedure on 9/12. On 9/14, a rapid response was called after pt became hypoxic, tachycardic, and tachypneic on the floor. He was placed on a venti mask however pt further decompensated and became pulseless. Code blue was called where pt was intubated, chest compressions were performed, and epi was given x2 where ROSC was attained. Pt was subsequently transferred to SICU for higher level of care. On 9/15, Hgb went from 7.8 to 4.3, then 3.9 on repeat. Pt was taken emergently to OR for ex-lap with intraop endoscopic control of bleeding with hemostatic clip in remnant stomach. He was given 3u pRBCs, 2U FFP prior to/in OR. Patient was extubated on 9/17, but had to be re-intubated on the same day due to significant respiratory failure. CXR with bilateral infiltrates (R>L). Patient was intermittently on pressors. Left radial arterial line placed on 09/14/18 and removed 09/20/18 due to malfunction. Vascular surgery consulted for left thumb cyanosis and signs of ischemia. Patient was started on heparin gtt, then transitioned to therapeutic enoxaparin. Respiratory cultures growing E. Coli. On 9/22, bronchoscopy was done and BAL cultures sent. Patient had episodes of hypotension requiring volume resuscitation with good response (500cc albumin, ~6L crystalloids, and 2u PRBC). On 9/24, he acutely decompensated and required an increase in vent settings. CXR done at that time showed large R tension pneumothorax requiring emergent chest tube.  Vitals quickly normalized after chest tube was placed. BAL cultures resulted 9/24 and grew E. Coli , candida, and stenotrophomonas maltophilia.      Interval History/Significant Events: Yesterday, larger chest tube was placed and trach was performed at bedside. Pt had some sanguinous oozing from trach site that improved with surgicel. Required 500cc bolus overnight for low UOP.   NGT removed this AM.       Tash's  9/12    LAPAROTOMY,EXPLORATORY (N/A)  GASTROSTOMY (N/A)  EGD (ESOPHAGOGASTRODUODENOSCOPY) (N/A)  9/15        Objective:     Vital Signs (Most Recent):  Temp: 99.1 °F (37.3 °C) (09/26/18 0715)  Pulse: 99 (09/26/18 0915)  Resp: (!) 30 (09/26/18 0729)  BP: 121/75 (09/26/18 0900)  SpO2: 99 % (09/26/18 0915) Vital Signs (24h Range):  Temp:  [95.7 °F (35.4 °C)-99.7 °F (37.6 °C)] 99.1 °F (37.3 °C)  Pulse:  [] 99  Resp:  [0-34] 30  SpO2:  [82 %-100 %] 99 %  BP: ()/() 121/75  Arterial Line BP: ()/() 115/66     Weight: 71.5 kg (157 lb 10.1 oz)  Body mass index is 25.44 kg/m².      Intake/Output Summary (Last 24 hours) at 9/26/2018 1000  Last data filed at 9/26/2018 0800  Gross per 24 hour   Intake 3591.76 ml   Output 5012 ml   Net -1420.24 ml       Physical Exam   Constitutional: He appears well-developed. He is sedated.   HENT:   Head: Normocephalic and atraumatic.   Trach in place   Eyes:   Pupils are unequal (L>R), but both reactive to light   Neck: Neck supple.   Cardiovascular: Normal rate, regular rhythm and intact distal pulses.   No murmur heard.  Pulmonary/Chest: He has rales. He exhibits no tenderness.   Abdominal: Soft. He exhibits no distension.   Musculoskeletal: He exhibits edema.   Neurological:   Moves all extremities  Withdraws to pain   Skin:   R thumb with distal ischemic changes.   Blisters present on ventral R wrist    Psychiatric:   Anxious/agitated appearing   Nursing note and vitals reviewed.      Vents:  Vent Mode: Spont (09/26/18 0909)  Ventilator  Initiated: Yes (09/14/18 2114)  Set Rate: 28 bmp (09/25/18 1712)  Vt Set: 350 mL (09/25/18 1712)  Pressure Support: 10 cmH20 (09/22/18 0544)  PEEP/CPAP: 5 cmH20 (09/26/18 0909)  Oxygen Concentration (%): 40 (09/26/18 0915)  Peak Airway Pressure: 30 cmH2O (09/26/18 0909)  Plateau Pressure: 29 cmH20 (09/26/18 0909)  Total Ve: 14.7 mL (09/26/18 0909)  Negative Inspiratory Force (cm H2O): 10 (09/23/18 0335)  F/VT Ratio<105 (RSBI): (!) 43.04 (09/26/18 0510)    Lines/Drains/Airways     Central Venous Catheter Line                 Percutaneous Central Line Insertion/Assessment - triple lumen  09/22/18 1720 right internal jugular 3 days          Drain                 Colostomy 09/12/18 LLQ 14 days         Gastrostomy/Enterostomy 09/15/18 1621 Gastrostomy tube w/o balloon LUQ decompression 10 days         Open Drain 09/22/18 Anterior;Right;Ventral Groin Other (see comments) 4 days         Urethral Catheter 09/22/18 1230 Temperature probe 3 days         Chest Tube 09/25/18 0800 1 Right Pleural 1 day          Airway                 Surgical Airway 09/25/18 1330 Shiley Cuffed less than 1 day          Arterial Line                 Arterial Line 09/22/18 1340 Left Femoral 3 days          Pressure Ulcer                 Pressure Injury 09/13/18 1534 lower Coccyx Stage 3 12 days          Peripheral Intravenous Line                 Midline Catheter Insertion/Assessment  - Single Lumen 09/13/18 1748 Right brachial vein 20g x 10cm 12 days                Significant Labs:    CBC/Anemia Profile:  Recent Labs   Lab  09/25/18   1555  09/25/18   2200  09/26/18   0350   WBC  25.39*  22.41*  19.24*   HGB  12.7*  10.5*  9.6*   HCT  38.5*  31.8*  28.6*   PLT  100*  90*  99*   MCV  92  91  90   RDW  18.0*  17.6*  17.0*        Chemistries:  Recent Labs   Lab  09/24/18   1612  09/25/18   0445  09/26/18   0350   NA  140  138  137   K  3.7  4.1  3.4*   CL  111*  111*  112*   CO2  23  20*  18*   BUN  30*  28*  24*   CREATININE  0.7  0.7  0.6    CALCIUM  7.5*  7.3*  7.5*   ALBUMIN   --   1.1*  1.4*   PROT   --   4.2*  3.8*   BILITOT   --   0.8  1.0   ALKPHOS   --   167*  131   ALT   --   21  22   AST   --   26  35   MG  1.6  1.7  1.8   PHOS   --   3.3  2.8       All pertinent labs within the past 24 hours have been reviewed.      Significant Imaging:  I have reviewed and interpreted all pertinent imaging results/findings within the past 24 hours.    Assessment/Plan:     * Perforated diverticulum of large intestine    Neuro:  - Sedation: Precedex  - Pain: fentanyl     Pulm:  - Intubated  Vent Mode: Spont  Oxygen Concentration (%):  [] 40  Resp Rate Total:  [18 br/min-43 br/min] 21 br/min  Vt Set:  [350 mL] 350 mL  PEEP/CPAP:  [5 cmH20] 5 cmH20  Mean Airway Pressure:  [7.2 ndE32-28 cmH20] 10 cmH20  - s/p chest tube placement s/p large R tension pneumothorax 9/24  - CT in place    - Daily SBT  - Trend ABGs   - trach tube in place     CV:  - Episodes of hypotension 9/22 requiring volume resuscitation with good response (500cc albumin, ~6L crystalloids, and 2u PRBC)  - 9/15: 3UpRBCs, 2 U FFP prior to OR  - 9/15: 1 U plts, 1 U cryo based on TEG study    GI/FEN/Lytes:  - TFs at 55  - Trend CMP  - Replace lytes PRN    Renal:  - BUN/Cr at 24/0.6  - Monitor UOP     Heme:  - Hgb 9.6(10.5)  - CBC, PT/INR q6  - Heparin gtt stopped per primary team 2/2 elevated aPTT. Lovenox 70 BID currently     ID:  - WBC downtrending  - Abx: cefepime and vanc  - Urine Cx: positive for candida   - Blood cx NGTD   - CT Chest/Abdomen w/ contrast 9/20: bilateral lung consolidation noted concerning for infection or an aspiration, consider repeat if no improvement with abx    - recent BAL (9/22) e. Coli, [place], stenotrophomonas maltophilia        - on ceftriaxone, fluconazole   - appreciate ID input     Endo:  - D10 infusion @ 50 mL/hr    PPx:   - PPI  - lovenox     Dispo:   - Cont SICU care             Critical care was time spent personally by me on the following activities:  development of treatment plan with patient or surrogate and bedside caregivers, discussions with consultants, evaluation of patient's response to treatment, examination of patient, ordering and performing treatments and interventions, ordering and review of laboratory studies, ordering and review of radiographic studies, pulse oximetry, re-evaluation of patient's condition.  This critical care time did not overlap with that of any other provider or involve time for any procedures.     Antonieta Grajeda MD  Critical Care - Surgery  [facility]

## 2018-09-26 NOTE — SUBJECTIVE & OBJECTIVE
Review of Systems   Unable to perform ROS: Acuity of condition     Objective:     Vital Signs (Most Recent):  Temp: 99.5 °F (37.5 °C) (09/26/18 1200)  Pulse: 87 (09/26/18 1445)  Resp: (!) 24 (09/26/18 1118)  BP: (!) 87/51 (09/26/18 1400)  SpO2: 100 % (09/26/18 1445) Vital Signs (24h Range):  Temp:  [95.7 °F (35.4 °C)-99.7 °F (37.6 °C)] 99.5 °F (37.5 °C)  Pulse:  [] 87  Resp:  [14-34] 24  SpO2:  [94 %-100 %] 100 %  BP: ()/() 87/51  Arterial Line BP: ()/() 109/62     Weight: 71.5 kg (157 lb 10.1 oz)  Body mass index is 25.44 kg/m².    Estimated Creatinine Clearance: 98.7 mL/min (based on SCr of 0.7 mg/dL).    Physical Exam   Constitutional: He appears well-developed.   HENT:   Head: Normocephalic and atraumatic.   Eyes:   Pupils are unequal (L>R), but both reactive to light   Neck: Neck supple.   Cardiovascular: Normal rate, regular rhythm and intact distal pulses.   No murmur heard.  Pulmonary/Chest: He has rales. He exhibits no tenderness.   Course breath sounds diffusely about the same as yesterday. Extubated. There is purulent drainage from the chest tube.      Abdominal: Soft. He exhibits no distension.   Musculoskeletal: He exhibits edema.   Neurological: GCS eye subscore is 4. GCS verbal subscore is 1. GCS motor subscore is 4.   Withdraws to pain. Unable to speak due to trach but awake and nods to yes or no questions appropriately.    Skin:   R thumb with distal ischemic changes.   Blisters present on ventral R wrist    Psychiatric:   Anxious/agitated appearing   Nursing note and vitals reviewed.    Significant Labs:   CBC:   Recent Labs   Lab  09/25/18   2200  09/26/18   0350  09/26/18   1000   WBC  22.41*  19.24*  19.51*   HGB  10.5*  9.6*  9.6*   HCT  31.8*  28.6*  28.3*   PLT  90*  99*  92*     CMP:   Recent Labs   Lab  09/25/18   0445  09/26/18   0350  09/26/18   1000   NA  138  137  137   K  4.1  3.4*  3.6   CL  111*  112*  112*   CO2  20*  18*  20*   GLU  84  85  88   BUN   28*  24*  23   CREATININE  0.7  0.6  0.7   CALCIUM  7.3*  7.5*  7.5*   PROT  4.2*  3.8*   --    ALBUMIN  1.1*  1.4*   --    BILITOT  0.8  1.0   --    ALKPHOS  167*  131   --    AST  26  35   --    ALT  21  22   --    ANIONGAP  7*  7*  5*   EGFRNONAA  >60.0  >60.0  >60.0

## 2018-09-27 NOTE — PROGRESS NOTES
[facility]  Critical Care - Surgery  Progress Note    Patient Name: Amari Saez  MRN: [id number]  Admission Date: 9/12/2018  Hospital Length of Stay: 15 days  Code Status: Full Code  Attending Provider: Judd Agudelo MD  Primary Care Provider: Chaim Valdovinos MD   Principal Problem: Perforated diverticulum of large intestine    Subjective:     Hospital/ICU Course:  Admitted with perforated diverticulitis (free intraperitoneal air on admission CXR). S/p emergent Tash procedure on 9/12. On 9/14, a rapid response was called after pt became hypoxic, tachycardic, and tachypneic on the floor. He was placed on a venti mask however pt further decompensated and became pulseless. Code blue was called where pt was intubated, chest compressions were performed, and epi was given x2 where ROSC was attained. Pt was subsequently transferred to SICU for higher level of care. On 9/15, Hgb went from 7.8 to 4.3, then 3.9 on repeat. Pt was taken emergently to OR for ex-lap with intraop endoscopic control of bleeding with hemostatic clip in remnant stomach. He was given 3u pRBCs, 2U FFP prior to/in OR. Patient was extubated on 9/17, but had to be re-intubated on the same day due to significant respiratory failure. CXR with bilateral infiltrates (R>L). Patient was intermittently on pressors. Left radial arterial line placed on 09/14/18 and removed 09/20/18 due to malfunction. Vascular surgery consulted for left thumb cyanosis and signs of ischemia. Patient was started on heparin gtt, then transitioned to therapeutic enoxaparin. Respiratory cultures growing E. Coli. On 9/22, bronchoscopy was done and BAL cultures sent. Patient had episodes of hypotension requiring volume resuscitation with good response (500cc albumin, ~6L crystalloids, and 2u PRBC). On 9/24, he acutely decompensated and required an increase in vent settings. CXR done at that time showed large R tension pneumothorax requiring emergent chest tube.  Vitals quickly normalized after chest tube was placed. BAL cultures resulted 9/24 and grew E. Coli , candida, and stenotrophomonas maltophilia.      Interval History/Significant Events: Episode of desat into the 80s and pt c/o shortness of breath. RT suctioned patient and soon after, his sats improved. No more oozing from trach site now off lovenox. Required 2 500cc boluses overnight for low UOP and borderline pressures.      Tash's  9/12    LAPAROTOMY,EXPLORATORY (N/A)  GASTROSTOMY (N/A)  EGD (ESOPHAGOGASTRODUODENOSCOPY) (N/A)  9/15    Tracheostomy  9/25    Objective:     Vital Signs (Most Recent):  Temp: 98.5 °F (36.9 °C) (09/27/18 0300)  Pulse: 97 (09/27/18 0645)  Resp: 20 (09/27/18 0338)  BP: (!) 110/54 (09/26/18 1600)  SpO2: 96 % (09/27/18 0645) Vital Signs (24h Range):  Temp:  [97.2 °F (36.2 °C)-99.5 °F (37.5 °C)] 98.5 °F (36.9 °C)  Pulse:  [] 97  Resp:  [20-30] 20  SpO2:  [90 %-100 %] 96 %  BP: ()/(51-95) 110/54  Arterial Line BP: ()/(44-89) 133/75     Weight: 72.3 kg (159 lb 6.3 oz)  Body mass index is 25.73 kg/m².      Intake/Output Summary (Last 24 hours) at 9/27/2018 0719  Last data filed at 9/27/2018 0600  Gross per 24 hour   Intake 3148.1 ml   Output 2690 ml   Net 458.1 ml       Physical Exam   Constitutional: He appears well-developed. He is sedated.   HENT:   Head: Normocephalic and atraumatic.   Trach in place   Eyes:   Pupils are unequal (L>R), but both reactive to light   Neck: Neck supple.   Cardiovascular: Normal rate, regular rhythm and intact distal pulses.   No murmur heard.  Pulmonary/Chest: He has rales. He exhibits no tenderness.   Abdominal: Soft. He exhibits no distension.   Musculoskeletal: He exhibits edema.   Neurological:   Moves all extremities  Withdraws to pain   Skin:   R thumb with distal ischemic changes.   Blisters present on ventral R wrist    Psychiatric:   Anxious/agitated appearing   Nursing note and vitals reviewed.      Vents:  Vent Mode: Spont  (09/27/18 0549)  Ventilator Initiated: Yes (09/14/18 2114)  Set Rate: 28 bmp (09/26/18 1621)  Vt Set: 350 mL (09/26/18 1621)  Pressure Support: 10 cmH20 (09/22/18 0544)  PEEP/CPAP: 5 cmH20 (09/27/18 0549)  Oxygen Concentration (%): 50 (09/27/18 0645)  Peak Airway Pressure: 28 cmH2O (09/27/18 0549)  Plateau Pressure: 0 cmH20 (09/27/18 0549)  Total Ve: 23.8 mL (09/27/18 0549)  Negative Inspiratory Force (cm H2O): 10 (09/23/18 0335)  F/VT Ratio<105 (RSBI): (!) 22.88 (09/27/18 0338)    Lines/Drains/Airways     Central Venous Catheter Line                 Percutaneous Central Line Insertion/Assessment - triple lumen  09/22/18 1720 right internal jugular 4 days          Drain                 Colostomy 09/12/18 LLQ 15 days         Gastrostomy/Enterostomy 09/15/18 1621 Gastrostomy tube w/o balloon LUQ decompression 11 days         Open Drain 09/22/18 Anterior;Right;Ventral Groin Other (see comments) 5 days         Urethral Catheter 09/22/18 1230 Temperature probe 4 days         Chest Tube 09/25/18 0800 1 Right Pleural 1 day          Airway                 Surgical Airway 09/25/18 1330 Shiley Cuffed 1 day          Arterial Line                 Arterial Line 09/22/18 1340 Left Femoral 4 days          Pressure Ulcer                 Pressure Injury 09/13/18 1534 lower Coccyx Stage 3 13 days          Peripheral Intravenous Line                 Midline Catheter Insertion/Assessment  - Single Lumen 09/13/18 1748 Right brachial vein 20g x 10cm 13 days                Significant Labs:    CBC/Anemia Profile:  Recent Labs   Lab  09/26/18   1000  09/26/18   1600  09/27/18   0333   WBC  19.51*  18.36*  20.79*   HGB  9.6*  9.3*  9.1*   HCT  28.3*  27.7*  27.6*   PLT  92*  90*  76*   MCV  89  90  92   RDW  17.0*  16.9*  17.1*        Chemistries:  Recent Labs   Lab  09/26/18   0350  09/26/18   1000  09/27/18   0333   NA  137  137  137   K  3.4*  3.6  4.1   CL  112*  112*  113*   CO2  18*  20*  20*   BUN  24*  23  22   CREATININE  0.6  0.7   0.6   CALCIUM  7.5*  7.5*  7.6*   ALBUMIN  1.4*   --   1.2*   PROT  3.8*   --   3.8*   BILITOT  1.0   --   0.6   ALKPHOS  131   --   129   ALT  22   --   24   AST  35   --   28   MG  1.8   --   1.8   PHOS  2.8   --   2.4*       All pertinent labs within the past 24 hours have been reviewed.      Significant Imaging:  I have reviewed and interpreted all pertinent imaging results/findings within the past 24 hours.    Assessment/Plan:     * Perforated diverticulum of large intestine    Neuro:  - Sedation: Precedex gtt  - Pain: fentanyl     Pulm:  - Intubated  Vent Mode: Spont  Oxygen Concentration (%):  [] 50  Resp Rate Total:  [19 br/min-32 br/min] 29 br/min  Vt Set:  [350 mL] 350 mL  PEEP/CPAP:  [5 cmH20] 5 cmH20  Mean Airway Pressure:  [7.7 vqN84-53 cmH20] 12 cmH20  - s/p chest tube placement s/p large R tension pneumothorax 9/24  - CT to suction, air leak present  - Daily CXR  - Daily SBT, tolerated tach collar ~ 30 min yesterday   - Trend ABGs 7.44/32.4/106/22 BE -2   - trach tube in place     CV:  - Episodes of hypotension 9/22 requiring volume resuscitation with good response (500cc albumin, ~6L crystalloids, and 2u PRBC)  - required 3 500 cc boluses for low UOP and hypotension 9/26/18  - 9/15: 3UpRBCs, 2 U FFP prior to OR  - 9/15: 1 U plts, 1 U cryo based on TEG study    GI/FEN/Lytes:  - TPN, may star trickle tube feeds through G tube today   - Trend CMP  - Replace lytes PRN    Renal:  - BUN/Cr at 22/0.6(24/0.6)  - Monitor UOP ~30/hr, +408/24 hrs, +19.5 overall   - DC mcneill today, condom cath     Heme:  - Hgb 9.1(9.3)  - CBC, PT/INR q6  - Heparin gtt stopped per primary team 2/2 elevated aPTT. Lovenox held 9/26 2/2 trach oozing. Will restart ppx lovenox today.     ID:  - afebrile   - trend WBC 20.7(18.4)  - Blood cx NGTD   - CT Chest/Abdomen w/ contrast 9/20: bilateral lung consolidation noted concerning for infection or an aspiration, consider repeat if no improvement with abx    - recent BAL (9/22) e.  Coli, candida, stenotrophomonas maltophilia        - on rocephin, fluconazole   - appreciate ID input   - pre ID, repeat CT chest today 2/2 concern for empyema 2/2 purulent chest tube output   - trending lactates: 1.5    Endo:  - monitor for hypoglycemia, sugars better with TPN     PPx:   - PPI  - lovenox     Dispo:   - Cont SICU care             Critical care was time spent personally by me on the following activities: development of treatment plan with patient or surrogate and bedside caregivers, discussions with consultants, evaluation of patient's response to treatment, examination of patient, ordering and performing treatments and interventions, ordering and review of laboratory studies, ordering and review of radiographic studies, pulse oximetry, re-evaluation of patient's condition.  This critical care time did not overlap with that of any other provider or involve time for any procedures.     Antonieta Grajeda MD  Critical Care - Surgery  [facility]

## 2018-09-27 NOTE — ASSESSMENT & PLAN NOTE
Patient is a 61 yo male presenting with SOB and abdominal pain found to have a perforated diverticula s/p nick procedure and ex lap. His hospital course has been complicated by PEA arrest requiring chest compressions and subsequent intubation. CXR showing diffuse pulmonary edema. Significant leukocytosis without fevers. Bronchoscopy cultures growing E. Coli, [place] and Stenotrophomonas maltophilia. E. Coli Sensitive to cefalosporins, Flouroquinolones, Ertapenem, tobramycin/genamycin. S. Maltophilia often seen on ventilated patients or severely immunocompromised patients. Currently treated with Cefepime and previous Vanc (since discontinued 2 doses (9/24-9/25). Downtrending WCC. CT chest showing worsening pulmonary infectious/inflammatory process as detailed above, noting multifocal new cavitary lesions.    Plan:   - No abscess or fluid collection on CT.  - Continue ceftriaxone 2g q24.   - Recommend continuing fluconazole 800mg loading dose and 400mg after for 10-14 days total.

## 2018-09-27 NOTE — SUBJECTIVE & OBJECTIVE
Interval History/Significant Events: Episode of desat into the 80s and pt c/o shortness of breath. RT suctioned patient and soon after, his sats improved. No more oozing from trach site now off lovenox. Required 2 500cc boluses overnight for low UOP and borderline pressures.      Tash's  9/12    LAPAROTOMY,EXPLORATORY (N/A)  GASTROSTOMY (N/A)  EGD (ESOPHAGOGASTRODUODENOSCOPY) (N/A)  9/15    Tracheostomy  9/25    Objective:     Vital Signs (Most Recent):  Temp: 98.5 °F (36.9 °C) (09/27/18 0300)  Pulse: 97 (09/27/18 0645)  Resp: 20 (09/27/18 0338)  BP: (!) 110/54 (09/26/18 1600)  SpO2: 96 % (09/27/18 0645) Vital Signs (24h Range):  Temp:  [97.2 °F (36.2 °C)-99.5 °F (37.5 °C)] 98.5 °F (36.9 °C)  Pulse:  [] 97  Resp:  [20-30] 20  SpO2:  [90 %-100 %] 96 %  BP: ()/(51-95) 110/54  Arterial Line BP: ()/(44-89) 133/75     Weight: 72.3 kg (159 lb 6.3 oz)  Body mass index is 25.73 kg/m².      Intake/Output Summary (Last 24 hours) at 9/27/2018 0719  Last data filed at 9/27/2018 0600  Gross per 24 hour   Intake 3148.1 ml   Output 2690 ml   Net 458.1 ml       Physical Exam   Constitutional: He appears well-developed. He is sedated.   HENT:   Head: Normocephalic and atraumatic.   Trach in place   Eyes:   Pupils are unequal (L>R), but both reactive to light   Neck: Neck supple.   Cardiovascular: Normal rate, regular rhythm and intact distal pulses.   No murmur heard.  Pulmonary/Chest: He has rales. He exhibits no tenderness.   Abdominal: Soft. He exhibits no distension.   Musculoskeletal: He exhibits edema.   Neurological:   Moves all extremities  Withdraws to pain   Skin:   R thumb with distal ischemic changes.   Blisters present on ventral R wrist    Psychiatric:   Anxious/agitated appearing   Nursing note and vitals reviewed.      Vents:  Vent Mode: Spont (09/27/18 0549)  Ventilator Initiated: Yes (09/14/18 2114)  Set Rate: 28 bmp (09/26/18 1621)  Vt Set: 350 mL (09/26/18 1621)  Pressure Support: 10 cmH20  (09/22/18 0544)  PEEP/CPAP: 5 cmH20 (09/27/18 0549)  Oxygen Concentration (%): 50 (09/27/18 0645)  Peak Airway Pressure: 28 cmH2O (09/27/18 0549)  Plateau Pressure: 0 cmH20 (09/27/18 0549)  Total Ve: 23.8 mL (09/27/18 0549)  Negative Inspiratory Force (cm H2O): 10 (09/23/18 0335)  F/VT Ratio<105 (RSBI): (!) 22.88 (09/27/18 0338)    Lines/Drains/Airways     Central Venous Catheter Line                 Percutaneous Central Line Insertion/Assessment - triple lumen  09/22/18 1720 right internal jugular 4 days          Drain                 Colostomy 09/12/18 LLQ 15 days         Gastrostomy/Enterostomy 09/15/18 1621 Gastrostomy tube w/o balloon LUQ decompression 11 days         Open Drain 09/22/18 Anterior;Right;Ventral Groin Other (see comments) 5 days         Urethral Catheter 09/22/18 1230 Temperature probe 4 days         Chest Tube 09/25/18 0800 1 Right Pleural 1 day          Airway                 Surgical Airway 09/25/18 1330 Shiley Cuffed 1 day          Arterial Line                 Arterial Line 09/22/18 1340 Left Femoral 4 days          Pressure Ulcer                 Pressure Injury 09/13/18 1534 lower Coccyx Stage 3 13 days          Peripheral Intravenous Line                 Midline Catheter Insertion/Assessment  - Single Lumen 09/13/18 1748 Right brachial vein 20g x 10cm 13 days                Significant Labs:    CBC/Anemia Profile:  Recent Labs   Lab  09/26/18   1000  09/26/18   1600  09/27/18   0333   WBC  19.51*  18.36*  20.79*   HGB  9.6*  9.3*  9.1*   HCT  28.3*  27.7*  27.6*   PLT  92*  90*  76*   MCV  89  90  92   RDW  17.0*  16.9*  17.1*        Chemistries:  Recent Labs   Lab  09/26/18   0350  09/26/18   1000  09/27/18   0333   NA  137  137  137   K  3.4*  3.6  4.1   CL  112*  112*  113*   CO2  18*  20*  20*   BUN  24*  23  22   CREATININE  0.6  0.7  0.6   CALCIUM  7.5*  7.5*  7.6*   ALBUMIN  1.4*   --   1.2*   PROT  3.8*   --   3.8*   BILITOT  1.0   --   0.6   ALKPHOS  131   --   129   ALT  22   --    24   AST  35   --   28   MG  1.8   --   1.8   PHOS  2.8   --   2.4*       All pertinent labs within the past 24 hours have been reviewed.      Significant Imaging:  I have reviewed and interpreted all pertinent imaging results/findings within the past 24 hours.

## 2018-09-27 NOTE — PROGRESS NOTES
Progress Note  Surgery    Admit Date: 9/12/2018  Attending: Jammie  S/P: Procedure(s) (LRB):  LAPAROTOMY,EXPLORATORY (N/A)  GASTROSTOMY (N/A)  EGD (ESOPHAGOGASTRODUODENOSCOPY) (N/A)    Post-operative Day: 12 Days Post-Op    Hospital Day: 16    SUBJECTIVE:     Required 1.5 L crystalloid yesterday for low UOP, low BP.  Last night, required increase in ventilator to 50% from 40% Fi.  Right lung appears fully expanded on CXR, although there is still an air leak.      No further oozing from trach    OBJECTIVE:     Vital Signs (Most Recent)  Temp: 98.5 °F (36.9 °C) (09/27/18 0300)  Pulse: 97 (09/27/18 0645)  Resp: 20 (09/27/18 0338)  BP: (!) 110/54 (09/26/18 1600)  SpO2: 96 % (09/27/18 0645)    Vital Signs Range (Last 24H):  Temp:  [97.2 °F (36.2 °C)-99.5 °F (37.5 °C)]   Pulse:  []   Resp:  [20-30]   BP: ()/(51-95)   SpO2:  [90 %-100 %]   Arterial Line BP: ()/(44-89)     I & O (Last 24H):    Intake/Output Summary (Last 24 hours) at 9/27/2018 0718  Last data filed at 9/27/2018 0600  Gross per 24 hour   Intake 3103.1 ml   Output 2060 ml   Net 1043.1 ml       Physical Exam:  Gen: Intubated, sedated  HEENT: NCAT  Pulm: Mechanical breath sounds, trach in place, dried blood  Abd: Soft.  Ostomy bag in place over serous leakage site from incision.  Otherwise, incision is c/d/i.  Ostomy with stool in bag  Extremities: R groin fem site with ascites.  Ostomy bag in place  Skin: Skin color, texture, turgor normal. No rashes or lesions    Laboratory:  CBC:   Recent Labs   Lab  09/27/18   0333   WBC  20.79*   RBC  3.00*   HGB  9.1*   HCT  27.6*   PLT  76*   MCV  92   MCH  30.3   MCHC  33.0     CMP:   Recent Labs   Lab  09/27/18   0333   GLU  171*   CALCIUM  7.6*   ALBUMIN  1.2*   PROT  3.8*   NA  137   K  4.1   CO2  20*   CL  113*   BUN  22   CREATININE  0.6   ALKPHOS  129   ALT  24   AST  28   BILITOT  0.6     Coagulation:   Recent Labs   Lab  09/25/18   0445   09/27/18   0333   INR  1.5*   < >  1.6*   APTT  43.1*    --    --     < > = values in this interval not displayed.     Labs within the past 24 hours have been reviewed.    ASSESSMENT/PLAN:     Assessment: 62 year old man with perforated diverticulitis and free air s/p Tash's procedure 9/12, ex-lap for bleeding, with intraop endoscopic control of bleeding remnant gastric ulcer 9/15    Plan:  Neuro: Precedex only.  Alert and appearing to understand conversation, attempting to answer by mouthing.      Resp: Maintain chest tube to suction (still has air leak)  Obtain CT scan today to investigate PTX and to rule out undrained empyema (ID recommendation)  Daily CXR  No room to diurese at the moment- but he is net negative on his own  Continue tx of PNA    CV: Pulm HTN and right heart failure.  Good LVEF.    HDS     Renal: Cr stable, BUN improved.  Making adequate UOP  Remove mcneill, place condom cath\    Abd/FEN/GI:Try trickle TF per G tube  Continue TPN in the mean time   Ostomy productive  May need J tube placement in OR next week    Heme/ID:   Continue BID protonix for previous GIB (no evidence of continued bleeding per G tube)  Therapeutic lovenox for Radial artery occlusion - will start ppx today, and likely advance to therapeutic tomorrow  PNA with E coli, candida and stenotrophomonas.  Rocephin and flucanozole as per ID recs. F/u sensitivities      Dispo: Continue critical care.  CT chest today, and get CT abdomen at the same time    Juan Carlos Hooper MD  General Surgery, PGY-5   [phone]

## 2018-09-27 NOTE — SUBJECTIVE & OBJECTIVE
Interval History: NAEON    Review of Systems   Unable to perform ROS: Acuity of condition     Objective:     Vital Signs (Most Recent):  Temp: 98.7 °F (37.1 °C) (09/27/18 0702)  Pulse: 104 (09/27/18 1400)  Resp: (!) 24 (09/27/18 1149)  BP: (!) 110/54 (09/26/18 1600)  SpO2: 96 % (09/27/18 1400) Vital Signs (24h Range):  Temp:  [98.5 °F (36.9 °C)-98.7 °F (37.1 °C)] 98.7 °F (37.1 °C)  Pulse:  [] 104  Resp:  [20-30] 24  SpO2:  [86 %-100 %] 96 %  BP: (110)/(54) 110/54  Arterial Line BP: ()/(49-92) 111/64     Weight: 72.3 kg (159 lb 6.3 oz)  Body mass index is 25.73 kg/m².    Estimated Creatinine Clearance: 115.2 mL/min (based on SCr of 0.6 mg/dL).    Physical Exam   Constitutional: He appears well-developed.   HENT:   Head: Normocephalic and atraumatic.   Eyes:   Pupils are unequal (L>R), but both reactive to light   Neck: Neck supple.   Cardiovascular: Normal rate, regular rhythm and intact distal pulses.   No murmur heard.  Pulmonary/Chest: He has rales. He exhibits no tenderness.   Course breath sounds diffusely about the same as yesterday. Extubated. There is purulent drainage from the chest tube.      Abdominal: Soft. He exhibits no distension.   Musculoskeletal: He exhibits edema.   Neurological: GCS eye subscore is 4. GCS verbal subscore is 1. GCS motor subscore is 4.   Withdraws to pain. Unable to speak due to trach but awake and nods to yes or no questions appropriately.    Skin:   R thumb with distal ischemic changes.   Blisters present on ventral R wrist    Psychiatric:   Anxious/agitated appearing   Nursing note and vitals reviewed.    Significant Labs:   CBC:   Recent Labs   Lab  09/26/18   1000  09/26/18   1600  09/27/18   0333   WBC  19.51*  18.36*  20.79*   HGB  9.6*  9.3*  9.1*   HCT  28.3*  27.7*  27.6*   PLT  92*  90*  76*     CMP:   Recent Labs   Lab  09/26/18   0350  09/26/18   1000  09/27/18   0333   NA  137  137  137   K  3.4*  3.6  4.1   CL  112*  112*  113*   CO2  18*  20*  20*   GLU   85  88  171*   BUN  24*  23  22   CREATININE  0.6  0.7  0.6   CALCIUM  7.5*  7.5*  7.6*   PROT  3.8*   --   3.8*   ALBUMIN  1.4*   --   1.2*   BILITOT  1.0   --   0.6   ALKPHOS  131   --   129   AST  35   --   28   ALT  22   --   24   ANIONGAP  7*  5*  4*   EGFRNONAA  >60.0  >60.0  >60.0     Significant Imaging: CT: I have reviewed all pertinent results/findings within the past 24 hours:  Worsening pulmonary infectious/inflammatory process as detailed above, noting multifocal new cavitary lesions.

## 2018-09-27 NOTE — PHYSICIAN QUERY
PT Name: Amari Saez  MR #: [id number]     Physician Query Form - Documentation Clarification      CDS/: Brandy E Capley               Contact information:  Spectralink:  [phone]    This form is a permanent document in the medical record.     Query Date: September 27, 2018    By submitting this query, we are merely seeking further clarification of documentation. Please utilize your independent clinical judgment when addressing the question(s) below.    The Medical record reflects the following:    Supporting Clinical Findings Location in Medical Record     Procedure: Bronchoscopy, Diagnostic. Bronchoalveolar lavage, BAL     Pre-Procedure Diagnosis:   1. Sepsis.  2. Right lower lobe infiltrate.     Post-Procedure Diagnosis: Same     The right lower lobe bronchus was filled with thick, purulent appearing secretion       General surgery procedures 9/22 1324   Aerobic Bacterial Culture        ESCHERICHIA COLI   Few      Aerobic Bacterial Culture        CANDIDA ALBICANS   Many      Aerobic Bacterial Culture        STENOTROPHOMONAS (X.) MALTOPHILIA   Few   Specimen Source:           Lung, RLL     Anaerobic Culture           CLOSTRIDIUM SUBTERMINALE   Few          Anaerobic Culture           BIFIDOBACTERIUM SPECIES   Few   further identified as Bifidobacterium scardovii   Specimen Source:           Lung, RLL       VAP BAL CULTURE          ESCHERICHIA COLI   10,000 - 49,999 cfu/ml           P     VAP BAL CULTURE          CORDELIA ALBICANS   10,000 - 49,999 cfu/ml           P     VAP BAL CULTURE          STENOTROPHOMONAS (X.) MALTOPHILIA   10,000 - 49,999 cfu/ml   Susceptibility pending  Specimen Source:           BAL, RLL     Aerobic culture 9/22                             Anaerobic culture 9/22                        Gram stain 9/22                                                                            Doctor, Please specify diagnosis or diagnoses associated with above clinical findings.    Provider Use Only         (x  )  Sepsis due to Escherichia Coli, Candida Albicans, stenotrophomonas (X.) Maltophilia, Clostridium Subterminale      (  )  Sepsis due to Escherichia Coli      (  )  Sepsis due to Candida Albicans      (  )  Sepsis due to stenotrophomonas (X.) Maltophilia      (  )  Sepsis due to Clostridium Subterminale      (  )  Other (please specify):  ___________________________________                                                                                                             [  ] Clinically undetermined

## 2018-09-27 NOTE — PROGRESS NOTES
[facility]  Infectious Disease  Progress Note    Patient Name: Amari Saez  MRN: [id number]  Admission Date: 9/12/2018  Length of Stay: 15 days  Attending Physician: Judd Agudelo MD  Primary Care Provider: Chaim Valdovinos MD    Isolation Status: No active isolations  Assessment/Plan:      Aspiration pneumonia of right lower lobe due to gastric secretions    Patient is a 63 yo male presenting with SOB and abdominal pain found to have a perforated diverticula s/p nick procedure and ex lap. His hospital course has been complicated by PEA arrest requiring chest compressions and subsequent intubation. CXR showing diffuse pulmonary edema. Significant leukocytosis without fevers. Bronchoscopy cultures growing E. Coli, Adrianne and Stenotrophomonas maltophilia. E. Coli Sensitive to cefalosporins, Flouroquinolones, Ertapenem, tobramycin/genamycin. S. Maltophilia often seen on ventilated patients or severely immunocompromised patients. Currently treated with Cefepime and previous Vanc (since discontinued 2 doses (9/24-9/25). Downtrending WCC. CT chest showing worsening pulmonary infectious/inflammatory process as detailed above, noting multifocal new cavitary lesions.    Plan:   - No abscess or fluid collection on CT.  - Continue ceftriaxone 2g q24.   - Recommend continuing fluconazole 800mg loading dose and 400mg after for 10-14 days total.           Thank you for your consult. I will follow-up with patient. Please contact us if you have any additional questions.    Jarod Alatorre MD  Infectious Disease  [facility]    Subjective:     Principal Problem:Perforated diverticulum of large intestine    HPI: Mr. Amari Saez is a 63 yo male with a PMHx of HTN, Gastric bypass, tobacco abuse presents to the ED on 9/12 with SOB and Abdominal pain. He was found to have free air within his abdomen which was caused by a perforated diverticulitis and was brought into the OR for surgical  evaluation where a Tash procedure was performed. On 9/14, a rapid response was called after pt became hypoxic, tachycardic, and tachypneic on the floor. He was placed on a venti mask however pt further decompensated and became pulseless. Code blue was called where pt was intubated, chest compressions were performed, and epi was given x2 where ROSC was attained. Pt was subsequently transferred to SICU for higher level of care. On 9/15, Hgb went from 7.8 to 4.3, then 3.9 on repeat. Pt was taken emergently to OR for ex-lap with intraop endoscopic control of bleeding with hemostatic clip in remnant stomach. He was given 3u pRBCs, 2U FFP prior to/in OR. Patient was extubated on 9/17, but had to be re-intubated on the same day due to significant respiratory failure. CXR with bilateral infiltrates (R>L). Patient was intermittently on pressors. Left radial arterial line placed on 09/14/18 and removed 09/20/18 due to malfunction. Patient has been intubated since.  On 9/22, bronchoscopy was done and BAL cultures sent which grew E. Coli, Candida and Sternotrophomin maltophilia.       Interval History: NAEON    Review of Systems   Unable to perform ROS: Acuity of condition     Objective:     Vital Signs (Most Recent):  Temp: 98.7 °F (37.1 °C) (09/27/18 0702)  Pulse: 104 (09/27/18 1400)  Resp: (!) 24 (09/27/18 1149)  BP: (!) 110/54 (09/26/18 1600)  SpO2: 96 % (09/27/18 1400) Vital Signs (24h Range):  Temp:  [98.5 °F (36.9 °C)-98.7 °F (37.1 °C)] 98.7 °F (37.1 °C)  Pulse:  [] 104  Resp:  [20-30] 24  SpO2:  [86 %-100 %] 96 %  BP: (110)/(54) 110/54  Arterial Line BP: ()/(49-92) 111/64     Weight: 72.3 kg (159 lb 6.3 oz)  Body mass index is 25.73 kg/m².    Estimated Creatinine Clearance: 115.2 mL/min (based on SCr of 0.6 mg/dL).    Physical Exam   Constitutional: He appears well-developed.   HENT:   Head: Normocephalic and atraumatic.   Eyes:   Pupils are unequal (L>R), but both reactive to light   Neck: Neck supple.    Cardiovascular: Normal rate, regular rhythm and intact distal pulses.   No murmur heard.  Pulmonary/Chest: He has rales. He exhibits no tenderness.   Course breath sounds diffusely about the same as yesterday. Extubated. There is purulent drainage from the chest tube.      Abdominal: Soft. He exhibits no distension.   Musculoskeletal: He exhibits edema.   Neurological: GCS eye subscore is 4. GCS verbal subscore is 1. GCS motor subscore is 4.   Withdraws to pain. Unable to speak due to trach but awake and nods to yes or no questions appropriately.    Skin:   R thumb with distal ischemic changes.   Blisters present on ventral R wrist    Psychiatric:   Anxious/agitated appearing   Nursing note and vitals reviewed.    Significant Labs:   CBC:   Recent Labs   Lab  09/26/18   1000  09/26/18   1600  09/27/18   0333   WBC  19.51*  18.36*  20.79*   HGB  9.6*  9.3*  9.1*   HCT  28.3*  27.7*  27.6*   PLT  92*  90*  76*     CMP:   Recent Labs   Lab  09/26/18   0350  09/26/18   1000  09/27/18   0333   NA  137  137  137   K  3.4*  3.6  4.1   CL  112*  112*  113*   CO2  18*  20*  20*   GLU  85  88  171*   BUN  24*  23  22   CREATININE  0.6  0.7  0.6   CALCIUM  7.5*  7.5*  7.6*   PROT  3.8*   --   3.8*   ALBUMIN  1.4*   --   1.2*   BILITOT  1.0   --   0.6   ALKPHOS  131   --   129   AST  35   --   28   ALT  22   --   24   ANIONGAP  7*  5*  4*   EGFRNONAA  >60.0  >60.0  >60.0     Significant Imaging: CT: I have reviewed all pertinent results/findings within the past 24 hours:  Worsening pulmonary infectious/inflammatory process as detailed above, noting multifocal new cavitary lesions.

## 2018-09-27 NOTE — PT/OT/SLP PROGRESS
Physical Therapy      Patient Name:  Amari Saez   MRN:  [id number]    Patient not seen today secondary to Unavailable (Comment). On hold due to increased respiratory needs.  Will follow-up as appropriate.    Althea Noel, PT   9/27/2018

## 2018-09-28 NOTE — PT/OT/SLP PROGRESS
Physical Therapy  Missed Visit    Patient Name:  Amari Saez   MRN:  [id number]  Admitting Diagnosis:  Perforated diverticulum of large intestine   Recent Surgery: Procedure(s) (LRB):  LAPAROTOMY,EXPLORATORY (N/A)  GASTROSTOMY (N/A)  EGD (ESOPHAGOGASTRODUODENOSCOPY) (N/A) 13 Days Post-Op    Patient not seen today secondary to Unavailable (Comment)(attempted in pm, RN hold due to agitation.). Will follow-up as POC allows.    Torri Armstrong PT, DPT  9/28/2018  Pager: [phone]

## 2018-09-28 NOTE — PROGRESS NOTES
Progress Note  Surgery    Admit Date: 9/12/2018  Attending: Jammie  S/P: Procedure(s) (LRB):  LAPAROTOMY,EXPLORATORY (N/A)  GASTROSTOMY (N/A)  EGD (ESOPHAGOGASTRODUODENOSCOPY) (N/A)    Post-operative Day: 13 Days Post-Op    Hospital Day: 17    SUBJECTIVE:     Required increase in ventilator to 60% from 50% Fi.  Right lung appears fully expanded on CXR, there is still an air leak present      No further oozing from trach    OBJECTIVE:     Vital Signs (Most Recent)  Temp: 99 °F (37.2 °C) (09/28/18 0300)  Pulse: 87 (09/28/18 0512)  Resp: (!) 27 (09/28/18 0302)  BP: (!) 110/54 (09/26/18 1600)  SpO2: 97 % (09/28/18 0512)    Vital Signs Range (Last 24H):  Temp:  [98.7 °F (37.1 °C)-99 °F (37.2 °C)]   Pulse:  []   Resp:  [23-30]   SpO2:  [74 %-100 %]   Arterial Line BP: ()/(44-92)     I & O (Last 24H):    Intake/Output Summary (Last 24 hours) at 9/28/2018 0703  Last data filed at 9/28/2018 0600  Gross per 24 hour   Intake 1398 ml   Output 2025 ml   Net -627 ml       Physical Exam:  Gen: Intubated, sedated  HEENT: NCAT  Pulm: Mechanical breath sounds, trach in place, dried blood  Abd: Soft.  Ostomy bag in place over serous leakage site from incision.  Otherwise, incision is c/d/i.  Ostomy with stool in bag  Extremities: R groin fem site with ascites.  Ostomy bag in place  Skin: Skin color, texture, turgor normal. No rashes or lesions    Laboratory:  CBC:   Recent Labs   Lab  09/28/18 0414   WBC  35.93*   RBC  2.97*   HGB  8.9*   HCT  26.7*   PLT  71*   MCV  90   MCH  30.0   MCHC  33.3     CMP:   Recent Labs   Lab  09/28/18 0414   GLU  138*   CALCIUM  8.5*   ALBUMIN  1.1*   PROT  4.0*   NA  137   K  3.9   CO2  20*   CL  111*   BUN  25*   CREATININE  0.7   ALKPHOS  144*   ALT  28   AST  31   BILITOT  0.5     Coagulation:   Recent Labs   Lab  09/25/18   0445   09/28/18   0414   INR  1.5*   < >  1.5*   APTT  43.1*   --    --     < > = values in this interval not displayed.     Labs within the past 24 hours  have been reviewed.    ASSESSMENT/PLAN:     Assessment: 62 year old man with perforated diverticulitis and free air s/p Tash's procedure 9/12, ex-lap for bleeding, with intraop endoscopic control of bleeding remnant gastric ulcer 9/15    Plan:  Neuro: Precedex only.  Alert and appearing to understand conversation, attempting to answer by mouthing.      Resp: Maintain chest tube to suction (still has air leak)  CT shows worsening PNA- cavitary lesions in lungs    Daily CXR  No room to diurese at the moment- but he is net negative on his own  Continue tx of PNA    CV: Pulm HTN and right heart failure.  Good LVEF.    HDS     Renal: Cr stable, Making adequate UOP  Ponce replaced yesterday for urinary retention    Abd/FEN/GI:Try trickle TF per G tube  Continue TPN in the mean time   Ostomy productive  May need J tube placement in OR next week    Heme/ID:   Continue BID protonix for previous GIB (no evidence of continued bleeding per G tube)  Therapeutic lovenox for Radial artery occlusion - Restart today  PNA with E coli, candida and stenotrophomonas.  Rocephin and flucanozole as per ID recs.   See if ID thinks we should start treating steno given increased WBC    Dispo: Continue critical care.      Juan Carlos Hooper MD  General Surgery, PGY-5   [phone]

## 2018-09-28 NOTE — ANESTHESIA PREPROCEDURE EVALUATION
[facility]  Anesthesia Pre-Operative Evaluation         Patient Name: Amari Saez  YOB: 1955  MRN: [id number]    SUBJECTIVE:     Pre-operative evaluation for Procedure(s) (LRB):  REVISION, COLOSTOMY (N/A)  INSERTION, JEJUNOSTOMY TUBE (N/A)     09/28/2018    Amari Saez is a 62 y.o. male admitted with perforated diverticulitis (free intraperitoneal air on admission CXR). S/p emergent Tash procedure on 9/12. On 9/14, a rapid response was called after pt became hypoxic, tachycardic, and tachypneic on the floor. He was placed on a venti mask however pt further decompensated and became pulseless. Code blue was called where pt was intubated, chest compressions were performed, and epi was given x2 where ROSC was attained. Pt was subsequently transferred to SICU for higher level of care. On 9/15, Hgb went from 7.8 to 4.3, then 3.9 on repeat. Pt was taken emergently to OR for ex-lap with intraop endoscopic control of bleeding with hemostatic clip in remnant stomach. He was given 3u pRBCs, 2U FFP prior to/in OR. Patient was extubated on 9/17, but had to be re-intubated on the same day due to significant respiratory failure. CXR with bilateral infiltrates (R>L). Patient was intermittently on pressors. Left radial arterial line placed on 09/14/18 and removed 09/20/18 due to malfunction. Vascular surgery consulted for left thumb cyanosis and signs of ischemia. Patient was started on heparin gtt, then transitioned to therapeutic enoxaparin. Respiratory cultures growing E. Coli. On 9/22, bronchoscopy was done and BAL cultures sent. Patient had episodes of hypotension requiring volume resuscitation with good response (500cc albumin, ~6L crystalloids, and 2u PRBC). On 9/24, he acutely decompensated and required an increase in vent settings. CXR done at that time showed large R tension pneumothorax requiring emergent chest tube. Vitals quickly normalized after chest tube was placed. BAL  cultures resulted 9/24 and grew E. Coli , candida, and stenotrophomonas maltophilia. Trach placed on 9/25.    Patient now presents for the above procedure(s).      LDA:        Percutaneous Central Line Insertion/Assessment - triple lumen  09/22/18 1720 right internal jugular (Active)   Dressing biopatch in place 9/28/2018  7:00 AM   Securement secured w/ sutures 9/28/2018  7:00 AM   Additional Site Signs no erythema;no warmth;no edema;no pain;no palpable cord;no streak formation;no drainage 9/28/2018  7:00 AM   Distal Patency/Care infusing 9/28/2018  7:00 AM   Medial Patency/Care infusing 9/28/2018  7:00 AM   Proximal Patency/Care infusing 9/28/2018  7:00 AM   Waveform normal 9/28/2018  7:00 AM   Line Interventions line leveled/zeroed 9/28/2018  7:00 AM   Dressing Change Due 10/02/18 9/27/2018  3:02 PM   Daily Line Review Performed 9/28/2018  7:00 AM   Number of days: 5            Midline Catheter Insertion/Assessment  - Single Lumen 09/13/18 1748 Right brachial vein 20g x 10cm (Active)   Site Assessment Clean;Dry;Intact 9/28/2018  7:00 AM   IV Device Securement catheter securement device 9/28/2018  7:00 AM   Line Status Infusing 9/28/2018  7:00 AM   Dressing Status Clean;Intact;Dry 9/28/2018  7:00 AM   Dressing Intervention Dressing reinforced 9/28/2018  7:00 AM   Dressing Change Due 10/12/18 9/26/2018  3:35 PM   Site Change Due 10/12/18 9/26/2018  7:40 AM   Reason Not Rotated Not due 9/28/2018  7:00 AM   Number of days: 14            Arterial Line 09/22/18 1340 Left Femoral (Active)   Site Assessment Clean;Dry;Intact 9/28/2018  7:00 AM   Line Status Pulsatile blood flow 9/28/2018  7:00 AM   Art Line Waveform Appropriate 9/28/2018  7:00 AM   Arterial Line Interventions Zeroed and calibrated 9/28/2018  7:00 AM   Color/Movement/Sensation Capillary refill less than 3 sec 9/28/2018  7:00 AM   Dressing Type Transparent 9/28/2018  7:00 AM   Dressing Status Clean;Dry;Intact 9/28/2018  7:00 AM   Dressing Intervention  Dressing reinforced 9/28/2018  7:00 AM   Dressing Change Due 10/02/18 9/28/2018  7:00 AM   Number of days: 5            Chest Tube 09/25/18 0800 1 Right Pleural (Active)   Chest Tube WDL ex 9/28/2018  7:00 AM   Function -20 cm H2O 9/28/2018  7:00 AM   Air Leak/Fluctuation air leak present 9/28/2018  7:00 AM   Care physician notified 9/28/2018  7:00 AM   Safety all connections secured 9/28/2018  7:00 AM   Securement tubing anchored to body distal to insertion site with sutures 9/28/2018  7:00 AM   Patency Intervention Tip/tilt 9/28/2018  7:00 AM   Drainage Description Serous 9/28/2018  7:00 AM   Dressing Appearance occlusive gauze dressing intact 9/28/2018  7:00 AM   Dressing Care dressing reinforced 9/28/2018  7:00 AM   Left Subcutaneous Emphysema none present 9/28/2018  7:00 AM   Right Subcutaneous Emphysema none present 9/28/2018  7:00 AM   Site Assessment Other (Comment) 9/28/2018  7:00 AM   Surrounding Skin Dry;Intact 9/28/2018  7:00 AM   Output (mL) 35 mL 9/28/2018  6:00 AM   Number of days: 3            Open Drain 09/22/18 Anterior;Right;Ventral Groin Other (see comments) (Active)   Site Description Leaking at site 9/28/2018  7:00 AM   Dressing Status Clean;Dry;Intact 9/28/2018  7:00 AM   Drainage Serous 9/28/2018  7:00 AM   Status Unclamped 9/28/2018  7:00 AM   Output (mL) 800 mL 9/28/2018  6:00 AM   Number of days: 6            Gastrostomy/Enterostomy 09/15/18 1621 Gastrostomy tube w/o balloon LUQ decompression (Active)   Securement other (see comments) 9/28/2018  3:00 AM   Interventions Prior to Feeding patency checked 9/28/2018  7:00 AM   Suction Setting/Drainage Method dependent drainage 9/24/2018  7:05 AM   Drainage serous 9/26/2018 11:45 AM   Feeding Type continuous 9/28/2018  7:00 AM   Clamp Status/Tolerance unclamped 9/28/2018  7:00 AM   Feeding Action feeding continued 9/28/2018  7:00 AM   Dressing dry and intact 9/28/2018  7:00 AM   Insertion Site serous drainage;other (see comments) 9/26/2018 11:45  "AM   Site Care site cleansed w/ sterile normal saline 9/26/2018  3:35 PM   Flush/Irrigation flushed w/;water;no resistance met 9/28/2018  3:00 AM   Current Rate (mL/hr) 10 mL/hr 9/28/2018  7:00 AM   Goal Rate (mL/hr) 45 mL/hr 9/28/2018  7:00 AM   Intake (mL) 50 mL 9/27/2018 11:00 PM   Tube Output(mL)(Include Discarded Residual) 550 mL 9/27/2018  7:01 PM   Tube Feeding Intake (mL) 10 9/28/2018  8:00 AM   Residual Amount (ml) 90 ml 9/28/2018  3:00 AM   Number of days: 12            Colostomy 09/12/18 LLQ (Active)   Stomal Appliance 1 piece 9/28/2018  3:00 AM   Stoma Appearance round;rosebud appearance 9/28/2018  3:00 AM   Site Assessment Intact 9/28/2018  3:00 AM   Peristomal Assessment Intact 9/28/2018  3:00 AM   Accessories/Skin Care cleansed w/ water 9/26/2018  3:00 AM   Stoma Function stool;dark brown;mushy 9/28/2018  3:00 AM   Treatment Bag change;Site care 9/25/2018  3:03 PM   Fluid Instilled (ml) 0 ml 9/26/2018 11:00 AM   Tolerance no signs/symptoms of discomfort 9/28/2018  3:00 AM   Output (mL) 300 mL 9/25/2018 11:00 AM   Number of days: 16            Urethral Catheter 09/27/18 2330 Latex (Active)   Site Assessment Clean;Intact 9/28/2018  7:00 AM   Collection Container Urimeter 9/28/2018  7:00 AM   Securement Method secured to top of thigh w/ adhesive device 9/28/2018  7:00 AM   Catheter Care Performed yes 9/28/2018  7:00 AM   Reason for Continuing Urinary Catheterization Critically ill in ICU requiring intensive monitoring;Urinary retention 9/28/2018  7:00 AM   CAUTI Prevention Bundle StatLock in place w 1" slack;Intact seal between catheter & drainage tubing;Drainage bag off the floor;Green sheeting clip in use;No dependent loops or kinks;Drainage bag not overfilled (<2/3 full);Drainage bag below bladder 9/28/2018  7:00 AM   Output (mL) 25 mL 9/28/2018 10:00 AM   Number of days: 0       Prev airway: Currently with trach     Drips:    dexmedetomidine (PRECEDEX) infusion 1.4 mcg/kg/hr (09/28/18 1000)    TPN " ADULT CENTRAL LINE CUSTOM 50 mL/hr at 09/28/18 1000    TPN ADULT CENTRAL LINE CUSTOM         Patient Active Problem List   Diagnosis    Hypertension    Perforated diverticulum of large intestine    Pressure injury of skin and subcutaneous tissue, stage 3    Alteration in skin integrity    Acute respiratory distress    Acute blood loss anemia    Acute hypoxemic respiratory failure    Gastric ulcer with hemorrhage    Ischemia of digits of hand    Tachycardia    Hypoglycemia    Aspiration pneumonia of right lower lobe due to gastric secretions    Wound dehiscence       Review of patient's allergies indicates:  No Known Allergies    Current Inpatient Medications:   albuterol-ipratropium  3 mL Nebulization Q4H    cefTRIAXone (ROCEPHIN) IVPB  2 g Intravenous Q24H    chlorhexidine  15 mL Mouth/Throat BID    enoxaparin  1 mg/kg (Dosing Weight) Subcutaneous Q12H    fat emulsion 20%  250 mL Intravenous Daily    fluconazole (DIFLUCAN) IVPB  400 mg Intravenous Q24H    pantoprozole (PROTONIX) IV  40 mg Intravenous Q12H    QUEtiapine  25 mg Per G Tube BID    sodium chloride 0.9%  3 mL Intravenous Q8H       No current facility-administered medications on file prior to encounter.      Current Outpatient Medications on File Prior to Encounter   Medication Sig Dispense Refill    alprazolam (XANAX) 0.5 MG tablet Take 1 tablet (0.5 mg total) by mouth daily as needed for Anxiety. 30 tablet 0    blood pressure test kit-medium Kit 1 kit by Misc.(Non-Drug; Combo Route) route once daily. 1 each 0    escitalopram oxalate (LEXAPRO) 10 MG tablet TAKE 1 TABLET BY MOUTH EVERY DAY 90 tablet 0    lisinopril 10 MG tablet TAKE 1 TABLET BY MOUTH EVERY DAY 90 tablet 0       Past Surgical History:   Procedure Laterality Date    CARPAL TUNNEL RELEASE Bilateral     COLECTOMY,SIGMOID  9/12/2018    Performed by [facility] at [facility] OR 2ND FLR    COLOSTOMY  9/12/2018    Procedure: COLOSTOMY;  Surgeon: Judd LUKE  MD Jammie;  Location: [address];  Service: General;;    COLOSTOMY  9/12/2018    Performed by Judd [facility]udelo MD at [facility] OR [address] [place] [address]    EGD (ESOPHAGOGASTRODUODENOSCOPY) N/A 9/15/2018    Performed by Jarod Pitt MD at [facility] OR [address] [place] [address]    ESOPHAGOGASTRODUODENOSCOPY N/A 9/15/2018    Procedure: EGD (ESOPHAGOGASTRODUODENOSCOPY);  Surgeon: Jarod Pitt MD;  Location: [address];  Service: General;  Laterality: N/A;    EYE SURGERY      vein occlusion -laser sugery for burst blood vessel     EYE SURGERY      lasix    GASTRIC BYPASS  2004    GASTROSTOMY N/A 9/15/2018    Procedure: GASTROSTOMY;  Surgeon: Jarod Pitt MD;  Location: [address];  Service: General;  Laterality: N/A;  insertion of G-tube    GASTROSTOMY N/A 9/15/2018    Performed by Jarod Pitt MD at [facility] OR [address] [place] [address]    KNEE SURGERY  1980s    arthroscopy    LAPAROTOMY,EXPLORATORY N/A 9/15/2018    Performed by Jarod Pitt MD at [facility] OR [address] [place] [address]    LAPAROTOMY,EXPLORATORY N/A 9/12/2018    Performed by Judd Agudelo MD at [facility] OR [address] [place] [address]       Social History     Socioeconomic History    Marital status: Single     Spouse name: Not on file    Number of children: Not on file    Years of education: Not on file    Highest education level: Not on file   Social Needs    Financial resource strain: Not on file    Food insecurity - worry: Not on file    Food insecurity - inability: Not on file    Transportation needs - medical: Not on file    Transportation needs - non-medical: Not on file   Occupational History    Not on file   Tobacco Use    Smoking status: Current Every Day Smoker     Packs/day: 1.00     Years: 30.00     Pack years: 30.00     Types: Cigarettes   Substance and Sexual Activity    Alcohol use: Yes     Alcohol/week: 1.2 oz     Types: 2 Shots of liquor per week    Drug use: No    Sexual activity: Yes     Partners: Male     Birth control/protection: Condom   Other Topics Concern     Not on file   Social History Narrative    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        OBJECTIVE:     Vital Signs Range (Last 24H):  Temp:  [36.7 °C (98.1 °F)-37.2 °C (99 °F)]   Pulse:  []   Resp:  [23-30]   BP: (103)/(58)   SpO2:  [74 %-100 %]   Arterial Line BP: ()/(44-92)       Significant Labs:  Lab Results   Component Value Date    WBC 35.93 (H) 09/28/2018    HGB 8.9 (L) 09/28/2018    HCT 26.7 (L) 09/28/2018    PLT 71 (L) 09/28/2018    CHOL 142 09/21/2015    TRIG 156 (H) 09/25/2018    HDL 72 09/21/2015    ALT 28 09/28/2018    AST 31 09/28/2018     09/28/2018    K 3.9 09/28/2018     (H) 09/28/2018    CREATININE 0.7 09/28/2018    BUN 25 (H) 09/28/2018    CO2 20 (L) 09/28/2018    TSH 0.986 09/21/2015    PSA 0.5 01/26/2004    INR 1.5 (H) 09/28/2018    HGBA1C 5.3 12/06/2010       Diagnostic Studies:   CT Abdomen Pelvis  Without Contrast  No evidence of intra-abdominal abscess.    Worsening pulmonary infectious/inflammatory process as detailed above, noting multifocal new cavitary lesions.    Pleural effusions have improved.    Scattered loops of mildly dilated small bowel, possibly representing ileus.    EKG:   Vent. Rate : 102 BPM     Atrial Rate : 102 BPM     P-R Int : 128 ms          QRS Dur : 078 ms      QT Int : 378 ms       P-R-T Axes : 089 088 -14 degrees     QTc Int : 492 ms    Sinus tachycardia  T wave abnormality, consider anterior ischemia  Abnormal ECG  When compared with ECG of 21-SEP-2018 00:45,  No significant change was found  Confirmed by Xu Tran MD ([id number]) on 9/25/2018 12:02:57 PM    2D ECHO:  Results for orders placed or performed during the hospital encounter of 09/12/18   2D echo with color flow doppler   Result Value Ref Range    EF 70 55 - 65    Est. PA Systolic Pressure 50.77 (A)     Pericardial Effusion SMALL (A)     Mitral Valve Mobility NORMAL     Tricuspid Valve Regurgitation MILD TO  MODERATE      ASSESSMENT/PLAN:       Anesthesia Evaluation    I have reviewed the Patient Summary Reports.     I have reviewed the Medications.     Review of Systems  Anesthesia Hx:  No problems with previous Anesthesia  History of prior surgery of interest to airway management or planning: tracheostomy, gastric bypass. Denies Family Hx of Anesthesia complications.   Denies Personal Hx of Anesthesia complications.   Social:  Smoker    Hematology/Oncology:     Oncology Normal    -- Anemia (Blood loss anemia):   Cardiovascular:   Hypertension, well controlled    Pulmonary:   COPD, moderate S/p trach   Hepatic/GI:   Denies PUD. Denies GERD.    Neurological:  Neurology Normal    Endocrine:  Endocrine Normal        Physical Exam  General:  Malnutrition    Airway/Jaw/Neck:  Airway Findings: Mouth Opening: Normal Tongue: Normal  Pre-Existing Airway Tube(s): Tracheostomy tube  General Airway Assessment: Adult  Mallampati: II  TM Distance: Normal, at least 6 cm  Jaw/Neck Findings:     Neck ROM: Normal ROM  Neck Findings:  Tracheal Stoma      Dental:  Dental Findings:    Chest/Lungs:  Chest/Lungs Findings: Decreased Breath Sounds Bilateral  Chest tube in place right side     Heart/Vascular:  Heart Findings: Rate: Normal  Rhythm: Regular Rhythm  Sounds: Normal        Mental Status:  Mental Status Findings:  Combative         Anesthesia Plan  Type of Anesthesia, risks & benefits discussed:  Anesthesia Type:  general  Patient's Preference:   Intra-op Monitoring Plan: standard ASA monitors and arterial line  Intra-op Monitoring Plan Comments:   Post Op Pain Control Plan: multimodal analgesia, IV/PO Opioids PRN and per primary service following discharge from PACU  Post Op Pain Control Plan Comments:   Induction:   IV  Beta Blocker:  Patient is not currently on a Beta-Blocker (No further documentation required).       Informed Consent: Patient representative understands risks and agrees with Anesthesia plan.  Questions answered.  Anesthesia consent signed with patient representative.  ASA Score: 4     Day of Surgery Review of History & Physical:    H&P update referred to the surgeon.         Ready For Surgery From Anesthesia Perspective.

## 2018-09-28 NOTE — PROGRESS NOTES
At beginning of shift 550cc of residual noted. Patient had gotten oral contrast earlier in the day for CT. Dr. Emerson notified. Per MD ok to discard reisuals and decrease Tube feed rate to 10cc/hr for now and increase later if patient tolerates it. Also ntofied of redness and wamth to Left thumb and wrist. Dr. Emerson came to bedside to assess. Per MD hold nitro paste for now. Patient had not voided since mcneill removal at 2pm, bladder scan was performed showing 400cc of urine. MD notified. Instructed to place mcneill if patient had not voided by 10pm. Unable to place mcneill due to penile edema. Urology consulted and came to bedside and placed mcneill. Will continue to monitor.

## 2018-09-28 NOTE — PROGRESS NOTES
[facility]  Critical Care - Surgery  Progress Note    Patient Name: Amari Saez  MRN: [id number]  Admission Date: 9/12/2018  Hospital Length of Stay: 16 days  Code Status: Full Code  Attending Provider: Judd Agudelo MD  Primary Care Provider: Chaim Valdovinos MD   Principal Problem: Perforated diverticulum of large intestine    Subjective:     Hospital/ICU Course:  Admitted with perforated diverticulitis (free intraperitoneal air on admission CXR). S/p emergent Tash procedure on 9/12. On 9/14, a rapid response was called after pt became hypoxic, tachycardic, and tachypneic on the floor. He was placed on a venti mask however pt further decompensated and became pulseless. Code blue was called where pt was intubated, chest compressions were performed, and epi was given x2 where ROSC was attained. Pt was subsequently transferred to SICU for higher level of care. On 9/15, Hgb went from 7.8 to 4.3, then 3.9 on repeat. Pt was taken emergently to OR for ex-lap with intraop endoscopic control of bleeding with hemostatic clip in remnant stomach. He was given 3u pRBCs, 2U FFP prior to/in OR. Patient was extubated on 9/17, but had to be re-intubated on the same day due to significant respiratory failure. CXR with bilateral infiltrates (R>L). Patient was intermittently on pressors. Left radial arterial line placed on 09/14/18 and removed 09/20/18 due to malfunction. Vascular surgery consulted for left thumb cyanosis and signs of ischemia. Patient was started on heparin gtt, then transitioned to therapeutic enoxaparin. Respiratory cultures growing E. Coli. On 9/22, bronchoscopy was done and BAL cultures sent. Patient had episodes of hypotension requiring volume resuscitation with good response (500cc albumin, ~6L crystalloids, and 2u PRBC). On 9/24, he acutely decompensated and required an increase in vent settings. CXR done at that time showed large R tension pneumothorax requiring emergent chest tube.  Vitals quickly normalized after chest tube was placed. BAL cultures resulted 9/24 and grew E. Coli , candida, and stenotrophomonas maltophilia. Trach placed on 9/25.    Interval History/Significant Events: NAEON. Patient agitated overnight and refusing physical exam this morning; but agreed eventually. UOP borderline overnight.    LAPAROTOMY,EXPLORATORY (N/A Abdomen)   COLECTOMY,SIGMOID (Abdomen)   COLOSTOMY (Abdomen)  (Tash's)  9/12    LAPAROTOMY,EXPLORATORY (N/A)  GASTROSTOMY (N/A)  EGD (ESOPHAGOGASTRODUODENOSCOPY) (N/A)  9/15    Tracheostomy  9/25    Objective:     Vital Signs (Most Recent):  Temp: 98.1 °F (36.7 °C) (09/28/18 0700)  Pulse: 91 (09/28/18 0800)  Resp: (!) 30 (09/28/18 0730)  BP: (!) 103/58 (09/28/18 0700)  SpO2: (!) 94 % (09/28/18 0800) Vital Signs (24h Range):  Temp:  [98.1 °F (36.7 °C)-99 °F (37.2 °C)] 98.1 °F (36.7 °C)  Pulse:  [] 91  Resp:  [23-30] 30  SpO2:  [74 %-100 %] 94 %  BP: (103)/(58) 103/58  Arterial Line BP: ()/(44-92) 110/57     Weight: 72.3 kg (159 lb 6.3 oz)  Body mass index is 25.73 kg/m².      Intake/Output Summary (Last 24 hours) at 9/28/2018 0824  Last data filed at 9/28/2018 0700  Gross per 24 hour   Intake 2799 ml   Output 2830 ml   Net -31 ml       Physical Exam   Constitutional: He appears well-developed. He is sedated.   HENT:   Head: Normocephalic and atraumatic.   Trach in place   Eyes:   Pupils are unequal (L>R), but both reactive to light   Neck: Neck supple.   Cardiovascular: Normal rate, regular rhythm and intact distal pulses.   No murmur heard.  Pulmonary/Chest: He has rales. He exhibits no tenderness.   Abdominal: Soft. He exhibits no distension.   Musculoskeletal: He exhibits edema.   Neurological:   Moves all extremities  Withdraws to pain   Skin:   R thumb with distal ischemic changes.   Blisters present on ventral R wrist    Psychiatric:   Anxious/agitated appearing   Nursing note and vitals reviewed.      Vents:  Vent Mode: Spont (09/28/18  0730)  Ventilator Initiated: Yes (09/14/18 2114)  Set Rate: 28 bmp (09/26/18 1621)  Vt Set: 350 mL (09/26/18 1621)  Pressure Support: 10 cmH20 (09/22/18 0544)  PEEP/CPAP: 5 cmH20 (09/28/18 0730)  Oxygen Concentration (%): 60 (09/28/18 0800)  Peak Airway Pressure: 31 cmH2O (09/28/18 0730)  Plateau Pressure: 0 cmH20 (09/28/18 0730)  Total Ve: 17.9 mL (09/28/18 0730)  Negative Inspiratory Force (cm H2O): 10 (09/23/18 0335)  F/VT Ratio<105 (RSBI): (!) 48.61 (09/27/18 1559)    Lines/Drains/Airways     Central Venous Catheter Line                 Percutaneous Central Line Insertion/Assessment - triple lumen  09/22/18 1720 right internal jugular 5 days          Drain                 Colostomy 09/12/18 LLQ 16 days         Gastrostomy/Enterostomy 09/15/18 1621 Gastrostomy tube w/o balloon LUQ decompression 12 days         Open Drain 09/22/18 Anterior;Right;Ventral Groin Other (see comments) 6 days         Chest Tube 09/25/18 0800 1 Right Pleural 3 days         Urethral Catheter 09/27/18 2330 Latex less than 1 day          Airway                 Surgical Airway 09/25/18 1330 Shiley Cuffed 2 days          Arterial Line                 Arterial Line 09/22/18 1340 Left Femoral 5 days          Pressure Ulcer                 Pressure Injury 09/13/18 1534 lower Coccyx Stage 3 14 days          Peripheral Intravenous Line                 Midline Catheter Insertion/Assessment  - Single Lumen 09/13/18 1748 Right brachial vein 20g x 10cm 14 days                Significant Labs:    CBC/Anemia Profile:  Recent Labs   Lab  09/26/18   1600  09/27/18   0333  09/28/18   0414   WBC  18.36*  20.79*  35.93*   HGB  9.3*  9.1*  8.9*   HCT  27.7*  27.6*  26.7*   PLT  90*  76*  71*   MCV  90  92  90   RDW  16.9*  17.1*  17.0*        Chemistries:  Recent Labs   Lab  09/26/18   1000  09/27/18   0333  09/28/18   0414   NA  137  137  137   K  3.6  4.1  3.9   CL  112*  113*  111*   CO2  20*  20*  20*   BUN  23  22  25*   CREATININE  0.7  0.6  0.7    CALCIUM  7.5*  7.6*  8.5*   ALBUMIN   --   1.2*  1.1*   PROT   --   3.8*  4.0*   BILITOT   --   0.6  0.5   ALKPHOS   --   129  144*   ALT   --   24  28   AST   --   28  31   MG   --   1.8  1.6   PHOS   --   2.4*  2.4*       All pertinent labs within the past 24 hours have been reviewed.      Significant Imaging:  I have reviewed and interpreted all pertinent imaging results/findings within the past 24 hours.    Assessment/Plan:     * Perforated diverticulum of large intestine    Neuro:  - Sedation: Precedex gtt  - Pain: fentanyl PRN    Pulm:  - s/p tracheostomy on 9/25  - Tolerating PAV+ with 70% support and PEEP 5  - s/p chest tube placement s/p large R tension pneumothorax 9/24  - CT to suction, air leak present  - Daily CXR  - Daily SBT   - Trend ABG:  Recent Labs   Lab  09/28/18   0416   PH  7.487*   PCO2  28.2*   PO2  83   HCO3  21.3*   POCSATURATED  97   BE  -2     CV:  - HDS; no pressor requirement  - Requiring IV fluid boluses due to hypovolemia    GI/FEN/Lytes:  - On TPN + trickle tube feeds through G tube   - Trend CMP  - Replace lytes PRN    Renal:  - BUN/Cr stable at 25/0.7  - Monitor UOP (650cc over 24 hrs)     Heme:  - Hgb stable at 8.9  - Therapeutic enoxaparin started for right hand ischemia, but held 9/26 secondary to trach oozing  - Restarted ppx enoxaparin on 9/27  - Restarted Therapeutic enoxaparin today    ID:  - Afebrile, WBC 36 (trending up from 21 yesterday and 18.4 the day before)  - Blood Cx NGTD  - CT Chest/Abdomen w/ contrast 9/20: bilateral lung consolidation noted concerning for infection or an aspiration   - Recent BAL (9/22) E. Coli, madison, stenotrophomonas maltophilia   - Abx: ceftriaxone for VAP with E. Coli, fluconazole for candida in peritoneal fluid   - Appreciate ID input   - Repeat CT chest on 9/27, per ID, for concern of empyema secondary to purulent chest tube output; it showed multifocal new cavitary leasions     Endo:  - Monitor for hypoglycemia  - Blood glucose better  with TPN       PPx:   - PPI  - Enoxaparin       Dispo:   - Cont SICU care            Critical care was time spent personally by me on the following activities: development of treatment plan with patient or surrogate and bedside caregivers, discussions with consultants, evaluation of patient's response to treatment, examination of patient, ordering and performing treatments and interventions, ordering and review of laboratory studies, ordering and review of radiographic studies, pulse oximetry, re-evaluation of patient's condition.  This critical care time did not overlap with that of any other provider or involve time for any procedures.         Tyesha Ellis MD  Surgical ICU  [facility]  SpectraLink # [id number]  Pager # [phone]

## 2018-09-28 NOTE — PROGRESS NOTES
Urology Progress Note    Called to bedside due to difficulty placing mcneill catheter due to edema and difficulty retracting foreskin. Mcneill removed around 1pm today and patient had not voided since. Around 500cc on bladder scan. Edema reduced and 16 Fr Mcneill catheter placed, 10cc placed in balloon.    ~500cc of clear yellow urine drained    Would recommend leaving catheter for several days to allow bladder to decompress and then attempting voiding trial per primary team. Possibly add tamsulosin when tolerating PO.    Please do not perform voiding trials in the afternoon in the future if possible.    Isaias Erazo MD  Urology Pgy-2  [phone]

## 2018-09-28 NOTE — ASSESSMENT & PLAN NOTE
Neuro:  - Sedation: Precedex gtt  - Pain: fentanyl PRN    Pulm:  - s/p tracheostomy on 9/25  - Tolerating PAV+ with 70% support and PEEP 5  - s/p chest tube placement s/p large R tension pneumothorax 9/24  - CT to suction, air leak present  - Daily CXR  - Daily SBT   - Trend ABG:  Recent Labs   Lab  09/28/18   0416   PH  7.487*   PCO2  28.2*   PO2  83   HCO3  21.3*   POCSATURATED  97   BE  -2     CV:  - HDS; no pressor requirement  - Requiring IV fluid boluses due to hypovolemia    GI/FEN/Lytes:  - On TPN + trickle tube feeds through G tube   - Trend CMP  - Replace lytes PRN    Renal:  - BUN/Cr stable at 25/0.7  - Monitor UOP (650cc over 24 hrs)     Heme:  - Hgb stable at 8.9  - Therapeutic enoxaparin started for right hand ischemia, but held 9/26 secondary to trach oozing  - Restarted ppx enoxaparin on 9/27     ID:  - Afebrile, WBC 36 (trending up from 21 yesterday and 18.4 the day before)  - Blood Cx NGTD  - CT Chest/Abdomen w/ contrast 9/20: bilateral lung consolidation noted concerning for infection or an aspiration   - Recent BAL (9/22) E. Coli, madison, stenotrophomonas maltophilia   - Abx: ceftriaxone for VAP with E. Coli, fluconazole for candida in peritoneal fluid   - Appreciate ID input   - Repeat CT chest on 9/27, per ID, for concern of empyema secondary to purulent chest tube output; it showed multifocal new cavitary leasions     Endo:  - Monitor for hypoglycemia  - Blood glucose better with TPN       PPx:   - PPI  - Enoxaparin       Dispo:   - Cont SICU care

## 2018-09-28 NOTE — SUBJECTIVE & OBJECTIVE
Interval History/Significant Events: NAEON. Patient agitated overnight and refusing physical exam this morning; but agreed eventually. UOP borderline overnight.    LAPAROTOMY,EXPLORATORY (N/A Abdomen)   COLECTOMY,SIGMOID (Abdomen)   COLOSTOMY (Abdomen)  (Tash's)  9/12    LAPAROTOMY,EXPLORATORY (N/A)  GASTROSTOMY (N/A)  EGD (ESOPHAGOGASTRODUODENOSCOPY) (N/A)  9/15    Tracheostomy  9/25    Objective:     Vital Signs (Most Recent):  Temp: 98.1 °F (36.7 °C) (09/28/18 0700)  Pulse: 91 (09/28/18 0800)  Resp: (!) 30 (09/28/18 0730)  BP: (!) 103/58 (09/28/18 0700)  SpO2: (!) 94 % (09/28/18 0800) Vital Signs (24h Range):  Temp:  [98.1 °F (36.7 °C)-99 °F (37.2 °C)] 98.1 °F (36.7 °C)  Pulse:  [] 91  Resp:  [23-30] 30  SpO2:  [74 %-100 %] 94 %  BP: (103)/(58) 103/58  Arterial Line BP: ()/(44-92) 110/57     Weight: 72.3 kg (159 lb 6.3 oz)  Body mass index is 25.73 kg/m².      Intake/Output Summary (Last 24 hours) at 9/28/2018 0824  Last data filed at 9/28/2018 0700  Gross per 24 hour   Intake 2799 ml   Output 2830 ml   Net -31 ml       Physical Exam   Constitutional: He appears well-developed. He is sedated.   HENT:   Head: Normocephalic and atraumatic.   Trach in place   Eyes:   Pupils are unequal (L>R), but both reactive to light   Neck: Neck supple.   Cardiovascular: Normal rate, regular rhythm and intact distal pulses.   No murmur heard.  Pulmonary/Chest: He has rales. He exhibits no tenderness.   Abdominal: Soft. He exhibits no distension.   Musculoskeletal: He exhibits edema.   Neurological:   Moves all extremities  Withdraws to pain   Skin:   R thumb with distal ischemic changes.   Blisters present on ventral R wrist    Psychiatric:   Anxious/agitated appearing   Nursing note and vitals reviewed.      Vents:  Vent Mode: Spont (09/28/18 0730)  Ventilator Initiated: Yes (09/14/18 2114)  Set Rate: 28 bmp (09/26/18 1621)  Vt Set: 350 mL (09/26/18 1621)  Pressure Support: 10 cmH20 (09/22/18 0544)  PEEP/CPAP: 5  cmH20 (09/28/18 0730)  Oxygen Concentration (%): 60 (09/28/18 0800)  Peak Airway Pressure: 31 cmH2O (09/28/18 0730)  Plateau Pressure: 0 cmH20 (09/28/18 0730)  Total Ve: 17.9 mL (09/28/18 0730)  Negative Inspiratory Force (cm H2O): 10 (09/23/18 0335)  F/VT Ratio<105 (RSBI): (!) 48.61 (09/27/18 1559)    Lines/Drains/Airways     Central Venous Catheter Line                 Percutaneous Central Line Insertion/Assessment - triple lumen  09/22/18 1720 right internal jugular 5 days          Drain                 Colostomy 09/12/18 LLQ 16 days         Gastrostomy/Enterostomy 09/15/18 1621 Gastrostomy tube w/o balloon LUQ decompression 12 days         Open Drain 09/22/18 Anterior;Right;Ventral Groin Other (see comments) 6 days         Chest Tube 09/25/18 0800 1 Right Pleural 3 days         Urethral Catheter 09/27/18 2330 Latex less than 1 day          Airway                 Surgical Airway 09/25/18 1330 Shiley Cuffed 2 days          Arterial Line                 Arterial Line 09/22/18 1340 Left Femoral 5 days          Pressure Ulcer                 Pressure Injury 09/13/18 1534 lower Coccyx Stage 3 14 days          Peripheral Intravenous Line                 Midline Catheter Insertion/Assessment  - Single Lumen 09/13/18 1748 Right brachial vein 20g x 10cm 14 days                Significant Labs:    CBC/Anemia Profile:  Recent Labs   Lab  09/26/18   1600  09/27/18   0333  09/28/18   0414   WBC  18.36*  20.79*  35.93*   HGB  9.3*  9.1*  8.9*   HCT  27.7*  27.6*  26.7*   PLT  90*  76*  71*   MCV  90  92  90   RDW  16.9*  17.1*  17.0*        Chemistries:  Recent Labs   Lab  09/26/18   1000  09/27/18   0333  09/28/18   0414   NA  137  137  137   K  3.6  4.1  3.9   CL  112*  113*  111*   CO2  20*  20*  20*   BUN  23  22  25*   CREATININE  0.7  0.6  0.7   CALCIUM  7.5*  7.6*  8.5*   ALBUMIN   --   1.2*  1.1*   PROT   --   3.8*  4.0*   BILITOT   --   0.6  0.5   ALKPHOS   --   129  144*   ALT   --   24  28   AST   --   28  31   MG    --   1.8  1.6   PHOS   --   2.4*  2.4*       All pertinent labs within the past 24 hours have been reviewed.      Significant Imaging:  I have reviewed and interpreted all pertinent imaging results/findings within the past 24 hours.

## 2018-09-28 NOTE — PROGRESS NOTES
" [facility]  Adult Nutrition  Progress Note    SUMMARY       Recommendations  Recommendation/Intervention:   1. Continue current TPN w/lipids - meeting needs.   2. If transition back to TF desired once J-tube placed, recommend resuming Isosource 1.5 at 45 mL/hr.   RD to monitor.    Goals: Pt to receive >85% EEN and EPN.  Nutrition Goal Status: goal met  Communication of RD Recs: reviewed with RN    Reason for Assessment  Reason for Assessment: RD follow-up  Diagnosis: gastrointestinal disease, surgery/postoperative complications(perforated diverticulum)  Relevant Medical History: HTN, gastric bypass (2003), tobacco abuse  General Information Comments: Patient still trach/vent. On TPN + lipids. Trickle TF turned off. (NFPE completed, noted now with some moderate muscle wasting of temples and shoulders. However patient does not meet other criteria for malnutrition. Stable weight and has been receiving > 85% EEN from TPN. RD to continue to monitor for indicators of malnutrition.)  Nutrition Discharge Planning: unable to determine at this time    Nutrition Risk Screen  Nutrition Risk Screen: no indicators present    Nutrition/Diet History  Do you have any cultural, spiritual, Jew conflicts, given your current situation?: none noted  Factors Affecting Nutritional Intake: altered gastrointestinal function, NPO, on mechanical ventilation    Anthropometrics  Temp: 98.1 °F (36.7 °C)  Height Method: Estimated  Height: 5' 6" (167.6 cm)  Height (inches): 66 in  Weight Method: Bed Scale  Weight: 72.3 kg (159 lb 6.3 oz)  Weight (lb): 159.39 lb  Ideal Body Weight (IBW), Male: 142 lb  % Ideal Body Weight, Male (lb): 111.01 lb  BMI (Calculated): 25.5  BMI Grade: 25 - 29.9 - overweight    Lab/Procedures/Meds  Pertinent Labs Reviewed: reviewed  Pertinent Labs Comments: Cl 111, BUN 25, Glu 138, Ca 8.5, Phos 2.4, Alb 1.1  Pertinent Medications Reviewed: reviewed  Pertinent Medications Comments: pantoprazole, " precedex    Physical Findings/Assessment  Overall Physical Appearance: on ventilator support, loss of muscle mass  Tubes: gastrostomy tube, other (see comments)(colostomy)  Oral/Mouth Cavity: tooth/teeth missing  Skin: edema, incision(s), pressure ulcer(s)(stage 3)    Estimated/Assessed Needs  Weight Used For Calorie Calculations: 59.9 kg (132 lb 0.9 oz)(dosing weight)  Energy Calorie Requirements (kcal): 1701 kcal/day  Energy Need Method: [facility]  Protein Requirements: 72-90 g/day(1.2-1.5 g/kg)  Weight Used For Protein Calculations: 59.9 kg (132 lb 0.9 oz)(dosing weight)  Fluid Requirements (mL): 1mL/kcal or per MD  Fluid Need Method: RDA Method  RDA Method (mL): 1701    Nutrition Prescription Ordered  Current Diet Order: NPO  Nutrition Order Comments: TF stopped  Current Nutrition Support Formula Ordered: (Custom TPN 7.5% AA/16% Dextrose + lipids)  Current Nutrition Support Rate Ordered: 50 (ml)  Current Nutrition Support Frequency Ordered: mL/hr    Evaluation of Received Nutrient/Fluid Intake  Parenteral Calories (kcal): 1013  Parenteral Protein (gm): 90  Parenteral Fluid (mL): 1200  Lipid Calories (kcals): 500 kcals  GIR (Glucose Infusion Rate) (mg/kg/min): 2.23 mg/kg/min  Total Calories (kcal): 1513  % Kcal Needs: 89  % Protein Needs: 100  I/O: +17.8L since admit  Energy Calories Required: meeting needs  Protein Required: meeting needs  Fluid Required: (per MD)  Comments: +ostomy output  Tolerance: (to start tonight)  % Intake of Estimated Energy Needs: 75 - 100 %  % Meal Intake: NPO    Nutrition Risk  Level of Risk/Frequency of Follow-up: high(2x/week)     Assessment and Plan  Nutrition Problem  Inadequate energy intake     Related to (etiology):   Nutrition support provision     Signs and Symptoms (as evidenced by):   Pt receiving <85% EEN and EPN     Nutrition Diagnosis Status:   Resolved    Monitor and Evaluation  Food and Nutrient Intake: energy intake, parenteral nutrition intake  Food and Nutrient  Adminstration: enteral and parenteral nutrition administration  Anthropometric Measurements: weight, weight change, body mass index  Biochemical Data, Medical Tests and Procedures: electrolyte and renal panel, gastrointestinal profile, glucose/endocrine profile, lipid profile, inflammatory profile  Nutrition-Focused Physical Findings: overall appearance     Nutrition Follow-Up  RD Follow-up?: Yes

## 2018-09-28 NOTE — PLAN OF CARE
Problem: Patient Care Overview  Goal: Plan of Care Review  Outcome: Ongoing (interventions implemented as appropriate)  Patient on ventilator with spontaneous setting 60% o2, 5 of PEEP. All other VSS at this time. Patient denies pain through shift. Patient still unable to perform neuro exam on, remains hostile and combative. MDs notified of leaking at G-tube site. Tube feeds held until 2200 for increasing residuals, to be resumed at that point at 8cc/hr. Urinary output 20s-30s, MDs notified. Patient on 50gtt TPN, 1.4mcg/kg/m precedex. All questions answered by RN, will continue to monitor through shift.

## 2018-09-29 PROBLEM — J95.812: Status: ACTIVE | Noted: 2018-01-01

## 2018-09-29 NOTE — ASSESSMENT & PLAN NOTE
63yo male with persistent air leak from chest tube.  Chest tube is not fully in the chest.  We will replace it.

## 2018-09-29 NOTE — PROGRESS NOTES
Wound care follow up. Patient with dark slough stoma slightly retracted below skin level. Area cleansed and new appliance applied. No family to bedside for teaching.   Called to Dr Cornejo to discuss dark stoma. MD states they are aware and he is scheduled for a revision Monday. Also made aware of increase size and pressure ulcer and ordering of air mattress.    There is an unstagable pressure ulcer to the sacral area. Patient is unable to turn without assistance. He appears confused and not wanting to turn. We were able to turn patient to assess and turn patient to the right side.   Will order immerse mattress for MONICA and pressure relief.   Recommend:  Triad paste BID to the sacral area         09/29/18 1110       Colostomy 09/12/18 LLQ   Placement Date: 09/12/18   Present Prior to Hospital Arrival?: No  Inserted by: MD  Location: LLQ   Wound Image    Stomal Appliance 1 piece;Changed   Stoma Appearance dark;retracted below skin level;black;oval;necrotic   Stoma Size (in) 40mm   Site Assessment Intact   Peristomal Assessment Intact   Accessories/Skin Care cleansed w/ water   Stoma Function stool   Treatment Bag change   Tolerance no signs/symptoms of discomfort   Output (mL) 100 mL       Pressure Injury 09/13/18 1534 lower Coccyx Unstageable   Date First Assessed/Time First Assessed: 09/13/18 1534   Pressure Injury Present on Admission: yes  Orientation: lower  Location: Coccyx  Staging: Unstageable   Wound Image    Wound Length (cm) 4 cm   Wound Width (cm) 3 cm   Wound Depth (cm) 0.2 cm   Wound Volume (cm^3) 2.4 cm^3   Care Cleansed with:;Other (see comments)  (wipes/ triad applied)   Dressing Other (see comments)  (triad)   Dressing Change Due 09/29/18

## 2018-09-29 NOTE — PLAN OF CARE
Problem: Patient Care Overview  Goal: Individualization & Mutuality  Dx: s/p code    Hx: HTN, gastric bypass (2003), cataracts, fall, anxiety, smoker    Hospital Course:  9/12: ED for SOB x months, LE weakness. Class A to OR for ex lap - Tash procedure for perforated diverticulitis  9/13: GISSU  9/14:  Admit to SICU S/p code, ju and central line placed, blood cultures x2, urine cultures, 5L NS and 1L albumin, Levo started  9/15: Vaso started. LR infusion started. 250 mL albumin given. Protonix gtt started. 3 PRBCs, 2 FFP. Class A to OR for ex lap. 1 of platelets and 1 of cryo given. Pressors weaned off.   9/16: extubated and re-intubated  9/19: Tylenol for T=100, HR>120.  9/20: failed SBT, CT chest/abd/pelvis  9/21: Low Spo2 throughout night-inc fio2 to 50%; failed repeat SBT in AM w/ extreme dyspnea. Goal is to repeat SBT 1 more time on Sunday and then plan for trach following Monday to place in LTAC in future.   9/22: Bronch with cultures, art line placed, line exchange. 500ml albumin, 4L LR  9/23: 2 Units PRBCs, 1L LR, Repeat Echo  9/24 PM:large right tension pneumothorax with shift of the mediastinum toward the left Right chest tube place (continuous bubble MD aware)  9/25: chest tube exchanged, trach placed at bedside   9/26: LR 500cc bolus, tracheostomy continuously oozing-> Surgicel       Nursing:   MAP > 60  SBP   Glucose q 4 hr                           Outcome: Ongoing (interventions implemented as appropriate)  Pt remains awake and alert. Follows commands. Vent to trach spontaneous mode 60% PEEP 5. O2 sat >93%. Afebrile. Map > 60 mmHg. CVP 5-7. Chest tube aopprox 30 ml serous drainage c air leak, MDs aware. R midline abd incision c small opening near ostomy, gauze in place. Tube feeds at 10 ml/hr, minimal residuals. Pt tolerating well, denies nausea. Old fem site to R groin c approx 450 ml serous drainage.UO approx 25-30 ml/hr.  Colostomy c thick stool noted. Remains on precedex, tpn, lipids.  Bathed, linen changed, wound care provided. Turned q 2 hours. Restraints in place due to pt pulling at lines during ROM and turning.  Pt and family updated on POC. See chart and doc flowsheet for details.

## 2018-09-29 NOTE — PROGRESS NOTES
Progress Note  Surgery    Admit Date: 9/12/2018  Attending: Jammie  S/P: Procedure(s) (LRB):  LAPAROTOMY,EXPLORATORY (N/A)  GASTROSTOMY (N/A)  EGD (ESOPHAGOGASTRODUODENOSCOPY) (N/A)    Post-operative Day: 14 Days Post-Op    Hospital Day: 18    SUBJECTIVE:     No acute events overnight, remains afebrile. Vent settings stable with Fi at 60%.  Right lung appears fully expanded on CXR, there is still an air leak present. Continues to have ostomy output. TFs running at 10 with residuals. Leukocytosis increased to 41.9 overnight.     OBJECTIVE:     Vital Signs (Most Recent)  Temp: 97.4 °F (36.3 °C) (09/29/18 0700)  Pulse: 77 (09/29/18 0930)  Resp: (!) 25 (09/29/18 0733)  BP: (!) 103/58 (09/28/18 0700)  SpO2: (!) 88 % (09/29/18 0930)    Vital Signs Range (Last 24H):  Temp:  [97.4 °F (36.3 °C)-99.1 °F (37.3 °C)]   Pulse:  []   Resp:  [25-33]   SpO2:  [67 %-100 %]   Arterial Line BP: ()/(47-70)     I & O (Last 24H):    Intake/Output Summary (Last 24 hours) at 9/29/2018 0943  Last data filed at 9/29/2018 0700  Gross per 24 hour   Intake 3661 ml   Output 1972 ml   Net 1689 ml       Physical Exam:  Gen: Intubated, sedated  HEENT: NCAT  Pulm: Mechanical breath sounds, trach in place, dried blood  Abd: Soft.  Ostomy bag in place over serous leakage site from incision.  Otherwise, incision is c/d/i.  Ostomy with stool in bag  Extremities: R groin fem site with ascites.  Ostomy bag in place  Skin: Skin color, texture, turgor normal. No rashes or lesions    Laboratory:  CBC:   Recent Labs   Lab  09/29/18   0348   WBC  41.90*   RBC  2.83*   HGB  8.8*   HCT  25.5*   PLT  48*   MCV  90   MCH  31.1*   MCHC  34.5     CMP:   Recent Labs   Lab  09/29/18   0348   GLU  132*   CALCIUM  8.5*   ALBUMIN  0.9*   PROT  4.1*   NA  139   K  4.3   CO2  20*   CL  112*   BUN  27*   CREATININE  0.6   ALKPHOS  204*   ALT  24   AST  28   BILITOT  0.3     Coagulation:   Recent Labs   Lab  09/25/18   0445   09/29/18   0350   INR  1.5*   < >   1.5*   APTT  43.1*   --    --     < > = values in this interval not displayed.     Labs within the past 24 hours have been reviewed.    ASSESSMENT/PLAN:     Assessment: 62 year old man with perforated diverticulitis and free air s/p Tash's procedure 9/12, ex-lap for bleeding, with intraop endoscopic control of bleeding remnant gastric ulcer 9/15    Plan:  Neuro: Precedex only.  Alert and appearing to understand conversation, attempting to answer by mouthing.      Resp: Maintain chest tube to suction (still has air leak)  CT shows worsening PNA- cavitary lesions in lungs, will discuss with ID if there is a need to further investigate this    Daily CXR  No room to diurese at the moment- but he is net negative on his own  Continue tx of PNA  Repeat BAL    CV: Pulm HTN and right heart failure.  Good LVEF.    HDS     Renal: Cr stable, Making adequate UOP  Ponce for urinary retention    Abd/FEN/GI:Try trickle TF per G tube  Continue TPN in the mean time   Ostomy productive  May need J tube placement in OR next week, likely Monday    Heme/ID:   Continue BID protonix for previous GIB (no evidence of continued bleeding per G tube)  Therapeutic lovenox for Radial artery occlusion   PNA with E coli, candida and stenotrophomonas.  Rocephin and flucanozole as per ID recs.   See if ID thinks we should start treating steno given increased WBC  Will check C. Diff today given persistent increase in WBC   Consider changing out lines if leukocytosis continues to worsen or becomes febrile     Dispo: Continue critical care.      Steffanie Estrella MD  PGY-2 General Surgery   [phone]

## 2018-09-29 NOTE — CONSULTS
[facility]  Thoracic Surgery  Consult Note    Patient Name: Amari Saez  MRN: [id number]  Code Status: Full Code  Admission Date: 9/12/2018  Hospital Length of Stay: 17 days  Consult Requesting Physician: Dr. Agudelo  Consulting Physician: Dr. Lindo  Primary Care Provider: Chaim Valdovinos MD    Inpatient consult to Cardiothoracic Surgery  Consult performed by: William White MD  Consult ordered by: Maureen Estrella MD        Subjective:     Reason for Consult: Persistent air leak    History of Present Illness: This is a 62 year-old gentleman who underwent an exploratory laparotomy for perforated sigmoid colon on 9/13 followed by repeat exploratory laparotomy for bleeding gastric remnant ulcer on 9/15.  He then developed a right tension pneumothorax on 9/24.  He had a pigtail catheter placed on 9/24 followed by upsizing to a 24Fr chest tube on 9/25.  Thoracic surgery is consulted for a persistent air leak.  The chest is sutured at the skin at 4cm and the sentinel hole is outside the chest wall on the most recent CXR.  The chest tube has been minimally in the chest since placement.    No current facility-administered medications on file prior to encounter.      Current Outpatient Medications on File Prior to Encounter   Medication Sig    alprazolam (XANAX) 0.5 MG tablet Take 1 tablet (0.5 mg total) by mouth daily as needed for Anxiety.    blood pressure test kit-medium Kit 1 kit by Misc.(Non-Drug; Combo Route) route once daily.    escitalopram oxalate (LEXAPRO) 10 MG tablet TAKE 1 TABLET BY MOUTH EVERY DAY    lisinopril 10 MG tablet TAKE 1 TABLET BY MOUTH EVERY DAY       Review of patient's allergies indicates:  No Known Allergies    Past Medical History:   Diagnosis Date    Anxiety     Hypertension     Nicotine abuse      Past Surgical History:   Procedure Laterality Date    CARPAL TUNNEL RELEASE Bilateral     COLECTOMY,SIGMOID  9/12/2018    [facility]    COLOSTOMY  9/12/2018    Procedure: COLOSTOMY;  Surgeon: Judd Agudelo MD;  Location: [facility] OR [address] [place] [address];  Service: General;;    COLOSTOMY  9/12/2018    Performed by Judd Agudelo MD at [facility] OR [address] [place] [address]    EGD (ESOPHAGOGASTRODUODENOSCOPY) N/A 9/15/2018    Performed by Jarod Pitt MD at [facility] OR [address] [place] [address]    ESOPHAGOGASTRODUODENOSCOPY N/A 9/15/2018    Procedure: EGD (ESOPHAGOGASTRODUODENOSCOPY);  Surgeon: Jarod Pitt MD;  Location: [facility] OR [address] [place] [address];  Service: General;  Laterality: N/A;    EYE SURGERY      vein occlusion -laser sugery for burst blood vessel     EYE SURGERY      lasix    GASTRIC BYPASS  2004    GASTROSTOMY N/A 9/15/2018    Procedure: GASTROSTOMY;  Surgeon: Jarod Pitt MD;  Location: [facility] OR [address] [place] [address];  Service: General;  Laterality: N/A;  insertion of G-tube    GASTROSTOMY N/A 9/15/2018    Performed by Jarod Pitt MD at [facility] OR [address] [place] [address]    KNEE SURGERY  1980s    arthroscopy    LAPAROTOMY,EXPLORATORY N/A 9/15/2018    Performed by Jarod Pitt MD at [facility] OR [address] [place] [address]    LAPAROTOMY,EXPLORATORY N/A 9/12/2018    Performed by Judd Agudelo MD at [facility] OR [address] [place] [address]     Family History     Problem Relation (Age of Onset)    Alzheimer's disease Sister, Maternal Uncle    Diabetes     Parkinsonism Mother    Stroke Father        Tobacco Use    Smoking status: Current Every Day Smoker     Packs/day: 1.00     Years: 30.00     Pack years: 30.00     Types: Cigarettes   Substance and Sexual Activity    Alcohol use: Yes     Alcohol/week: 1.2 oz     Types: 2 Shots of liquor per week    Drug use: No    Sexual activity: Yes     Partners: Male     Birth control/protection: Condom     Review of Systems   Unable to perform ROS: Intubated     Objective:     Vital Signs (Most Recent):  Temp: 97.5 °F (36.4 °C) (09/29/18 1100)  Pulse: 99 (09/29/18 1345)  Resp: (!) 26 (09/29/18 1116)  BP: (!) 103/58 (09/28/18 0700)  SpO2: (!) 83 % (09/29/18 1345) Vital Signs  (24h Range):  Temp:  [97.4 °F (36.3 °C)-99.1 °F (37.3 °C)] 97.5 °F (36.4 °C)  Pulse:  [] 99  Resp:  [25-33] 26  SpO2:  [67 %-100 %] 83 %  Arterial Line BP: ()/(47-82) 157/73     Weight: 72.3 kg (159 lb 6.3 oz)  Body mass index is 25.73 kg/m².  ECOG Performance Status Grade: 4 - Completely disabled    Intake/Output - Last 3 Shifts       09/27 0700 - 09/28 0659 09/28 0700 - 09/29 0659 09/29 0700 - 09/30 0659    I.V. (mL/kg) 1142 (15.8) 1351 (18.7)     Other       NG/ 150 70    IV Piggyback       TPN 1407 2170     Total Intake(mL/kg) 2789 (38.6) 3671 (50.8) 70 (1)    Urine (mL/kg/hr) 735 (0.4) 657 (0.4) 255 (0.5)    Drains 2000 1200     Stool  50 110    Chest Tube 125 70 20    Total Output 2860 1977 385    Net -71 +1694 -315                 SpO2: (!) 83 %  O2 Device (Oxygen Therapy): ventilator    Physical Exam   Constitutional: No distress.   HENT:   Head: Normocephalic and atraumatic.   Cardiovascular: Normal rate.   Pulmonary/Chest: Effort normal.   Air leak from chest tube, which does decrease with placing tube to water seal and with attempting tube placement adjustment.  Air is likely being introduced into the system from the skin into the sentinel hole.    Tracheostomy in place   Neurological: He is alert.   Skin: Skin is warm and dry.   Psychiatric:   sedated       Significant Labs:  ABGs:   Recent Labs   Lab  09/29/18   0354   PH  7.477*   PCO2  33.3*   PO2  67*   HCO3  24.6   POCSATURATED  94*   BE  1     Amylase: No results for input(s): AMYLASE in the last 48 hours.  BMP:   Recent Labs   Lab  09/29/18   0348   GLU  132*   NA  139   K  4.3   CL  112*   CO2  20*   BUN  27*   CREATININE  0.6   CALCIUM  8.5*   MG  1.7     Cardiac markers: No results for input(s): CKMB, CPKMB, TROPONINT, TROPONINI, MYOGLOBIN in the last 48 hours.  CBC:   Recent Labs   Lab  09/29/18   0348   WBC  41.90*   RBC  2.83*   HGB  8.8*   HCT  25.5*   PLT  48*   MCV  90   MCH  31.1*   MCHC  34.5     CMP:   Recent Labs    Lab  09/29/18   0348   GLU  132*   CALCIUM  8.5*   ALBUMIN  0.9*   PROT  4.1*   NA  139   K  4.3   CO2  20*   CL  112*   BUN  27*   CREATININE  0.6   ALKPHOS  204*   ALT  24   AST  28   BILITOT  0.3     Coagulation:   Recent Labs   Lab  09/29/18   0350   INR  1.5*     Lactic Acid:   Recent Labs   Lab  09/29/18   0348   LACTATE  1.6     LFTs:   Recent Labs   Lab  09/29/18   0348   ALT  24   AST  28   ALKPHOS  204*   BILITOT  0.3   PROT  4.1*   ALBUMIN  0.9*     Lipase: No results for input(s): LIPASE in the last 48 hours.    Significant Diagnostics:  I have reviewed and interpreted all pertinent imaging results/findings within the past 24 hours.  CT chest with cavitary lesions some of which were present on CT scan from 9/20.  Chest tube is about to be out of chest on CT scan and sentinel hole is outside of chest wall on today's CXR.    VTE Risk Mitigation (From admission, onward)        Ordered     IP VTE HIGH RISK PATIENT  Once      09/12/18 1817        Assessment/Plan:     Postprocedural air leak    63yo male with persistent air leak from chest tube.  Chest tube is not fully in the chest.  We will replace it.          William White MD  Thoracic Surgery Resident, PGY7  General Thoracic Surgery  [facility]

## 2018-09-29 NOTE — PROGRESS NOTES
[facility]  Critical Care - Surgery  Progress Note    Patient Name: Amari Saez  MRN: [id number]  Admission Date: 9/12/2018  Hospital Length of Stay: 17 days  Code Status: Full Code  Attending Provider: Judd Agudelo MD  Primary Care Provider: Chaim Valdovinos MD   Principal Problem: Perforated diverticulum of large intestine    Subjective:     Hospital/ICU Course:  Admitted with perforated diverticulitis (free intraperitoneal air on admission CXR). S/p emergent Tash procedure on 9/12. On 9/14, a rapid response was called after pt became hypoxic, tachycardic, and tachypneic on the floor. He was placed on a venti mask however pt further decompensated and became pulseless. Code blue was called where pt was intubated, chest compressions were performed, and epi was given x2 where ROSC was attained. Pt was subsequently transferred to SICU for higher level of care. On 9/15, Hgb went from 7.8 to 4.3, then 3.9 on repeat. Pt was taken emergently to OR for ex-lap with intraop endoscopic control of bleeding with hemostatic clip in remnant stomach. He was given 3u pRBCs, 2U FFP prior to/in OR. Patient was extubated on 9/17, but had to be re-intubated on the same day due to significant respiratory failure. CXR with bilateral infiltrates (R>L). Patient was intermittently on pressors. Left radial arterial line placed on 09/14/18 and removed 09/20/18 due to malfunction. Vascular surgery consulted for left thumb cyanosis and signs of ischemia. Patient was started on heparin gtt, then transitioned to therapeutic enoxaparin. Respiratory cultures growing E. Coli. On 9/22, bronchoscopy was done and BAL cultures sent. Patient had episodes of hypotension requiring volume resuscitation with good response (500cc albumin, ~6L crystalloids, and 2u PRBC). On 9/24, he acutely decompensated and required an increase in vent settings. CXR done at that time showed large R tension pneumothorax requiring emergent chest tube.  Vitals quickly normalized after chest tube was placed. BAL cultures resulted 9/24 and grew E. Coli , candida, and stenotrophomonas maltophilia. Trach placed on 9/25. Patient required precedex gtt for agitation; improved with adding quetiapine.    Interval History/Significant Events: NAEON. Patient is pleasant this morning. Agitation and delirium improved significantly after adding quetiapine. UOP acceptable. Stable on PAV+ with trach.      LAPAROTOMY,EXPLORATORY (N/A Abdomen)   COLECTOMY,SIGMOID (Abdomen)   COLOSTOMY (Abdomen)  (Tash's)  9/12    LAPAROTOMY,EXPLORATORY (N/A)  GASTROSTOMY (N/A)  EGD (ESOPHAGOGASTRODUODENOSCOPY) (N/A)  9/15    Tracheostomy  9/25    Objective:     Vital Signs (Most Recent):  Temp: 97.4 °F (36.3 °C) (09/29/18 0700)  Pulse: 79 (09/29/18 0745)  Resp: (!) 25 (09/29/18 0733)  BP: (!) 103/58 (09/28/18 0700)  SpO2: (!) 92 % (09/29/18 0745) Vital Signs (24h Range):  Temp:  [97.4 °F (36.3 °C)-99.1 °F (37.3 °C)] 97.4 °F (36.3 °C)  Pulse:  [] 79  Resp:  [25-33] 25  SpO2:  [67 %-100 %] 92 %  Arterial Line BP: ()/(47-70) 95/50     Weight: 72.3 kg (159 lb 6.3 oz)  Body mass index is 25.73 kg/m².      Intake/Output Summary (Last 24 hours) at 9/29/2018 0838  Last data filed at 9/29/2018 0700  Gross per 24 hour   Intake 3661 ml   Output 1992 ml   Net 1669 ml       Physical Exam   Constitutional: He appears well-developed. No distress.   HENT:   Head: Normocephalic and atraumatic.   Trach in place   Eyes:   Pupils are unequal (L>R), but both reactive to light   Neck: Neck supple.   Cardiovascular: Normal rate, regular rhythm and intact distal pulses.   No murmur heard.  Pulmonary/Chest: Effort normal. He has wheezes. He exhibits no tenderness.   Abdominal: Soft. He exhibits no distension.   Musculoskeletal: He exhibits edema.   Neurological:   Moves all extremities  Withdraws to pain   Skin: Skin is warm.   R thumb with distal ischemic changes.   Blisters present on ventral R wrist     Psychiatric: He has a normal mood and affect.   Nursing note and vitals reviewed.      Vents:  Vent Mode: Spont (09/29/18 0734)  Ventilator Initiated: Yes (09/14/18 2114)  Set Rate: 28 bmp (09/26/18 1621)  Vt Set: 350 mL (09/26/18 1621)  Pressure Support: 10 cmH20 (09/22/18 0544)  PEEP/CPAP: 5 cmH20 (09/29/18 0734)  Oxygen Concentration (%): 60 (09/29/18 0745)  Peak Airway Pressure: 19 cmH2O (09/29/18 0734)  Plateau Pressure: 0 cmH20 (09/29/18 0734)  Total Ve: 12.4 mL (09/29/18 0734)  Negative Inspiratory Force (cm H2O): 10 (09/23/18 0335)  F/VT Ratio<105 (RSBI): (!) 48.41 (09/28/18 1651)    Lines/Drains/Airways     Central Venous Catheter Line                 Percutaneous Central Line Insertion/Assessment - triple lumen  09/22/18 1720 right internal jugular 6 days          Drain                 Colostomy 09/12/18 LLQ 17 days         Gastrostomy/Enterostomy 09/15/18 1621 Gastrostomy tube w/o balloon LUQ decompression 13 days         Open Drain 09/22/18 Anterior;Right;Ventral Groin Other (see comments) 7 days         Chest Tube 09/25/18 0800 1 Right Pleural 4 days         Urethral Catheter 09/27/18 2330 Latex 1 day          Airway                 Surgical Airway 09/25/18 1330 Shiley Cuffed 3 days          Arterial Line                 Arterial Line 09/22/18 1340 Left Femoral 6 days          Pressure Ulcer                 Pressure Injury 09/13/18 1534 lower Coccyx Stage 3 15 days          Peripheral Intravenous Line                 Midline Catheter Insertion/Assessment  - Single Lumen 09/13/18 1748 Right brachial vein 20g x 10cm 15 days                Significant Labs:    CBC/Anemia Profile:  Recent Labs   Lab  09/28/18 0414 09/29/18 0348   WBC  35.93*  41.90*   HGB  8.9*  8.8*   HCT  26.7*  25.5*   PLT  71*  48*   MCV  90  90   RDW  17.0*  17.2*        Chemistries:  Recent Labs   Lab  09/28/18 0414 09/29/18 0348   NA  137  139   K  3.9  4.3   CL  111*  112*   CO2  20*  20*   BUN  25*  27*   CREATININE   0.7  0.6   CALCIUM  8.5*  8.5*   ALBUMIN  1.1*  0.9*   PROT  4.0*  4.1*   BILITOT  0.5  0.3   ALKPHOS  144*  204*   ALT  28  24   AST  31  28   MG  1.6  1.7   PHOS  2.4*  3.6       All pertinent labs within the past 24 hours have been reviewed.      Significant Imaging:  I have reviewed and interpreted all pertinent imaging results/findings within the past 24 hours.    Assessment/Plan:     * Perforated diverticulum of large intestine    Neuro:  - Sedation: Precedex gtt  - Agitation and delirium: quetiapine 25mg BID  - Pain: fentanyl PRN    Pulm:  - s/p tracheostomy on 9/25  - Tolerating PAV+ with 60% support and PEEP 5  - s/p chest tube placement s/p large R tension pneumothorax 9/24  - CT to suction  - Daily CXR  - Daily SBT   - Trend ABG:  Recent Labs   Lab  09/29/18   0354   PH  7.477*   PCO2  33.3*   PO2  67*   HCO3  24.6   POCSATURATED  94*   BE  1     CV:  - HDS; no pressor requirement  - Requiring IV fluid boluses intermittently due to hypovolemia    GI/FEN/Lytes:  - On TPN with trickle tube feeds through G tube   - Trend CMP  - Replace lytes PRN    Renal:  - BUN/Cr stable at 27/0.7  - Monitor UOP (600cc over 24 hrs)     Heme:  - Hgb stable at 8.8  - Therapeutic enoxaparin started for right hand ischemia, but held 9/26 secondary to trach oozing  - Restarted enoxaparin on 9/27  - PLT trending down (41 this morning from 167 on 9/22)  - HIT panel sent and enoxaparin stopped today    ID:  - Afebrile, WBC 41 (trending up from 36 yesterday and 21 the day before)  - Blood Cx NGTD  - CT Chest/Abdomen w/ contrast 9/20: bilateral lung consolidation noted concerning for infection or an aspiration   - Recent BAL (9/22) E. Coli, [place], stenotrophomonas maltophilia   - Abx: ceftriaxone for VAP with E. Coli, fluconazole for candida in peritoneal fluid   - Appreciate ID input  - Repeat CT chest on 9/27, per ID, for concern of empyema secondary to purulent chest tube output; it showed multifocal new cavitary leasions      Endo:  - Monitor for hypoglycemia  - Blood glucose better with TPN       PPx:   - PPI  - Enoxaparin       Dispo:   - Cont SICU care            Critical care was time spent personally by me on the following activities: development of treatment plan with patient or surrogate and bedside caregivers, discussions with consultants, evaluation of patient's response to treatment, examination of patient, ordering and performing treatments and interventions, ordering and review of laboratory studies, ordering and review of radiographic studies, pulse oximetry, re-evaluation of patient's condition.  This critical care time did not overlap with that of any other provider or involve time for any procedures.         Tyesha Ellis MD  Surgical ICU  [facility]  SpectraLink # [id number]  Pager # [phone]

## 2018-09-29 NOTE — PROCEDURES
"Amari Saez is a 62 y.o. male patient.    Temp: 97.9 °F (36.6 °C) (18 1100)  Pulse: (!) 0 (18 1430)  Resp: (!) 36 (18 1056)  BP: (!) 75/52 (18 1230)  SpO2: (!) 79 % (18 1415)  Weight: 72.3 kg (159 lb 6.3 oz) (18 0300)  Height: 5' 6" (167.6 cm) (18 0300)       Chest Tube Insertion  Date/Time: 2018 2:03 PM  Location procedure was performed: [facility] SURGICAL ICU (SICU)  Performed by: Nicole Shea MD  Authorized by: Nicole Shea MD   Assisting provider: William White MD  Post-operative diagnosis: pneumothorax, air leak  Pre-operative diagnosis: pneumothorax, air leak  Consent Done: Yes  Consent: Written consent obtained.  Risks and benefits: risks, benefits and alternatives were discussed  Consent given by: sibling  Patient understanding: patient states understanding of the procedure being performed  Patient consent: the patient's understanding of the procedure matches consent given  Procedure consent: procedure consent matches procedure scheduled  Relevant documents: relevant documents present and verified  Imaging studies: imaging studies available  Patient identity confirmed: , MRN and name  Time out: Immediately prior to procedure a "time out" was called to verify the correct patient, procedure, equipment, support staff and site/side marked as required.  Indications: pneumothorax  Patient sedated: yes  Sedation type: moderate (conscious) sedation  (See MAR for exact dosages of medications).  Sedatives: see MAR for details  Analgesia: fentanyl  Anesthesia: local infiltration    Anesthesia:  Local Anesthetic: lidocaine 1% with epinephrine  Anesthetic total: 9 mL  Preparation: skin prepped with ChloraPrep and sterile field established  Placement location: right lateral  Tube size: 24 [place]  Dissection instrument: finger  Ultrasound guidance: no  Tension pneumothorax heard: no  Tube connected to: suction  Drainage characteristics: purulent " and bloody  Suture material: 0 silk  Dressing: petrolatum-impregnated gauze and 4x4 sterile gauze  Patient tolerance: Patient tolerated the procedure well with no immediate complications  Estimated blood loss (mL): 2  Specimens: No  Implants: No          Nicole Shea  9/29/2018

## 2018-09-29 NOTE — SUBJECTIVE & OBJECTIVE
No current facility-administered medications on file prior to encounter.      Current Outpatient Medications on File Prior to Encounter   Medication Sig    alprazolam (XANAX) 0.5 MG tablet Take 1 tablet (0.5 mg total) by mouth daily as needed for Anxiety.    blood pressure test kit-medium Kit 1 kit by Misc.(Non-Drug; Combo Route) route once daily.    escitalopram oxalate (LEXAPRO) 10 MG tablet TAKE 1 TABLET BY MOUTH EVERY DAY    lisinopril 10 MG tablet TAKE 1 TABLET BY MOUTH EVERY DAY       Review of patient's allergies indicates:  No Known Allergies    Past Medical History:   Diagnosis Date    Anxiety     Hypertension     Nicotine abuse      Past Surgical History:   Procedure Laterality Date    CARPAL TUNNEL RELEASE Bilateral     COLECTOMY,SIGMOID  9/12/2018    Performed by Judd Agudelo MD at [facility] OR [address] [place] [address]    COLOSTOMY  9/12/2018    Procedure: COLOSTOMY;  Surgeon: Judd Agudelo MD;  Location: [facility] OR [address] [place] [place] [address];  Service: General;;    COLOSTOMY  9/12/2018    Performed by Judd Agudelo MD at [facility] OR [address] [place] [place] [address]    EGD (ESOPHAGOGASTRODUODENOSCOPY) N/A 9/15/2018    Performed by Jarod Pitt MD at [facility] OR [address] [place] [place] [address]    ESOPHAGOGASTRODUODENOSCOPY N/A 9/15/2018    Procedure: EGD (ESOPHAGOGASTRODUODENOSCOPY);  Surgeon: Jarod Pitt MD;  Location: [address];  Service: General;  Laterality: N/A;    EYE SURGERY      vein occlusion -laser sugery for burst blood vessel     EYE SURGERY      lasix    GASTRIC BYPASS  2004    GASTROSTOMY N/A 9/15/2018    Procedure: GASTROSTOMY;  Surgeon: Jarod Pitt MD;  Location: [address];  Service: General;  Laterality: N/A;  insertion of G-tube    GASTROSTOMY N/A 9/15/2018    Performed by Jarod Pitt MD at [facility] OR [address] [place] [place] [address]    KNEE SURGERY  1980s    arthroscopy    LAPAROTOMY,EXPLORATORY N/A 9/15/2018    Performed by Jraod Pitt MD at [facility] OR [address] [place] [place] [address]    LAPAROTOMY,EXPLORATORY N/A 9/12/2018    Performed by Judd LUKE  MD Jammie at [facility] OR [address] [place] [address]     Family History     Problem Relation (Age of Onset)    Alzheimer's disease Sister, Maternal Uncle    Diabetes     Parkinsonism Mother    Stroke Father        Tobacco Use    Smoking status: Current Every Day Smoker     Packs/day: 1.00     Years: 30.00     Pack years: 30.00     Types: Cigarettes   Substance and Sexual Activity    Alcohol use: Yes     Alcohol/week: 1.2 oz     Types: 2 Shots of liquor per week    Drug use: No    Sexual activity: Yes     Partners: Male     Birth control/protection: Condom     Review of Systems   Unable to perform ROS: Intubated     Objective:     Vital Signs (Most Recent):  Temp: 97.5 °F (36.4 °C) (09/29/18 1100)  Pulse: 99 (09/29/18 1345)  Resp: (!) 26 (09/29/18 1116)  BP: (!) 103/58 (09/28/18 0700)  SpO2: (!) 83 % (09/29/18 1345) Vital Signs (24h Range):  Temp:  [97.4 °F (36.3 °C)-99.1 °F (37.3 °C)] 97.5 °F (36.4 °C)  Pulse:  [] 99  Resp:  [25-33] 26  SpO2:  [67 %-100 %] 83 %  Arterial Line BP: ()/(47-82) 157/73     Weight: 72.3 kg (159 lb 6.3 oz)  Body mass index is 25.73 kg/m².  ECOG Performance Status Grade: 4 - Completely disabled    Intake/Output - Last 3 Shifts       09/27 0700 - 09/28 0659 09/28 0700 - 09/29 0659 09/29 0700 - 09/30 0659    I.V. (mL/kg) 1142 (15.8) 1351 (18.7)     Other       NG/ 150 70    IV Piggyback       TPN 1407 2170     Total Intake(mL/kg) 2789 (38.6) 3671 (50.8) 70 (1)    Urine (mL/kg/hr) 735 (0.4) 657 (0.4) 255 (0.5)    Drains 2000 1200     Stool  50 110    Chest Tube 125 70 20    Total Output 2860 1977 385    Net -71 +1694 -315                 SpO2: (!) 83 %  O2 Device (Oxygen Therapy): ventilator    Physical Exam   Constitutional: No distress.   HENT:   Head: Normocephalic and atraumatic.   Cardiovascular: Normal rate.   Pulmonary/Chest: Effort normal.   Air leak from chest tube, which does decrease with placing tube to water seal and with attempting tube placement adjustment.  Air is likely being  introduced into the system from the skin into the sentinel hole.    Tracheostomy in place   Neurological: He is alert.   Skin: Skin is warm and dry.   Psychiatric:   sedated       Significant Labs:  ABGs:   Recent Labs   Lab  09/29/18   0354   PH  7.477*   PCO2  33.3*   PO2  67*   HCO3  24.6   POCSATURATED  94*   BE  1     Amylase: No results for input(s): AMYLASE in the last 48 hours.  BMP:   Recent Labs   Lab  09/29/18   0348   GLU  132*   NA  139   K  4.3   CL  112*   CO2  20*   BUN  27*   CREATININE  0.6   CALCIUM  8.5*   MG  1.7     Cardiac markers: No results for input(s): CKMB, CPKMB, TROPONINT, TROPONINI, MYOGLOBIN in the last 48 hours.  CBC:   Recent Labs   Lab  09/29/18   0348   WBC  41.90*   RBC  2.83*   HGB  8.8*   HCT  25.5*   PLT  48*   MCV  90   MCH  31.1*   MCHC  34.5     CMP:   Recent Labs   Lab  09/29/18   0348   GLU  132*   CALCIUM  8.5*   ALBUMIN  0.9*   PROT  4.1*   NA  139   K  4.3   CO2  20*   CL  112*   BUN  27*   CREATININE  0.6   ALKPHOS  204*   ALT  24   AST  28   BILITOT  0.3     Coagulation:   Recent Labs   Lab  09/29/18   0350   INR  1.5*     Lactic Acid:   Recent Labs   Lab  09/29/18   0348   LACTATE  1.6     LFTs:   Recent Labs   Lab  09/29/18   0348   ALT  24   AST  28   ALKPHOS  204*   BILITOT  0.3   PROT  4.1*   ALBUMIN  0.9*     Lipase: No results for input(s): LIPASE in the last 48 hours.    Significant Diagnostics:  I have reviewed and interpreted all pertinent imaging results/findings within the past 24 hours.  CT chest with cavitary lesions some of which were present on CT scan from 9/20.  Chest tube is about to be out of chest on CT scan and sentinel hole is outside of chest wall on today's CXR.    VTE Risk Mitigation (From admission, onward)        Ordered     IP VTE HIGH RISK PATIENT  Once      09/12/18 5263

## 2018-09-29 NOTE — ASSESSMENT & PLAN NOTE
Neuro:  - Sedation: Precedex gtt  - Agitation and delirium: quetiapine 25mg BID  - Pain: fentanyl PRN    Pulm:  - s/p tracheostomy on 9/25  - Tolerating PAV+ with 60% support and PEEP 5  - s/p chest tube placement s/p large R tension pneumothorax 9/24  - CT to suction  - Daily CXR  - Daily SBT   - Trend ABG:  Recent Labs   Lab  09/29/18   0354   PH  7.477*   PCO2  33.3*   PO2  67*   HCO3  24.6   POCSATURATED  94*   BE  1     CV:  - HDS; no pressor requirement  - Requiring IV fluid boluses intermittently due to hypovolemia    GI/FEN/Lytes:  - On TPN with trickle tube feeds through G tube   - Trend CMP  - Replace lytes PRN    Renal:  - BUN/Cr stable at 27/0.7  - Monitor UOP (600cc over 24 hrs)     Heme:  - Hgb stable at 8.8  - Therapeutic enoxaparin started for right hand ischemia, but held 9/26 secondary to trach oozing  - Restarted enoxaparin on 9/27    ID:  - Afebrile, WBC 41 (trending up from 36 yesterday and 21 the day before)  - Blood Cx NGTD  - CT Chest/Abdomen w/ contrast 9/20: bilateral lung consolidation noted concerning for infection or an aspiration   - Recent BAL (9/22) E. Coli, madison, stenotrophomonas maltophilia   - Abx: ceftriaxone for VAP with E. Coli, fluconazole for candida in peritoneal fluid   - Appreciate ID input  - Repeat CT chest on 9/27, per ID, for concern of empyema secondary to purulent chest tube output; it showed multifocal new cavitary leasions     Endo:  - Monitor for hypoglycemia  - Blood glucose better with TPN       PPx:   - PPI  - Enoxaparin       Dispo:   - Cont SICU care

## 2018-09-29 NOTE — HPI
This is a 62 year-old gentleman who underwent an exploratory laparotomy for perforated sigmoid colon on 9/13 followed by repeat exploratory laparotomy for bleeding gastric remnant ulcer on 9/15.  He then developed a right tension pneumothorax on 9/24.  He had a pigtail catheter placed on 9/24 followed by upsizing to a 24Fr chest tube on 9/25.  Thoracic surgery is consulted for a persistent air leak.  The chest is sutured at the skin at 4cm and the sentinel hole is outside the chest wall on the most recent CXR.  The chest tube has been minimally in the chest since placement.

## 2018-09-29 NOTE — NURSING
Pt on spontaneous vent setting 40% and PEEP of 5 on 1.4mcg/kg/h of precedex. Orders to wean Precedex as tolerated. Pt weaned to 0.4mcg/kg/h by 10:30 am. Sats maintaining low 90's, right pleural chest tube in place with air leak, thoracics to bedside to assess chest tube. New chest tube inserted this morning, post insertion, saturations trending down to low 80's oxygen increased to 100% Dr. Shea notified and to bedside to assess. After x ray obtained, chest tube pulled back, Vent settings currently at 87%, with increased work of breathing. Vent on 100% and PEEP 12. Dr. Shea at bedside. Will continue to monitor.     Dr. Monahan to bedside to pull chest tube back, sats now 90-94%. Vent on 100% PEEP 10. Precedex on 1.4mcg/kg/hr, pt awake alert following commands, calm cooperative.     Family at bedside. Questions and concerns addressed. Will continue to monitor.

## 2018-09-29 NOTE — PROGRESS NOTES
RRT called due to patient desat. Patient's saturation was 85% ABG obtained and patient placed back on a rate per Dr. Tsai.

## 2018-09-30 NOTE — PROGRESS NOTES
Pt remains awake and alert. Follows commands. CAMP, weak. Currently on a/c 100% PEEP 15, 20 nitric. O2 sat %. Afebrile. sr-st.  Map >60 mmHg c levo and vaso gtts. Minimal output from groin site. PEG to gravity. 2 chest tubes In place; posterior ct has air leak and approx 130 ml serosanginous output. Anterior ct for pneumothorax. Minimal uo. Wound care performed. Bathed, linen changed. Denies pain. Pt and brother updated on POC. See chart and doc flowsheet for details.

## 2018-09-30 NOTE — SUBJECTIVE & OBJECTIVE
Interval History: Respiratory status declining despite maximal vent support    Medications:  Continuous Infusions:   dexmedetomidine (PRECEDEX) infusion 1 mcg/kg/hr (09/30/18 0854)    nitric oxide gas      norepinephrine bitartrate-D5W 0.02 mcg/kg/min (09/30/18 0800)    TPN ADULT CENTRAL LINE CUSTOM 50 mL/hr at 09/30/18 0800    vasopressin (PITRESSIN) infusion 0.04 Units/min (09/30/18 0800)     Scheduled Meds:   albuterol-ipratropium  3 mL Nebulization Q4H    cefTRIAXone (ROCEPHIN) IVPB  2 g Intravenous Q24H    chlorhexidine  15 mL Mouth/Throat BID    fluconazole (DIFLUCAN) IVPB  400 mg Intravenous Q24H    pantoprozole (PROTONIX) IV  40 mg Intravenous Q12H    QUEtiapine  25 mg Per G Tube BID    sodium chloride 0.9%  3 mL Intravenous Q8H     PRN Meds:sodium chloride, dextrose 50%, dextrose 50%, dextrose 50%, glucagon (human recombinant), glucose, glucose, insulin aspart U-100, omnipaque, ondansetron, oxyCODONE, potassium chloride in water, potassium chloride in water, potassium chloride in water, sodium chloride 0.9%, sodium chloride 0.9%, sodium phosphate IVPB, sodium phosphate IVPB, sodium phosphate IVPB     Review of patient's allergies indicates:  No Known Allergies  Objective:     Vital Signs (Most Recent):  Temp: 97.8 °F (36.6 °C) (09/30/18 0700)  Pulse: 85 (09/30/18 1000)  Resp: (!) 36 (09/30/18 0712)  BP: 90/62 (09/30/18 0900)  SpO2: 98 % (09/30/18 1000) Vital Signs (24h Range):  Temp:  [97.4 °F (36.3 °C)-97.8 °F (36.6 °C)] 97.8 °F (36.6 °C)  Pulse:  [] 85  Resp:  [26-39] 36  SpO2:  [37 %-100 %] 98 %  BP: ()/(53-70) 90/62  Arterial Line BP: ()/(33-83) 88/80     Weight: 72.3 kg (159 lb 6.3 oz)  Body mass index is 25.73 kg/m².    Intake/Output - Last 3 Shifts       09/28 0700 - 09/29 0659 09/29 0700 - 09/30 0659 09/30 0700 - 10/01 0659    I.V. (mL/kg) 1351 (18.7) 1103 (15.3)     NG/ 90     TPN 2170 1407     Total Intake(mL/kg) 3671 (50.8) 2600 (36)     Urine (mL/kg/hr) 657  (0.4) 491 (0.3) 15 (0.1)    Drains 1200 875     Stool 50 110     Chest Tube 70 164 35    Total Output 1977 1640 50    Net +1694 +960 -50                 Physical Exam   Chest tube in ok location with air leak still present    Significant Labs:  CBC:   Recent Labs   Lab  09/30/18   0358   WBC  18.91*   RBC  2.86*   HGB  9.1*   HCT  27.2*   PLT  28*   MCV  95   MCH  31.8*   MCHC  33.5     CMP:   Recent Labs   Lab  09/30/18   0358   GLU  133*   CALCIUM  8.7   ALBUMIN  0.8*   PROT  4.4*   NA  137   K  4.8   CO2  19*   CL  109   BUN  35*   CREATININE  0.7   ALKPHOS  183*   ALT  23   AST  38   BILITOT  0.3     ABGs:   Recent Labs   Lab  09/30/18   0941   PH  7.091*   PCO2  77.1*   PO2  74*   HCO3  23.4*   POCSATURATED  87*   BE  -6       Significant Diagnostics:  CXR and CT reviewed: Lung is expanded.  Possible cavitary lesions

## 2018-09-30 NOTE — ASSESSMENT & PLAN NOTE
Neuro:  - Sedation: Precedex gtt  - Agitation and delirium: quetiapine 25mg BID  - Pain: fentanyl PRN    Pulm:  - s/p tracheostomy on 9/25  - AC/VC+ 400/30/15/100%  - started Eunice 7/29, 20ppm  - s/p CT replacement, interval removal, and placement of two new chest tubes 9/29  - Anterior and Posterior CT to suction, air leak in posterior tube  - Daily CXR  - Daily SBT   - Trend ABG:  Recent Labs   Lab  09/30/18   0449   PH  7.180*   PCO2  60.4*   PO2  69*   HCO3  22.5*   POCSATURATED  88*   BE  -6     CV:  - Wean pressors as tolerated: Levo @ 0.02, Vaso @ 0.04  - STAT 2D ECHO  - Requiring IV fluid boluses intermittently due to hypovolemia    GI/FEN/Lytes:  - On TPN with trickle tube feeds through G tube   - Holding tube feeds for very high residuals. G tube to gravity.   - Trend CMP  - Replace lytes PRN    Renal:  - BUN/Cr stable at 35/0.7  - Monitor UOP (451cc over 24 hrs)     Heme:  - Hgb stable at 9.1  - Therapeutic lovenox started for right hand ischemia, but held 9/26 secondary to trach oozing  - Restarted lovenox on 9/27    ID:  - Afebrile, WBC 18.9 (23.6)  - Blood Cx NGTD  - CT Chest/Abdomen w/ contrast 9/20: bilateral lung consolidation noted concerning for infection or an aspiration   - Recent BAL (9/22) E. Coli, madison, stenotrophomonas maltophilia   - Abx: ceftriaxone for VAP with E. Coli, fluconazole for candida in peritoneal fluid   - Appreciate ID input  - Repeat CT chest on 9/27, per ID, for concern of empyema secondary to purulent chest tube output; it showed multifocal new cavitary leasions     Endo:  - Monitor for hypoglycemia  - Blood glucose better with TPN       PPx:   - PPI  - Enoxaparin       Dispo:   - Cont SICU care  - Primary: General Surgery

## 2018-09-30 NOTE — PROGRESS NOTES
Dr. Monahan and Dr. Shea  at bedside. anterior chest tube placed, O2 sat decreased to 65%. Second chest tube placed; air leak present; MDs aware. Pt placed on nitric 19. O2 sat increased to 90% . Vent settings currently A/C 100% PEEP 15.  Levo weaned down p vaso started. Pt became more alert. Minimal movements noted c BUE. Pt is not moving BLE. Pt's brother at bedside, updated on POC. Will continue to monitor.

## 2018-09-30 NOTE — NURSING
Pt on .02 mcg/kg/min of levo and .04 units/min upon arrival to shift to sustain MAP >65. BP with MAP >65 throughout shift. At 1415 MAP began to decrease, levo titrated up, BP continued to decrease, MAP's in to 30's, pt then became bradycardic, pt PEA, ACLS protocol initiated. Dr. Pugh to bedside for code. FRANK called at 1421. Family in room during initiation of ACLS protocol.  with family. Post mortuum care received.

## 2018-09-30 NOTE — SIGNIFICANT EVENT
Death Note    Admitted with perforated diverticulitis (free intraperitoneal air on admission CXR). S/p emergent Tash procedure on 9/12. On 9/14, a rapid response was called after pt became hypoxic, tachycardic, and tachypneic on the floor. He was placed on a venti mask however pt further decompensated and became pulseless. Code blue was called where pt was intubated, chest compressions were performed, and epi was given x2 where ROSC was attained. Pt was subsequently transferred to SICU for higher level of care. On 9/15, Hgb went from 7.8 to 4.3, then 3.9 on repeat. Pt was taken emergently to OR for ex-lap with intraop endoscopic control of bleeding with hemostatic clip in remnant stomach. He was given 3u pRBCs, 2U FFP prior to/in OR. Patient was extubated on 9/17, but had to be re-intubated on the same day due to significant respiratory failure. CXR with bilateral infiltrates (R>L). Patient was intermittently on pressors. Left radial arterial line placed on 09/14/18 and removed 09/20/18 due to malfunction. Vascular surgery consulted for left thumb cyanosis and signs of ischemia. Patient was started on heparin gtt, then transitioned to therapeutic enoxaparin. Respiratory cultures growing E. Coli. On 9/22, bronchoscopy was done and BAL cultures sent. Patient had episodes of hypotension requiring volume resuscitation with good response (500cc albumin, ~6L crystalloids, and 2u PRBC). On 9/24, he acutely decompensated and required an increase in vent settings. CXR done at that time showed large R tension pneumothorax requiring emergent chest tube. Vitals quickly normalized after chest tube was placed. BAL cultures resulted 9/24 and grew E. Coli , candida, and stenotrophomonas maltophilia. Trach placed on 9/25. Patient required precedex gtt for agitation; improved with adding quetiapine.      Over the past 24 hours, pt has had significant decline in his pulmonary status.  Despite chest tube exchange which decompressed  his pneumothorax, he had a persistent air leak with signs/ symptoms consistent with pulmonary sepsis and ARDS.      Despite being on maximal vent settings 100% FiO2 and 15 of PEEP and 20 ppm Eunice, pt remained persistently hypoxic.  Called to bedside after patient became acutely hypotensive, bradycadic, which quickly became PEA arrest.  Code initiated    Despite multiple rounds of ACLS, coded was terminated at 1421 after failure to respond to multiple boluses of epinephrine, bicarb, a single cardioversion for vfib with the addition of amiodarone.    Time of death is 1421  Cause is septic shock and hypoxic respiratory failure    Dwaine Pugh MD  PGY-IV  [phone]

## 2018-09-30 NOTE — SUBJECTIVE & OBJECTIVE
Interval History: Yesterday experienced severe respiratory decompensation, right sided chest tube exchanged given persistent air leak and continued pneumothorax; however, following exchanged sats dropped to 70s despite adequate positioning. A second chest tube was placed at this time without improvement so original chest tube removed and new tube placed with some improvement. Eunice started at 20ppm, CXR with re-inflation of lung. This morning anterior chest tube without air leak, but posterior remains with leak. Required pressor support overnight, decreasing this morning. No output from stoma.     Medications:  Continuous Infusions:   dexmedetomidine (PRECEDEX) infusion 1 mcg/kg/hr (09/30/18 1121)    nitric oxide gas      norepinephrine bitartrate-D5W 0.02 mcg/kg/min (09/30/18 1121)    TPN ADULT CENTRAL LINE CUSTOM 50 mL/hr at 09/30/18 1100    vasopressin (PITRESSIN) infusion 0.04 Units/min (09/30/18 1100)     Scheduled Meds:   albuterol-ipratropium  3 mL Nebulization Q4H    chlorhexidine  15 mL Mouth/Throat BID    fluconazole (DIFLUCAN) IVPB  400 mg Intravenous Q24H    lactated ringers  250 mL Intravenous Once    pantoprozole (PROTONIX) IV  40 mg Intravenous Q12H    piperacillin-tazobactam (ZOSYN) IVPB  4.5 g Intravenous Q12H    QUEtiapine  25 mg Per G Tube BID    sodium chloride 0.9%  3 mL Intravenous Q8H    vancomycin (VANCOCIN) IVPB  1,000 mg Intravenous Q12H     PRN Meds:sodium chloride, dextrose 50%, dextrose 50%, dextrose 50%, glucagon (human recombinant), glucose, glucose, insulin aspart U-100, omnipaque, ondansetron, oxyCODONE, potassium chloride in water, potassium chloride in water, potassium chloride in water, sodium chloride 0.9%, sodium chloride 0.9%, sodium phosphate IVPB, sodium phosphate IVPB, sodium phosphate IVPB     Review of patient's allergies indicates:  No Known Allergies  Objective:     Vital Signs (Most Recent):  Temp: 97.9 °F (36.6 °C) (09/30/18 1100)  Pulse: 82 (09/30/18  1100)  Resp: (!) 36 (09/30/18 1056)  BP: 90/62 (09/30/18 0900)  SpO2: 100 % (09/30/18 1100) Vital Signs (24h Range):  Temp:  [97.4 °F (36.3 °C)-97.9 °F (36.6 °C)] 97.9 °F (36.6 °C)  Pulse:  [] 82  Resp:  [26-39] 36  SpO2:  [37 %-100 %] 100 %  BP: ()/(53-70) 90/62  Arterial Line BP: ()/(33-83) 112/59     Weight: 72.3 kg (159 lb 6.3 oz)  Body mass index is 25.73 kg/m².    Intake/Output - Last 3 Shifts       09/28 0700 - 09/29 0659 09/29 0700 - 09/30 0659 09/30 0700 - 10/01 0659    I.V. (mL/kg) 1351 (18.7) 1103 (15.3)     NG/ 90     IV Piggyback   250    TPN 2170 1407     Total Intake(mL/kg) 3671 (50.8) 2600 (36) 250 (3.5)    Urine (mL/kg/hr) 657 (0.4) 491 (0.3) 25 (0.1)    Drains 1200 875     Stool 50 110     Chest Tube 70 164 45    Total Output 1977 1640 70    Net +1694 +960 +180                 Physical Exam   Constitutional: He appears well-developed. No distress.   HENT:   Head: Normocephalic and atraumatic.   Trach in place   Eyes:   Pupils are unequal (L>R), but both reactive to light   Neck: Neck supple.   Cardiovascular: Normal rate, regular rhythm and intact distal pulses.   No murmur heard.  Pulmonary/Chest: Effort normal. He has wheezes. He exhibits no tenderness.   VC+ 100% FiO2, PEEP 12  Anterior chest tube, no leak, to suction  Posterior chest tube, large air leak, to suction   Abdominal: Soft. He exhibits no distension.   Stoma dark and retracted. No stool output.    Musculoskeletal: He exhibits edema.   Neurological:   Moves all extremities  Withdraws to pain   Skin: Skin is warm.   R thumb with distal ischemic changes.   Blisters present on ventral R wrist    Psychiatric: He has a normal mood and affect.   Nursing note and vitals reviewed.      Significant Labs:  CBC:   Recent Labs   Lab  09/30/18   0358   WBC  18.91*   RBC  2.86*   HGB  9.1*   HCT  27.2*   PLT  28*   MCV  95   MCH  31.8*   MCHC  33.5     CMP:   Recent Labs   Lab  09/30/18   0358   GLU  133*   CALCIUM  8.7    ALBUMIN  0.8*   PROT  4.4*   NA  137   K  4.8   CO2  19*   CL  109   BUN  35*   CREATININE  0.7   ALKPHOS  183*   ALT  23   AST  38   BILITOT  0.3       Significant Diagnostics:  I have reviewed all pertinent imaging results/findings within the past 24 hours.

## 2018-09-30 NOTE — SUBJECTIVE & OBJECTIVE
Interval History/Significant Events: Severe respiratory decompensation started yesterday afternoon. Right sided CT was exchanged for air leak and continuing pneumothorax (sats high 80s prior to exchange). After exchange, sats dropped to high 70s. Tube was in good position, but pneumo persisted. MAPs gradually declined, requiring the addition of levo and vaso. A second chest tube was placed in the evening, sats stayed low, so the 1st CT was removed and a 3rd was placed. Sats improved marginally to low 80s, so Eunice started at 20ppm. CXR showed reinflation of the lung. Sats increased to 88. Anterior CT no output, to suction, no air leak. Posterior CT 159cc serosanguinous output, to suction, large air leak.     Overnight, pressor requirements came down; now at levo of 0.02 and vaso 0.04. No stoma output. UOP trending down (5-25cc/hr). Tube feeds held.     Follow-up For: Procedure(s) (LRB):  LAPAROTOMY,EXPLORATORY (N/A)  GASTROSTOMY (N/A)  EGD (ESOPHAGOGASTRODUODENOSCOPY) (N/A)    Post-Operative Day: 15 Days Post-Op    Objective:     Vital Signs (Most Recent):  Temp: 97.8 °F (36.6 °C) (09/30/18 0700)  Pulse: 85 (09/30/18 0715)  Resp: (!) 36 (09/30/18 0712)  BP: 91/63 (09/30/18 0400)  SpO2: 99 % (09/30/18 0715) Vital Signs (24h Range):  Temp:  [97.4 °F (36.3 °C)-97.8 °F (36.6 °C)] 97.8 °F (36.6 °C)  Pulse:  [] 85  Resp:  [26-39] 36  SpO2:  [37 %-100 %] 99 %  BP: ()/(53-70) 91/63  Arterial Line BP: ()/(33-83) 93/68     Weight: 72.3 kg (159 lb 6.3 oz)  Body mass index is 25.73 kg/m².      Intake/Output Summary (Last 24 hours) at 9/30/2018 0803  Last data filed at 9/30/2018 0700  Gross per 24 hour   Intake 2580 ml   Output 1555 ml   Net 1025 ml       Physical Exam   Constitutional: He appears well-developed. No distress.   HENT:   Head: Normocephalic and atraumatic.   Trach in place   Eyes:   Pupils are unequal (L>R), but both reactive to light   Neck: Neck supple.   Cardiovascular: Normal rate, regular  rhythm and intact distal pulses.   No murmur heard.  Pulmonary/Chest: Effort normal. He has wheezes. He exhibits no tenderness.   VC+ 100% FiO2, PEEP 12  Anterior chest tube, no leak, to suction  Posterior chest tube, large air leak, to suction   Abdominal: Soft. He exhibits no distension.   Stoma dark and retracted. No stool output.    Musculoskeletal: He exhibits edema.   Neurological:   Moves all extremities  Withdraws to pain   Skin: Skin is warm.   R thumb with distal ischemic changes.   Blisters present on ventral R wrist    Psychiatric: He has a normal mood and affect.   Nursing note and vitals reviewed.      Vents:  Vent Mode: A/C (09/30/18 0700)  Ventilator Initiated: Yes (09/14/18 2114)  Set Rate: 30 bmp (09/30/18 0700)  Vt Set: 450 mL (09/30/18 0700)  Pressure Support: 10 cmH20 (09/22/18 0544)  PEEP/CPAP: 15 cmH20 (09/30/18 0700)  Oxygen Concentration (%): 100 (09/30/18 0715)  Peak Airway Pressure: 33 cmH2O (09/30/18 0700)  Plateau Pressure: 0 cmH20 (09/30/18 0700)  Total Ve: 10.2 mL (09/30/18 0700)  Negative Inspiratory Force (cm H2O): 10 (09/23/18 0335)  F/VT Ratio<105 (RSBI): (!) 74.22 (09/30/18 0320)    Lines/Drains/Airways     Central Venous Catheter Line                 Percutaneous Central Line Insertion/Assessment - triple lumen  09/22/18 1720 right internal jugular 7 days          Drain                 Colostomy 09/12/18 LLQ 18 days         Gastrostomy/Enterostomy 09/15/18 1621 Gastrostomy tube w/o balloon LUQ decompression 14 days         Open Drain 09/22/18 Anterior;Right;Ventral Groin Other (see comments) 8 days         Urethral Catheter 09/27/18 2330 Latex 2 days         Chest Tube 09/29/18 1900 Anterior Pleural less than 1 day         Chest Tube 09/29/18 2030 1 Posterior Pleural less than 1 day          Airway                 Surgical Airway 09/25/18 1330 Shiley Cuffed 4 days          Arterial Line                 Arterial Line 09/22/18 1340 Left Femoral 7 days          Pressure Ulcer                  Pressure Injury 09/13/18 1534 lower Coccyx Unstageable 16 days          Peripheral Intravenous Line                 Midline Catheter Insertion/Assessment  - Single Lumen 09/13/18 1748 Right brachial vein 20g x 10cm 16 days                Significant Labs:    CBC/Anemia Profile:  Recent Labs   Lab  09/29/18   1847  09/29/18 2012 09/30/18   0358   WBC  21.39*  23.62*  18.91*   HGB  9.1*  9.2*  9.1*   HCT  27.7*  28.5*  27.2*   PLT  33*  38*  28*   MCV  93  95  95   RDW  17.9*  18.0*  18.3*        Chemistries:  Recent Labs   Lab  09/29/18   0348  09/29/18 2012 09/30/18   0358   NA  139  140  137   K  4.3  4.3  4.8   CL  112*  111*  109   CO2  20*  24  19*   BUN  27*  31*  35*   CREATININE  0.6  0.6  0.7   CALCIUM  8.5*  8.5*  8.7   ALBUMIN  0.9*  0.9*  0.8*   PROT  4.1*  3.9*  4.4*   BILITOT  0.3  0.3  0.3   ALKPHOS  204*  225*  183*   ALT  24  24  23   AST  28  31  38   MG  1.7  2.5  2.9*   PHOS  3.6  5.5*  5.9*       ABGs:   Recent Labs   Lab  09/30/18   0449   PH  7.180*   PCO2  60.4*   HCO3  22.5*   POCSATURATED  88*   BE  -6       Significant Imaging:  CXR: I have reviewed all pertinent results/findings within the past 24 hours and my personal findings are:  chest tubes in good position.  Right pneumothorax is essentially resolved, status post chest tube replacement.  Mild degree of new right chest wall subcutaneous air.  No mediastinal shift or left-sided pneumothorax seen.  Otherwise, unchanged radiographic appearance of the chest compared to 19:08.

## 2018-09-30 NOTE — PROGRESS NOTES
[facility]  General Surgery  Progress Note    Subjective:     History of Present Illness:  No notes on file    Post-Op Info:  Procedure(s) (LRB):  LAPAROTOMY,EXPLORATORY (N/A)  GASTROSTOMY (N/A)  EGD (ESOPHAGOGASTRODUODENOSCOPY) (N/A)   15 Days Post-Op     Interval History: Yesterday experienced severe respiratory decompensation, right sided chest tube exchanged given persistent air leak and continued pneumothorax; however, following exchanged sats dropped to 70s despite adequate positioning. A second chest tube was placed at this time without improvement so original chest tube removed and new tube placed with some improvement. Eunice started at 20ppm, CXR with re-inflation of lung. This morning anterior chest tube without air leak, but posterior remains with leak. Required pressor support overnight, decreasing this morning. No output from stoma.     Medications:  Continuous Infusions:   dexmedetomidine (PRECEDEX) infusion 1 mcg/kg/hr (09/30/18 1121)    nitric oxide gas      norepinephrine bitartrate-D5W 0.02 mcg/kg/min (09/30/18 1121)    TPN ADULT CENTRAL LINE CUSTOM 50 mL/hr at 09/30/18 1100    vasopressin (PITRESSIN) infusion 0.04 Units/min (09/30/18 1100)     Scheduled Meds:   albuterol-ipratropium  3 mL Nebulization Q4H    chlorhexidine  15 mL Mouth/Throat BID    fluconazole (DIFLUCAN) IVPB  400 mg Intravenous Q24H    lactated ringers  250 mL Intravenous Once    pantoprozole (PROTONIX) IV  40 mg Intravenous Q12H    piperacillin-tazobactam (ZOSYN) IVPB  4.5 g Intravenous Q12H    QUEtiapine  25 mg Per G Tube BID    sodium chloride 0.9%  3 mL Intravenous Q8H    vancomycin (VANCOCIN) IVPB  1,000 mg Intravenous Q12H     PRN Meds:sodium chloride, dextrose 50%, dextrose 50%, dextrose 50%, glucagon (human recombinant), glucose, glucose, insulin aspart U-100, omnipaque, ondansetron, oxyCODONE, potassium chloride in water, potassium chloride in water, potassium chloride in water, sodium  chloride 0.9%, sodium chloride 0.9%, sodium phosphate IVPB, sodium phosphate IVPB, sodium phosphate IVPB     Review of patient's allergies indicates:  No Known Allergies  Objective:     Vital Signs (Most Recent):  Temp: 97.9 °F (36.6 °C) (09/30/18 1100)  Pulse: 82 (09/30/18 1100)  Resp: (!) 36 (09/30/18 1056)  BP: 90/62 (09/30/18 0900)  SpO2: 100 % (09/30/18 1100) Vital Signs (24h Range):  Temp:  [97.4 °F (36.3 °C)-97.9 °F (36.6 °C)] 97.9 °F (36.6 °C)  Pulse:  [] 82  Resp:  [26-39] 36  SpO2:  [37 %-100 %] 100 %  BP: ()/(53-70) 90/62  Arterial Line BP: ()/(33-83) 112/59     Weight: 72.3 kg (159 lb 6.3 oz)  Body mass index is 25.73 kg/m².    Intake/Output - Last 3 Shifts       09/28 0700 - 09/29 0659 09/29 0700 - 09/30 0659 09/30 0700 - 10/01 0659    I.V. (mL/kg) 1351 (18.7) 1103 (15.3)     NG/ 90     IV Piggyback   250    TPN 2170 1407     Total Intake(mL/kg) 3671 (50.8) 2600 (36) 250 (3.5)    Urine (mL/kg/hr) 657 (0.4) 491 (0.3) 25 (0.1)    Drains 1200 875     Stool 50 110     Chest Tube 70 164 45    Total Output 1977 1640 70    Net +1694 +960 +180                 Physical Exam   Constitutional: He appears well-developed. No distress.   HENT:   Head: Normocephalic and atraumatic.   Trach in place   Eyes:   Pupils are unequal (L>R), but both reactive to light   Neck: Neck supple.   Cardiovascular: Normal rate, regular rhythm and intact distal pulses.   No murmur heard.  Pulmonary/Chest: Effort normal. He has wheezes. He exhibits no tenderness.   VC+ 100% FiO2, PEEP 12  Anterior chest tube, no leak, to suction  Posterior chest tube, large air leak, to suction   Abdominal: Soft. He exhibits no distension.   Stoma dark and retracted. No stool output.    Musculoskeletal: He exhibits edema.   Neurological:   Moves all extremities  Withdraws to pain   Skin: Skin is warm.   R thumb with distal ischemic changes.   Blisters present on ventral R wrist    Psychiatric: He has a normal mood and affect.    Nursing note and vitals reviewed.      Significant Labs:  CBC:   Recent Labs   Lab  09/30/18   0358   WBC  18.91*   RBC  2.86*   HGB  9.1*   HCT  27.2*   PLT  28*   MCV  95   MCH  31.8*   MCHC  33.5     CMP:   Recent Labs   Lab  09/30/18   0358   GLU  133*   CALCIUM  8.7   ALBUMIN  0.8*   PROT  4.4*   NA  137   K  4.8   CO2  19*   CL  109   BUN  35*   CREATININE  0.7   ALKPHOS  183*   ALT  23   AST  38   BILITOT  0.3       Significant Diagnostics:  I have reviewed all pertinent imaging results/findings within the past 24 hours.    Assessment/Plan:     * Perforated diverticulum of large intestine    62 year old man with perforated diverticulitis and free air s/p Tash's procedure 9/12/18    Neuro:  Precedex         Resp: Maintain chest tube to suction (still has air leak)  CT shows worsening PNA- cavitary lesions in lungs, will discuss with ID if there is a need to further investigate this   Daily CXR  Continue tx of PNA  F/u repeat BAL  Continue ventilatory support  Vent Mode: A/C  Oxygen Concentration (%):  [] 100  Resp Rate Total:  [25 br/min-58 br/min] 37 br/min  Vt Set:  [350 mL-450 mL] 450 mL  PEEP/CPAP:  [5 cmH20-15 cmH20] 15 cmH20  Mean Airway Pressure:  [10 tpT90-55 cmH20] 23 cmH20  Wean Eunice and vent as tolerated  Continuous pulse Ox  ABG PRN    CV: Pulm HTN and right heart failure.  Good LVEF.    Required vasopressor support overnight, wean levo and vaso off as tolerated      Renal: Cr stable, UOP dropping off, continue to monitor   Ponce for urinary retention     Abd/FEN/GI:Holding TFs given clinical worsening requiring pressors  Continue TPN in the mean time   Ostomy without output today  Will hold off on J tube placement given acute worsening      Heme/ID:   Continue BID protonix for previous GIB (no evidence of continued bleeding per G tube)  Holding therapeutic lovenox for radial artery occlusion   PNA with E coli, candida and stenotrophomonas. Continue flucanozole as per ID recs.  Antibiotics broadened to Vanc/Zosyn  F/U C. Diff    Dispo: Continue critical care.  Will have a family discussion about palliative care this week as at this point with worsening respiratory status despite ventilatory support he has a poor prognosis.         Postprocedural air leak    Chest tubes replaced overnight  Keep to suction  Air leak is likely from cavity lesion.  Lung appears expanded on CXR  Prognosis is poor given pulmonary septic shock and near maximal vent settings with elevated PEEP and persistent air leak.  Will follow            Maureen Estrella MD  General Surgery  [facility]

## 2018-09-30 NOTE — PROCEDURES
"Amari Saez is a 62 y.o. male patient.    Temp: 97.4 °F (36.3 °C) (09/29/18 2000)  Pulse: 97 (09/29/18 2145)  Resp: (!) 38 (09/29/18 2038)  BP: (!) 85/57 (09/29/18 2100)  SpO2: (!) 90 % (09/29/18 2145)  Weight: 72.3 kg (159 lb 6.3 oz) (09/27/18 0300)  Height: 5' 6" (167.6 cm) (09/26/18 0300)       Chest Tube Insertion  Date/Time: 9/29/2018 10:02 PM  Location procedure was performed: [facility] CRITICAL CARE SURGERY  Performed by: Elkin Monahan MD  Authorized by: Elkin Monahan MD   Post-operative diagnosis: Pneumothorax  Pre-operative diagnosis: Pneumothorax  Consent Done: Emergent Situation  Indications: tension pneumothorax  Patient sedated: yes  Analgesia: fentanyl    Anesthesia:  Local Anesthetic: lidocaine 1% with epinephrine  Anesthetic total: 5 mL  Preparation: skin prepped with ChloraPrep  Placement location: right lateral  Scalpel size: 15  Tube size: 24 Korean  Dissection instrument: finger and Savanna clamp  Ultrasound guidance: no  Tension pneumothorax heard: yes  Tube connected to: suction  Drainage characteristics: bloody  Drainage amount: 5 ml  Suture material: 0 silk  Dressing: 4x4 sterile gauze and petrolatum-impregnated gauze  Post-insertion x-ray findings: tube in good position  Complications: No  Estimated blood loss (mL): 5  Specimens: No  Implants: No          Elkin Monahan MD  [facility] PGY-II  Pager: [phone]    "

## 2018-09-30 NOTE — DISCHARGE SUMMARY
[facility]  Critical Care - Surgery  Discharge Summary      Patient Name: Amari Saez  MRN: [id number]  Admission Date: 9/12/2018  Hospital Length of Stay: 18 days  Discharge Date and Time: 9/30/2018  6:30 PM  Attending Physician: No att. providers found   Discharging Provider: Antonieta Grajeda MD  Primary Care Provider: Chaim Valdovinos MD    HPI:  Mr. Saez is a 63 yo M with PMHx of HTN, previous gastric bypass (in 2003), and tobacco abuse who presented to the [facility] ED on 9/12 with shortness of breath, LE swelling, and abdominal pain. CXR on admission showed evidence of intraperitoneal air. He was subsequently taken to OR where Tash procedure was performed. He was recovering on the floor where this morning a rapid response was called after pt became hypoxic, tachycardic, and tachypneic on the floor. He was placed on a venti mask however pt further decompensated and became pulseless. Code blue was called where pt was intubated, chest compressions were performed, and epi was given x2 where ROSC was attained. Pt was subsequently transferred to SICU for higher level of care.     Procedure(s) (LRB):  LAPAROTOMY,EXPLORATORY (N/A)  GASTROSTOMY (N/A)  EGD (ESOPHAGOGASTRODUODENOSCOPY) (N/A)    Indwelling Lines/Drains at Time of Discharge:   Lines/Drains/Airways     Central Venous Catheter Line                 Percutaneous Central Line Insertion/Assessment - triple lumen  09/22/18 1720 right internal jugular 8 days          Drain                 Colostomy 09/12/18 LLQ 18 days         Gastrostomy/Enterostomy 09/15/18 1621 Gastrostomy tube w/o balloon LUQ decompression 15 days         Open Drain 09/22/18 Anterior;Right;Ventral Groin Other (see comments) 8 days         Urethral Catheter 09/27/18 2330 Latex 2 days         Chest Tube 09/29/18 1900 Anterior Pleural less than 1 day         Chest Tube 09/29/18 2030 1 Posterior Pleural less than 1 day          Arterial Line                 Arterial Line 09/22/18  1340 Left Femoral 8 days          Pressure Ulcer                 Pressure Injury 09/13/18 1534 lower Coccyx Unstageable 17 days                Hospital Course:  Admitted with perforated diverticulitis (free intraperitoneal air on admission CXR). S/p emergent Tash procedure on 9/12. On 9/14, a rapid response was called after pt became hypoxic, tachycardic, and tachypneic on the floor. He was placed on a venti mask however pt further decompensated and became pulseless. Code blue was called where pt was intubated, chest compressions were performed, and epi was given x2 where ROSC was attained. Pt was subsequently transferred to SICU for higher level of care. On 9/15, Hgb went from 7.8 to 4.3, then 3.9 on repeat. Pt was taken emergently to OR for ex-lap with intraop endoscopic control of bleeding with hemostatic clip in remnant stomach. He was given 3u pRBCs, 2U FFP prior to/in OR. Patient was extubated on 9/17, but had to be re-intubated on the same day due to significant respiratory failure. CXR with bilateral infiltrates (R>L). Patient was intermittently on pressors. Left radial arterial line placed on 09/14/18 and removed 09/20/18 due to malfunction. Vascular surgery consulted for left thumb cyanosis and signs of ischemia. Patient was started on heparin gtt, then transitioned to therapeutic enoxaparin. Respiratory cultures growing E. Coli. On 9/22, bronchoscopy was done and BAL cultures sent. Patient had episodes of hypotension requiring volume resuscitation with good response (500cc albumin, ~6L crystalloids, and 2u PRBC). On 9/24, he acutely decompensated and required an increase in vent settings. CXR done at that time showed large R tension pneumothorax requiring emergent chest tube. Vitals quickly normalized after chest tube was placed. BAL cultures resulted 9/24 and grew E. Coli , candida, and stenotrophomonas maltophilia. A larger chest tube placed and trach were placed on 9/25. Patient required precedex  gtt for agitation; improved with adding quetiapine. Required 1.5 L crystalloid yesterday for low UOP, low BP. Also required increase in ventilator to 50% from 40% Fi.  Right lung appears fully expanded on CXR, although there is still an air leak. He continued to have ostomy output. TFs running at 10 with residuals. Thoracic surgery was consulted for the persistent air leak. The chest is sutured at the skin at 4cm and the sentinel hole is outside the chest wall on the most recent CXR. The chest tube has been minimally in the chest since placement. It was replaced. Severe respiratory decompensation started 9/29 afternoon. Right sided CT was exchanged for air leak and continuing pneumothorax (sats high 80s prior to exchange). After exchange, sats dropped to high 70s. Tube was in good position, but pneumo persisted. MAPs gradually declined, requiring the addition of levo and vaso. A second chest tube was placed in the evening, sats stayed low, so the 1st CT was removed and a 3rd was placed. Sats improved marginally to low 80s, so Eunice started at 20ppm. CXR showed reinflation of the lung. Sats increased to 88. Anterior CT no output, to suction, no air leak. Posterior CT 159cc serosanguinous output, to suction, large air leak.   Over the past 24 hours (9/29/9/30), pt has had significant decline in his pulmonary status.  Despite chest tube exchange which decompressed his pneumothorax, he had a persistent air leak with signs/ symptoms consistent with pulmonary sepsis and ARDS. Despite being on maximal vent settings 100% FiO2 and 15 of PEEP and 20 ppm Eunice, pt remained persistently hypoxic.  Called to bedside after patient became acutely hypotensive, bradycadic, which quickly became PEA arrest.  Code initiated     Despite multiple rounds of ACLS, coded was terminated at 1421 after failure to respond to multiple boluses of epinephrine, bicarb, a single cardioversion for vfib with the addition of amiodarone.     Time of death is  1421  Cause is septic shock and hypoxic respiratory failure.    Consults (From admission, onward)        Status Ordering Provider     Inpatient consult to Cardiothoracic Surgery  Once     Provider:  (Not yet assigned)    Completed ELDA VALENTINE     Inpatient consult to General surgery  Once     Provider:  (Not yet assigned)    Completed RADHA MENJIVAR III     Inpatient consult to Infectious Diseases  Once     Provider:  (Not yet assigned)    Completed KEITH OSMAN     Inpatient consult to PICC team ([facility])  Once     Provider:  (Not yet assigned)    Completed TI CARSON JR     Inpatient consult to Registered Dietitian/Nutritionist  Once     Provider:  (Not yet assigned)    Completed TI CARSON JR     Inpatient consult to Registered Dietitian/Nutritionist  Once     Provider:  (Not yet assigned)    Completed KEITH OSMAN     Inpatient consult to Vascular Surgery  Once     Provider:  (Not yet assigned)    Completed KEITH OSMAN          Significant Labs:  None    Significant Imaging:  I have reviewed all pertinent imaging results/findings within the past 24 hours.    Pending Diagnostic Studies:     Procedure Component Value Units Date/Time    CBC auto differential [[id number]] Collected:  09/20/18 1705    Order Status:  Sent Lab Status:  In process Updated:  09/20/18 1707    Specimen:  Blood     CT Chest Without Contrast [[id number]] Resulted:  09/27/18 1136    Order Status:  Sent Lab Status:  In process Updated:  09/27/18 1137    Heparin-induced platelet antibody [[id number]] Collected:  09/29/18 1025    Order Status:  Sent Lab Status:  In process Updated:  09/29/18 1030    Specimen:  Blood     X-Ray Chest 1 View [[id number]]     Order Status:  Sent Lab Status:  No result         Final Active Diagnoses:    Diagnosis Date Noted POA    PRINCIPAL PROBLEM:  Perforated diverticulum of large intestine [K57.20] 09/12/2018 Yes    Postprocedural air leak [J95.812] 09/29/2018 No    Wound dehiscence  [T81.30XA] 2018 Yes    Aspiration pneumonia of right lower lobe due to gastric secretions [J69.0]  Unknown    Ischemia of digits of hand [I99.8] 2018 Unknown    Tachycardia [R00.0]  Unknown    Hypoglycemia [E16.2]  Unknown    Acute blood loss anemia [D62]  No    Acute hypoxemic respiratory failure [J96.01]  No    Gastric ulcer with hemorrhage [K25.4]  No    Acute respiratory distress [R06.03] 2018 Unknown    Pressure injury of skin and subcutaneous tissue, stage 3 [L89.93] 2018 Yes    Alteration in skin integrity [R23.9] 2018 Yes      Problems Resolved During this Admission:       Discharged Condition:     Disposition:     Follow Up:  Follow-up Information     Judd Agudelo MD.    Specialty:  General Surgery  Contact information:  [address] [facility] [address]  [phone]                 Patient Instructions:      Lifting restrictions   Order Comments: Do not lift anything greater than 10-15 lbs for 6 weeks     No dressing needed     Notify your health care provider if you experience any of the following:  increased confusion or weakness     Notify your health care provider if you experience any of the following:  persistent dizziness, light-headedness, or visual disturbances     Notify your health care provider if you experience any of the following:  worsening rash     Notify your health care provider if you experience any of the following:  severe persistent headache     Notify your health care provider if you experience any of the following:  difficulty breathing or increased cough     Notify your health care provider if you experience any of the following:  severe uncontrolled pain     Notify your health care provider if you experience any of the following:  redness, tenderness, or signs of infection (pain, swelling, redness, odor or green/yellow discharge around incision site)     Notify your health care provider if you experience any of  the following:  persistent nausea and vomiting or diarrhea     Notify your health care provider if you experience any of the following:  temperature >100.4     Activity as tolerated     Medications:  None (patient  at medical facility)    Antonieta Grajeda MD  Critical Care - Surgery  [facility]

## 2018-09-30 NOTE — PROGRESS NOTES
[facility]  General Surgery  Progress Note    Subjective:     History of Present Illness:  No notes on file    Post-Op Info:  Procedure(s) (LRB):  LAPAROTOMY,EXPLORATORY (N/A)  GASTROSTOMY (N/A)  EGD (ESOPHAGOGASTRODUODENOSCOPY) (N/A)   15 Days Post-Op     Interval History: Respiratory status declining despite maximal vent support    Medications:  Continuous Infusions:   dexmedetomidine (PRECEDEX) infusion 1 mcg/kg/hr (09/30/18 0854)    nitric oxide gas      norepinephrine bitartrate-D5W 0.02 mcg/kg/min (09/30/18 0800)    TPN ADULT CENTRAL LINE CUSTOM 50 mL/hr at 09/30/18 0800    vasopressin (PITRESSIN) infusion 0.04 Units/min (09/30/18 0800)     Scheduled Meds:   albuterol-ipratropium  3 mL Nebulization Q4H    cefTRIAXone (ROCEPHIN) IVPB  2 g Intravenous Q24H    chlorhexidine  15 mL Mouth/Throat BID    fluconazole (DIFLUCAN) IVPB  400 mg Intravenous Q24H    pantoprozole (PROTONIX) IV  40 mg Intravenous Q12H    QUEtiapine  25 mg Per G Tube BID    sodium chloride 0.9%  3 mL Intravenous Q8H     PRN Meds:sodium chloride, dextrose 50%, dextrose 50%, dextrose 50%, glucagon (human recombinant), glucose, glucose, insulin aspart U-100, omnipaque, ondansetron, oxyCODONE, potassium chloride in water, potassium chloride in water, potassium chloride in water, sodium chloride 0.9%, sodium chloride 0.9%, sodium phosphate IVPB, sodium phosphate IVPB, sodium phosphate IVPB     Review of patient's allergies indicates:  No Known Allergies  Objective:     Vital Signs (Most Recent):  Temp: 97.8 °F (36.6 °C) (09/30/18 0700)  Pulse: 85 (09/30/18 1000)  Resp: (!) 36 (09/30/18 0712)  BP: 90/62 (09/30/18 0900)  SpO2: 98 % (09/30/18 1000) Vital Signs (24h Range):  Temp:  [97.4 °F (36.3 °C)-97.8 °F (36.6 °C)] 97.8 °F (36.6 °C)  Pulse:  [] 85  Resp:  [26-39] 36  SpO2:  [37 %-100 %] 98 %  BP: ()/(53-70) 90/62  Arterial Line BP: ()/(33-83) 88/80     Weight: 72.3 kg (159 lb 6.3 oz)  Body mass  index is 25.73 kg/m².    Intake/Output - Last 3 Shifts       09/28 0700 - 09/29 0659 09/29 0700 - 09/30 0659 09/30 0700 - 10/01 0659    I.V. (mL/kg) 1351 (18.7) 1103 (15.3)     NG/ 90     TPN 2170 1407     Total Intake(mL/kg) 3671 (50.8) 2600 (36)     Urine (mL/kg/hr) 657 (0.4) 491 (0.3) 15 (0.1)    Drains 1200 875     Stool 50 110     Chest Tube 70 164 35    Total Output 1977 1640 50    Net +1694 +960 -50                 Physical Exam   Chest tube in ok location with air leak still present    Significant Labs:  CBC:   Recent Labs   Lab  09/30/18   0358   WBC  18.91*   RBC  2.86*   HGB  9.1*   HCT  27.2*   PLT  28*   MCV  95   MCH  31.8*   MCHC  33.5     CMP:   Recent Labs   Lab  09/30/18   0358   GLU  133*   CALCIUM  8.7   ALBUMIN  0.8*   PROT  4.4*   NA  137   K  4.8   CO2  19*   CL  109   BUN  35*   CREATININE  0.7   ALKPHOS  183*   ALT  23   AST  38   BILITOT  0.3     ABGs:   Recent Labs   Lab  09/30/18   0941   PH  7.091*   PCO2  77.1*   PO2  74*   HCO3  23.4*   POCSATURATED  87*   BE  -6       Significant Diagnostics:  CXR and CT reviewed: Lung is expanded.  Possible cavitary lesions    Assessment/Plan:     * Perforated diverticulum of large intestine    62 year old man with perforated diverticulitis and free air s/p Tash's procedure 9/12/18     Neuro:  Sedation: Propofol gtt, wean to off this morning for SBT   Patient continues to open eyes, but per nursing reports not following commands     CV:  - HDS; off levo and vaso gtt  - EKG post code with sinus tachycardia, no troponin bump  - A line 9/15 and central line IJ placed 9/16  - continue flotrac, SVV improved    Pulm:  - intubated in severe ARDS after likely aspiration with respiratory code  - wean vent as tolerated per ARDSnet protocol, current settings Fi O2 40%, PEEP5  - ABG q6  - CXR showing bilateral diffuse infiltrates, no significant change from 9/16. Daily while intubated.  - SBT today     GI/FEN/Lytes:  - NPO  - continue NG  - Patient has  PEG in remnant stomach- cont to gravity, flushing q6 hours, if blood from PEG concern for re-bleed of ulcer- monitor  - cont protonix gtt  - CMP, Mg, Phos qday  - replace lytes PRN per ICU protocol  - holding TFs this morning while attempting SBT/possible extubation     Renal:  - monitor BUN/Cr (baseline 0.8)  - monitor UOP     Heme/ID:  - H/H stable since OR, will space labs to daily   - transfused 9/15 3U prbcs, 2 ffp, plt, cryo for acute GI bleed  - peritoneal cx growing Bacteroides and E. Coli  - Zosyn (day 4)  - lactate wnl  - f/u repeat Blood/Urine cx 9/15     Endo:  SSI     PPX: Lovenox    Dispo: continue SICU care, wean vent with possible extubation based on SBT, monitor neuro status        Postprocedural air leak    Chest tubes replaced overnight  Keep to suction  Air leak is likely from cavity lesion.  Lung appears expanded on CXR  Prognosis is poor given pulmonary septic shock and near maximal vent settings with elevated PEEP and persistent air leak.  Will follow            Dwaine Pugh MD  General Surgery  [facility]

## 2018-09-30 NOTE — PROCEDURES
"Amari Saez is a 62 y.o. male patient.    Temp: 97.4 °F (36.3 °C) (09/29/18 2000)  Pulse: 100 (09/29/18 2130)  Resp: (!) 38 (09/29/18 2038)  BP: (!) 85/57 (09/29/18 2100)  SpO2: (!) 92 % (09/29/18 2130)  Weight: 72.3 kg (159 lb 6.3 oz) (09/27/18 0300)  Height: 5' 6" (167.6 cm) (09/26/18 0300)       Chest Tube Insertion  Date/Time: 9/29/2018 9:58 PM  Location procedure was performed: [facility] CRITICAL CARE SURGERY  Performed by: Elkin Monahan MD  Authorized by: Elkin Monahan MD   Post-operative diagnosis: Pneumothorax  Pre-operative diagnosis: Penumothorax  Consent Done: Emergent Situation  Indications: tension pneumothorax  Patient sedated: yes  Sedation type: moderate (conscious) sedation  (See MAR for exact dosages of medications).  Analgesia: fentanyl    Anesthesia:  Local Anesthetic: lidocaine 1% with epinephrine  Anesthetic total: 5 mL  Preparation: skin prepped with ChloraPrep  Placement location: right lateral  Scalpel size: 15  Tube size: 28 Uzbek  Dissection instrument: finger and [place] clamp  Ultrasound guidance: no  Tension pneumothorax heard: yes  Tube connected to: suction  Drainage characteristics: bloody  Drainage amount: 10 ml  Suture material: 0 silk  Dressing: 4x4 sterile gauze and petrolatum-impregnated gauze  Post-insertion x-ray findings: tube in good position  Patient tolerance: Patient tolerated the procedure well with no immediate complications  Complications: No  Estimated blood loss (mL): 5  Specimens: No  Implants: No        Elkin Monahan MD  [facility] General [facility] PGY-II  Pager: [phone]    "

## 2018-09-30 NOTE — ASSESSMENT & PLAN NOTE
Chest tubes replaced overnight  Keep to suction  Air leak is likely from cavity lesion.  Lung appears expanded on CXR  Prognosis is poor given pulmonary septic shock and near maximal vent settings with elevated PEEP and persistent air leak.  Will follow

## 2018-09-30 NOTE — PROGRESS NOTES
[facility]  Critical Care - Surgery  Progress Note    Patient Name: Amari Seaz  MRN: [id number]  Admission Date: 9/12/2018  Hospital Length of Stay: 18 days  Code Status: Full Code  Attending Provider: Judd Agudelo MD  Primary Care Provider: Chaim Valdovinos MD   Principal Problem: Perforated diverticulum of large intestine    Subjective:     Hospital/ICU Course:  Admitted with perforated diverticulitis (free intraperitoneal air on admission CXR). S/p emergent Tash procedure on 9/12. On 9/14, a rapid response was called after pt became hypoxic, tachycardic, and tachypneic on the floor. He was placed on a venti mask however pt further decompensated and became pulseless. Code blue was called where pt was intubated, chest compressions were performed, and epi was given x2 where ROSC was attained. Pt was subsequently transferred to SICU for higher level of care. On 9/15, Hgb went from 7.8 to 4.3, then 3.9 on repeat. Pt was taken emergently to OR for ex-lap with intraop endoscopic control of bleeding with hemostatic clip in remnant stomach. He was given 3u pRBCs, 2U FFP prior to/in OR. Patient was extubated on 9/17, but had to be re-intubated on the same day due to significant respiratory failure. CXR with bilateral infiltrates (R>L). Patient was intermittently on pressors. Left radial arterial line placed on 09/14/18 and removed 09/20/18 due to malfunction. Vascular surgery consulted for left thumb cyanosis and signs of ischemia. Patient was started on heparin gtt, then transitioned to therapeutic enoxaparin. Respiratory cultures growing E. Coli. On 9/22, bronchoscopy was done and BAL cultures sent. Patient had episodes of hypotension requiring volume resuscitation with good response (500cc albumin, ~6L crystalloids, and 2u PRBC). On 9/24, he acutely decompensated and required an increase in vent settings. CXR done at that time showed large R tension pneumothorax requiring emergent chest tube.  Vitals quickly normalized after chest tube was placed. BAL cultures resulted 9/24 and grew E. Coli , candida, and stenotrophomonas maltophilia. Trach placed on 9/25. Patient required precedex gtt for agitation; improved with adding quetiapine.    Interval History/Significant Events: Severe respiratory decompensation started yesterday afternoon. Right sided CT was exchanged for air leak and continuing pneumothorax (sats high 80s prior to exchange). After exchange, sats dropped to high 70s. Tube was in good position, but pneumo persisted. MAPs gradually declined, requiring the addition of levo and vaso. A second chest tube was placed in the evening, sats stayed low, so the 1st CT was removed and a 3rd was placed. Sats improved marginally to low 80s, so Eunice started at 20ppm. CXR showed reinflation of the lung. Sats increased to 88. Anterior CT no output, to suction, no air leak. Posterior CT 159cc serosanguinous output, to suction, large air leak.     Overnight, pressor requirements came down; now at levo of 0.02 and vaso 0.04. No stoma output. UOP trending down (5-25cc/hr). Tube feeds held.     Follow-up For: Procedure(s) (LRB):  LAPAROTOMY,EXPLORATORY (N/A)  GASTROSTOMY (N/A)  EGD (ESOPHAGOGASTRODUODENOSCOPY) (N/A)    Post-Operative Day: 15 Days Post-Op    Objective:     Vital Signs (Most Recent):  Temp: 97.8 °F (36.6 °C) (09/30/18 0700)  Pulse: 85 (09/30/18 0715)  Resp: (!) 36 (09/30/18 0712)  BP: 91/63 (09/30/18 0400)  SpO2: 99 % (09/30/18 0715) Vital Signs (24h Range):  Temp:  [97.4 °F (36.3 °C)-97.8 °F (36.6 °C)] 97.8 °F (36.6 °C)  Pulse:  [] 85  Resp:  [26-39] 36  SpO2:  [37 %-100 %] 99 %  BP: ()/(53-70) 91/63  Arterial Line BP: ()/(33-83) 93/68     Weight: 72.3 kg (159 lb 6.3 oz)  Body mass index is 25.73 kg/m².      Intake/Output Summary (Last 24 hours) at 9/30/2018 0803  Last data filed at 9/30/2018 0700  Gross per 24 hour   Intake 2580 ml   Output 1555 ml   Net 1025 ml       Physical Exam    Constitutional: He appears well-developed. No distress.   HENT:   Head: Normocephalic and atraumatic.   Trach in place   Eyes:   Pupils are unequal (L>R), but both reactive to light   Neck: Neck supple.   Cardiovascular: Normal rate, regular rhythm and intact distal pulses.   No murmur heard.  Pulmonary/Chest: Effort normal. He has wheezes. He exhibits no tenderness.   VC+ 100% FiO2, PEEP 12  Anterior chest tube, no leak, to suction  Posterior chest tube, large air leak, to suction   Abdominal: Soft. He exhibits no distension.   Stoma dark and retracted. No stool output.    Musculoskeletal: He exhibits edema.   Neurological:   Moves all extremities  Withdraws to pain   Skin: Skin is warm.   R thumb with distal ischemic changes.   Blisters present on ventral R wrist    Psychiatric: He has a normal mood and affect.   Nursing note and vitals reviewed.      Vents:  Vent Mode: A/C (09/30/18 0700)  Ventilator Initiated: Yes (09/14/18 2114)  Set Rate: 30 bmp (09/30/18 0700)  Vt Set: 450 mL (09/30/18 0700)  Pressure Support: 10 cmH20 (09/22/18 0544)  PEEP/CPAP: 15 cmH20 (09/30/18 0700)  Oxygen Concentration (%): 100 (09/30/18 0715)  Peak Airway Pressure: 33 cmH2O (09/30/18 0700)  Plateau Pressure: 0 cmH20 (09/30/18 0700)  Total Ve: 10.2 mL (09/30/18 0700)  Negative Inspiratory Force (cm H2O): 10 (09/23/18 0335)  F/VT Ratio<105 (RSBI): (!) 74.22 (09/30/18 0320)    Lines/Drains/Airways     Central Venous Catheter Line                 Percutaneous Central Line Insertion/Assessment - triple lumen  09/22/18 1720 right internal jugular 7 days          Drain                 Colostomy 09/12/18 LLQ 18 days         Gastrostomy/Enterostomy 09/15/18 1621 Gastrostomy tube w/o balloon LUQ decompression 14 days         Open Drain 09/22/18 Anterior;Right;Ventral Groin Other (see comments) 8 days         Urethral Catheter 09/27/18 2330 Latex 2 days         Chest Tube 09/29/18 1900 Anterior Pleural less than 1 day         Chest Tube  09/29/18 2030 1 Posterior Pleural less than 1 day          Airway                 Surgical Airway 09/25/18 1330 Shiley Cuffed 4 days          Arterial Line                 Arterial Line 09/22/18 1340 Left Femoral 7 days          Pressure Ulcer                 Pressure Injury 09/13/18 1534 lower Coccyx Unstageable 16 days          Peripheral Intravenous Line                 Midline Catheter Insertion/Assessment  - Single Lumen 09/13/18 1748 Right brachial vein 20g x 10cm 16 days                Significant Labs:    CBC/Anemia Profile:  Recent Labs   Lab  09/29/18   1847  09/29/18 2012 09/30/18   0358   WBC  21.39*  23.62*  18.91*   HGB  9.1*  9.2*  9.1*   HCT  27.7*  28.5*  27.2*   PLT  33*  38*  28*   MCV  93  95  95   RDW  17.9*  18.0*  18.3*        Chemistries:  Recent Labs   Lab  09/29/18   0348  09/29/18 2012 09/30/18   0358   NA  139  140  137   K  4.3  4.3  4.8   CL  112*  111*  109   CO2  20*  24  19*   BUN  27*  31*  35*   CREATININE  0.6  0.6  0.7   CALCIUM  8.5*  8.5*  8.7   ALBUMIN  0.9*  0.9*  0.8*   PROT  4.1*  3.9*  4.4*   BILITOT  0.3  0.3  0.3   ALKPHOS  204*  225*  183*   ALT  24  24  23   AST  28  31  38   MG  1.7  2.5  2.9*   PHOS  3.6  5.5*  5.9*       ABGs:   Recent Labs   Lab  09/30/18   0449   PH  7.180*   PCO2  60.4*   HCO3  22.5*   POCSATURATED  88*   BE  -6       Significant Imaging:  CXR: I have reviewed all pertinent results/findings within the past 24 hours and my personal findings are:  chest tubes in good position.  Right pneumothorax is essentially resolved, status post chest tube replacement.  Mild degree of new right chest wall subcutaneous air.  No mediastinal shift or left-sided pneumothorax seen.  Otherwise, unchanged radiographic appearance of the chest compared to 19:08.    Assessment/Plan:     * Perforated diverticulum of large intestine    Neuro:  - Sedation: Precedex gtt  - Agitation and delirium: quetiapine 25mg BID  - Pain: fentanyl PRN    Pulm:  - s/p tracheostomy on  9/25  - AC/VC+ 400/30/15/100%  - started Eunice 7/29, 20ppm  - s/p CT replacement, interval removal, and placement of two new chest tubes 9/29  - Anterior and Posterior CT to suction, air leak in posterior tube  - Daily CXR  - Daily SBT   - Trend ABG:  Recent Labs   Lab  09/30/18   0449   PH  7.180*   PCO2  60.4*   PO2  69*   HCO3  22.5*   POCSATURATED  88*   BE  -6     CV:  - Wean pressors as tolerated: Levo @ 0.02, Vaso @ 0.04  - STAT 2D ECHO  - Requiring IV fluid boluses intermittently due to hypovolemia    GI/FEN/Lytes:  - On TPN with trickle tube feeds through G tube   - Holding tube feeds for very high residuals. G tube to gravity.   - Trend CMP  - Replace lytes PRN    Renal:  - BUN/Cr stable at 35/0.7  - Monitor UOP (451cc over 24 hrs)     Heme:  - Hgb stable at 9.1  - Therapeutic lovenox started for right hand ischemia, but held 9/26 secondary to trach oozing  - Restarted lovenox on 9/27    ID:  - Afebrile, WBC 18.9 (23.6)  - Blood Cx NGTD  - CT Chest/Abdomen w/ contrast 9/20: bilateral lung consolidation noted concerning for infection or an aspiration   - Recent BAL (9/22) E. Coli, [place], stenotrophomonas maltophilia   - Abx: ceftriaxone for VAP with E. Coli, fluconazole for candida in peritoneal fluid   - Appreciate ID input  - Repeat CT chest on 9/27, per ID, for concern of empyema secondary to purulent chest tube output; it showed multifocal new cavitary leasions     Endo:  - Monitor for hypoglycemia  - Blood glucose better with TPN       PPx:   - PPI  - Enoxaparin       Dispo:   - Cont SICU care  - Primary: General Surgery             Critical care was time spent personally by me on the following activities: development of treatment plan with patient or surrogate and bedside caregivers, discussions with consultants, evaluation of patient's response to treatment, examination of patient, ordering and performing treatments and interventions, ordering and review of laboratory studies, ordering and review of  radiographic studies, pulse oximetry, re-evaluation of patient's condition.  This critical care time did not overlap with that of any other provider or involve time for any procedures.     Nicole Shea MD  Critical Care - Surgery  [facility]

## 2018-09-30 NOTE — ASSESSMENT & PLAN NOTE
62 year old man with perforated diverticulitis and free air s/p Tash's procedure 9/12/18    Neuro:  Precedex         Resp: Maintain chest tube to suction (still has air leak)  CT shows worsening PNA- cavitary lesions in lungs, will discuss with ID if there is a need to further investigate this   Daily CXR  Continue tx of PNA  F/u repeat BAL  Continue ventilatory support  Vent Mode: A/C  Oxygen Concentration (%):  [] 100  Resp Rate Total:  [25 br/min-58 br/min] 37 br/min  Vt Set:  [350 mL-450 mL] 450 mL  PEEP/CPAP:  [5 cmH20-15 cmH20] 15 cmH20  Mean Airway Pressure:  [10 uuV23-54 cmH20] 23 cmH20  Wean Eunice and vent as tolerated  Continuous pulse Ox  ABG PRN    CV: Pulm HTN and right heart failure.  Good LVEF.    Required vasopressor support overnight, wean levo and vaso off as tolerated      Renal: Cr stable, UOP dropping off, continue to monitor   [place] for urinary retention     Abd/FEN/GI:Holding TFs given clinical worsening requiring pressors  Continue TPN in the mean time   Ostomy without output today  Will hold off on J tube placement given acute worsening      Heme/ID:   Continue BID protonix for previous GIB (no evidence of continued bleeding per G tube)  Holding therapeutic lovenox for radial artery occlusion   PNA with E coli, candida and stenotrophomonas. Continue flucanozole as per ID recs. Antibiotics broadened to Vanc/Zosyn  F/U C. Diff    Dispo: Continue critical care.  Will have a family discussion about palliative care this week as at this point with worsening respiratory status despite ventilatory support he has a poor prognosis.

## 2018-10-01 ENCOUNTER — ANESTHESIA (OUTPATIENT)
Dept: SURGERY | Facility: HOSPITAL | Age: 63
End: 2018-10-01

## 2018-10-01 LAB
ALLENS TEST: ABNORMAL
DELSYS: ABNORMAL
HCO3 UR-SCNC: 66.1 MMOL/L (ref 24–28)
HCT VFR BLD CALC: 19 %PCV (ref 36–54)
HEPARIN INDUCED THROMBOCYTOPENIA: NORMAL
PCO2 BLDA: 74.7 MMHG (ref 35–45)
PH SMN: 7.55 [PH] (ref 7.35–7.45)
PO2 BLDA: 54 MMHG (ref 80–100)
POC BE: >30 MMOL/L
POC IONIZED CALCIUM: 0.85 MMOL/L (ref 1.06–1.42)
POC SATURATED O2: 89 % (ref 95–100)
POC TCO2: >50 MMOL/L (ref 23–27)
POTASSIUM BLD-SCNC: 4.7 MMOL/L (ref 3.5–5.1)
SAMPLE: ABNORMAL
SITE: ABNORMAL
SODIUM BLD-SCNC: 163 MMOL/L (ref 136–145)

## 2018-10-02 NOTE — PHYSICIAN QUERY
PT Name: Amari Saez  MR #: [id number]     Physician Query Form - Documentation Clarification      CDS: Yesika Wolfe RN     Contact information:  [email or web address]    Phone: [phone]  This form is a permanent document in the medical record.     Query Date: October 2, 2018    By submitting this query, we are merely seeking further clarification of documentation. Please utilize your independent clinical judgment when addressing the question(s) below.    The Medical record reflects the following:    Supporting Clinical Findings Location in Medical Record       Heart size and pulmonary vessels are normal.  There are some cystic lucencies in the right mid and lower lung field likely with some fluid.  Left lung is clear.  The most significant finding is the presence of a large amount of free intraperitoneal air.     CONCLUSIONS     1 - Normal left ventricular systolic function (EF 55-60%).     2 - Right ventricle is upper limit of normal in size with severely depressed systolic function.     3 - Trivial tricuspid regurgitation.     4 - Pulmonary hypertension. The estimated PA systolic pressure is greater than 52 mmHg.     5 - Small pericardial effusion without evidence of tamponade.     6 - Ascites is noted.      Positive for leg swelling.     Will repeat echo to re-eval prior right heart failure    furosemide (LASIX) 10 mg/mL injection   Start: 09/14/18 1852 End: 09/15/18 0659   Admin Instructions:  Created by cabinet override        furosemide injection 20 mg   Dose: 20 mg  Freq: 2 times daily Route: IV  Start: 09/19/18 0600 End: 09/19/18 1531        furosemide injection 20 mg   Dose: 20 mg  Freq: 2 times daily Route: IV  Start: 09/18/18 1800 End: 09/19/18 0556        furosemide injection 40 mg   Dose: 40 mg  Freq: 2 times daily Route: IV  Start: 09/22/18 0915 End: 09/22/18 2113        furosemide injection 40 mg   Dose: 40 mg  Freq: 2 times daily Route: IV  Start: 09/19/18 1800 End: 09/20/18 2872                      CXR 9/12/18              ECHO 9/18/18                     ED Note 9/12/18     PN 9/23/18     MAR           MAR           MAR           MAR           MAR                                                                                      Doctor, please specify the acuity of the diagnosis  associated with above clinical findings.    Provider Use Only        [  ] Acute Right sided heart failure      [  ] Acute on chronic right sided heart failure      [  ] Chronic right sided heat failure      [  ] Other _________________________                                                                                                                   [x  ] Clinically undetermined

## 2018-10-03 LAB
BACTERIA BAL AEROBE CULT: NORMAL
BACTERIA BAL AEROBE CULT: NORMAL
GRAM STN SPEC: NORMAL

## 2018-10-09 LAB — FUNGUS SPEC CULT: NORMAL

## 2018-10-09 NOTE — PHYSICIAN QUERY
PT Name: Amari Saez  MR #: [id number]     Physician Query Form - Documentation Clarification      CDS/: Brandy E Capley               Contact information:  Spectralink:  [phone]    This form is a permanent document in the medical record.     Query Date: October 9, 2018    By submitting this query, we are merely seeking further clarification of documentation. Please utilize your independent clinical judgment when addressing the question(s) below.    The Medical record reflects the following:    Supporting Clinical Findings Location in Medical Record     On 9/14, a rapid response was called after pt became hypoxic, tachycardic, and tachypneic on the floor. He was placed on a venti mask however pt further decompensated and became pulseless. Code blue was called where pt was intubated, chest compressions were performed, and epi was given x2 where ROSC was attained.       Discharge summary 9/30     At this point in time code is suspected to be of respiratory origin (ARDS from acute illness?).      Plastic surgery significant event 9/14                                                                            Doctor, Please specify diagnosis or diagnoses associated with above clinical findings.    Provider Use Only       (X)  Cardiac arrest (please specify etiology if known): Postoperative development of SIRS in a recently diagnosed COPD with systemic hypertension patient with a known history of ethanol and tobacco abuse resulting in cardiomyopathy (history of progressive peripheral edema) who subsequently developed a pneumoperitoneum from diverticulitis requiring urgent exploratory surgery. As his SIRS postoperatively progressed, increasing work of breathing, he developed sudden onset of SVT probably due to hypoxemia leading to tissue hypoxia (lactic acidosis) resulting in cardiovascular collapse. He quickly responded to CPR which suggests that he was unable to support his work of breathing. His inability to provide  adequate alveolar gas exchange led to his respiratory (low pulse oximetry readings) then circulatory collapse in a witnessed environment.       (  )  Other (please specify):  ____________________________                                                                                                               [  ] Clinically undetermined

## 2020-09-05 NOTE — HPI
Mr. Amari Saez is a 63 yo male with a PMHx of HTN, Gastric bypass, tobacco abuse presents to the ED on 9/12 with SOB and Abdominal pain. He was found to have free air within his abdomen which was caused by a perforated diverticulitis and was brought into the OR for surgical evaluation where a Tash procedure was performed. On 9/14, a rapid response was called after pt became hypoxic, tachycardic, and tachypneic on the floor. He was placed on a venti mask however pt further decompensated and became pulseless. Code blue was called where pt was intubated, chest compressions were performed, and epi was given x2 where ROSC was attained. Pt was subsequently transferred to SICU for higher level of care. On 9/15, Hgb went from 7.8 to 4.3, then 3.9 on repeat. Pt was taken emergently to OR for ex-lap with intraop endoscopic control of bleeding with hemostatic clip in remnant stomach. He was given 3u pRBCs, 2U FFP prior to/in OR. Patient was extubated on 9/17, but had to be re-intubated on the same day due to significant respiratory failure. CXR with bilateral infiltrates (R>L). Patient was intermittently on pressors. Left radial arterial line placed on 09/14/18 and removed 09/20/18 due to malfunction. Patient has been intubated since.  On 9/22, bronchoscopy was done and BAL cultures sent which grew E. Coli, Candida and Sternotrophomin maltophilia.        No

## 2022-10-25 NOTE — SUBJECTIVE & OBJECTIVE
Interval History/Significant Events: NAEON. Patient is pleasant this morning. Agitation and delirium improved significantly after adding quetiapine. UOP acceptable. Stable on PAV+ with trach.      LAPAROTOMY,EXPLORATORY (N/A Abdomen)   COLECTOMY,SIGMOID (Abdomen)   COLOSTOMY (Abdomen)  (Tash's)  9/12    LAPAROTOMY,EXPLORATORY (N/A)  GASTROSTOMY (N/A)  EGD (ESOPHAGOGASTRODUODENOSCOPY) (N/A)  9/15    Tracheostomy  9/25    Objective:     Vital Signs (Most Recent):  Temp: 97.4 °F (36.3 °C) (09/29/18 0700)  Pulse: 79 (09/29/18 0745)  Resp: (!) 25 (09/29/18 0733)  BP: (!) 103/58 (09/28/18 0700)  SpO2: (!) 92 % (09/29/18 0745) Vital Signs (24h Range):  Temp:  [97.4 °F (36.3 °C)-99.1 °F (37.3 °C)] 97.4 °F (36.3 °C)  Pulse:  [] 79  Resp:  [25-33] 25  SpO2:  [67 %-100 %] 92 %  Arterial Line BP: ()/(47-70) 95/50     Weight: 72.3 kg (159 lb 6.3 oz)  Body mass index is 25.73 kg/m².      Intake/Output Summary (Last 24 hours) at 9/29/2018 0838  Last data filed at 9/29/2018 0700  Gross per 24 hour   Intake 3661 ml   Output 1992 ml   Net 1669 ml       Physical Exam   Constitutional: He appears well-developed. No distress.   HENT:   Head: Normocephalic and atraumatic.   Trach in place   Eyes:   Pupils are unequal (L>R), but both reactive to light   Neck: Neck supple.   Cardiovascular: Normal rate, regular rhythm and intact distal pulses.   No murmur heard.  Pulmonary/Chest: Effort normal. He has wheezes. He exhibits no tenderness.   Abdominal: Soft. He exhibits no distension.   Musculoskeletal: He exhibits edema.   Neurological:   Moves all extremities  Withdraws to pain   Skin: Skin is warm.   R thumb with distal ischemic changes.   Blisters present on ventral R wrist    Psychiatric: He has a normal mood and affect.   Nursing note and vitals reviewed.      Vents:  Vent Mode: Spont (09/29/18 0734)  Ventilator Initiated: Yes (09/14/18 2114)  Set Rate: 28 bmp (09/26/18 1621)  Vt Set: 350 mL (09/26/18 1621)  Pressure  Support: 10 cmH20 (09/22/18 0544)  PEEP/CPAP: 5 cmH20 (09/29/18 0734)  Oxygen Concentration (%): 60 (09/29/18 0745)  Peak Airway Pressure: 19 cmH2O (09/29/18 0734)  Plateau Pressure: 0 cmH20 (09/29/18 0734)  Total Ve: 12.4 mL (09/29/18 0734)  Negative Inspiratory Force (cm H2O): 10 (09/23/18 0335)  F/VT Ratio<105 (RSBI): (!) 48.41 (09/28/18 1651)    Lines/Drains/Airways     Central Venous Catheter Line                 Percutaneous Central Line Insertion/Assessment - triple lumen  09/22/18 1720 right internal jugular 6 days          Drain                 Colostomy 09/12/18 LLQ 17 days         Gastrostomy/Enterostomy 09/15/18 1621 Gastrostomy tube w/o balloon LUQ decompression 13 days         Open Drain 09/22/18 Anterior;Right;Ventral Groin Other (see comments) 7 days         Chest Tube 09/25/18 0800 1 Right Pleural 4 days         Urethral Catheter 09/27/18 2330 Latex 1 day          Airway                 Surgical Airway 09/25/18 1330 Shiley Cuffed 3 days          Arterial Line                 Arterial Line 09/22/18 1340 Left Femoral 6 days          Pressure Ulcer                 Pressure Injury 09/13/18 1534 lower Coccyx Stage 3 15 days          Peripheral Intravenous Line                 Midline Catheter Insertion/Assessment  - Single Lumen 09/13/18 1748 Right brachial vein 20g x 10cm 15 days                Significant Labs:    CBC/Anemia Profile:  Recent Labs   Lab  09/28/18   0414  09/29/18   0348   WBC  35.93*  41.90*   HGB  8.9*  8.8*   HCT  26.7*  25.5*   PLT  71*  48*   MCV  90  90   RDW  17.0*  17.2*        Chemistries:  Recent Labs   Lab  09/28/18   0414  09/29/18   0348   NA  137  139   K  3.9  4.3   CL  111*  112*   CO2  20*  20*   BUN  25*  27*   CREATININE  0.7  0.6   CALCIUM  8.5*  8.5*   ALBUMIN  1.1*  0.9*   PROT  4.0*  4.1*   BILITOT  0.5  0.3   ALKPHOS  144*  204*   ALT  28  24   AST  31  28   MG  1.6  1.7   PHOS  2.4*  3.6       All pertinent labs within the past 24 hours have been  reviewed.      Significant Imaging:  I have reviewed and interpreted all pertinent imaging results/findings within the past 24 hours.   Hemigard Intro: Due to skin fragility and wound tension, it was decided to use HEMIGARD adhesive retention suture devices to permit a linear closure. The skin was cleaned and dried for a 6cm distance away from the wound. Excessive hair, if present, was removed to allow for adhesion.

## 2022-12-03 NOTE — NURSING
Dr. Smith aware of TM=649g X1 hr; assessed pt. Fentanyl gtt recently increased for agitation; will monitor X1 hour and reassess.   No

## 2023-08-07 NOTE — SUBJECTIVE & OBJECTIVE
Medications:  Continuous Infusions:   dexmedetomidine (PRECEDEX) infusion 0.2 mcg/kg/hr (09/21/18 0800)    fentanyl Stopped (09/20/18 0740)    heparin (porcine) in D5W Stopped (09/21/18 0740)    propofol 35 mcg/kg/min (09/21/18 0902)     Scheduled Meds:   albuterol-ipratropium  3 mL Nebulization Q4H    chlorhexidine  15 mL Mouth/Throat BID    nitroGLYCERIN 2% TD oint  2 inch Topical (Top) Q6H    pantoprozole (PROTONIX) IV  40 mg Intravenous Q12H    piperacillin-tazobactam (ZOSYN) IVPB  4.5 g Intravenous Q8H    propofol        sodium chloride 0.9%  3 mL Intravenous Q8H     PRN Meds:sodium chloride, dextrose 50%, dextrose 50%, dextrose 50%, fentaNYL, glucagon (human recombinant), glucose, glucose, heparin (PORCINE), heparin (PORCINE), insulin aspart U-100, magnesium sulfate IVPB, magnesium sulfate IVPB, ondansetron, potassium chloride in water, potassium chloride in water, potassium chloride in water, sodium chloride 0.9%, sodium phosphate IVPB, sodium phosphate IVPB, sodium phosphate IVPB     Objective:     Vital Signs (Most Recent):  Temp: (!) 95.5 °F (35.3 °C) (09/21/18 0942)  Pulse: 66 (09/21/18 0942)  Resp: (!) 22 (09/21/18 0942)  BP: 103/66 (09/21/18 0900)  SpO2: 96 % (09/21/18 0942) Vital Signs (24h Range):  Temp:  [95.5 °F (35.3 °C)-99.1 °F (37.3 °C)] 95.5 °F (35.3 °C)  Pulse:  [] 66  Resp:  [16-54] 22  SpO2:  [90 %-100 %] 96 %  BP: ()/() 103/66     Date 09/21/18 0700 - 09/22/18 0659   Shift 2628-6352 9707-2046 4349-9660 24 Hour Total   INTAKE   I.V.(mL/kg) 53(0.7)   53(0.7)   NG/GT 40   40   Shift Total(mL/kg) 93(1.3)   93(1.3)   OUTPUT   Urine(mL/kg/hr) 135   135   Drains 40   40   Stool 10   10   Shift Total(mL/kg) 185(2.5)   185(2.5)   Weight (kg) 73.2 73.2 73.2 73.2       Physical Exam   General appearance:          Intubated              Neurological:             CAMP                            Neck:            Supple, no significant adenopathy; thyroid is not enlarged        CC:  Tiffanie CAITIE McfarlandReis is here today for Office Visit and Pre-Op Exam     Medications: medications verified and updated  Refills needed today?  NO  denies Latex allergy or sensitivity  Patient would like communication of their results via:      Cell Phone:   Telephone Information:   Mobile [phone]     Okay to leave a message containing results? Yes  Tobacco history: verified  Patient's current [facility] status: Active.    Pharmacy Verified?  Yes    Health Maintenance Due   Topic Date Due   • Shingles Vaccine (1 of 2) Never done   • DTaP/Tdap/Td Vaccine (1 - Tdap) 08/26/2009   • COVID-19 Vaccine (4 - Pfizer series) 06/03/2022   • Hepatitis B Vaccine (For Physician/APC Discussion) (2 of 3 - 19+ 3-dose series) 11/11/2022   • Medicare Advantage- Medicare Wellness Visit  01/01/2023   • Diabetes Foot Exam  03/23/2023       Patient is due for topics as listed above but is not proceeding with Immunization(s) COVID-19, Dtap/Tdap/Td and Shingles at this time. Orders placed for Immunization(s) Hep B.                               Chest:            Clear to auscultation, no wheezes, rales or rhonchi, symmetric air entry                                                  No use of accessory muscles                        Cardiac:           Normal rate and regular rhythm, S1 and S2 normal; PMI non-displaced                 Extremities:            Palpable pulses BLE                                                  Palpable rad/ulnar RUE                                                  Improved cyanosis Left thumb, hand warmer                                                    Triphasic ulnar signal at wrist, ultrasound color dopper flow of radial artery down to previous a-line site.         Significant Labs:  ABGs:   Recent Labs   Lab  09/21/18   0453   PH  7.474*   PCO2  39.9   PO2  65*   HCO3  29.3*   POCSATURATED  94*   BE  6     CBC:   Recent Labs   Lab  09/21/18   0404   WBC  15.11*  15.11*  15.11*   RBC  2.80*  2.80*  2.80*   HGB  8.5*  8.5*  8.5*   HCT  25.4*  25.4*  25.4*   PLT  142*  142*  142*   MCV  91  91  91   MCH  30.4  30.4  30.4   MCHC  33.5  33.5  33.5     CMP:   Recent Labs   Lab  09/21/18   0404   GLU  114*   CALCIUM  7.5*   ALBUMIN  1.0*   PROT  3.6*   NA  144   K  3.7   CO2  27   CL  109   BUN  27*   CREATININE  0.7   ALKPHOS  157*   ALT  28   AST  54*   BILITOT  0.8     Coagulation:   Recent Labs   Lab  09/21/18   0404  09/21/18   0549   LABPROT  14.3*   --    INR  1.4*   --    APTT  >150.0*  >150.0*       Significant Diagnostics:  I have reviewed all pertinent imaging results/findings within the past 24 hours.

## 2024-04-30 NOTE — PLAN OF CARE
"Patient lives alone in a 1 story house w/5-6 NAKUL, no railing. Brother, Ole, at BS. PT/OT recs pending. Patient reports he has h/o falling & was seeing a Dr. Valdovinos @ [facility] who told him he may have neuropathy. See D/c plans below.     Informed him while he is here, a Wound care nurse will teach him & show him how to care for his new colostomy. He asked ABBIE,"My brother just told me I have this. Do I have to have this w/me all the time?" ABBIE informed him yes, that he would have it for now, & he would have to talk w/his  to find out the final outcome;Reassured him the  will be by later today & as a , together w/the -who is on his team, we would make sure he has what he needs for discharge to home. D/c is not expected until early next week. Discussed barrier to potentially not being able to obtain HH. He verbalized his understanding & states,"I want to go home anyway."        09/13/18 1325   Discharge Assessment   Assessment Type Discharge Planning Assessment   Confirmed/corrected address and phone number on facesheet? Yes   Assessment information obtained from? Patient;Medical Record   Expected Length of Stay (days) (6)   Communicated expected length of stay with patient/caregiver no  (Per MD)   Prior to hospitilization cognitive status: Alert/Oriented;No Deficits   Prior to hospitalization functional status: Independent;Assistive Equipment   Current cognitive status: Alert/Oriented;No Deficits   Current Functional Status: Independent;Assistive Equipment;Needs Assistance   Facility Arrived From: (N/A)   Lives With alone   Able to Return to Prior Arrangements yes   Is patient able to care for self after discharge? Yes   Who are your caregiver(s) and their phone number(s)? (He has 2 brothers & 3 sisters who will help him as he needs. 1. Ole Saez Brother [phone] . 2.Yamila Saez Sister     [phone]    )   Patient's perception of discharge disposition home or " selfcare;other (comments)  (HH will be needed for new ostomy follow-up, ? PT, however he has [facility] Medicaid insurance=Barrier. SW may not find an accepting HH Agency. He will need follow-up w/OP Wound care nurse, potential OP PT pending PT/OT recs. CM discussed this w/patient, fly)   Readmission Within The Last 30 Days unable to assess   Patient currently being followed by outpatient case management? No   Patient currently receives any other outside agency services? No   Equipment Currently Used at Home walker, rolling;bedside commode   Do you have any problems affording any of your prescribed medications? No   Is the patient taking medications as prescribed? yes   Does the patient have transportation home? Yes   Transportation Available car;family or friend will provide   Dialysis Name and Scheduled days (N/A)   Does the patient receive services at the [facility]? No   Discharge Plan A Home;Other  (Family will assist him as he needs-see above.  ? OP PT.)   Discharge Plan B Rehab  (? Rehab pending PT/OT recs & if he has a working Dx. )   Patient/Family In Agreement With Plan yes      98.2

## 2025-01-14 NOTE — PROGRESS NOTES
I called and left a voicemail for the patient. Thank you [facility]  General Surgery  Progress Note    Subjective:     History of Present Illness:  No notes on file    Post-Op Info:  Procedure(s) (LRB):  LAPAROTOMY,EXPLORATORY (N/A)  GASTROSTOMY (N/A)  EGD (ESOPHAGOGASTRODUODENOSCOPY) (N/A)   1 Day Post-Op     Interval History:   Acute GI bleed yesterday- taken to OR for ex lap, found to have actively bleeding ulcer in remnant stomach, ulcer clipped with intraop assistance of GI. PEG placed in remnant stomach. Received 3U PRBCs, 2 FFP, 1 plt, 1 cryo. Has remained off pressors since OR.   UOP decreased o/n, responded well to 20 of lasix.   Decreasing vent settings 50%/10, AC Vt 350.     Medications:  Continuous Infusions:   lactated ringers 125 mL/hr at 09/16/18 0800    pantoprozole (PROTONIX) IV infusion 8 mg/hr (09/16/18 0800)    propofol 30 mcg/kg/min (09/16/18 0800)     Scheduled Meds:   albuterol-ipratropium  3 mL Nebulization Q6H WAKE    lidocaine (PF) 10 mg/ml (1%)  1 mL Other Once    piperacillin-tazobactam (ZOSYN) IVPB  4.5 g Intravenous Q8H    sodium chloride 0.9%  3 mL Intravenous Q8H    tamsulosin  0.4 mg Oral Daily     PRN Meds:sodium chloride, sodium chloride, magnesium sulfate IVPB, magnesium sulfate IVPB, ondansetron, potassium chloride in water, potassium chloride in water, potassium chloride in water, sodium chloride 0.9%, sodium phosphate IVPB, sodium phosphate IVPB, sodium phosphate IVPB     Review of patient's allergies indicates:  No Known Allergies    Objective:     Vital Signs (Most Recent):  Temp: 98.4 °F (36.9 °C) (09/16/18 0700)  Pulse: 91 (09/16/18 0830)  Resp: (!) 28 (09/16/18 0702)  BP: (!) 118/94 (09/15/18 1845)  SpO2: 100 % (09/16/18 0830) Vital Signs (24h Range):  Temp:  [93.7 °F (34.3 °C)-99.4 °F (37.4 °C)] 98.4 °F (36.9 °C)  Pulse:  [] 91  Resp:  [28] 28  SpO2:  [73 %-100 %] 100 %  BP: ()/(69-94) 118/94  Arterial Line BP: ()/() 120/86     Weight: 70.2 kg (154 lb 12.2 oz)  Body mass index  is 24.98 kg/m².    Intake/Output - Last 3 Shifts       09/14 0700 - 09/15 0659 09/15 0700 - 09/16 0659 09/16 0700 - 09/17 0659    P.O.       I.V. (mL/kg) 1312 (18.7) 5819.5 (82.9)     Blood  3028     NG/GT  150     IV Piggyback 4000 1000     Total Intake(mL/kg) 5312 (75.7) 9997.5 (142.4)     Urine (mL/kg/hr) 905 (0.5) 945 (0.6) 185 (1.5)    Drains  50     Other  800     Stool 400 50     Total Output 1305 1845 185    Net +4007 +8152.5 -185               Physical Exam    Gen: NAD, intubated and sedated, open eyes and turns head to voice but not following commands  HEENT: NCAT, ETT in place, NGT in place  Pulm: Coarse BS bilaterally  Vent Mode: A/C  Oxygen Concentration (%):  [50-60] 50  Resp Rate Total:  [28 br/min-36 br/min] 28 br/min  Vt Set:  [350 mL] 350 mL  PEEP/CPAP:  [10 oxS92-82 cmH20] 10 cmH20  Mean Airway Pressure:  [14 kyM79-72 cmH20] 14 cmH20  Abd: Soft, non distended, benign exam. Ostomy with blood tinged sweat and gas, mucosa appears purple but viable, PEG with minimal serosang output, incision dressing C/D/I  +Flank edema.  Extremities: no cyanosis or edema, or clubbing  Skin: Skin color, texture, turgor normal. No rashes or lesions    Significant Labs:  CBC:   Recent Labs   Lab  09/16/18   0751   WBC  13.40*   RBC  2.98*   HGB  8.8*   HCT  24.5*   PLT  158   MCV  82   MCH  29.5   MCHC  35.9     CMP:   Recent Labs   Lab  09/16/18   0300   GLU  84   CALCIUM  7.7*   ALBUMIN  2.1*   PROT  3.7*   NA  140   K  4.3   CO2  20*   CL  114*   BUN  14   CREATININE  0.9   ALKPHOS  81   ALT  17   AST  22   BILITOT  3.0*     Significant Diagnostics:  I have reviewed and interpreted all pertinent imaging results/findings within the past 24 hours.    Assessment/Plan:     * Perforated diverticulum of large intestine    62 year old man with perforated diverticulitis and free air s/p Tash's procedure 9/12/18     Neuro:  Sedation: Propofol gtt  Patient opens eyes, not tracking this am but turning head to voice,  consider head CT if does not improve today     CV:  - HDS; off levo and vaso gtt  - EKG post code with sinus tachycardia, no troponin bump  - A line and Central line 9/15- will need femoral line exchanged for IJ central line today now that coagulopathy has resolved  - continue flotrac, SVV improved    Pulm:  - intubated in severe ARDS after likely aspiration with respiratory code  - wean vent as tolerated per ARDSnet protocol  - ABG q6  - CXR showing bilateral diffuse infiltrates. Daily while intubated.     GI/FEN/Lytes:  - NPO  - continue NG  - Patient has PEG in remnant stomach- cont to gravity, flushing q6 hours, if blood from PEG concern for re-bleed of ulcer- monitor  - cont protonix gtt  - CMP, Mg, Phos qday  - replace lytes PRN per ICU protocol     Renal:  - monitor BUN/Cr (baseline 0.8)  - monitor UOP, may need more lasix today     Heme/ID:  - H/H stable since OR, will space labs to q6   - transfused 9/15 3U prbcs, 2 ffp, plt, cryo for acute GI bleed  - peritoneal cx growing Bacteroides and E. Coli  - Zosyn (day 3)  - lactate wnl  - f/u repeat Blood/Urine cx 9/15     Endo:  SSI     PPX: Lovenox    Dispo: continue SICU care, wean vent, monitor neuro status, remove and replace femoral central line            Shabana Rosen MD  General Surgery  [facility]

## (undated) DEVICE — CATH MALECOT 16FR 6EA/BX OR CA

## (undated) DEVICE — BLADE 4 INCH EDGE UN-INS

## (undated) DEVICE — KIT GASTROSTOMY PEG 20F

## (undated) DEVICE — ELECTRODE REM PLYHSV RETURN 9

## (undated) DEVICE — SEE MEDLINE ITEM 156902

## (undated) DEVICE — TOWEL OR XRAY WHITE 17X26IN

## (undated) DEVICE — DRAPE ABDOMINAL TIBURON 14X11

## (undated) DEVICE — DRAPE INCISE IOBAN 2 23X17IN

## (undated) DEVICE — STAPLER SKIN PROXIMATE WIDE

## (undated) DEVICE — SEE MEDLINE ITEM 146417

## (undated) DEVICE — DRESSING ADH ISLAND 3.6 X 14

## (undated) DEVICE — HEMOSTAT SURGICEL 4X8IN

## (undated) DEVICE — CUTTER PROXIMATE BLUE 75MM

## (undated) DEVICE — RELOAD PROXIMATE CUT BLUE 75MM

## (undated) DEVICE — SUT 1 48IN PDS II VIO MONO